# Patient Record
Sex: FEMALE | Race: WHITE | NOT HISPANIC OR LATINO | Employment: FULL TIME | URBAN - METROPOLITAN AREA
[De-identification: names, ages, dates, MRNs, and addresses within clinical notes are randomized per-mention and may not be internally consistent; named-entity substitution may affect disease eponyms.]

---

## 2017-04-24 ENCOUNTER — MYC MEDICAL ADVICE (OUTPATIENT)
Dept: PEDIATRICS | Facility: CLINIC | Age: 47
End: 2017-04-24

## 2017-04-24 NOTE — TELEPHONE ENCOUNTER
Appointment scheduled for 4pm Thursday 4/27/17 per patients request via Shared Performancet.    Elo Brady RN,   [facility]

## 2017-04-26 ENCOUNTER — TELEPHONE (OUTPATIENT)
Dept: OPTOMETRY | Facility: CLINIC | Age: 47
End: 2017-04-26

## 2017-04-26 ENCOUNTER — OFFICE VISIT (OUTPATIENT)
Dept: OPTOMETRY | Facility: CLINIC | Age: 47
End: 2017-04-26
Payer: COMMERCIAL

## 2017-04-26 DIAGNOSIS — H52.4 HYPEROPIA WITH ASTIGMATISM AND PRESBYOPIA, BILATERAL: Primary | ICD-10-CM

## 2017-04-26 DIAGNOSIS — H52.03 HYPEROPIA WITH ASTIGMATISM AND PRESBYOPIA, BILATERAL: Primary | ICD-10-CM

## 2017-04-26 DIAGNOSIS — H52.203 HYPEROPIA WITH ASTIGMATISM AND PRESBYOPIA, BILATERAL: Primary | ICD-10-CM

## 2017-04-26 PROCEDURE — 92310 CONTACT LENS FITTING OU: CPT | Performed by: OPTOMETRIST

## 2017-04-26 PROCEDURE — 92014 COMPRE OPH EXAM EST PT 1/>: CPT | Mod: GA | Performed by: OPTOMETRIST

## 2017-04-26 PROCEDURE — 92015 DETERMINE REFRACTIVE STATE: CPT | Performed by: OPTOMETRIST

## 2017-04-26 ASSESSMENT — VISUAL ACUITY
OD_CC: 20/25
OD_SC: 20/60
CORRECTION_TYPE: GLASSES
METHOD: SNELLEN - LINEAR
OS_SC+: -1
OS_CC+: +2
OS_CC: 20/30-1
OD_SC: 20/30
OS_SC: 20/40
OS_CC: 20/25
OD_SC+: -2
OS_SC: 20/30

## 2017-04-26 ASSESSMENT — REFRACTION_MANIFEST
OS_SPHERE: +0.50
OS_AXIS: 164
OD_AXIS: 010
OD_SPHERE: +0.25
METHOD_AUTOREFRACTION: 1
OD_CYLINDER: +1.25
OS_CYLINDER: +0.75
OD_SPHERE: +0.25
OS_SPHERE: +0.50
OD_AXIS: 020
OS_AXIS: 163
OD_CYLINDER: +1.25
OS_CYLINDER: +0.75

## 2017-04-26 ASSESSMENT — REFRACTION_WEARINGRX
SPECS_TYPE: SVL
OD_AXIS: 12
OD_CYLINDER: +1.50
OS_CYLINDER: +1.00
OS_SPHERE: +0.25
OD_SPHERE: PLANO
OS_AXIS: 160

## 2017-04-26 ASSESSMENT — REFRACTION_CURRENTRX
OS_BASECURVE: 8.6
OS_AXIS: 070
OD_DIAMETER: 14.0
OS_BASECURVE: 8.6
OS_ADDL_SPECS: DIST
OD_CYLINDER: -1.25
OS_BRAND: ACUVUE OASYS
OS_ADDL_SPECS: DIST
OS_DIAMETER: 14.0
OS_SPHERE: +1.25
OD_AXIS: 110
OD_ADDL_SPECS: NEAR
OS_BRAND: ACUVUE OASYS
OD_DIAMETER: 14.0
OS_AXIS: 070
OD_CYLINDER: -1.25
OD_BRAND: ACUVUE OASYS
OS_SPHERE: +1.25
OD_AXIS: 110
OD_BASECURVE: 8.6
OS_CYLINDER: -0.75
OD_BASECURVE: 8.6
OD_BRAND: ACUVUE OASYS
OD_ADDL_SPECS: DIST
OD_SPHERE: +1.50
OS_CYLINDER: -0.75
OD_SPHERE: +2.25
OS_DIAMETER: 14.0

## 2017-04-26 ASSESSMENT — KERATOMETRY
OS_K1POWER_DIOPTERS: 44.25
METHOD_AUTO_MANUAL: AUTOMATED
OD_AXISANGLE2_DEGREES: 099
OS_AXISANGLE_DEGREES: 171
OS_K2POWER_DIOPTERS: 45.00
OD_AXISANGLE_DEGREES: 009
OD_K1POWER_DIOPTERS: 44.50
OD_K2POWER_DIOPTERS: 45.75
OS_AXISANGLE2_DEGREES: 081

## 2017-04-26 ASSESSMENT — CONF VISUAL FIELD
OS_NORMAL: 1
OD_NORMAL: 1
METHOD: COUNTING FINGERS

## 2017-04-26 ASSESSMENT — EXTERNAL EXAM - LEFT EYE: OS_EXAM: NORMAL

## 2017-04-26 ASSESSMENT — EXTERNAL EXAM - RIGHT EYE: OD_EXAM: NORMAL

## 2017-04-26 ASSESSMENT — TONOMETRY
OD_IOP_MMHG: 18
OS_IOP_MMHG: 20
IOP_METHOD: APPLANATION

## 2017-04-26 ASSESSMENT — SLIT LAMP EXAM - LIDS
COMMENTS: NORMAL
COMMENTS: NORMAL

## 2017-04-26 ASSESSMENT — CUP TO DISC RATIO
OD_RATIO: 0.1
OS_RATIO: 0.2

## 2017-04-26 NOTE — MR AVS SNAPSHOT
After Visit Summary   4/26/2017    Lachelle Duque    MRN: [id number]           Patient Information     Date Of Birth          1970        Visit Information        Provider Department      4/26/2017 9:30 AM Savi Gray OD [facility]        Today's Diagnoses     Hyperopia with astigmatism and presbyopia, bilateral    -  1      Care Instructions    We will order the contact lenses and call you when they come in. Try the monovison and let me know if you like it or not.    Your eyes may be blurry at near and sensitive to light for several hours from the dilating drops.    Thank you!      Optometry Providers       Clinic Locations                                 Telephone Number   Dr. Savi Spann   [facility] and [place]  [phone]     [facility] [facility] Hours:                [facility] Hours:       [facility] Hours:  [address]   [address] [facility]     [address] [place] [address]   [place] [address]    [place] [address]  Phone: [phone]                    Phone [phone]                      Phone: [phone]                          Monday 8:00-7:00                          Monday 8:00-7:00                          Monday 8:00-7:00              Tuesday 8:00-6:00                          Tuesday 8:00-7:00                          Tuesday 8:00-7:00              Wednesday 8:00-6:00                  Wednesday 8:00-7:00                   Wednesday 8:00-7:00      Thursday 8:00-6:00                        Thursday 8:00-7:00                         Thursday 8:00-7:00            Friday 8:00-5:00                              Friday 8:00-5:00                              Friday 8:00-5:00    Please log on to [email or web address]) to order your contact lenses.  The link is found on the Eye Care and Vision Services page.  As always, Thank you for  trusting us with your health care needs!          Follow-ups after your visit        Follow-up notes from your care team     Return in about 1 year (around 4/26/2018) for comprehensive eye exam.      Your next 10 appointments already scheduled     Apr 27, 2017  4:00 PM CDT   Return Visit with ROSALIND Nazario CNP   [facility]    [id number] [place] [id number] [phone]              Who to contact     If you have questions or need follow up information about today's clinic visit or your schedule please contact [facility] directly at [phone].  Normal or non-critical lab and imaging results will be communicated to you by MyChart, letter or phone within 4 business days after the clinic has received the results. If you do not hear from us within 7 days, please contact the clinic through iSSimplehart or phone. If you have a critical or abnormal lab result, we will notify you by phone as soon as possible.  Submit refill requests through [employer] or call your pharmacy and they will forward the refill request to us. Please allow 3 business days for your refill to be completed.          Additional Information About Your Visit        MyChart Information     [employer] gives you secure access to your electronic health record. If you see a primary care provider, you can also send messages to your care team and make appointments. If you have questions, please call your primary care clinic.  If you do not have a primary care provider, please call [phone] and they will assist you.        Care EveryWhere ID     This is your Care EveryWhere ID. This could be used by other organizations to access your [place] medical records  [id number]         Blood Pressure from Last 3 Encounters:   09/06/16 110/60   02/26/16 110/60   10/27/15 129/80    Weight from Last 3 Encounters:   09/06/16 106.7 kg (235 lb 3.2 oz)   02/26/16 105.9 kg (233 lb  8 oz)   10/20/15 104.3 kg (230 lb)              We Performed the Following     CONTACT LENS FITTING,BILAT     EYE EXAM (SIMPLE-NONBILLABLE)     REFRACTION        Primary Care Provider Office Phone # Fax #    ROSALIND Nazario [phone]       [facility] [address]  [place] [address]        Thank you!     Thank you for choosing [facility]  for your care. Our goal is always to provide you with excellent care. Hearing back from our patients is one way we can continue to improve our services. Please take a few minutes to complete the written survey that you may receive in the mail after your visit with us. Thank you!             Your Updated Medication List - Protect others around you: Learn how to safely use, store and throw away your medicines at www.disposemymeds.org.          This list is accurate as of: 4/26/17 11:58 AM.  Always use your most recent med list.                   Brand Name Dispense Instructions for use    acyclovir 400 MG tablet    ZOVIRAX    180 tablet    one tid po for 7 days prn for flare ups and one daily for maintenance.       BENADRYL 25 MG capsule   Generic drug:  diphenhydrAMINE      Take 25 mg by mouth every 6 hours as needed.       fluticasone 50 MCG/ACT spray    FLONASE    16 g    Spray 2 sprays into both nostrils daily       norethindrone-ethinyl estradiol 1-35 MG-MCG per tablet    NORTREL 1/35 (28)    84 tablet    Take 1 tablet by mouth daily       omeprazole 20 MG tablet     30 tablet    Take 1 tablet by mouth daily. Take 30-60 minutes before a meal.       SUDAFED 12 HOUR 120 MG 12 hr tablet   Generic drug:  pseudoePHEDrine      Take 120 mg by mouth every 12 hours. PRN.       ZYRTEC ALLERGY 10 MG Caps   Generic drug:  cetirizine HCl      Take one tablet daily.

## 2017-04-26 NOTE — Clinical Note
Luna,  Please order prescription 1 and 2 for Lachelle. She can  both and either call or come in to finalize. Thanks!  Hb

## 2017-04-26 NOTE — TELEPHONE ENCOUNTER
Current Contact Lens Rx     Brand Base Curve Diameter Sphere Cylinder Axis Addl. Specs   Right Acuvue Oasys 8.6 14.0 +1.50 -1.25 110 Dist   Left Acuvue Oasys 8.6 14.0 +1.25 -0.75 070 Dist      Current Contact Lens Rx #2 (Trial Lens)     Brand Base Curve Diameter Sphere Cylinder Axis Addl. Specs   Right Acuvue Oasys 8.6 14.0 +2.25 -1.25 110 Near   Left Acuvue Oasys 8.6 14.0 +1.25 -0.75 070 Dist        Ordered from [facility].  Pt needs to let Dr. Gray know if the monovision works.  If works no need to come in for follow up.

## 2017-04-26 NOTE — PATIENT INSTRUCTIONS
We will order the contact lenses and call you when they come in. Try the monovison and let me know if you like it or not.    Your eyes may be blurry at near and sensitive to light for several hours from the dilating drops.    Thank you!      Optometry Providers       Clinic Locations                                 Telephone Number   Dr. Savi Spann   [facility] and [place]  [phone]     [facility] Hours:                [facility] Hours:       [facility] [place] [facility] Hours:  [address]   [address] [facility] [address]     [address] [place] [address]   [place] [address]    [place] [address]  Phone: [phone]                    Phone [phone]                      Phone: [phone]                          Monday 8:00-7:00                          Monday 8:00-7:00                          Monday 8:00-7:00              Tuesday 8:00-6:00                          Tuesday 8:00-7:00                          Tuesday 8:00-7:00              Wednesday 8:00-6:00                  Wednesday 8:00-7:00                   Wednesday 8:00-7:00      Thursday 8:00-6:00                        Thursday 8:00-7:00                         Thursday 8:00-7:00            Friday 8:00-5:00                              Friday 8:00-5:00                              Friday 8:00-5:00    Please log on to [email or web address] to order your contact lenses.  The link is found on the Eye Care and Vision Services page.  As always, Thank you for trusting us with your health care needs!

## 2017-04-26 NOTE — PROGRESS NOTES
Chief Complaint   Patient presents with     COMPREHENSIVE EYE EXAM     Contact Lens Fitting     waiver signed $60        Previous contact lens wearer? Yes: Acuvue Oasys for Astigmatism   Comfort of contact lenses :Pt likes her contacts. Wears for about 12 hours a day and uses biotru solution  Satisfied with current lenses: Yes        Last Eye Exam: 3/2016  Dilated Previously: Yes    What are you currently using to see?  glasses and contacts    Distance Vision Acuity: Satisfied with vision    Near Vision Acuity: Not satisfied . Pt will wear cheaters sometimes with contacts    Eye Comfort: good  Do you use eye drops? 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       OBJECTIVE: See Ophthalmology exam    ASSESSMENT:    ICD-10-CM    1. Hyperopia with astigmatism and presbyopia, bilateral H52.03 EYE EXAM (SIMPLE-NONBILLABLE)    H52.203 REFRACTION     CONTACT LENS FITTING,BILAT      PLAN:     Patient Instructions     We will order the contact lenses and call you when they come in. Try the monovison and let me know if you like it or not.    Your eyes may be blurry at near and sensitive to light for several hours from the dilating drops.    Thank you!      Optometry Providers       Clinic Locations                                 Telephone Number   Dr. Savi Spann   [facility]  [phone]     [facility] Hours:                [place] [facility] Hours:       [facility] Hours:  [address] [facility] [address]   [address]     [address] [place] [address]   [place] [address]    [place] [address]  Phone: [phone]                    Phone [phone]                      Phone: [phone]                          Monday 8:00-7:00                           Monday 8:00-7:00                          Monday 8:00-7:00              Tuesday 8:00-6:00                          Tuesday 8:00-7:00                          Tuesday 8:00-7:00              Wednesday 8:00-6:00                  Wednesday 8:00-7:00                   Wednesday 8:00-7:00      Thursday 8:00-6:00                        Thursday 8:00-7:00                         Thursday 8:00-7:00            Friday 8:00-5:00                              Friday 8:00-5:00                              Friday 8:00-5:00    Please log on to [email or web address] to order your contact lenses.  The link is found on the Eye Care and Vision Services page.  As always, Thank you for trusting us with your health care needs!

## 2017-05-10 ENCOUNTER — TELEPHONE (OUTPATIENT)
Dept: OPTOMETRY | Facility: CLINIC | Age: 47
End: 2017-05-10

## 2017-05-10 ASSESSMENT — REFRACTION_MANIFEST
OD_ADD: +1.50
OS_ADD: +1.50

## 2017-05-10 NOTE — TELEPHONE ENCOUNTER
Reason for Call:  Other call back    Detailed comments: Lachelle called asking someone to give her a call regarding the trial pair of contact lens she is currently using. Has a couple general questions, did not specify    Phone Number Patient can be reached at: Home number on file [phone] (home)    Best Time: Any    Can we leave a detailed message on this number? YES    Call taken on 5/10/2017 at 5:38 PM by Sue Berrios

## 2017-05-11 NOTE — TELEPHONE ENCOUNTER
Called Lachelle. She likes the mono fit. Will finalize tonight and fax to her when she calls with the number tomorrow.    Savi Gray O.D.  Tel: [phone]

## 2017-08-18 ENCOUNTER — RADIANT APPOINTMENT (OUTPATIENT)
Dept: MAMMOGRAPHY | Facility: CLINIC | Age: 47
End: 2017-08-18
Attending: NURSE PRACTITIONER
Payer: COMMERCIAL

## 2017-08-18 DIAGNOSIS — Z12.31 VISIT FOR SCREENING MAMMOGRAM: ICD-10-CM

## 2017-08-18 PROCEDURE — 77063 BREAST TOMOSYNTHESIS BI: CPT | Performed by: RADIOLOGY

## 2017-08-18 PROCEDURE — G0202 SCR MAMMO BI INCL CAD: HCPCS | Performed by: RADIOLOGY

## 2017-09-08 ENCOUNTER — OFFICE VISIT (OUTPATIENT)
Dept: PEDIATRICS | Facility: CLINIC | Age: 47
End: 2017-09-08
Payer: COMMERCIAL

## 2017-09-08 VITALS
SYSTOLIC BLOOD PRESSURE: 115 MMHG | OXYGEN SATURATION: 97 % | HEART RATE: 76 BPM | HEIGHT: 66 IN | DIASTOLIC BLOOD PRESSURE: 80 MMHG | BODY MASS INDEX: 39.55 KG/M2 | TEMPERATURE: 97.1 F | WEIGHT: 246.1 LBS

## 2017-09-08 DIAGNOSIS — Z00.00 ENCOUNTER FOR ROUTINE ADULT HEALTH EXAMINATION WITHOUT ABNORMAL FINDINGS: Primary | ICD-10-CM

## 2017-09-08 DIAGNOSIS — Z30.09 ENCOUNTER FOR OTHER GENERAL COUNSELING OR ADVICE ON CONTRACEPTION: ICD-10-CM

## 2017-09-08 DIAGNOSIS — Z13.6 CARDIOVASCULAR SCREENING; LDL GOAL LESS THAN 160: ICD-10-CM

## 2017-09-08 DIAGNOSIS — Z00.00 ENCOUNTER FOR ROUTINE ADULT HEALTH EXAMINATION WITHOUT ABNORMAL FINDINGS: ICD-10-CM

## 2017-09-08 DIAGNOSIS — E55.9 VITAMIN D INSUFFICIENCY: ICD-10-CM

## 2017-09-08 LAB
ANION GAP SERPL CALCULATED.3IONS-SCNC: 5 MMOL/L (ref 3–14)
BUN SERPL-MCNC: 14 MG/DL (ref 7–30)
CALCIUM SERPL-MCNC: 8.9 MG/DL (ref 8.5–10.1)
CHLORIDE SERPL-SCNC: 108 MMOL/L (ref 94–109)
CHOLEST SERPL-MCNC: 192 MG/DL
CO2 SERPL-SCNC: 27 MMOL/L (ref 20–32)
CREAT SERPL-MCNC: 0.67 MG/DL (ref 0.52–1.04)
DEPRECATED CALCIDIOL+CALCIFEROL SERPL-MC: 26 UG/L (ref 20–75)
GFR SERPL CREATININE-BSD FRML MDRD: >90 ML/MIN/1.7M2
GLUCOSE SERPL-MCNC: 107 MG/DL (ref 70–99)
HDLC SERPL-MCNC: 38 MG/DL
LDLC SERPL CALC-MCNC: 121 MG/DL
NONHDLC SERPL-MCNC: 154 MG/DL
POTASSIUM SERPL-SCNC: 3.9 MMOL/L (ref 3.4–5.3)
SODIUM SERPL-SCNC: 140 MMOL/L (ref 133–144)
TRIGL SERPL-MCNC: 165 MG/DL

## 2017-09-08 PROCEDURE — 99396 PREV VISIT EST AGE 40-64: CPT | Performed by: NURSE PRACTITIONER

## 2017-09-08 PROCEDURE — 82306 VITAMIN D 25 HYDROXY: CPT | Performed by: NURSE PRACTITIONER

## 2017-09-08 PROCEDURE — 36415 COLL VENOUS BLD VENIPUNCTURE: CPT | Performed by: NURSE PRACTITIONER

## 2017-09-08 PROCEDURE — 80048 BASIC METABOLIC PNL TOTAL CA: CPT | Performed by: NURSE PRACTITIONER

## 2017-09-08 PROCEDURE — 80061 LIPID PANEL: CPT | Performed by: NURSE PRACTITIONER

## 2017-09-08 NOTE — PROGRESS NOTES
SUBJECTIVE:   CC: Lachelle Duque is an 47 year old woman who presents for preventive health visit.     Healthy Habits:    Do you get at least three servings of calcium containing foods daily (dairy, green leafy vegetables, etc.)? yes    Amount of exercise or daily activities, outside of work: 4-5 day(s) per week    Problems taking medications regularly No    Medication side effects: No    Have you had an eye exam in the past two years? yes    Do you see a dentist twice per year? yes  Do you have sleep apnea, excessive snoring or daytime drowsiness?no      Today's PHQ-2 Score:   PHQ-2 ( 1999 Pfizer) 9/8/2017 9/6/2016   Q1: Little interest or pleasure in doing things 0 0   Q2: Feeling down, depressed or hopeless 0 0   PHQ-2 Score 0 0         Abuse: Current or Past(Physical, Sexual or Emotional)- NA  Do you feel safe in your environment - Yes  Social History   Substance Use Topics     Smoking status: Never Smoker     Smokeless tobacco: Never Used     Alcohol use 0.5 oz/week     1 Standard drinks or equivalent per week      Comment: occasionally     The patient does not drink >3 drinks per day nor >7 drinks per week.    Reviewed orders with patient.  Reviewed health maintenance and updated orders accordingly - Yes  Labs reviewed in EPIC  BP Readings from Last 3 Encounters:   09/08/17 115/80   09/06/16 110/60   02/26/16 110/60    Wt Readings from Last 3 Encounters:   09/08/17 246 lb 1.6 oz (111.6 kg)   09/06/16 235 lb 3.2 oz (106.7 kg)   02/26/16 233 lb 8 oz (105.9 kg)                  Patient Active Problem List   Diagnosis     Encounter for other general counseling or advice on contraception     Herpes zoster     Family history of colon cancer     Hyperlipidemia LDL goal <130     Obesity     Vitamin D deficiency     Hiatal hernia     GERD (gastroesophageal reflux disease)     Cholelithiasis     Biliary colic     Environmental allergies     Elevation of optic disc, right     Pupil asymmetry     Past Surgical  History:   Procedure Laterality Date     ADENOIDECTOMY  1977     COLONOSCOPY N/A 10/27/2015    Procedure: COLONOSCOPY;  Surgeon: Duane, William Charles, MD;  Location: [facility] OR     COLONOSCOPY WITH CO2 INSUFFLATION N/A 10/27/2015    Procedure: COLONOSCOPY WITH CO2 INSUFFLATION;  Surgeon: Duane, William Charles, MD;  Location: [facility] OR     HC TOOTH EXTRACTION W/FORCEP      18 yo     LAPAROSCOPIC CHOLECYSTECTOMY  1/30/2013    Procedure: LAPAROSCOPIC CHOLECYSTECTOMY;  Laparoscopic Cholecystectomy;  Surgeon: Cindy Soni MD;  Location: [facility] OR     SINUS SURGERY  1988    Deviated septum     wisdom teeth removed[         Social History   Substance Use Topics     Smoking status: Never Smoker     Smokeless tobacco: Never Used     Alcohol use 0.5 oz/week     1 Standard drinks or equivalent per week      Comment: occasionally     Family History   Problem Relation Age of Onset     C.A.D. Father      bypass     DIABETES Father      Cancer - colorectal Father 42     GASTROINTESTINAL DISEASE Mother      diverticulosis     Arthritis Mother      fibromyalgia, lupus, rheumatoid     Anesthesia Reaction Mother      nausea     Asthma Mother      Lipids Mother      DIABETES Mother      Macular Degeneration Other      CANCER Other 29     CML     Breast Cancer Other      Colon Polyps Brother      C.A.D. Maternal Grandfather      CEREBROVASCULAR DISEASE Maternal Grandfather      Hypertension Maternal Grandmother      Glaucoma Maternal Grandmother      GASTROINTESTINAL DISEASE Maternal Grandmother      diverticulosis     Depression Brother      Arthritis Brother      fibromyalgia     Anesthesia Reaction Brother      nausea     CANCER Maternal Aunt      Breast Cancer Maternal Aunt      Thyroid Disease No family hx of      Prostate Cancer No family hx of          Current Outpatient Prescriptions   Medication Sig Dispense Refill     norethindrone-ethinyl estradiol (NORTREL 1/35, 28,) 1-35 MG-MCG per tablet Take 1 tablet by mouth daily 84  tablet 4     acyclovir (ZOVIRAX) 400 MG tablet one tid po for 7 days prn for flare ups and one daily for maintenance. 180 tablet 3     fluticasone (FLONASE) 50 MCG/ACT nasal spray Spray 2 sprays into both nostrils daily 16 g 3     omeprazole 20 MG tablet Take 1 tablet by mouth daily. Take 30-60 minutes before a meal. 30 tablet 1     diphenhydrAMINE (BENADRYL) 25 MG capsule Take 25 mg by mouth every 6 hours as needed.       cetirizine HCl (ZYRTEC ALLERGY) 10 MG CAPS Take one tablet daily.       pseudoePHEDrine (SUDAFED 12 HOUR) 120 MG 12 hr tablet Take 120 mg by mouth every 12 hours. PRN.        [DISCONTINUED] norethindrone-ethinyl estradiol (NORTREL 1/35, 28,) 1-35 MG-MCG per tablet Take 1 tablet by mouth daily 84 tablet 4     Allergies   Allergen Reactions     Sulfa Drugs Anaphylaxis           Patient under age 50, mutual decision reflected in health maintenance.        Pertinent mammograms are reviewed under the imaging tab.  History of abnormal Pap smear: NO - age 30- 65 PAP every 3 years recommended    Reviewed and updated as needed this visit by clinical staffTobacco  Allergies  Meds  Med Hx  Surg Hx  Fam Hx  Soc Hx        Reviewed and updated as needed this visit by Provider        Past Medical History:   Diagnosis Date     Chronic rhinitis      Environmental allergies 1/18/2013     Gastro-oesophageal reflux disease      GERD (gastroesophageal reflux disease) 1/3/2013     Herpes zoster without mention of complication     L eye      Hiatal hernia      PONV (postoperative nausea and vomiting)       Past Surgical History:   Procedure Laterality Date     ADENOIDECTOMY  1977     COLONOSCOPY N/A 10/27/2015    Procedure: COLONOSCOPY;  Surgeon: Duane, William Charles, MD;  Location: [facility]     COLONOSCOPY WITH CO2 INSUFFLATION N/A 10/27/2015    Procedure: COLONOSCOPY WITH CO2 INSUFFLATION;  Surgeon: Duane, William Charles, MD;  Location: [facility] OR     HC TOOTH EXTRACTION W/FORCEP      18 yo     LAPAROSCOPIC  "CHOLECYSTECTOMY  1/30/2013    Procedure: LAPAROSCOPIC CHOLECYSTECTOMY;  Laparoscopic Cholecystectomy;  Surgeon: Cindy Soni MD;  Location: [facility] OR     SINUS SURGERY  1988    Deviated septum     wisdom teeth removed[         ROS:  CONSTITUTIONAL:NEGATIVE for fever, chills, change in weight  INTEGUMENTARY/SKIN: NEGATIVE for worrisome rashes, moles or lesions  EYES: NEGATIVE for vision changes or irritation  ENT: NEGATIVE for ear, mouth and throat problems  RESP:NEGATIVE for significant cough or SOB  BREAST: NEGATIVE for masses, tenderness or discharge  CV: NEGATIVE for chest pain, palpitations or peripheral edema  GI: NEGATIVE for nausea, abdominal pain, heartburn, or change in bowel habits   female: normal menses, no unusual urinary symptoms and no unusual vaginal symptoms  MUSCULOSKELETAL:NEGATIVE for significant arthralgias or myalgia  NEURO: NEGATIVE for weakness, dizziness or paresthesias  ENDOCRINE: NEGATIVE for temperature intolerance, skin/hair changes  HEME/ALLERGY/IMMUNE: NEGATIVE for bleeding problems  PSYCHIATRIC: NEGATIVE for changes in mood or affect     OBJECTIVE:   /80 (BP Location: Right arm, Patient Position: Sitting, Cuff Size: Adult Large)  Pulse 76  Temp 97.1  F (36.2  C) (Temporal)  Ht 5' 6.25\" (1.683 m)  Wt 246 lb 1.6 oz (111.6 kg)  LMP 08/23/2017  SpO2 97%  Breastfeeding? No  BMI 39.42 kg/m2   Wt Readings from Last 4 Encounters:   09/08/17 246 lb 1.6 oz (111.6 kg)   09/06/16 235 lb 3.2 oz (106.7 kg)   02/26/16 233 lb 8 oz (105.9 kg)   10/20/15 230 lb (104.3 kg)       EXAM:  GENERAL: alert, no distress and obese  EYES: Eyes grossly normal to inspection, PERRL and conjunctivae and sclerae normal  HENT: ear canals and TM's normal, nose and mouth without ulcers or lesions  NECK: no adenopathy, no asymmetry, masses, or scars and thyroid normal to palpation  RESP: lungs clear to auscultation - no rales, rhonchi or wheezes  BREAST: normal without masses, tenderness or nipple " discharge and no palpable axillary masses or adenopathy  CV: regular rate and rhythm, normal S1 S2, no S3 or S4, no murmur, click or rub, no peripheral edema and peripheral pulses strong  ABDOMEN: soft, nontender, no hepatosplenomegaly, no masses and bowel sounds normal   (female): normal female external genitalia, normal urethral meatus, vaginal mucosa pink, moist, well rugated, and normal cervix/adnexa/uterus without masses or discharge  MS: no gross musculoskeletal defects noted, no edema  SKIN: no suspicious lesions or rashes  NEURO: Normal strength and tone, mentation intact and speech normal  PSYCH: mentation appears normal, affect normal/bright  LYMPH: no cervical, supraclavicular, axillary, or inguinal adenopathy    ASSESSMENT/PLAN:   Lachelle was seen today for physical.    Diagnoses and all orders for this visit:    Encounter for routine adult health examination without abnormal findings    Encounter for other general counseling or advice on contraception  -     norethindrone-ethinyl estradiol (NORTREL 1/35, 28,) 1-35 MG-MCG per tablet; Take 1 tablet by mouth daily    PLAN:   1.   Symptomatic therapy suggested: Continue current medications.  2.  Orders Placed This Encounter   Medications     norethindrone-ethinyl estradiol (NORTREL 1/35, 28,) 1-35 MG-MCG per tablet     Sig: Take 1 tablet by mouth daily     Dispense:  84 tablet     Refill:  4     3. Patient needs to follow up in if no improvement,or sooner if worsening of symptoms or other symptoms develop.  Follow up office visit in one year for annual health maintenance exam, sooner PRN.    COUNSELING:   Reviewed preventive health counseling, as reflected in patient instructions  Special attention given to:        Regular exercise       Healthy diet/nutrition       Vision screening       Contraception       Family planning       The 10-year ASCVD risk score (Molly RAFAEL Jr, et al., 2013) is: 1.1%    Values used to calculate the score:      Age: 47 years       "Sex: Female      Is Non- : No      Diabetic: No      Tobacco smoker: No      Systolic Blood Pressure: 115 mmHg      Is BP treated: No      HDL Cholesterol: 38 mg/dL      Total Cholesterol: 192 mg/dL         reports that she has never smoked. She has never used smokeless tobacco.    Estimated body mass index is 39.42 kg/(m^2) as calculated from the following:    Height as of this encounter: 5' 6.25\" (1.683 m).    Weight as of this encounter: 246 lb 1.6 oz (111.6 kg).   Weight management plan: Discussed healthy diet and exercise guidelines and patient will follow up in 12 months in clinic to re-evaluate.    Counseling Resources:  ATP IV Guidelines  Pooled Cohorts Equation Calculator  Breast Cancer Risk Calculator  FRAX Risk Assessment  ICSI Preventive Guidelines  Dietary Guidelines for Americans, 2010  USDA's MyPlate  ASA Prophylaxis  Lung CA Screening    ROSALIND Nazario CNP  [facility]  "

## 2017-09-08 NOTE — NURSING NOTE
"Chief Complaint   Patient presents with     Physical     AFE       Initial /80 (BP Location: Right arm, Patient Position: Sitting, Cuff Size: Adult Large)  Pulse 76  Temp 97.1  F (36.2  C) (Temporal)  Ht 5' 6.25\" (1.683 m)  Wt 246 lb 1.6 oz (111.6 kg)  LMP 08/23/2017  SpO2 97%  Breastfeeding? No  BMI 39.42 kg/m2 Estimated body mass index is 39.42 kg/(m^2) as calculated from the following:    Height as of this encounter: 5' 6.25\" (1.683 m).    Weight as of this encounter: 246 lb 1.6 oz (111.6 kg).  Medication Reconciliation: complete      NINO Obrien      "

## 2017-09-08 NOTE — MR AVS SNAPSHOT
After Visit Summary   9/8/2017    Lachelle Duuqe    MRN: [id number]           Patient Information     Date Of Birth          1970        Visit Information        Provider Department      9/8/2017 7:40 AM Suha Beavers APRN CNP [facility]        Today's Diagnoses     Encounter for routine adult health examination without abnormal findings    -  1    Encounter for other general counseling or advice on contraception          Care Instructions    PLAN:   1.   Symptomatic therapy suggested: Continue current medications.  2.  Orders Placed This Encounter   Medications     norethindrone-ethinyl estradiol (NORTREL 1/35, 28,) 1-35 MG-MCG per tablet     Sig: Take 1 tablet by mouth daily     Dispense:  84 tablet     Refill:  4     3. Patient needs to follow up in if no improvement,or sooner if worsening of symptoms or other symptoms develop.  Follow up office visit in one year for annual health maintenance exam, sooner PRN.    Preventive Health Recommendations  Female Ages 40 to 49    Yearly exam:     See your health care provider every year in order to  1. Review health changes.   2. Discuss preventive care.    3. Review your medicines if your doctor prescribed any.      Get a Pap test every three years (unless you have an abnormal result and your provider advises testing more often).      If you get Pap tests with HPV test, you only need to test every 5 years, unless you have an abnormal result. You do not need a Pap test if your uterus was removed (hysterectomy) and you have not had cancer.      You should be tested each year for STDs (sexually transmitted diseases), if you're at risk.       Ask your doctor if you should have a mammogram.      Have a colonoscopy (test for colon cancer) if someone in your family has had colon cancer or polyps before age 50.       Have a cholesterol test every 5 years.       Have a diabetes test (fasting glucose) after age 45. If you are at risk  for diabetes, you should have this test every 3 years.    Shots: Get a flu shot each year. Get a tetanus shot every 10 years.     Nutrition:     Eat at least 5 servings of fruits and vegetables each day.    Eat whole-grain bread, whole-wheat pasta and brown rice instead of white grains and rice.    Talk to your provider about Calcium and Vitamin D.     Lifestyle    Exercise at least 150 minutes a week (an average of 30 minutes a day, 5 days a week). This will help you control your weight and prevent disease.    Limit alcohol to one drink per day.    No smoking.     Wear sunscreen to prevent skin cancer.    See your dentist every six months for an exam and cleaning.  It was a pleasure seeing you today at the [facility]. Thank you for allowing us to care for you today. We truly hope we provided you with the excellent service you deserve. Please let us know if there is anything else we can do for you so we can be sure you are leaving completley satisfied with your care experience.       General information about your clinic   Clinic Hours Lab Hours (Appointments are required)   Mon-Thurs: 7:30 AM - 7 PM Mon-Thurs: 7:30 AM - 7 PM   Fri: 7:30 AM - 5 PM Fri: 7:30 AM - 5 PM        After Hours Nurse Advise & Appts:  [facility]: [phone]  [facility] On Call: to make appointments anytime: [phone] On Call Physician: call [phone] and answering service will page the on call physician.        For urgent appointments, please call [phone] and ask for the triage nurse or your care team clinic nurse.  How to contact my care team:  MyChart: [email or web address]   Phone: [phone]   Fax: [phone]       [facility]:   Phone: [phone]  Hours: 8:00 AM - 6:00 PM  Medication Refills:  Call your pharmacy and they will forward the refill to us. Please allow 3 business days for your refills to be completed.       Normal or non-critical lab and imaging results  will be communicated to you by MyChart, letter or phone within 7 days.  If you do not hear from us within 10 days, please call the clinic. If you have a critical or abnormal lab result, we will notify you by phone as soon as possible.       We now have PWIC (Pediatric Walk in Care)  Monday-Friday from 7:30-4. Simply walk in and be seen for your urgent needs like cough, fever, rash, diarrhea or vomiting, pink eye, UTI. No appointments needed. Ask one of the team for more information      -Your Care Team:    Dr. Senait Shen - Internal Medicine/Pediatrics   Dr. Yue Wiggins - Family Medicine  Dr. Kirsten Iqbal - Pediatrics  Dr. Polly Tovar - Pediatrics  Suha Beavers CNP - Family Practice Nurse Practitioner            Follow-ups after your visit        Who to contact     If you have questions or need follow up information about today's clinic visit or your schedule please contact [facility] directly at [phone].  Normal or non-critical lab and imaging results will be communicated to you by Fangddhart, letter or phone within 4 business days after the clinic has received the results. If you do not hear from us within 7 days, please contact the clinic through Fangddhart or phone. If you have a critical or abnormal lab result, we will notify you by phone as soon as possible.  Submit refill requests through [employer] or call your pharmacy and they will forward the refill request to us. Please allow 3 business days for your refill to be completed.          Additional Information About Your Visit        [employer] Information     [employer] gives you secure access to your electronic health record. If you see a primary care provider, you can also send messages to your care team and make appointments. If you have questions, please call your primary care clinic.  If you do not have a primary care provider, please call [phone] and they will assist you.      [employer] is an electronic gateway that provides easy,  "online access to your medical records. With dianboom, you can request a clinic appointment, read your test results, renew a prescription or communicate with your care team.     To access your existing account, please contact your [facility] or call [phone] for assistance.        Care EveryWhere ID     This is your Care EveryWhere ID. This could be used by other organizations to access your [place] medical records  [id number]        Your Vitals Were     Pulse Temperature Height Last Period Pulse Oximetry Breastfeeding?    76 97.1  F (36.2  C) (Temporal) 5' 6.25\" (1.683 m) 08/23/2017 97% No    BMI (Body Mass Index)                   39.42 kg/m2            Blood Pressure from Last 3 Encounters:   09/08/17 115/80   09/06/16 110/60   02/26/16 110/60    Weight from Last 3 Encounters:   09/08/17 246 lb 1.6 oz (111.6 kg)   09/06/16 235 lb 3.2 oz (106.7 kg)   02/26/16 233 lb 8 oz (105.9 kg)              Today, you had the following     No orders found for display         Where to get your medicines      These medications were sent to [facility] - [place], [place] - [address]  [address] [facility] [address]    Hours:  Mon-Fri 8:30am-5:00pm Toll Free [phone] Phone:  [phone]     norethindrone-ethinyl estradiol 1-35 MG-MCG per tablet          Primary Care Provider Office Phone # Fax #    Suha nAnel Beavers, APRN [facility] [phone]       [address]  [place] [address]        Equal Access to Services     TUCKER GARCIA AH: [place]lin[place] Kim, waraimundoda lumarkoadaha, qaybta kaalmada pema, lior rosales. So [facility] [phone].    ATENCIÓN: Si habla español, tiene a knight disposición servicios gratuitos de asistencia lingüística. Llame al [phone].    We comply with applicable federal civil rights laws and [place] laws. We do not discriminate on the basis of race, color, " national origin, age, disability sex, sexual orientation or gender identity.            Thank you!     Thank you for choosing [facility]  for your care. Our goal is always to provide you with excellent care. Hearing back from our patients is one way we can continue to improve our services. Please take a few minutes to complete the written survey that you may receive in the mail after your visit with us. Thank you!             Your Updated Medication List - Protect others around you: Learn how to safely use, store and throw away your medicines at www.disposemymeds.org.          This list is accurate as of: 9/8/17  8:02 AM.  Always use your most recent med list.                   Brand Name Dispense Instructions for use Diagnosis    acyclovir 400 MG tablet    ZOVIRAX    180 tablet    one tid po for 7 days prn for flare ups and one daily for maintenance.    Herpes zoster without complication       BENADRYL 25 MG capsule   Generic drug:  diphenhydrAMINE      Take 25 mg by mouth every 6 hours as needed.        fluticasone 50 MCG/ACT spray    FLONASE    16 g    Spray 2 sprays into both nostrils daily    Chronic rhinitis       norethindrone-ethinyl estradiol 1-35 MG-MCG per tablet    NORTREL 1/35 (28)    84 tablet    Take 1 tablet by mouth daily    Encounter for other general counseling or advice on contraception       omeprazole 20 MG tablet     30 tablet    Take 1 tablet by mouth daily. Take 30-60 minutes before a meal.    Recurring abdominal pain, GERD (gastroesophageal reflux disease)       SUDAFED 12 HOUR 120 MG 12 hr tablet   Generic drug:  pseudoePHEDrine      Take 120 mg by mouth every 12 hours. PRN.        ZYRTEC ALLERGY 10 MG Caps   Generic drug:  cetirizine HCl      Take one tablet daily.

## 2017-09-11 NOTE — PROGRESS NOTES
Yanelis Duque,    Attached are your test results.  -Kidney function (GFR) is normal.  -Sodium is normal.  -Potassium is normal.  -Glucose is slight elevated and may be sign of early diabetes (prediabetes). ADVISE:: low carbohydrate diet, exercise, try to lose weight (if necessary) and recheck glucose in 12 months. (GLU,A1C, DX: prediabetes)  -LDL(bad) cholesterol level is elevated, HDL(good) cholesterol level is low and your triglycerides are elevated which can increase your heart disease risk.  A diet high in fat and simple carbohydrates, genetics and being overweight can contribute to this. ADVISE: Exercise, a low fat, low carbohydrate diet, weight control, and omega-3 fatty acids (fish oil) daily are helpful to improve this.  Rechecking your fasting cholesterol panel in 12 months is recommended (Lipid w/ LDL reflex, DX: hyperlipidemia)  -Vitamin D level is mildly below normal, 1000 IU daily in diet or supplements is recommended.    Please contact us if you have any questions.    Suha Beavers, CNP

## 2018-04-27 ENCOUNTER — OFFICE VISIT (OUTPATIENT)
Dept: OPTOMETRY | Facility: CLINIC | Age: 48
End: 2018-04-27
Payer: COMMERCIAL

## 2018-04-27 DIAGNOSIS — H52.203 ASTIGMATISM OF BOTH EYES WITH PRESBYOPIA: Primary | ICD-10-CM

## 2018-04-27 DIAGNOSIS — H47.391 ELEVATION OF OPTIC DISC, RIGHT: ICD-10-CM

## 2018-04-27 DIAGNOSIS — H52.4 ASTIGMATISM OF BOTH EYES WITH PRESBYOPIA: Primary | ICD-10-CM

## 2018-04-27 PROCEDURE — 92014 COMPRE OPH EXAM EST PT 1/>: CPT | Performed by: OPTOMETRIST

## 2018-04-27 PROCEDURE — 92015 DETERMINE REFRACTIVE STATE: CPT | Performed by: OPTOMETRIST

## 2018-04-27 ASSESSMENT — REFRACTION_MANIFEST
OD_CYLINDER: +1.00
OS_SPHERE: +0.25
OS_AXIS: 166
OD_ADD: +1.75
OD_AXIS: 010
OS_ADD: +1.75
OS_CYLINDER: +1.00
METHOD_AUTOREFRACTION: 1
OS_SPHERE: +0.25
OD_CYLINDER: +1.25
OS_CYLINDER: +1.00
OD_SPHERE: PLANO
OS_AXIS: 175
OD_SPHERE: +0.50
OD_AXIS: 010

## 2018-04-27 ASSESSMENT — CUP TO DISC RATIO
OS_RATIO: 0.2
OD_RATIO: 0.1

## 2018-04-27 ASSESSMENT — REFRACTION_WEARINGRX
OS_AXIS: 163
OS_ADD: +1.50
OD_CYLINDER: +1.25
OD_AXIS: 020
OS_SPHERE: +0.50
OD_ADD: +1.50
SPECS_TYPE: PAL
OS_CYLINDER: +0.75
OD_SPHERE: +0.25

## 2018-04-27 ASSESSMENT — VISUAL ACUITY
OS_SC: 20/20-1
OD_SC: 20/30
OD_SC: 20/30
METHOD: SNELLEN - LINEAR
OD_SC+: +1
OD_CC: 20/25
CORRECTION_TYPE: GLASSES
OS_CC+: -1
OS_SC: 20/25
OS_CC: 20/20

## 2018-04-27 ASSESSMENT — SLIT LAMP EXAM - LIDS
COMMENTS: NORMAL
COMMENTS: NORMAL

## 2018-04-27 ASSESSMENT — EXTERNAL EXAM - RIGHT EYE: OD_EXAM: NORMAL

## 2018-04-27 ASSESSMENT — TONOMETRY
OD_IOP_MMHG: 15
IOP_METHOD: APPLANATION
OS_IOP_MMHG: 18

## 2018-04-27 ASSESSMENT — CONF VISUAL FIELD
OS_NORMAL: 1
METHOD: COUNTING FINGERS
OD_NORMAL: 1

## 2018-04-27 ASSESSMENT — EXTERNAL EXAM - LEFT EYE: OS_EXAM: NORMAL

## 2018-04-27 NOTE — PATIENT INSTRUCTIONS
There was very little change in the prescription for your glasses and contact lenses.    Your eyes may be blurry at near and sensitive to light for several hours from the dilating drops.    Yearly eye exams recommended.    Thank you!

## 2018-04-27 NOTE — MR AVS SNAPSHOT
After Visit Summary   4/27/2018    Lachelle Duque    MRN: [id number]           Patient Information     Date Of Birth          1970        Visit Information        Provider Department      4/27/2018 8:40 AM Savi Gray OD [facility]        Today's Diagnoses     Astigmatism of both eyes with presbyopia    -  1    Elevation of optic disc, right          Care Instructions    There was very little change in the prescription for your glasses and contact lenses.    Your eyes may be blurry at near and sensitive to light for several hours from the dilating drops.    Yearly eye exams recommended.    Thank you!          Follow-ups after your visit        Who to contact     If you have questions or need follow up information about today's clinic visit or your schedule please contact [facility] directly at [phone].  Normal or non-critical lab and imaging results will be communicated to you by MyChart, letter or phone within 4 business days after the clinic has received the results. If you do not hear from us within 7 days, please contact the clinic through Flashnoteshart or phone. If you have a critical or abnormal lab result, we will notify you by phone as soon as possible.  Submit refill requests through [employer] or call your pharmacy and they will forward the refill request to us. Please allow 3 business days for your refill to be completed.          Additional Information About Your Visit        MyChart Information     [employer] gives you secure access to your electronic health record. If you see a primary care provider, you can also send messages to your care team and make appointments. If you have questions, please call your primary care clinic.  If you do not have a primary care provider, please call [phone] and they will assist you.        Care EveryWhere ID     This is your Care EveryWhere ID. This could be used by other organizations to access your  [place] medical records  [id number]         Blood Pressure from Last 3 Encounters:   09/08/17 115/80   09/06/16 110/60   02/26/16 110/60    Weight from Last 3 Encounters:   09/08/17 111.6 kg (246 lb 1.6 oz)   09/06/16 106.7 kg (235 lb 3.2 oz)   02/26/16 105.9 kg (233 lb 8 oz)              We Performed the Following     EYE EXAM (SIMPLE-NONBILLABLE)     REFRACTION        Primary Care Provider Office Phone # Fax #    Suha Beavers, APRN [facility] [phone]       [address]  [place] [address]        Equal Access to Services     TUCKER GARCIA : Hadii raul callejaso [place], waaxda luqadaha, qaybta kaalmada adeegyada, lior bernard . So [facility] [phone].    ATENCIÓN: Si habla español, tiene a knight disposición servicios gratuitos de asistencia lingüística. Llame al [phone].    We comply with applicable federal civil rights laws and [place] laws. We do not discriminate on the basis of race, color, national origin, age, disability, sex, sexual orientation, or gender identity.            Thank you!     Thank you for choosing [facility]  for your care. Our goal is always to provide you with excellent care. Hearing back from our patients is one way we can continue to improve our services. Please take a few minutes to complete the written survey that you may receive in the mail after your visit with us. Thank you!             Your Updated Medication List - Protect others around you: Learn how to safely use, store and throw away your medicines at www.disposemymeds.org.          This list is accurate as of 4/27/18 10:03 AM.  Always use your most recent med list.                   Brand Name Dispense Instructions for use Diagnosis    acyclovir 400 MG tablet    ZOVIRAX    180 tablet    one tid po for 7 days prn for flare ups and one daily for maintenance.    Herpes zoster without complication       BENADRYL 25 MG capsule   Generic drug:   diphenhydrAMINE      Take 25 mg by mouth every 6 hours as needed.        fluticasone 50 MCG/ACT spray    FLONASE    16 g    Spray 2 sprays into both nostrils daily    Chronic rhinitis       norethindrone-ethinyl estradiol 1-35 MG-MCG per tablet    NORTREL 1/35 (28)    84 tablet    Take 1 tablet by mouth daily    Encounter for other general counseling or advice on contraception       omeprazole 20 MG tablet     30 tablet    Take 1 tablet by mouth daily. Take 30-60 minutes before a meal.    Recurring abdominal pain, GERD (gastroesophageal reflux disease)       SUDAFED 12 HOUR 120 MG 12 hr tablet   Generic drug:  pseudoePHEDrine      Take 120 mg by mouth every 12 hours. PRN.        ZYRTEC ALLERGY 10 MG Caps   Generic drug:  cetirizine HCl      Take one tablet daily.

## 2018-04-27 NOTE — PROGRESS NOTES
Chief Complaint   Patient presents with     COMPREHENSIVE EYE EXAM         Last Eye Exam: 4/26/2017  Dilated Previously: Yes    What are you currently using to see?  glasses and contacts       Distance Vision Acuity: Satisfied with vision    Near Vision Acuity: Satisfied with vision while reading  with glasses / contacts    Eye Comfort: good  Do you use eye drops? 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       OBJECTIVE: See Ophthalmology exam    ASSESSMENT:    ICD-10-CM    1. Astigmatism of both eyes with presbyopia H52.203 EYE EXAM (SIMPLE-NONBILLABLE)    H52.4 REFRACTION   2. Elevation of optic disc, right H47.391 EYE EXAM (SIMPLE-NONBILLABLE)      PLAN:     Patient Instructions   There was very little change in the prescription for your glasses and contact lenses.    Your eyes may be blurry at near and sensitive to light for several hours from the dilating drops.    Yearly eye exams recommended.    Thank you!

## 2018-04-27 NOTE — LETTER
4/27/2018         RE: Lachelle Duque  [address]  [facility] [address]        Dear Colleague,    Thank you for referring your patient, Lachelle Duque, to the [facility]. Please see a copy of my visit note below.    Chief Complaint   Patient presents with     COMPREHENSIVE EYE EXAM         Last Eye Exam: 4/26/2017  Dilated Previously: Yes    What are you currently using to see?  glasses and contacts       Distance Vision Acuity: Satisfied with vision    Near Vision Acuity: Satisfied with vision while reading  with glasses / contacts    Eye Comfort: good  Do you use eye drops? 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       OBJECTIVE: See Ophthalmology exam    ASSESSMENT:    ICD-10-CM    1. Astigmatism of both eyes with presbyopia H52.203 EYE EXAM (SIMPLE-NONBILLABLE)    H52.4 REFRACTION   2. Elevation of optic disc, right H47.391 EYE EXAM (SIMPLE-NONBILLABLE)      PLAN:     Patient Instructions   There was very little change in the prescription for your glasses and contact lenses.    Your eyes may be blurry at near and sensitive to light for several hours from the dilating drops.    Yearly eye exams recommended.    Thank you!         Again, thank you for allowing me to participate in the care of your patient.        Sincerely,        Savi Gray OD

## 2018-08-20 ENCOUNTER — RADIANT APPOINTMENT (OUTPATIENT)
Dept: MAMMOGRAPHY | Facility: CLINIC | Age: 48
End: 2018-08-20
Attending: NURSE PRACTITIONER
Payer: COMMERCIAL

## 2018-08-20 DIAGNOSIS — Z12.39 SCREENING BREAST EXAMINATION: ICD-10-CM

## 2018-08-20 PROCEDURE — 77067 SCR MAMMO BI INCL CAD: CPT

## 2018-08-20 PROCEDURE — 77063 BREAST TOMOSYNTHESIS BI: CPT

## 2018-09-06 ENCOUNTER — DOCUMENTATION ONLY (OUTPATIENT)
Dept: LAB | Facility: CLINIC | Age: 48
End: 2018-09-06

## 2018-09-06 DIAGNOSIS — Z13.1 SCREENING FOR DIABETES MELLITUS: ICD-10-CM

## 2018-09-06 DIAGNOSIS — Z00.00 ENCOUNTER FOR ROUTINE ADULT HEALTH EXAMINATION WITHOUT ABNORMAL FINDINGS: ICD-10-CM

## 2018-09-06 DIAGNOSIS — Z13.29 SCREENING FOR THYROID DISORDER: ICD-10-CM

## 2018-09-06 DIAGNOSIS — R73.09 ELEVATED GLUCOSE: ICD-10-CM

## 2018-09-06 DIAGNOSIS — E78.5 HYPERLIPIDEMIA LDL GOAL <130: Primary | ICD-10-CM

## 2018-09-06 DIAGNOSIS — E55.9 VITAMIN D DEFICIENCY: ICD-10-CM

## 2018-09-06 NOTE — PROGRESS NOTES
Labs pended. Routed to PCP to review and order.    Elo Brady RN,   [facility]

## 2018-09-07 NOTE — PROGRESS NOTES
Next 5 appointments (look out 90 days)     Sep 10, 2018  9:30 AM CDT   PHYSICAL with ROSALIND Nazario CNP   [facility] ([facility]    [id number] [place] [address]   [phone]                Done

## 2018-09-10 ENCOUNTER — OFFICE VISIT (OUTPATIENT)
Dept: PEDIATRICS | Facility: CLINIC | Age: 48
End: 2018-09-10
Payer: COMMERCIAL

## 2018-09-10 VITALS
WEIGHT: 240.1 LBS | HEIGHT: 66 IN | HEART RATE: 94 BPM | OXYGEN SATURATION: 97 % | TEMPERATURE: 97.6 F | SYSTOLIC BLOOD PRESSURE: 101 MMHG | BODY MASS INDEX: 38.59 KG/M2 | DIASTOLIC BLOOD PRESSURE: 50 MMHG

## 2018-09-10 DIAGNOSIS — Z91.09 ENVIRONMENTAL ALLERGIES: ICD-10-CM

## 2018-09-10 DIAGNOSIS — Z13.1 SCREENING FOR DIABETES MELLITUS: ICD-10-CM

## 2018-09-10 DIAGNOSIS — E55.9 VITAMIN D DEFICIENCY: ICD-10-CM

## 2018-09-10 DIAGNOSIS — Z00.00 ENCOUNTER FOR ROUTINE ADULT HEALTH EXAMINATION WITHOUT ABNORMAL FINDINGS: ICD-10-CM

## 2018-09-10 DIAGNOSIS — B02.9 HERPES ZOSTER WITHOUT COMPLICATION: ICD-10-CM

## 2018-09-10 DIAGNOSIS — Z00.00 ENCOUNTER FOR ROUTINE ADULT HEALTH EXAMINATION WITHOUT ABNORMAL FINDINGS: Primary | ICD-10-CM

## 2018-09-10 DIAGNOSIS — Z30.09 ENCOUNTER FOR OTHER GENERAL COUNSELING OR ADVICE ON CONTRACEPTION: ICD-10-CM

## 2018-09-10 DIAGNOSIS — Z13.29 SCREENING FOR THYROID DISORDER: ICD-10-CM

## 2018-09-10 DIAGNOSIS — E78.5 HYPERLIPIDEMIA LDL GOAL <130: ICD-10-CM

## 2018-09-10 DIAGNOSIS — R73.09 ELEVATED GLUCOSE: ICD-10-CM

## 2018-09-10 DIAGNOSIS — A09 TRAVELER'S DIARRHEA: ICD-10-CM

## 2018-09-10 PROBLEM — E66.01 MORBID OBESITY (H): Status: ACTIVE | Noted: 2018-09-10

## 2018-09-10 LAB
ALBUMIN SERPL-MCNC: 3.4 G/DL (ref 3.4–5)
ALP SERPL-CCNC: 83 U/L (ref 40–150)
ALT SERPL W P-5'-P-CCNC: 32 U/L (ref 0–50)
ANION GAP SERPL CALCULATED.3IONS-SCNC: 8 MMOL/L (ref 3–14)
AST SERPL W P-5'-P-CCNC: 19 U/L (ref 0–45)
BILIRUB SERPL-MCNC: 0.3 MG/DL (ref 0.2–1.3)
BUN SERPL-MCNC: 14 MG/DL (ref 7–30)
CALCIUM SERPL-MCNC: 8.6 MG/DL (ref 8.5–10.1)
CHLORIDE SERPL-SCNC: 109 MMOL/L (ref 94–109)
CHOLEST SERPL-MCNC: 195 MG/DL
CO2 SERPL-SCNC: 24 MMOL/L (ref 20–32)
CREAT SERPL-MCNC: 0.77 MG/DL (ref 0.52–1.04)
DEPRECATED CALCIDIOL+CALCIFEROL SERPL-MC: 51 UG/L (ref 20–75)
GFR SERPL CREATININE-BSD FRML MDRD: 81 ML/MIN/1.7M2
GLUCOSE SERPL-MCNC: 99 MG/DL (ref 70–99)
HBA1C MFR BLD: 5.4 % (ref 0–5.6)
HDLC SERPL-MCNC: 39 MG/DL
HIV 1+2 AB+HIV1 P24 AG SERPL QL IA: NONREACTIVE
LDLC SERPL CALC-MCNC: 125 MG/DL
NONHDLC SERPL-MCNC: 156 MG/DL
POTASSIUM SERPL-SCNC: 4 MMOL/L (ref 3.4–5.3)
PROT SERPL-MCNC: 7.8 G/DL (ref 6.8–8.8)
SODIUM SERPL-SCNC: 141 MMOL/L (ref 133–144)
TRIGL SERPL-MCNC: 157 MG/DL
TSH SERPL DL<=0.005 MIU/L-ACNC: 1.22 MU/L (ref 0.4–4)

## 2018-09-10 PROCEDURE — 80061 LIPID PANEL: CPT | Performed by: NURSE PRACTITIONER

## 2018-09-10 PROCEDURE — 99396 PREV VISIT EST AGE 40-64: CPT | Performed by: NURSE PRACTITIONER

## 2018-09-10 PROCEDURE — 87389 HIV-1 AG W/HIV-1&-2 AB AG IA: CPT | Performed by: NURSE PRACTITIONER

## 2018-09-10 PROCEDURE — 82306 VITAMIN D 25 HYDROXY: CPT | Performed by: NURSE PRACTITIONER

## 2018-09-10 PROCEDURE — 36415 COLL VENOUS BLD VENIPUNCTURE: CPT | Performed by: NURSE PRACTITIONER

## 2018-09-10 PROCEDURE — 83036 HEMOGLOBIN GLYCOSYLATED A1C: CPT | Performed by: NURSE PRACTITIONER

## 2018-09-10 PROCEDURE — 84443 ASSAY THYROID STIM HORMONE: CPT | Performed by: NURSE PRACTITIONER

## 2018-09-10 PROCEDURE — 80053 COMPREHEN METABOLIC PANEL: CPT | Performed by: NURSE PRACTITIONER

## 2018-09-10 RX ORDER — AZITHROMYCIN 500 MG/1
500 TABLET, FILM COATED ORAL DAILY
Qty: 3 TABLET | Refills: 0 | Status: SHIPPED | OUTPATIENT
Start: 2018-09-10 | End: 2019-05-06

## 2018-09-10 RX ORDER — FLUTICASONE PROPIONATE 50 MCG
2 SPRAY, SUSPENSION (ML) NASAL DAILY
Qty: 16 G | Refills: 3 | Status: SHIPPED | OUTPATIENT
Start: 2018-09-10 | End: 2019-05-20

## 2018-09-10 RX ORDER — ACYCLOVIR 400 MG/1
TABLET ORAL
Qty: 180 TABLET | Refills: 3 | Status: SHIPPED | OUTPATIENT
Start: 2018-09-10 | End: 2022-04-18

## 2018-09-10 NOTE — PATIENT INSTRUCTIONS
PLAN:   1.   Symptomatic therapy suggested: Continue current medications.  2.  Orders Placed This Encounter   Medications     norethindrone-ethinyl estradiol (NORTREL 1/35, 28,) 1-35 MG-MCG per tablet     Sig: Take 1 tablet by mouth daily     Dispense:  84 tablet     Refill:  4     acyclovir (ZOVIRAX) 400 MG tablet     Sig: one tid po for 7 days prn for flare ups and one daily for maintenance.     Dispense:  180 tablet     Refill:  3     fluticasone (FLONASE) 50 MCG/ACT spray     Sig: Spray 2 sprays into both nostrils daily     Dispense:  16 g     Refill:  3     azithromycin (ZITHROMAX) 500 MG tablet     Sig: Take 1 tablet (500 mg) by mouth daily     Dispense:  3 tablet     Refill:  0     Orders Placed This Encounter   Procedures     HIV Antigen Antibody Combo       3. Patient needs to follow up in if no improvement,or sooner if worsening of symptoms or other symptoms develop.  Will follow up and/or notify patient of  results via My Chart to determine further need for followup  Follow up office visit in one year for annual health maintenance exam, sooner PRN.    Preventive Health Recommendations  Female Ages 40 to 49    Yearly exam:     See your health care provider every year in order to  1. Review health changes.   2. Discuss preventive care.    3. Review your medicines if your doctor prescribed any.      Get a Pap test every three years (unless you have an abnormal result and your provider advises testing more often).      If you get Pap tests with HPV test, you only need to test every 5 years, unless you have an abnormal result. You do not need a Pap test if your uterus was removed (hysterectomy) and you have not had cancer.      You should be tested each year for STDs (sexually transmitted diseases), if you're at risk.     Ask your doctor if you should have a mammogram.      Have a colonoscopy (test for colon cancer) if someone in your family has had colon cancer or polyps before age 50.       Have a  cholesterol test every 5 years.       Have a diabetes test (fasting glucose) after age 45. If you are at risk for diabetes, you should have this test every 3 years.    Shots: Get a flu shot each year. Get a tetanus shot every 10 years.     Nutrition:     Eat at least 5 servings of fruits and vegetables each day.    Eat whole-grain bread, whole-wheat pasta and brown rice instead of white grains and rice.    Get adequate Calcium and Vitamin D.      Lifestyle    Exercise at least 150 minutes a week (an average of 30 minutes a day, 5 days a week). This will help you control your weight and prevent disease.    Limit alcohol to one drink per day.    No smoking.     Wear sunscreen to prevent skin cancer.    See your dentist every six months for an exam and cleaning.  It was a pleasure seeing you today at the [facility]. Thank you for allowing us to care for you today. We truly hope we provided you with the excellent service you deserve. Please let us know if there is anything else we can do for you so we can be sure you are leaving completley satisfied with your care experience.       General information about your clinic   Clinic Hours Lab Hours (Appointments are required)   Mon-Thurs: 7:30 AM - 7 PM Mon-Thurs: 7:30 AM - 7 PM   Fri: 7:30 AM - 5 PM Fri: 7:30 AM - 5 PM        After Hours Nurse Advise & Appts:  [facility]visors: [phone]  [facility] On Call: to make appointments anytime: [phone] On Call Physician: call [phone] and answering service will page the on call physician.        For urgent appointments, please call [phone] and ask for the triage nurse or your care team clinic nurse.  How to contact my care team:  MyChart: [email or web address]   Phone: [phone]   Fax: [phone]       [facility]:   Phone: [phone]  Hours: 8:00 AM - 6:00 PM  Medication Refills:  Call your pharmacy and they will forward the refill to us. Please allow 3  business days for your refills to be completed.       Normal or non-critical lab and imaging results will be communicated to you by MyChart, letter or phone within 7 days.  If you do not hear from us within 10 days, please call the clinic. If you have a critical or abnormal lab result, we will notify you by phone as soon as possible.       We now have PWIC (Pediatric Walk in Care)  Monday-Friday from 7:30-4. Simply walk in and be seen for your urgent needs like cough, fever, rash, diarrhea or vomiting, pink eye, UTI. No appointments needed. Ask one of the team for more information      -Your Care Team:    Dr. Senait Shen - Internal Medicine/Pediatrics   Dr. Yue Wiggins - Family Medicine  Dr. Kirsten Iqbal - Pediatrics  Dr. Polly Tovar - Pediatrics  Suha Beavers CNP - Family Practice Nurse Practitioner

## 2018-09-10 NOTE — MR AVS SNAPSHOT
After Visit Summary   9/10/2018    Lachelle Duque    MRN: [id number]           Patient Information     Date Of Birth          1970        Visit Information        Provider Department      9/10/2018 9:30 AM Suha Beavers APRN CNP [facility]        Today's Diagnoses     Encounter for routine adult health examination without abnormal findings    -  1    Encounter for other general counseling or advice on contraception        Herpes zoster without complication by the left eye recurrent         Environmental allergies        Traveler's diarrhea          Care Instructions    PLAN:   1.   Symptomatic therapy suggested: Continue current medications.  2.  Orders Placed This Encounter   Medications     norethindrone-ethinyl estradiol (NORTREL 1/35, 28,) 1-35 MG-MCG per tablet     Sig: Take 1 tablet by mouth daily     Dispense:  84 tablet     Refill:  4     acyclovir (ZOVIRAX) 400 MG tablet     Sig: one tid po for 7 days prn for flare ups and one daily for maintenance.     Dispense:  180 tablet     Refill:  3     fluticasone (FLONASE) 50 MCG/ACT spray     Sig: Spray 2 sprays into both nostrils daily     Dispense:  16 g     Refill:  3     azithromycin (ZITHROMAX) 500 MG tablet     Sig: Take 1 tablet (500 mg) by mouth daily     Dispense:  3 tablet     Refill:  0     Orders Placed This Encounter   Procedures     HIV Antigen Antibody Combo       3. Patient needs to follow up in if no improvement,or sooner if worsening of symptoms or other symptoms develop.  Will follow up and/or notify patient of  results via My Chart to determine further need for followup  Follow up office visit in one year for annual health maintenance exam, sooner PRN.    Preventive Health Recommendations  Female Ages 40 to 49    Yearly exam:     See your health care provider every year in order to  1. Review health changes.   2. Discuss preventive care.    3. Review your medicines if your doctor prescribed  any.      Get a Pap test every three years (unless you have an abnormal result and your provider advises testing more often).      If you get Pap tests with HPV test, you only need to test every 5 years, unless you have an abnormal result. You do not need a Pap test if your uterus was removed (hysterectomy) and you have not had cancer.      You should be tested each year for STDs (sexually transmitted diseases), if you're at risk.     Ask your doctor if you should have a mammogram.      Have a colonoscopy (test for colon cancer) if someone in your family has had colon cancer or polyps before age 50.       Have a cholesterol test every 5 years.       Have a diabetes test (fasting glucose) after age 45. If you are at risk for diabetes, you should have this test every 3 years.    Shots: Get a flu shot each year. Get a tetanus shot every 10 years.     Nutrition:     Eat at least 5 servings of fruits and vegetables each day.    Eat whole-grain bread, whole-wheat pasta and brown rice instead of white grains and rice.    Get adequate Calcium and Vitamin D.      Lifestyle    Exercise at least 150 minutes a week (an average of 30 minutes a day, 5 days a week). This will help you control your weight and prevent disease.    Limit alcohol to one drink per day.    No smoking.     Wear sunscreen to prevent skin cancer.    See your dentist every six months for an exam and cleaning.  It was a pleasure seeing you today at the [facility]. Thank you for allowing us to care for you today. We truly hope we provided you with the excellent service you deserve. Please let us know if there is anything else we can do for you so we can be sure you are leaving completley satisfied with your care experience.       General information about your clinic   Clinic Hours Lab Hours (Appointments are required)   Mon-Thurs: 7:30 AM - 7 PM Mon-Thurs: 7:30 AM - 7 PM   Fri: 7:30 AM - 5 PM Fri: 7:30 AM - 5 PM        After  Hours Nurse Advise & Appts:  [facility]: [phone]  [facility] On Call: to make appointments anytime: [phone] On Call Physician: call [phone] and answering service will page the on call physician.        For urgent appointments, please call [phone] and ask for the triage nurse or your care team clinic nurse.  How to contact my care team:  [facility]: [email or web address]   Phone: [phone]   Fax: [phone]       [facility]:   Phone: [phone]  Hours: 8:00 AM - 6:00 PM  Medication Refills:  Call your pharmacy and they will forward the refill to us. Please allow 3 business days for your refills to be completed.       Normal or non-critical lab and imaging results will be communicated to you by MyChart, letter or phone within 7 days.  If you do not hear from us within 10 days, please call the clinic. If you have a critical or abnormal lab result, we will notify you by phone as soon as possible.       We now have PWIC (Pediatric Walk in Care)  Monday-Friday from 7:30-4. Simply walk in and be seen for your urgent needs like cough, fever, rash, diarrhea or vomiting, pink eye, UTI. No appointments needed. Ask one of the team for more information      -Your Care Team:    Dr. Senait Shen - Internal Medicine/Pediatrics   Dr. Yue Wiggins - Family Medicine  Dr. Kirsten Iqbal - Pediatrics  Dr. Polly Tovar - Pediatrics  Suha Beavers CNP - Family Practice Nurse Practitioner                         Follow-ups after your visit        Who to contact     If you have questions or need follow up information about today's clinic visit or your schedule please contact [facility] [[email or web address]] directly at [phone].  Normal or non-critical lab and imaging results will be communicated to you by MyChart, letter or phone within 4 business days after the clinic has received the results. If you do not hear from us within 7 days, please contact the clinic through MyChart or phone. If you  "have a critical or abnormal lab result, we will notify you by phone as soon as possible.  Submit refill requests through [employer] or call your pharmacy and they will forward the refill request to us. Please allow 3 business days for your refill to be completed.          Additional Information About Your Visit        [employer] Information     [employer] gives you secure access to your electronic health record. If you see a primary care provider, you can also send messages to your care team and make appointments. If you have questions, please call your primary care clinic.  If you do not have a primary care provider, please call [phone] and they will assist you.      [employer] is an electronic gateway that provides easy, online access to your medical records. With [employer], you can request a clinic appointment, read your test results, renew a prescription or communicate with your care team.     To access your existing account, please contact your [facility] or call [phone] for assistance.        Care EveryWhere ID     This is your Care EveryWhere ID. This could be used by other organizations to access your [facility] medical records  [id number]        Your Vitals Were     Pulse Temperature Height Last Period Pulse Oximetry Breastfeeding?    94 97.6  F (36.4  C) (Temporal) 5' 5.5\" (1.664 m) 08/21/2018 97% No    BMI (Body Mass Index)                   39.35 kg/m2            Blood Pressure from Last 3 Encounters:   09/10/18 101/50   09/08/17 115/80   09/06/16 110/60    Weight from Last 3 Encounters:   09/10/18 240 lb 1.6 oz (108.9 kg)   09/08/17 246 lb 1.6 oz (111.6 kg)   09/06/16 235 lb 3.2 oz (106.7 kg)              We Performed the Following     HIV Antigen Antibody Combo          Today's Medication Changes          These changes are accurate as of 9/10/18 10:01 AM.  If you have any questions, ask your nurse or doctor.               Start taking these medicines.        Dose/Directions "    azithromycin 500 MG tablet   Commonly known as:  ZITHROMAX   Used for:  Traveler's diarrhea   Started by:  Suha Beavers APRN CNP        Dose:  500 mg   Take 1 tablet (500 mg) by mouth daily   Quantity:  3 tablet   Refills:  0            Where to get your medicines      These medications were sent to [facility] - [place], [place] - [address]  [address] [facility], [facility] [address]    Hours:  Mon-Fri 8:30am-5:00pm Toll Free [phone] Phone:  [phone]     acyclovir 400 MG tablet    azithromycin 500 MG tablet    fluticasone 50 MCG/ACT spray    norethindrone-ethinyl estradiol 1-35 MG-MCG per tablet                Primary Care Provider Office Phone # Fax #    ROSALIND Nazario [phone] [address]  [place] [address]        Equal Access to Services     [facility] AH: Hadii raul ku hadasho Soomaali, waaxda luqadaha, qaybta kaalmada adeegyada, waxay idiin haydebbien reese bernard . So [facility] [phone].    ATENCIÓN: Si friedala español, tiene a knight disposición servicios gratuitos de asistencia lingüística. Shahida al [phone].    We comply with applicable federal civil rights laws and [place] laws. We do not discriminate on the basis of race, color, national origin, age, disability, sex, sexual orientation, or gender identity.            Thank you!     Thank you for choosing [facility]  for your care. Our goal is always to provide you with excellent care. Hearing back from our patients is one way we can continue to improve our services. Please take a few minutes to complete the written survey that you may receive in the mail after your visit with us. Thank you!             Your Updated Medication List - Protect others around you: Learn how to safely use, store and throw away your medicines at www.disposemymeds.org.          This list is accurate as of 9/10/18 10:01 AM.  Always use your most  recent med list.                   Brand Name Dispense Instructions for use Diagnosis    acyclovir 400 MG tablet    ZOVIRAX    180 tablet    one tid po for 7 days prn for flare ups and one daily for maintenance.    Herpes zoster without complication       azithromycin 500 MG tablet    ZITHROMAX    3 tablet    Take 1 tablet (500 mg) by mouth daily    Traveler's diarrhea       BENADRYL 25 MG capsule   Generic drug:  diphenhydrAMINE      Take 25 mg by mouth every 6 hours as needed.        fluticasone 50 MCG/ACT spray    FLONASE    16 g    Spray 2 sprays into both nostrils daily    Environmental allergies       norethindrone-ethinyl estradiol 1-35 MG-MCG per tablet    NORTREL 1/35 28    84 tablet    Take 1 tablet by mouth daily    Encounter for other general counseling or advice on contraception       omeprazole 20 MG tablet     30 tablet    Take 1 tablet by mouth daily. Take 30-60 minutes before a meal.    Recurring abdominal pain, GERD (gastroesophageal reflux disease)       SUDAFED 12 HOUR 120 MG 12 hr tablet   Generic drug:  pseudoePHEDrine      Take 120 mg by mouth every 12 hours. PRN.        ZYRTEC ALLERGY 10 MG Caps   Generic drug:  cetirizine HCl      Take one tablet daily.

## 2018-09-10 NOTE — PROGRESS NOTES
Yanelis Duque,    Attached are your test results.  -Vitamin D level is normal, continue present supplements is recommended.    Please contact us if you have any questions.    Suha Beavers, CNP

## 2018-09-10 NOTE — NURSING NOTE
"Chief Complaint   Patient presents with     Physical     AFE       Initial /50 (BP Location: Right arm, Patient Position: Sitting, Cuff Size: Adult Large)  Pulse 94  Temp 97.6  F (36.4  C) (Temporal)  Ht 5' 5.5\" (1.664 m)  Wt 240 lb 1.6 oz (108.9 kg)  LMP 08/21/2018  SpO2 97%  Breastfeeding? No  BMI 39.35 kg/m2 Estimated body mass index is 39.35 kg/(m^2) as calculated from the following:    Height as of this encounter: 5' 5.5\" (1.664 m).    Weight as of this encounter: 240 lb 1.6 oz (108.9 kg).  Medication Reconciliation: complete      NINO Obrien      "

## 2018-09-10 NOTE — PROGRESS NOTES
SUBJECTIVE:   CC: Lachelle Duque is an 48 year old woman who presents for preventive health visit.     Healthy Habits:     Do you get at least three servings of calcium containing foods daily (dairy, green leafy vegetables, etc.)? yes    Amount of exercise or daily activities, outside of work: very little    Problems taking medications regularly No    Medication side effects: No    Have you had an eye exam in the past two years? yes    Do you see a dentist twice per year? yes    Do you have sleep apnea, excessive snoring or daytime drowsiness?no    Today's PHQ-2 Score:   PHQ-2 ( 1999 [employer]) 9/10/2018 9/8/2017   Q1: Little interest or pleasure in doing things 0 0   Q2: Feeling down, depressed or hopeless 0 0   PHQ-2 Score 0 0       Abuse: Current or Past(Physical, Sexual or Emotional)- No  Do you feel safe in your environment - Yes    Social History   Substance Use Topics     Smoking status: Never Smoker     Smokeless tobacco: Never Used     Alcohol use 0.5 oz/week     1 Standard drinks or equivalent per week      Comment: occasionally     If you drink alcohol do you typically have >3 drinks per day or >7 drinks per week? No                     Reviewed orders with patient.  Reviewed health maintenance and updated orders accordingly - Yes  Labs reviewed in EPIC  BP Readings from Last 3 Encounters:   09/10/18 101/50   09/08/17 115/80   09/06/16 110/60    Wt Readings from Last 3 Encounters:   09/10/18 240 lb 1.6 oz (108.9 kg)   09/08/17 246 lb 1.6 oz (111.6 kg)   09/06/16 235 lb 3.2 oz (106.7 kg)                  Patient Active Problem List   Diagnosis     Encounter for other general counseling or advice on contraception     Herpes zoster     Family history of colon cancer     Hyperlipidemia LDL goal <130     Obesity     Vitamin D deficiency     Hiatal hernia     GERD (gastroesophageal reflux disease)     Cholelithiasis     Biliary colic     Environmental allergies     Elevation of optic disc, right     Pupil  asymmetry     Past Surgical History:   Procedure Laterality Date     ADENOIDECTOMY  1977     COLONOSCOPY N/A 10/27/2015    Procedure: COLONOSCOPY;  Surgeon: Duane, William Charles, MD;  Location: [facility] OR     COLONOSCOPY WITH CO2 INSUFFLATION N/A 10/27/2015    Procedure: COLONOSCOPY WITH CO2 INSUFFLATION;  Surgeon: Duane, William Charles, MD;  Location: [facility] OR     HC TOOTH EXTRACTION W/FORCEP      20 yo     LAPAROSCOPIC CHOLECYSTECTOMY  1/30/2013    Procedure: LAPAROSCOPIC CHOLECYSTECTOMY;  Laparoscopic Cholecystectomy;  Surgeon: Cindy Soni MD;  Location: [facility] OR     SINUS SURGERY  1988    Deviated septum     wisdom teeth removed[         Social History   Substance Use Topics     Smoking status: Never Smoker     Smokeless tobacco: Never Used     Alcohol use 0.5 oz/week     1 Standard drinks or equivalent per week      Comment: occasionally     Family History   Problem Relation Age of Onset     C.A.D. Father      bypass     Diabetes Father      Cancer - colorectal Father 42     GASTROINTESTINAL DISEASE Mother      diverticulosis     Arthritis Mother      fibromyalgia, lupus, rheumatoid     Anesthesia Reaction Mother      nausea     Asthma Mother      Lipids Mother      Diabetes Mother      Macular Degeneration Other      Cancer Other 29     CML     Breast Cancer Other      Colon Polyps Brother      C.A.D. Maternal Grandfather      Cerebrovascular Disease Maternal Grandfather      Hypertension Maternal Grandmother      Glaucoma Maternal Grandmother      GASTROINTESTINAL DISEASE Maternal Grandmother      diverticulosis     Depression Brother      Arthritis Brother      fibromyalgia     Anesthesia Reaction Brother      nausea     Cancer Maternal Aunt      Breast Cancer Maternal Aunt      Thyroid Disease No family hx of      Prostate Cancer No family hx of          Current Outpatient Prescriptions   Medication Sig Dispense Refill     acyclovir (ZOVIRAX) 400 MG tablet one tid po for 7 days prn for flare ups and one  daily for maintenance. 180 tablet 3     cetirizine HCl (ZYRTEC ALLERGY) 10 MG CAPS Take one tablet daily.       diphenhydrAMINE (BENADRYL) 25 MG capsule Take 25 mg by mouth every 6 hours as needed.       fluticasone (FLONASE) 50 MCG/ACT nasal spray Spray 2 sprays into both nostrils daily 16 g 3     norethindrone-ethinyl estradiol (NORTREL 1/35, 28,) 1-35 MG-MCG per tablet Take 1 tablet by mouth daily 84 tablet 4     omeprazole 20 MG tablet Take 1 tablet by mouth daily. Take 30-60 minutes before a meal. 30 tablet 1     pseudoePHEDrine (SUDAFED 12 HOUR) 120 MG 12 hr tablet Take 120 mg by mouth every 12 hours. PRN.        Allergies   Allergen Reactions     Sulfa Drugs Anaphylaxis       Patient under age 50, mutual decision reflected in health maintenance.      Pertinent mammograms are reviewed under the imaging tab.  History of abnormal Pap smear: NO - age 30- 65 PAP every 3 years recommended  PAP / HPV Latest Ref Rng & Units 9/6/2016 8/19/2013 9/17/2012   PAP - NIL NIL NIL   HPV 16 DNA NEG Negative - -   HPV 18 DNA NEG Negative - -   OTHER HR HPV NEG Negative - -     Reviewed and updated as needed this visit by clinical staff  Tobacco  Allergies  Meds  Med Hx  Surg Hx  Fam Hx  Soc Hx        Reviewed and updated as needed this visit by Provider        Past Medical History:   Diagnosis Date     Chronic rhinitis      Environmental allergies 1/18/2013     Gastro-oesophageal reflux disease      GERD (gastroesophageal reflux disease) 1/3/2013     Herpes zoster without mention of complication     L eye      Hiatal hernia      PONV (postoperative nausea and vomiting)       Past Surgical History:   Procedure Laterality Date     ADENOIDECTOMY  1977     COLONOSCOPY N/A 10/27/2015    Procedure: COLONOSCOPY;  Surgeon: Duane, William Charles, MD;  Location:  OR     COLONOSCOPY WITH CO2 INSUFFLATION N/A 10/27/2015    Procedure: COLONOSCOPY WITH CO2 INSUFFLATION;  Surgeon: Duane, William Charles, MD;  Location:  OR       "TOOTH EXTRACTION W/FORCEP      18 yo     LAPAROSCOPIC CHOLECYSTECTOMY  1/30/2013    Procedure: LAPAROSCOPIC CHOLECYSTECTOMY;  Laparoscopic Cholecystectomy;  Surgeon: Cindy Soni MD;  Location: [facility] OR     SINUS SURGERY  1988    Deviated septum     wisdom teeth removed[         ROS:  CONSTITUTIONAL:POSITIVE  for weight loss and NEGATIVE  for anorexia  INTEGUMENTARY/SKIN: NEGATIVE for worrisome rashes, moles or lesions  EYES: NEGATIVE for vision changes or irritation  ENT: NEGATIVE for ear, mouth and throat problems  RESP:NEGATIVE for significant cough or SOB  BREAST: NEGATIVE for masses, tenderness or discharge  CV: NEGATIVE for chest pain, palpitations or peripheral edema  GI: POSITIVE for Hx GERD and also has a history of a hiatal hernia  and NEGATIVE for nausea, poor appetite and vomiting. Has had diarrhea since returning from trip to [place] about a week. Will have 2 or 3 times a day   female: normal menses, no unusual urinary symptoms and no unusual vaginal symptoms  MUSCULOSKELETAL:NEGATIVE for significant arthralgias or myalgia  NEURO: NEGATIVE for weakness, dizziness or paresthesias  ENDOCRINE: NEGATIVE for temperature intolerance, skin/hair changes  HEME/ALLERGY/IMMUNE: NEGATIVE for bleeding problems  PSYCHIATRIC: NEGATIVE for changes in mood or affect       OBJECTIVE:   /50 (BP Location: Right arm, Patient Position: Sitting, Cuff Size: Adult Large)  Pulse 94  Temp 97.6  F (36.4  C) (Temporal)  Ht 5' 5.5\" (1.664 m)  Wt 240 lb 1.6 oz (108.9 kg)  LMP 08/21/2018  SpO2 97%  Breastfeeding? No  BMI 39.35 kg/m2   Wt Readings from Last 4 Encounters:   09/10/18 240 lb 1.6 oz (108.9 kg)   09/08/17 246 lb 1.6 oz (111.6 kg)   09/06/16 235 lb 3.2 oz (106.7 kg)   02/26/16 233 lb 8 oz (105.9 kg)       EXAM:  GENERAL: alert, no distress and obese  EYES: Eyes grossly normal to inspection, PERRL and conjunctivae and sclerae normal  HENT: ear canals and TM's normal, nose and mouth without ulcers or " lesions  NECK: no adenopathy, no asymmetry, masses, or scars and thyroid normal to palpation  RESP: lungs clear to auscultation - no rales, rhonchi or wheezes  BREAST: normal without masses, tenderness or nipple discharge and no palpable axillary masses or adenopathy  CV: regular rates and rhythm, no murmur, click or rub, peripheral pulses strong and no peripheral edema  ABDOMEN: soft, nontender, no hepatosplenomegaly, no masses and bowel sounds normal   (female): normal female external genitalia, normal urethral meatus, vaginal mucosa pink, moist, well rugated, and normal cervix/adnexa/uterus without masses or discharge  MS: no gross musculoskeletal defects noted, no edema  SKIN: no suspicious lesions or rashes  NEURO: Normal strength and tone, mentation intact and speech normal  PSYCH: mentation appears normal, affect normal/bright  LYMPH: no cervical, supraclavicular, axillary, or inguinal adenopathy    Diagnostic Test Results:  Results for orders placed or performed in visit on 09/10/18   Lipid panel reflex to direct LDL Fasting   Result Value Ref Range    Cholesterol 195 <200 mg/dL    Triglycerides 157 (H) <150 mg/dL    HDL Cholesterol 39 (L) >49 mg/dL    LDL Cholesterol Calculated 125 (H) <100 mg/dL    Non HDL Cholesterol 156 (H) <130 mg/dL   Hemoglobin A1c   Result Value Ref Range    Hemoglobin A1C 5.4 0 - 5.6 %   Comprehensive metabolic panel   Result Value Ref Range    Sodium 141 133 - 144 mmol/L    Potassium 4.0 3.4 - 5.3 mmol/L    Chloride 109 94 - 109 mmol/L    Carbon Dioxide 24 20 - 32 mmol/L    Anion Gap 8 3 - 14 mmol/L    Glucose 99 70 - 99 mg/dL    Urea Nitrogen 14 7 - 30 mg/dL    Creatinine 0.77 0.52 - 1.04 mg/dL    GFR Estimate 81 >60 mL/min/1.7m2    GFR Estimate If Black >90 >60 mL/min/1.7m2    Calcium 8.6 8.5 - 10.1 mg/dL    Bilirubin Total 0.3 0.2 - 1.3 mg/dL    Albumin 3.4 3.4 - 5.0 g/dL    Protein Total 7.8 6.8 - 8.8 g/dL    Alkaline Phosphatase 83 40 - 150 U/L    ALT 32 0 - 50 U/L    AST 19  0 - 45 U/L       ASSESSMENT/PLAN:   Lachelle was seen today for physical.    Diagnoses and all orders for this visit:    Encounter for routine adult health examination without abnormal findings  -     HIV Antigen Antibody Combo    Encounter for other general counseling or advice on contraception  -     norethindrone-ethinyl estradiol (NORTREL 1/35, 28,) 1-35 MG-MCG per tablet; Take 1 tablet by mouth daily    Herpes zoster without complication by the left eye recurrent   -     acyclovir (ZOVIRAX) 400 MG tablet; one tid po for 7 days prn for flare ups and one daily for maintenance.    Environmental allergies  -     fluticasone (FLONASE) 50 MCG/ACT spray; Spray 2 sprays into both nostrils daily    Traveler's diarrhea  -     azithromycin (ZITHROMAX) 500 MG tablet; Take 1 tablet (500 mg) by mouth daily  Further workup and treatment could be done if symptoms persist, worsen or new related symptoms occur. The patient will call in that eventuality.    PLAN:   Continue current medications.   Patient needs to follow up in if no improvement,or sooner if worsening of symptoms or other symptoms develop.  Will follow up and/or notify patient of  results via My Chart to determine further need for followup  Follow up office visit in one year for annual health maintenance exam, sooner PRN.      COUNSELING:   Reviewed preventive health counseling, as reflected in patient instructions  Special attention given to:        Regular exercise       Healthy diet/nutrition       Vision screening       Contraception       Family planning       Osteoporosis Prevention/Bone Health       The 10-year ASCVD risk score (Molly HALL Jr, et al., 2013) is: 0.9%    Values used to calculate the score:      Age: 48 years      Sex: Female      Is Non- : No      Diabetic: No      Tobacco smoker: No      Systolic Blood Pressure: 101 mmHg      Is BP treated: No      HDL Cholesterol: 39 mg/dL      Total Cholesterol: 195 mg/dL       Advance  "Care Planning    BP Readings from Last 1 Encounters:   09/08/17 115/80     Estimated body mass index is 39.42 kg/(m^2) as calculated from the following:    Height as of 9/8/17: 5' 6.25\" (1.683 m).    Weight as of 9/8/17: 246 lb 1.6 oz (111.6 kg).      Weight management plan: Discussed healthy diet and exercise guidelines and patient will follow up in 12 months in clinic to re-evaluate.     reports that she has never smoked. She has never used smokeless tobacco.      Counseling Resources:  ATP IV Guidelines  Pooled Cohorts Equation Calculator  Breast Cancer Risk Calculator  FRAX Risk Assessment  ICSI Preventive Guidelines  Dietary Guidelines for Americans, 2010  USDA's MyPlate  ASA Prophylaxis  Lung CA Screening    ROSALIND Nazario CNP  [facility]  "

## 2018-09-10 NOTE — PROGRESS NOTES
Yanelis Duque,    Attached are your test results.  -Liver and gallbladder tests are normal. (ALT,AST, Alk phos, bilirubin), kidney function is normal (Cr, GFR), Sodium is normal, Potassium is normal, Calcium is normal, Glucose is normal (diabetes screening test).   -LDL(bad) cholesterol level is elevated, HDL(good) cholesterol level is low and your triglycerides are elevated which can increase your heart disease risk.  A diet high in fat and simple carbohydrates, genetics and being overweight can contribute to this. ADVISE: Exercise, a low fat, low carbohydrate diet, weight control, and omega-3 fatty acids (fish oil)daily are helpful to improve this.  Rechecking your fasting cholesterol panel in 12 months is recommended (Lipid w/ LDL reflex, DX: hyperlipidemia)  -TSH (thyroid stimulating hormone) level is normal which indicates normal thyroid function.  -A1C (diabetic test) is normal and indicates that your blood sugar has been in a normal range the last 3 months.   Please contact us if you have any questions.    Suha Beavers, CNP

## 2018-09-11 NOTE — PROGRESS NOTES
Yanelis Duque,    Attached are your test results.  HIV is negative    Please contact us if you have any questions.    Suha Beavers, CNP

## 2019-02-08 ENCOUNTER — E-VISIT (OUTPATIENT)
Dept: PEDIATRICS | Facility: CLINIC | Age: 49
End: 2019-02-08
Payer: COMMERCIAL

## 2019-02-08 DIAGNOSIS — A09 TRAVELER'S DIARRHEA: ICD-10-CM

## 2019-02-08 PROCEDURE — 99444 ZZC PHYSICIAN ONLINE EVALUATION & MANAGEMENT SERVICE: CPT | Performed by: NURSE PRACTITIONER

## 2019-02-08 RX ORDER — AZITHROMYCIN 500 MG/1
500 TABLET, FILM COATED ORAL DAILY
Qty: 3 TABLET | Refills: 0 | Status: SHIPPED | OUTPATIENT
Start: 2019-02-08 | End: 2019-05-06

## 2019-02-08 NOTE — PATIENT INSTRUCTIONS
Thank you for choosing us for your care. I have placed an order for a prescription so that you can start treatment. View your full visit summary for details by clicking on the link below. Your pharmacist will able to address any questions you may have about the medication.     If you re not feeling better within 2-3 days, please schedule an appointment.  You can schedule an appointment right here in [facility], or call [phone]  If the visit is for the same symptoms as your e-visit, we ll refund the cost of your e-visit if seen within seven days.      Traveler's Diarrhea (Adult)    Traveler's diarrhea is an infection in the intestinal tract that is usually caused by bacteria called E coli. This bacteria is commonly found in water supplies of developing countries. The local people of those countries are used to E coli in the water and don't get sick. Tourists who drink contaminated water or eat foods that were washed or prepared with this water may become very ill.  The illness begins 1 to 3 days after exposure and lasts up to 5 days. Symptoms include fever, vomiting, stomach cramping, and watery diarrhea. There may also be blood or mucus in the stool. Mild cases will get better without treatment. Antibiotics are used for more severe cases.  Home care    If you were prescribed antibiotics, take them until they are finished.    You may use acetaminophen or ibuprofen to control fever, unless another medicine was prescribed. If you have chronic liver or kidney disease or have ever had a stomach ulcer or gastrointestinal  bleeding, talk with the healthcare provider before using these medicines. Aspirin should never be used in anyone under 18 years of age who is ill with a fever. It may cause severe illness or death.    Do not take over-the-counter antidiarrheal medicines, unless advised by the healthcare provider.  Once vomiting stops, follow these guidelines:  During the first 12 to 24 hours, follow the diet below:     Fruit juices. Apple, grape and cranberry juice; clear fruit drinks, electrolyte replacement and sports drinks.    Beverages. Soft drinks without caffeine; mineral water (plain or flavored); decaffeinated tea and coffee    Soups. Clear broth, consommé and bouillon.    Desserts. Plain gelatin, popsicles and fruit juice bars; As you feel better, you may add 6 to 8 oz of yogurt per day.  During the next 24 hours, you may add the following to the above:    Hot cereal, plain toast, bread, rolls, or crackers    Plain noodles, rice, mashed potatoes, or chicken noodle or rice soup    Unsweetened canned fruit (avoid pineapple), bananas    Limit fat intake to less than 15 grams per day by avoiding margarine, butter, oils, mayonnaise, sauces, gravies, fried foods, peanut butter, meat, poultry, and fish.    Limit fiber; avoid raw or cooked vegetables, fresh fruits (except bananas), and bran cereals.    Limit caffeine and chocolate. No spices or seasonings except salt.  During the next 24 hours, you can gradually resume a normal diet as the symptoms lessen.  Follow-up care  Follow up with your healthcare provider, or as advised. Call if you are not improving within 24 hours or if the diarrhea lasts more than 1 week on antibiotics. If a stool (diarrhea) sample was taken, you may call in 2 days (or as directed) for the results.  When to seek medical advice  Call your healthcare provider if any of these happen:    Severe constant pain in the lower part of the belly, on the right side    Blood in diarrhea or vomit, dark coffee ground appearing vomit, or dark tarry stools    Continued vomiting (unable to keep liquids down)    Frequent diarrhea (more than 5 times a day); blood (red or black) or mucus in diarrhea    Reduced oral intake    Reduced urine output or extreme thirst    Weakness, dizziness, or fainting    Drowsiness, confusion, stiff neck, or seizure    Fever (1 degree above your normal temperature) lasting 24 to 48 hours,  or whatever your healthcare provider told you to report based on your condition  Date Last Reviewed: 11/1/2017 2000-2018 [employer]. [address] [place] [address], [place] [address]. All rights reserved. This information is not intended as a substitute for professional medical care. Always follow your healthcare professional's instructions.

## 2019-05-06 ENCOUNTER — OFFICE VISIT (OUTPATIENT)
Dept: OPTOMETRY | Facility: CLINIC | Age: 49
End: 2019-05-06
Payer: COMMERCIAL

## 2019-05-06 DIAGNOSIS — Z01.00 EXAMINATION OF EYES AND VISION: Primary | ICD-10-CM

## 2019-05-06 DIAGNOSIS — H52.4 PRESBYOPIA: ICD-10-CM

## 2019-05-06 DIAGNOSIS — H52.03 HYPEROPIA, BILATERAL: ICD-10-CM

## 2019-05-06 DIAGNOSIS — H52.223 REGULAR ASTIGMATISM OF BOTH EYES: ICD-10-CM

## 2019-05-06 PROCEDURE — 92014 COMPRE OPH EXAM EST PT 1/>: CPT | Performed by: OPTOMETRIST

## 2019-05-06 PROCEDURE — 92310 CONTACT LENS FITTING OU: CPT | Mod: GA | Performed by: OPTOMETRIST

## 2019-05-06 PROCEDURE — 92015 DETERMINE REFRACTIVE STATE: CPT | Performed by: OPTOMETRIST

## 2019-05-06 ASSESSMENT — TONOMETRY
OD_IOP_MMHG: 20
IOP_METHOD: TONOPEN
OS_IOP_MMHG: 19

## 2019-05-06 ASSESSMENT — REFRACTION_CURRENTRX
OD_CYLINDER: -1.25
OS_ADDL_SPECS: DIST
OD_AXIS: 110
OS_AXIS: 070
OD_ADDL_SPECS: NEAR
OS_CYLINDER: -0.75
OD_SPHERE: +2.50
OS_SPHERE: +1.25

## 2019-05-06 ASSESSMENT — VISUAL ACUITY
OD_CC: 20/30-1
OD_CC: 20/20
OS_SC: 20/25
OD_SC: 20/25
OD_SC+: -2
OD_CC+: -2
OS_CC: 20/20-1
OS_CC: 20/20
METHOD: SNELLEN - LINEAR
CORRECTION_TYPE: GLASSES

## 2019-05-06 ASSESSMENT — REFRACTION_MANIFEST
OS_ADD: +1.75
OD_CYLINDER: +1.25
OD_SPHERE: +0.50
OD_AXIS: 020
OD_ADD: +1.75
OS_AXIS: 164
OS_SPHERE: +0.50
OS_CYLINDER: +0.75

## 2019-05-06 ASSESSMENT — REFRACTION_WEARINGRX
OD_AXIS: 010
OS_ADD: +1.50
OS_SPHERE: +0.25
OD_CYLINDER: +1.00
OS_AXIS: 175
OS_CYLINDER: +0.75
OD_ADD: +1.50
OS_SPHERE: +0.50
OS_CYLINDER: +1.00
OD_CYLINDER: +1.25
OD_SPHERE: PLANO
OS_AXIS: 163
OD_AXIS: 020
OD_ADD: +1.75
SPECS_TYPE: PAL
OS_ADD: +1.75
OD_SPHERE: +0.25

## 2019-05-06 ASSESSMENT — SLIT LAMP EXAM - LIDS
COMMENTS: NORMAL
COMMENTS: NORMAL

## 2019-05-06 ASSESSMENT — CONF VISUAL FIELD
OS_NORMAL: 1
OD_NORMAL: 1

## 2019-05-06 ASSESSMENT — EXTERNAL EXAM - RIGHT EYE: OD_EXAM: NORMAL

## 2019-05-06 ASSESSMENT — EXTERNAL EXAM - LEFT EYE: OS_EXAM: NORMAL

## 2019-05-06 ASSESSMENT — CUP TO DISC RATIO
OD_RATIO: 0.1
OS_RATIO: 0.1

## 2019-05-06 NOTE — PATIENT INSTRUCTIONS
Eyeglass prescription given.    Trial contact lenses will be ordered for .  Ok to order if satisfied.    Contact lens prescription given.    Return in 1 year for a complete eye exam or sooner if needed.    Juan Carlos Spann OD    The affects of the dilating drops last for 4- 6 hours.  You will be more sensitive to light and vision will be blurry up close.  Mydriatic sunglasses were given if needed.      Optometry Providers       Clinic Locations                                 Telephone Number   Dr. Faith Ray   [place] [facility] [place]   [place] [phone]     [facility] Hours:                [place] [facility] Hours:       [facility] Hours:   [address] [facility] [address]   [address]      [address]  [place] [address]   [place] [address]    [place] [address]  Phone: [phone]                    Phone: [phone]     Phone: [phone]                      Monday 8:00-7:00                          Monday 8:00-7:00                          Monday 8:00-7:00              Tuesday 8:00-6:00                          Tuesday 8:00-7:00                          Tuesday 8:00-7:00              Wednesday 8:00-6:00                  Wednesday 8:00-7:00                   Wednesday 8:00-7:00      Thursday 8:00-6:00                        Thursday 8:00-7:00                         Thursday 8:00-7:00            Friday 8:00-5:00                              Friday 8:00-5:00                              Friday 8:00-5:00    [facility] Hours:   [address] [place] [address]  [phone]    Monday 8:00-7:00  Tuesday 8:00-7:00  Wednesday 8:00-7:00  Thursday 8:00-7:00  Friday 8:00-5:00  Please log on to [email or web address] to order your contact lenses.  The link is found on the [facility] page.  As always, Thank you for trusting us with your health care needs!

## 2019-05-06 NOTE — PROGRESS NOTES
Chief Complaint   Patient presents with     Annual Eye Exam     More contacts fit fee ok        Previous contact lens wearer? Yes: lacie crum   Comfort of contact lenses :great  Satisfied with current lenses: Yes,just need stronger rx        Last Eye Exam: 4-  Dilated Previously: Yes    What are you currently using to see?  glasses and contacts    Distance Vision Acuity: Satisfied with vision    Near Vision Acuity: Not satisfied     Eye Comfort: good  Do you use eye drops? 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     Medical, surgical and family histories reviewed and updated 5/6/2019.       OBJECTIVE: See Ophthalmology exam    ASSESSMENT:    ICD-10-CM    1. Examination of eyes and vision Z01.00 EYE EXAM (SIMPLE-NONBILLABLE)     REFRACTION     CONTACT LENS FITTING,BILAT   2. Hyperopia, bilateral H52.03 EYE EXAM (SIMPLE-NONBILLABLE)     REFRACTION     CONTACT LENS FITTING,BILAT   3. Regular astigmatism of both eyes H52.223 EYE EXAM (SIMPLE-NONBILLABLE)     REFRACTION     CONTACT LENS FITTING,BILAT   4. Presbyopia H52.4 EYE EXAM (SIMPLE-NONBILLABLE)     REFRACTION     CONTACT LENS FITTING,BILAT      PLAN:     Patient Instructions   Eyeglass prescription given.    Trial contact lenses will be ordered for .  Ok to order if satisfied.    Contact lens prescription given.    Return in 1 year for a complete eye exam or sooner if needed.    Juan Carlos Spann, OD

## 2019-05-06 NOTE — LETTER
5/6/2019         RE: Lachelle Duque  [address]  [facility] [address]        Dear Colleague,    Thank you for referring your patient, Lachelle Duque, to the [facility]. Please see a copy of my visit note below.    Chief Complaint   Patient presents with     Annual Eye Exam     More contacts fit fee ok        Previous contact lens wearer? Yes: acuvue oasys   Comfort of contact lenses :great  Satisfied with current lenses: Yes,just need stronger rx        Last Eye Exam: 4-  Dilated Previously: Yes    What are you currently using to see?  glasses and contacts    Distance Vision Acuity: Satisfied with vision    Near Vision Acuity: Not satisfied     Eye Comfort: good  Do you use eye drops? 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     Medical, surgical and family histories reviewed and updated 5/6/2019.       OBJECTIVE: See Ophthalmology exam    ASSESSMENT:    ICD-10-CM    1. Examination of eyes and vision Z01.00 EYE EXAM (SIMPLE-NONBILLABLE)     REFRACTION     CONTACT LENS FITTING,BILAT   2. Hyperopia, bilateral H52.03 EYE EXAM (SIMPLE-NONBILLABLE)     REFRACTION     CONTACT LENS FITTING,BILAT   3. Regular astigmatism of both eyes H52.223 EYE EXAM (SIMPLE-NONBILLABLE)     REFRACTION     CONTACT LENS FITTING,BILAT   4. Presbyopia H52.4 EYE EXAM (SIMPLE-NONBILLABLE)     REFRACTION     CONTACT LENS FITTING,BILAT      PLAN:     Patient Instructions   Eyeglass prescription given.    Trial contact lenses will be ordered for .  Ok to order if satisfied.    Contact lens prescription given.    Return in 1 year for a complete eye exam or sooner if needed.    Juan Carlos Spann, OD                         Again, thank you for allowing me to participate in the care of your patient.        Sincerely,        Juan Carlos Spann, OD

## 2019-05-07 ENCOUNTER — TELEPHONE (OUTPATIENT)
Dept: OPTOMETRY | Facility: CLINIC | Age: 49
End: 2019-05-07

## 2019-05-07 NOTE — TELEPHONE ENCOUNTER
Brand Base Curve Diameter Sphere Cylinder Axis Addl. Specs   Right Acuvue Oasys 8.6 14.0 +2.50 -1.25 110 Near   Left Acuvue Oasys 8.6 14.0 +1.25 -0.75 070 Dist     Trial contact lenses will be ordered for .  Ok to order if satisfied.    Beatrice Michaels ORDERED CONTACTS 5/7/2019.

## 2019-05-17 ASSESSMENT — REFRACTION_CURRENTRX
OD_BRAND: J&J ACUVUE OASYS FOR ASTIGMATISM BC 8.6, D 14.5
OS_BRAND: J&J ACUVUE OASYS FOR ASTIGMATISM BC 8.6, D 14.5

## 2019-05-20 DIAGNOSIS — Z91.09 ENVIRONMENTAL ALLERGIES: ICD-10-CM

## 2019-05-21 RX ORDER — FLUTICASONE PROPIONATE 50 MCG
SPRAY, SUSPENSION (ML) NASAL
Qty: 16 G | Refills: 2 | Status: SHIPPED | OUTPATIENT
Start: 2019-05-21 | End: 2022-04-18

## 2019-08-22 ENCOUNTER — ANCILLARY PROCEDURE (OUTPATIENT)
Dept: MAMMOGRAPHY | Facility: CLINIC | Age: 49
End: 2019-08-22
Attending: NURSE PRACTITIONER
Payer: COMMERCIAL

## 2019-08-22 DIAGNOSIS — Z12.31 VISIT FOR SCREENING MAMMOGRAM: ICD-10-CM

## 2019-08-22 PROCEDURE — 77063 BREAST TOMOSYNTHESIS BI: CPT | Performed by: RADIOLOGY

## 2019-08-22 PROCEDURE — 77067 SCR MAMMO BI INCL CAD: CPT | Performed by: RADIOLOGY

## 2019-09-20 ENCOUNTER — ANCILLARY PROCEDURE (OUTPATIENT)
Dept: GENERAL RADIOLOGY | Facility: CLINIC | Age: 49
End: 2019-09-20
Attending: NURSE PRACTITIONER
Payer: COMMERCIAL

## 2019-09-20 ENCOUNTER — OFFICE VISIT (OUTPATIENT)
Dept: PEDIATRICS | Facility: CLINIC | Age: 49
End: 2019-09-20
Payer: COMMERCIAL

## 2019-09-20 ENCOUNTER — ANCILLARY PROCEDURE (OUTPATIENT)
Dept: ULTRASOUND IMAGING | Facility: CLINIC | Age: 49
End: 2019-09-20
Attending: NURSE PRACTITIONER
Payer: COMMERCIAL

## 2019-09-20 VITALS
OXYGEN SATURATION: 96 % | TEMPERATURE: 97.6 F | HEIGHT: 66 IN | SYSTOLIC BLOOD PRESSURE: 115 MMHG | BODY MASS INDEX: 36.8 KG/M2 | WEIGHT: 229 LBS | DIASTOLIC BLOOD PRESSURE: 75 MMHG | HEART RATE: 76 BPM

## 2019-09-20 DIAGNOSIS — Z13.6 CARDIOVASCULAR SCREENING; LDL GOAL LESS THAN 160: ICD-10-CM

## 2019-09-20 DIAGNOSIS — Z00.00 ROUTINE GENERAL MEDICAL EXAMINATION AT A HEALTH CARE FACILITY: Primary | ICD-10-CM

## 2019-09-20 DIAGNOSIS — Z13.29 SCREENING FOR THYROID DISORDER: ICD-10-CM

## 2019-09-20 DIAGNOSIS — M79.609 POPLITEAL PAIN: ICD-10-CM

## 2019-09-20 DIAGNOSIS — M25.562 LEFT KNEE PAIN, UNSPECIFIED CHRONICITY: ICD-10-CM

## 2019-09-20 DIAGNOSIS — Z13.1 SCREENING FOR DIABETES MELLITUS (DM): ICD-10-CM

## 2019-09-20 DIAGNOSIS — Z30.09 ENCOUNTER FOR OTHER GENERAL COUNSELING OR ADVICE ON CONTRACEPTION: ICD-10-CM

## 2019-09-20 DIAGNOSIS — Z12.4 SCREENING FOR MALIGNANT NEOPLASM OF CERVIX: ICD-10-CM

## 2019-09-20 LAB
ALBUMIN SERPL-MCNC: 3.8 G/DL (ref 3.4–5)
ALP SERPL-CCNC: 84 U/L (ref 40–150)
ALT SERPL W P-5'-P-CCNC: 28 U/L (ref 0–50)
ANION GAP SERPL CALCULATED.3IONS-SCNC: 5 MMOL/L (ref 3–14)
AST SERPL W P-5'-P-CCNC: 17 U/L (ref 0–45)
BILIRUB SERPL-MCNC: 0.3 MG/DL (ref 0.2–1.3)
BUN SERPL-MCNC: 14 MG/DL (ref 7–30)
CALCIUM SERPL-MCNC: 9.7 MG/DL (ref 8.5–10.1)
CHLORIDE SERPL-SCNC: 106 MMOL/L (ref 94–109)
CHOLEST SERPL-MCNC: 231 MG/DL
CO2 SERPL-SCNC: 28 MMOL/L (ref 20–32)
CREAT SERPL-MCNC: 0.69 MG/DL (ref 0.52–1.04)
GFR SERPL CREATININE-BSD FRML MDRD: >90 ML/MIN/{1.73_M2}
GLUCOSE SERPL-MCNC: 106 MG/DL (ref 70–99)
HDLC SERPL-MCNC: 52 MG/DL
LDLC SERPL CALC-MCNC: 162 MG/DL
NONHDLC SERPL-MCNC: 179 MG/DL
POTASSIUM SERPL-SCNC: 4.1 MMOL/L (ref 3.4–5.3)
PROT SERPL-MCNC: 8 G/DL (ref 6.8–8.8)
SODIUM SERPL-SCNC: 139 MMOL/L (ref 133–144)
TRIGL SERPL-MCNC: 83 MG/DL
TSH SERPL DL<=0.005 MIU/L-ACNC: 0.67 MU/L (ref 0.4–4)

## 2019-09-20 PROCEDURE — 73560 X-RAY EXAM OF KNEE 1 OR 2: CPT | Mod: LT | Performed by: RADIOLOGY

## 2019-09-20 PROCEDURE — 99214 OFFICE O/P EST MOD 30 MIN: CPT | Mod: 25 | Performed by: NURSE PRACTITIONER

## 2019-09-20 PROCEDURE — 84443 ASSAY THYROID STIM HORMONE: CPT | Performed by: NURSE PRACTITIONER

## 2019-09-20 PROCEDURE — 99396 PREV VISIT EST AGE 40-64: CPT | Performed by: NURSE PRACTITIONER

## 2019-09-20 PROCEDURE — 80053 COMPREHEN METABOLIC PANEL: CPT | Performed by: NURSE PRACTITIONER

## 2019-09-20 PROCEDURE — 80061 LIPID PANEL: CPT | Performed by: NURSE PRACTITIONER

## 2019-09-20 PROCEDURE — 87624 HPV HI-RISK TYP POOLED RSLT: CPT | Performed by: NURSE PRACTITIONER

## 2019-09-20 PROCEDURE — 36415 COLL VENOUS BLD VENIPUNCTURE: CPT | Performed by: NURSE PRACTITIONER

## 2019-09-20 PROCEDURE — 93971 EXTREMITY STUDY: CPT | Mod: LT | Performed by: RADIOLOGY

## 2019-09-20 PROCEDURE — G0123 SCREEN CERV/VAG THIN LAYER: HCPCS | Performed by: NURSE PRACTITIONER

## 2019-09-20 ASSESSMENT — MIFFLIN-ST. JEOR: SCORE: 1690.49

## 2019-09-20 NOTE — NURSING NOTE
"Chief Complaint   Patient presents with     Physical     AFE-fasting today       Initial /75 (BP Location: Right arm, Patient Position: Sitting, Cuff Size: Adult Large)   Pulse 76   Temp 97.6  F (36.4  C) (Temporal)   Ht 1.676 m (5' 6\")   Wt 103.9 kg (229 lb)   LMP 09/17/2019 (Exact Date)   SpO2 96%   Breastfeeding? No   BMI 36.96 kg/m   Estimated body mass index is 36.96 kg/m  as calculated from the following:    Height as of this encounter: 1.676 m (5' 6\").    Weight as of this encounter: 103.9 kg (229 lb).  Medication Reconciliation: complete      NINO Obrien      "

## 2019-09-20 NOTE — RESULT ENCOUNTER NOTE
Yanelis Duque,    Attached are your test results.  Normal except possible subchondral cyst. This is a fluid-filled space inside a joint that extends from one of the bones that forms the joint. This type of bone cyst is usually caused by  Osteoarthritis  Lets have you see orthopedics and see what they think we should do     Please call [phone] to make appointment  if you do not hear from referrals in the next few days.      Please contact us if you have any questions.    Suha Beavers, CNP

## 2019-09-20 NOTE — PROGRESS NOTES
SUBJECTIVE:   CC: Lachelle Duque is an 49 year old woman who presents for preventive health visit.     Healthy Habits:    Do you get at least three servings of calcium containing foods daily (dairy, green leafy vegetables, etc.)? yes    Amount of exercise or daily activities, outside of work: 2-3 day(s) per week    Problems taking medications regularly No    Medication side effects: No    Have you had an eye exam in the past two years? yes    Do you see a dentist twice per year? yes    Do you have sleep apnea, excessive snoring or daytime drowsiness?no      Today's PHQ-2 Score:   PHQ-2 ( 1999 [employer]) 9/20/2019 9/10/2018   Q1: Little interest or pleasure in doing things 0 0   Q2: Feeling down, depressed or hopeless 0 0   PHQ-2 Score 0 0       Abuse: Current or Past(Physical, Sexual or Emotional)- No  Do you feel safe in your environment? Yes    Social History     Tobacco Use     Smoking status: Never Smoker     Smokeless tobacco: Never Used   Substance Use Topics     Alcohol use: Yes     Alcohol/week: 0.5 oz     Types: 1 Standard drinks or equivalent per week     Comment: occasionally     If you drink alcohol do you typically have >3 drinks per day or >7 drinks per week? No                     Reviewed orders with patient.  Reviewed health maintenance and updated orders accordingly - Yes  Labs reviewed in EPIC  BP Readings from Last 3 Encounters:   09/20/19 115/75   09/10/18 101/50   09/08/17 115/80    Wt Readings from Last 3 Encounters:   09/20/19 103.9 kg (229 lb)   09/10/18 108.9 kg (240 lb 1.6 oz)   09/08/17 111.6 kg (246 lb 1.6 oz)                  Patient Active Problem List   Diagnosis     Encounter for other general counseling or advice on contraception     Herpes zoster     Family history of colon cancer     Hyperlipidemia LDL goal <130     Obesity     Vitamin D deficiency     Hiatal hernia     GERD (gastroesophageal reflux disease)     Cholelithiasis     Biliary colic     Environmental allergies      Elevation of optic disc, right     Pupil asymmetry     Obesity (BMI 35.0-39.9) with comorbidity (H)     Past Surgical History:   Procedure Laterality Date     ADENOIDECTOMY  1977     COLONOSCOPY N/A 10/27/2015    Procedure: COLONOSCOPY;  Surgeon: Duane, William Charles, MD;  Location: [facility]     COLONOSCOPY WITH CO2 INSUFFLATION N/A 10/27/2015    Procedure: COLONOSCOPY WITH CO2 INSUFFLATION;  Surgeon: Duane, William Charles, MD;  Location: [facility]     HC TOOTH EXTRACTION W/FORCEP      18 yo     LAPAROSCOPIC CHOLECYSTECTOMY  1/30/2013    Procedure: LAPAROSCOPIC CHOLECYSTECTOMY;  Laparoscopic Cholecystectomy;  Surgeon: Cindy Soni MD;  Location: [facility] OR     SINUS SURGERY  1988    Deviated septum     wisdom teeth removed[         Social History     Tobacco Use     Smoking status: Never Smoker     Smokeless tobacco: Never Used   Substance Use Topics     Alcohol use: Yes     Alcohol/week: 0.5 oz     Types: 1 Standard drinks or equivalent per week     Comment: occasionally     Family History   Problem Relation Age of Onset     C.A.D. Father         bypass     Diabetes Father      Cancer - colorectal Father 42     Coronary Artery Disease Father      Gastrointestinal Disease Mother         diverticulosis     Arthritis Mother         fibromyalgia, lupus, rheumatoid     Anesthesia Reaction Mother         nausea     Asthma Mother      Lipids Mother      Diabetes Mother      Macular Degeneration Other      Cancer Other 29        CML     Breast Cancer Other      Colon Polyps Brother      C.A.D. Maternal Grandfather      Cerebrovascular Disease Maternal Grandfather      Coronary Artery Disease Maternal Grandfather      Hypertension Maternal Grandmother      Glaucoma Maternal Grandmother      Gastrointestinal Disease Maternal Grandmother         diverticulosis     Depression Brother      Arthritis Brother         fibromyalgia     Anesthesia Reaction Brother         nausea     Cancer Maternal Aunt      Breast Cancer Maternal  Aunt      Coronary Artery Disease Maternal Uncle      Thyroid Disease No family hx of      Prostate Cancer No family hx of          Current Outpatient Medications   Medication Sig Dispense Refill     acyclovir (ZOVIRAX) 400 MG tablet one tid po for 7 days prn for flare ups and one daily for maintenance. 180 tablet 3     cetirizine HCl (ZYRTEC ALLERGY) 10 MG CAPS Take one tablet daily.       diphenhydrAMINE (BENADRYL) 25 MG capsule Take 25 mg by mouth every 6 hours as needed.       fluticasone (FLONASE) 50 MCG/ACT nasal spray INSTILL TWO SPRAYS IN EACH NOSTRIL EVERY DAY 16 g 2     norethindrone-ethinyl estradiol (NORTREL 1/35, 28,) 1-35 MG-MCG per tablet Take 1 tablet by mouth daily 84 tablet 4     omeprazole 20 MG tablet Take 1 tablet by mouth daily. Take 30-60 minutes before a meal. 30 tablet 1     pseudoePHEDrine (SUDAFED 12 HOUR) 120 MG 12 hr tablet Take 120 mg by mouth every 12 hours. PRN.        Allergies   Allergen Reactions     Sulfa Drugs Anaphylaxis       Mammogram Screening: Patient under age 50, mutual decision reflected in health maintenance.      Pertinent mammograms are reviewed under the imaging tab.  History of abnormal Pap smear: NO - age 30- 65 PAP every 3 years recommended  PAP / HPV Latest Ref Rng & Units 9/6/2016 8/19/2013 9/17/2012   PAP - NIL NIL NIL   HPV 16 DNA NEG Negative - -   HPV 18 DNA NEG Negative - -   OTHER HR HPV NEG Negative - -     Reviewed and updated as needed this visit by clinical staff  Tobacco  Allergies  Meds  Med Hx  Surg Hx  Fam Hx  Soc Hx        Reviewed and updated as needed this visit by Provider        Past Medical History:   Diagnosis Date     Chronic rhinitis      Environmental allergies 1/18/2013     Gastro-oesophageal reflux disease      GERD (gastroesophageal reflux disease) 1/3/2013     Herpes zoster without mention of complication     L eye      Hiatal hernia      PONV (postoperative nausea and vomiting)       Past Surgical History:   Procedure Laterality  "Date     ADENOIDECTOMY  1977     COLONOSCOPY N/A 10/27/2015    Procedure: COLONOSCOPY;  Surgeon: Duane, William Charles, MD;  Location: [facility]     COLONOSCOPY WITH CO2 INSUFFLATION N/A 10/27/2015    Procedure: COLONOSCOPY WITH CO2 INSUFFLATION;  Surgeon: Duane, William Charles, MD;  Location: [facility]     HC TOOTH EXTRACTION W/FORCEP      20 yo     LAPAROSCOPIC CHOLECYSTECTOMY  1/30/2013    Procedure: LAPAROSCOPIC CHOLECYSTECTOMY;  Laparoscopic Cholecystectomy;  Surgeon: Cindy Soni MD;  Location: [facility]     SINUS SURGERY  1988    Deviated septum     wisdom teeth removed[         ROS:  CONSTITUTIONAL:POSITIVE  for weight loss and NEGATIVE  for anorexia  INTEGUMENTARY/SKIN: NEGATIVE for worrisome rashes, moles or lesions  EYES: NEGATIVE for vision changes or irritation  ENT: NEGATIVE for ear, mouth and throat problems  RESP:NEGATIVE for significant cough or SOB  BREAST: NEGATIVE for masses, tenderness or discharge  CV: NEGATIVE for chest pain, palpitations or peripheral edema  GI: NEGATIVE for nausea, abdominal pain, heartburn, or change in bowel habits   female: normal menses, no unusual urinary symptoms and no unusual vaginal symptoms  MUSCULOSKELETAL:NEGATIVE for Hx DVT , Hx gout, joint swelling , joint warmth  and paresthesias  pain behind her left knee going on for about 2 weeks. No injury or swelling.   NEURO: NEGATIVE for weakness, dizziness or paresthesias  ENDOCRINE: NEGATIVE for temperature intolerance, skin/hair changes  HEME/ALLERGY/IMMUNE: NEGATIVE for bleeding problems  PSYCHIATRIC: NEGATIVE for changes in mood or affect     OBJECTIVE:   /75 (BP Location: Right arm, Patient Position: Sitting, Cuff Size: Adult Large)   Pulse 76   Temp 97.6  F (36.4  C) (Temporal)   Ht 1.676 m (5' 6\")   Wt 103.9 kg (229 lb)   LMP 09/17/2019 (Exact Date)   SpO2 96%   Breastfeeding? No   BMI 36.96 kg/m    EXAM:  GENERAL: alert, no distress and obese  EYES: Eyes grossly normal to inspection, PERRL and " conjunctivae and sclerae normal  HENT: ear canals and TM's normal, nose and mouth without ulcers or lesions  NECK: no adenopathy, no asymmetry, masses, or scars and thyroid normal to palpation  RESP: lungs clear to auscultation - no rales, rhonchi or wheezes  BREAST: normal without masses, tenderness or nipple discharge and no palpable axillary masses or adenopathy  CV: regular rates and rhythm, no murmur, click or rub, peripheral pulses strong and no peripheral edema  ABDOMEN: soft, nontender, no hepatosplenomegaly, no masses and bowel sounds normal   (female): normal female external genitalia, normal urethral meatus, vaginal mucosa pink, moist, well rugated, and normal cervix/adnexa/uterus without masses or discharge  pelvic exam limited by obesity, pap smear done  MS: extremities normal- no gross deformities noted, gait normal, normal muscle tone and peripheral pulses normal  gait is age appropriate without ataxia  antalgic gait, limited range of motion  Due to pain and swelling popliteal space, popliteal fossa tenderness, leg compartments are soft  and no knee discomfort with internal rotation of the hip  SKIN: no suspicious lesions or rashes  NEURO: Normal strength and tone, mentation intact and speech normal  PSYCH: mentation appears normal, affect normal/bright  LYMPH: no cervical, supraclavicular, axillary, or inguinal adenopathy    Diagnostic Test Results:  Results for orders placed or performed in visit on 09/20/19   HPV High Risk Types DNA Cervical   Result Value Ref Range    HPV Source [facility]     HPV 16 DNA Negative NEG^Negative    HPV 18 DNA Negative NEG^Negative    Other HR HPV Negative NEG^Negative    Final Diagnosis This patient's sample is negative for HPV DNA.     Specimen Description Cervical Cells    Lipid panel reflex to direct LDL Fasting   Result Value Ref Range    Cholesterol 231 (H) <200 mg/dL    Triglycerides 83 <150 mg/dL    HDL Cholesterol 52 >49 mg/dL    LDL Cholesterol Calculated  162 (H) <100 mg/dL    Non HDL Cholesterol 179 (H) <130 mg/dL   Comprehensive metabolic panel   Result Value Ref Range    Sodium 139 133 - 144 mmol/L    Potassium 4.1 3.4 - 5.3 mmol/L    Chloride 106 94 - 109 mmol/L    Carbon Dioxide 28 20 - 32 mmol/L    Anion Gap 5 3 - 14 mmol/L    Glucose 106 (H) 70 - 99 mg/dL    Urea Nitrogen 14 7 - 30 mg/dL    Creatinine 0.69 0.52 - 1.04 mg/dL    GFR Estimate >90 >60 mL/min/[1.73_m2]    GFR Estimate If Black >90 >60 mL/min/[1.73_m2]    Calcium 9.7 8.5 - 10.1 mg/dL    Bilirubin Total 0.3 0.2 - 1.3 mg/dL    Albumin 3.8 3.4 - 5.0 g/dL    Protein Total 8.0 6.8 - 8.8 g/dL    Alkaline Phosphatase 84 40 - 150 U/L    ALT 28 0 - 50 U/L    AST 17 0 - 45 U/L   TSH with free T4 reflex   Result Value Ref Range    TSH 0.67 0.40 - 4.00 mU/L   A pap thin layer screen   Result Value Ref Range    PAP OTHER-NIL, See Result     Copath Report         Patient Name: BALDEV ORTIZ  MR#: [id number]  Specimen #: [id number]  Collected: 9/20/2019  Received: 9/23/2019  Reported: 9/25/2019 08:33  Ordering Phy(s): RAGINI WHITE    For improved result formatting, select 'View Enhanced Report Format' under   Linked Documents section.    SPECIMEN/STAIN PROCESS:  Pap thin layer prep screening ([facility])       Pap-Cyto x 1, HPV ordered x 1    SOURCE: Cervical, endocervical  ----------------------------------------------------------------   Pap thin layer prep screening ([facility])  SPECIMEN ADEQUACY:  Satisfactory for evaluation.  Specimen was unable to be imaged on the   [employer] .  -Transformation zone component present.    CYTOLOGIC INTERPRETATION:    Other: Negative for Intraepithelial Lesion or Malignancy, See   interpretation/Result.         Endometrial cells present in a woman 45 years of age or older     -Endometrial cells correlate with the menstrual history provided.    Electronically signed out by:  JOSE CARLOS Leone ([facility])    CLINICAL HISTORY:  LMP: 09/17/2019  Oral Birth  Control Pill, A previous normal pap  Date of Last Pap: 09/06/2016,    Papanicolaou Test Limitations:  Cervical cytology is a screening test with   limited sensitivity; regular  screening is critical for cancer prevention; Pap tests are primarily   effective for the diagnosis/prevention of  squamous cell carcinoma, not adenocarcinomas or other cancers.    The technical component of this testing was completed at the [facility], with the professional component performed   at the [facility], [address] [place] [id number] ([phone])    COLLECTION SITE:  Client:  [facility]  Location: [facility] (B)         Recent Results (from the past 744 hour(s))   US Lower Extremity Venous Duplex Left    Narrative    EXAMINATION: US LOWER EXTREMITY VENOUS DUPLEX LEFT, 9/20/2019 9:39 AM     COMPARISON: None.    HISTORY: Left popliteal pain.    TECHNIQUE:  Gray-scale evaluation with compression, spectral flow, and  color Doppler assessment of the deep venous system of the left leg  from groin to knee, and then at the ankle.    FINDINGS:  In the left lower extremity, the common femoral, superficial femoral,  popliteal and posterior tibial veins demonstrate normal  compressibility and blood flow. There is normal compressibility,  phasicity, and augmentation in the right common femoral vein. No  ultrasound evidence of popliteal fossa cyst.      Impression    IMPRESSION:  1.  No evidence left lower extremity deep venous thrombosis.    AN MD JUSTIN   XR Knee Left 1/2 Views    Narrative    2 views left knee radiographs 9/20/2019 10:29 AM    History: Left knee pain, unspecified chronicity    Comparison: None available.    Findings:    AP and lateral views of the left knee were obtained.     No acute osseous abnormality.  No joint effusion.    Question subchondral cystic change  of the patellar articular surface;  otherwise, no substantial degenerative change.    Soft tissue is unremarkable.      Impression    Impression:  1. No acute osseous abnormality.  2. Question subchondral cystic change of the patellar articular  surface; otherwise, no substantial degenerative change.    SRAA JULIAN   MR Knee Left w/o Contrast    Narrative    EXAMINATION: MRI of the left knee without contrast.    DATE:  9/30/2019.    HISTORY: Knee pain.    TECHNIQUE: Multiplanar, multisequence MR imaging of the left knee was  obtained using standard sequences in 3 orthogonal planes without the  use of intravenous or intra-articular gadolinium contrast.    Comparison:  Comparison radiographs dated 9/20/2019 were reviewed,  which demonstrated no acute bone abnormality.    FINDINGS:    In the medial compartment, there is a complex radial tear involving  the posterior horn of the medial meniscus, coronal series 7 image 23.  There is heterogeneity of the articular cartilage along the medial  femoral condyle with cartilage fissuring and subtle subchondral edema.    In the lateral compartment, the lateral meniscus is intact. There is  no high-grade or full-thickness cartilage loss or subchondral edema.    In the patellofemoral compartment, there are areas of cartilage  fissuring and high-grade to full-thickness cartilage loss centered at  the median ridge of the patella extending both medially and laterally  with subchondral edema. The trochlear cartilage is intact.    The anterior and posterior cruciate ligaments are intact.    The tibial collateral ligament is intact. Mild soft tissue edema  surrounds the intact tibial collateral ligament.    The iliotibial band, fibular collateral ligament, biceps femoris  tendon, and popliteus tendons are intact.    There is moderate-sized joint effusion. Small amount of fluid in the  semimembranosus medial gastrocnemius bursa. No joint bodies.    The extensor mechanism is intact  and normal in appearance.       Impression    IMPRESSION:  1. Moderate-sized left knee joint effusion.  2. Radial tear involving the posterior horn of the left knee medial  meniscus.  3. Osteoarthrosis in the left knee, greatest within the patellofemoral  and medial femorotibial joint compartments, as described above. There  are areas of high-grade to full-thickness cartilage loss centered at  the median ridge of the patella with subchondral edema, extending both  medially and laterally. There are also areas of cartilage fissuring  with heterogeneity of the articular cartilage along the medial femoral  condyle with subtle subchondral edema.  4. The anterior and posterior cruciate ligaments, lateral meniscus,  and medial and lateral supporting structures are intact. Small amount  of fluid along the tibial collateral ligament is likely reactive to  the medial meniscal tear, less likely a low-grade sprain.    POLY BALDERRAMA MD         ASSESSMENT/PLAN:   Lachelle was seen today for physical.    Diagnoses and all orders for this visit:    Routine general medical examination at a health care facility  -     JUST IN CASE  -     Lipid panel reflex to direct LDL Fasting  -     Comprehensive metabolic panel  -     TSH with free T4 reflex    Encounter for other general counseling or advice on contraception  -     norethindrone-ethinyl estradiol (NORTREL 1/35, 28,) 1-35 MG-MCG tablet; Take 1 tablet by mouth daily    Screening for malignant neoplasm of cervix  -     Pap imaged thin layer screen with HPV - recommended age 30 - 65 years (select HPV order below)  -     HPV High Risk Types DNA Cervical  -     A pap thin layer screen    CARDIOVASCULAR SCREENING; LDL GOAL LESS THAN 160  -     Lipid panel reflex to direct LDL Fasting  -     Lipid panel reflex to direct LDL Fasting; Future    Screening for diabetes mellitus (DM)  -     JUST IN CASE  -     Comprehensive metabolic panel    Screening for thyroid disorder  -     TSH with  "free T4 reflex    Left knee pain, unspecified chronicity  -     XR Knee Left 1/2 Views; Future  -     ORTHOPEDICS ADULT REFERRAL  Will follow up and/or notify patient of  results via My Chart to determine further need for followup    Popliteal pain  -     US Lower Extremity Venous Duplex Left; Future  -     ORTHOPEDICS ADULT REFERRAL  Rule out DVT   Will follow up and/or notify patient of  results via My Chart to determine further need for followup      PLAN:    Patient needs to follow up in if no improvement,or sooner if worsening of symptoms or other symptoms develop.  FURTHER TESTING:       - venous  ultrasound  Work on weight loss  Regular exercise  Will follow up and/or notify patient of  results via My Chart to determine further need for followup  Follow up office visit in one year for annual health maintenance exam, sooner PRN.      COUNSELING:   Reviewed preventive health counseling, as reflected in patient instructions  Special attention given to:        Regular exercise       Healthy diet/nutrition       Vision screening       Osteoporosis Prevention/Bone Health       The 10-year ASCVD risk score (Molly HALL Jr., et al., 2013) is: 1.3%    Values used to calculate the score:      Age: 49 years      Sex: Female      Is Non- : No      Diabetic: No      Tobacco smoker: No      Systolic Blood Pressure: 127 mmHg      Is BP treated: No      HDL Cholesterol: 52 mg/dL      Total Cholesterol: 231 mg/dL    Estimated body mass index is 36.96 kg/m  as calculated from the following:    Height as of this encounter: 1.676 m (5' 6\").    Weight as of this encounter: 103.9 kg (229 lb).    Weight management plan: Discussed healthy diet and exercise guidelines     reports that she has never smoked. She has never used smokeless tobacco.      Counseling Resources:  ATP IV Guidelines  Pooled Cohorts Equation Calculator  Breast Cancer Risk Calculator  FRAX Risk Assessment  ICSI Preventive Guidelines  Dietary " Guidelines for Americans, 2010  USDA's MyPlate  ASA Prophylaxis  Lung CA Screening    ROSALIND Nazario CNP  [facility]

## 2019-09-20 NOTE — PATIENT INSTRUCTIONS
PLAN:   1.   Symptomatic therapy suggested: Continue current medication regimen unchanged.  2.  Orders Placed This Encounter   Medications     norethindrone-ethinyl estradiol (NORTREL 1/35, 28,) 1-35 MG-MCG tablet     Sig: Take 1 tablet by mouth daily     Dispense:  84 tablet     Refill:  4     Orders Placed This Encounter   Procedures     US Lower Extremity Venous Duplex Left     XR Knee Left 1/2 Views     Pap imaged thin layer screen with HPV - recommended age 30 - 65 years (select HPV order below)     HPV High Risk Types DNA Cervical     JUST IN CASE     Lipid panel reflex to direct LDL Fasting     Comprehensive metabolic panel     TSH with free T4 reflex       3. Patient needs to follow up in if no improvement,or sooner if worsening of symptoms or other symptoms develop.  FURTHER TESTING:       - venous  ultrasound  Work on weight loss  Regular exercise  Will follow up and/or notify patient of  results via My Chart to determine further need for followup  Follow up office visit in one year for annual health maintenance exam, sooner PRN.    Preventive Health Recommendations  Female Ages 40 to 49    Yearly exam:     See your health care provider every year in order to  1. Review health changes.   2. Discuss preventive care.    3. Review your medicines if your doctor prescribed any.      Get a Pap test every three years (unless you have an abnormal result and your provider advises testing more often).      If you get Pap tests with HPV test, you only need to test every 5 years, unless you have an abnormal result. You do not need a Pap test if your uterus was removed (hysterectomy) and you have not had cancer.      You should be tested each year for STDs (sexually transmitted diseases), if you're at risk.     Ask your doctor if you should have a mammogram.      Have a colonoscopy (test for colon cancer) if someone in your family has had colon cancer or polyps before age 50.       Have a cholesterol test every 5  years.       Have a diabetes test (fasting glucose) after age 45. If you are at risk for diabetes, you should have this test every 3 years.    Shots: Get a flu shot each year. Get a tetanus shot every 10 years.     Nutrition:     Eat at least 5 servings of fruits and vegetables each day.    Eat whole-grain bread, whole-wheat pasta and brown rice instead of white grains and rice.    Get adequate Calcium and Vitamin D.      Lifestyle    Exercise at least 150 minutes a week (an average of 30 minutes a day, 5 days a week). This will help you control your weight and prevent disease.    Limit alcohol to one drink per day.    No smoking.     Wear sunscreen to prevent skin cancer.    See your dentist every six months for an exam and cleaning.

## 2019-09-20 NOTE — RESULT ENCOUNTER NOTE
Yanelis Duque,    Attached are your test results.  Ultrasound negative will see what the xray tells us    Please contact us if you have any questions.    Suha Beavers, CNP

## 2019-09-20 NOTE — RESULT ENCOUNTER NOTE
Yanelis Duque,    Attached are your test results.  -LDL(bad) cholesterol level is elevated which can increase your heart disease risk.  A diet high in fat and simple carbohydrates, genetics and being overweight can contribute to this. ADVISE: exercising 150 minutes of aerobic exercise per week (30 minutes for 5 days per week or 50 minutes for 3 days per week are options) and eating a low saturated fat/low carbohydrate diet are helpful to improve this. In 3 months, you should recheck your fasting cholesterol panel by scheduling a lab-only appointment.  -Liver and gallbladder tests (ALT,AST, Alk phos,bilirubin) are normal.  -Kidney function (GFR) is normal.  -Sodium is normal.  -Potassium is normal.  -Calcium is normal.  -Glucose is slight elevated and may be a sign of early diabetes (prediabetes). ADVISE:: eating a low carbohydrate diet, exercising, trying to lose weight (if necessary) and rechecking your glucose level in 12 months.  -TSH (thyroid stimulating hormone) level is normal which indicates normal thyroid function.   Please contact us if you have any questions.    Suha Beavers, CNP

## 2019-09-25 LAB
COPATH REPORT: NORMAL
PAP: NORMAL

## 2019-09-27 LAB
FINAL DIAGNOSIS: NORMAL
HPV HR 12 DNA CVX QL NAA+PROBE: NEGATIVE
HPV16 DNA SPEC QL NAA+PROBE: NEGATIVE
HPV18 DNA SPEC QL NAA+PROBE: NEGATIVE
SPECIMEN DESCRIPTION: NORMAL
SPECIMEN SOURCE CVX/VAG CYTO: NORMAL

## 2019-09-30 ENCOUNTER — ANCILLARY PROCEDURE (OUTPATIENT)
Dept: MRI IMAGING | Facility: CLINIC | Age: 49
End: 2019-09-30
Attending: NURSE PRACTITIONER
Payer: COMMERCIAL

## 2019-09-30 DIAGNOSIS — M25.562 LEFT KNEE PAIN, UNSPECIFIED CHRONICITY: ICD-10-CM

## 2019-10-01 NOTE — RESULT ENCOUNTER NOTE
Yanelis Duque,    Attached are your test results.  MRI shows significant arthritis   Also shows a tear in the meniscus and fluid in the joint  Keep appointment with orthopedics and will see what they recommend    Please contact us if you have any questions.    Suha Beavers, CNP

## 2019-10-04 ENCOUNTER — OFFICE VISIT (OUTPATIENT)
Dept: ORTHOPEDICS | Facility: CLINIC | Age: 49
End: 2019-10-04
Attending: NURSE PRACTITIONER
Payer: COMMERCIAL

## 2019-10-04 VITALS
OXYGEN SATURATION: 97 % | SYSTOLIC BLOOD PRESSURE: 127 MMHG | HEART RATE: 72 BPM | WEIGHT: 232.9 LBS | DIASTOLIC BLOOD PRESSURE: 80 MMHG | HEIGHT: 67 IN | BODY MASS INDEX: 36.55 KG/M2

## 2019-10-04 DIAGNOSIS — M25.562 ACUTE PAIN OF LEFT KNEE: Primary | ICD-10-CM

## 2019-10-04 PROCEDURE — 99213 OFFICE O/P EST LOW 20 MIN: CPT | Performed by: FAMILY MEDICINE

## 2019-10-04 RX ORDER — DICLOFENAC SODIUM 75 MG/1
75 TABLET, DELAYED RELEASE ORAL 2 TIMES DAILY
Qty: 28 TABLET | Refills: 1 | Status: SHIPPED | OUTPATIENT
Start: 2019-10-04 | End: 2019-11-15

## 2019-10-04 ASSESSMENT — PAIN SCALES - GENERAL: PAINLEVEL: MODERATE PAIN (5)

## 2019-10-04 ASSESSMENT — MIFFLIN-ST. JEOR: SCORE: 1724.06

## 2019-10-04 NOTE — NURSING NOTE
"      [facility]  10/4/2019    Lachelle Duque's chief complaint for this visit includes:  Chief Complaint   Patient presents with     Left Knee - Pain     PCP: Suha Beavers    Referring Provider:  Suha Beavers, APRN CNP  [address]  [place] [address]    /80 (BP Location: Left arm, Patient Position: Sitting, Cuff Size: Adult Large)   Pulse 72   Ht 1.702 m (5' 7\")   Wt 105.6 kg (232 lb 14.4 oz)   LMP 09/17/2019 (Exact Date)   SpO2 97%   BMI 36.48 kg/m    Moderate Pain (5)       Reason for visit:     What part of your body is injured / painful?  left knee    What caused the injury /pain? Felt pain when walking    How long ago did your injury occur or pain begin? 6 days ago    What are your most bothersome symptoms? Pain, Swelling, Tingling and Giving way or instability    How would you characterize your symptom?  aching and dull    What makes your symptoms better? Rest, Ice, Ibuprofen, Tylenol and Other: elevation    What makes your symptoms worse? Walking, Squatting and Other: stairs    Have you been previously seen for this problem? Yes, Suha Beavers    Medical History:    Any recent changes to your medical history? No    Any new medication prescribed since last visit? No    Have you had surgery on this body part before? No    Social History:    Occupation: RN - Med Onc at the     Exercise: Cardiovascular: Other: treadmill and dog walking    Review of Systems:    Do you have fever, chills, weight loss? No    Do you have any vision problems? No    Do you have any chest pain or edema? No    Do you have any shortness of breath or wheezing?  No    Do you have stomach problems? No    Do you have any numbness or focal weakness? No    Do you have diabetes? No    Do you have problems with bleeding or clotting? No    Do you have an rashes or other skin lesions? No    Domitila Harris CMA           "

## 2019-10-04 NOTE — LETTER
"    10/4/2019         RE: Lachelle Duque  [address]  [place] [address]        Dear Colleague,    Thank you for referring your patient, Lachelle Duque, to the [facility]. Please see a copy of my visit note below.    [facility]      Patient is a 49 year old female who presents to the office today for: left knee pain. Present for 4 weeks. No inciting event or trauma. Pain in posterior knee. Has had some pain with WB and twisting, stairs. Better with rest and ice. Ibuprofen and acetaminophen help slightly. No prior knee injury or knee pain.  No clicking locking or catching    Past Medical History, Current Medications, and Allergies are reviewed in the electronic medical record as appropriate.     ROS: Pertinent items are noted in HPI.  Constitutional: negative for fevers, chills and malaise  Cardiovascular: negative for dyspnea, fatigue, lower extremity edema  Integument/breast: negative for rash, skin lesion(s) and skin color change  Neurological: negative for paresthesia and weakness      EXAM:/80 (BP Location: Left arm, Patient Position: Sitting, Cuff Size: Adult Large)   Pulse 72   Ht 1.702 m (5' 7\")   Wt 105.6 kg (232 lb 14.4 oz)   LMP 09/17/2019 (Exact Date)   SpO2 97%   BMI 36.48 kg/m       Patient is alert, No acute distress, pleasant and conversational.    Gait: nonantalgic. Normal heel toe gait.    left knee:   Skin intact. No erythema or ecchymosis.  mild effusion, no soft tissue swelling.    AROM: Zero to approximately 135  without restriction or reported pain.    Palpation:   TTP posterior medial joint line  No medial or lateral facet joint tenderness.  No lateral joint line tenderness     Special Tests:  Negative bounce test, negative forced flexion and + Jesse's for pain. + Thessaly  No ligamentous laxity or pain with valgus or varus stress.  Negative Lachman's, Anterior Drawer and Posterior Drawer     Full Isometric quad " strength, extensor mechanism in place     Neurovascularly intact in the lower extremity    Hip and Ankle with full AROM and nontender      Imaging: Reviewed MRI of the left knee performed on 9/30/2019 and report per radiology:  IMPRESSION:  1. Moderate-sized left knee joint effusion.  2. Radial tear involving the posterior horn of the left knee medial  meniscus.  3. Osteoarthrosis in the left knee, greatest within the patellofemoral  and medial femorotibial joint compartments, as described above. There  are areas of high-grade to full-thickness cartilage loss centered at  the median ridge of the patella with subchondral edema, extending both  medially and laterally. There are also areas of cartilage fissuring  with heterogeneity of the articular cartilage along the medial femoral  condyle with subtle subchondral edema.  4. The anterior and posterior cruciate ligaments, lateral meniscus,  and medial and lateral supporting structures are intact. Small amount  of fluid along the tibial collateral ligament is likely reactive to  the medial meniscal tear, less likely a low-grade sprain.      Assessment: Patient is a 49 year old female with knee pain for the last 4 weeks and demonstration of focal cartilage loss and medial meniscus tearing on MRI.  At the current time it is unclear how much the meniscus injury is contributing to her pain, if at all.    Recommendations:   Reviewed imaging and assessment with the patient in detail  Discussed course of treatment including oral anti-inflammatory, diclofenac prescribed, knee bracing with cut out knee sleeve, home exercises and physical therapy.  May consider trial of steroid injection.  She will follow-up if she would like to pursue this option.  Otherwise follow-up in 6 weeks for reevaluation.    Ezra Currie MD                Again, thank you for allowing me to participate in the care of your patient.        Sincerely,        Ezra Currie MD

## 2019-10-04 NOTE — PATIENT INSTRUCTIONS
Thanks for coming today.  [facility] Medicine Clinic  [address] [place] [address]    To schedule future appointments in Ortho Clinic, you may call [phone].    To schedule ordered imaging by your provider:   Call Central Imaging [phone]    To schedule an injection ordered by your provider:  Call Central Imaging Injection scheduling line: [phone]  [employer] available online at:  [email or web address]    Please call if any further questions or concerns ([phone]).  Clinic hours 8 am to 5 pm.    Return to clinic (call) if symptoms worsen or fail to improve.

## 2019-10-04 NOTE — PROGRESS NOTES
"[facility]      Patient is a 49 year old female who presents to the office today for: left knee pain. Present for 4 weeks. No inciting event or trauma. Pain in posterior knee. Has had some pain with WB and twisting, stairs. Better with rest and ice. Ibuprofen and acetaminophen help slightly. No prior knee injury or knee pain.  No clicking locking or catching    Past Medical History, Current Medications, and Allergies are reviewed in the electronic medical record as appropriate.     ROS: Pertinent items are noted in HPI.  Constitutional: negative for fevers, chills and malaise  Cardiovascular: negative for dyspnea, fatigue, lower extremity edema  Integument/breast: negative for rash, skin lesion(s) and skin color change  Neurological: negative for paresthesia and weakness      EXAM:/80 (BP Location: Left arm, Patient Position: Sitting, Cuff Size: Adult Large)   Pulse 72   Ht 1.702 m (5' 7\")   Wt 105.6 kg (232 lb 14.4 oz)   LMP 09/17/2019 (Exact Date)   SpO2 97%   BMI 36.48 kg/m      Patient is alert, No acute distress, pleasant and conversational.    Gait: nonantalgic. Normal heel toe gait.    left knee:   Skin intact. No erythema or ecchymosis.  mild effusion, no soft tissue swelling.    AROM: Zero to approximately 135  without restriction or reported pain.    Palpation:   TTP posterior medial joint line  No medial or lateral facet joint tenderness.  No lateral joint line tenderness     Special Tests:  Negative bounce test, negative forced flexion and + Jesse's for pain. + Thessaly  No ligamentous laxity or pain with valgus or varus stress.  Negative Lachman's, Anterior Drawer and Posterior Drawer     Full Isometric quad strength, extensor mechanism in place     Neurovascularly intact in the lower extremity    Hip and Ankle with full AROM and nontender      Imaging: Reviewed MRI of the left knee performed on 9/30/2019 and report per radiology:  IMPRESSION:  1. Moderate-sized " left knee joint effusion.  2. Radial tear involving the posterior horn of the left knee medial  meniscus.  3. Osteoarthrosis in the left knee, greatest within the patellofemoral  and medial femorotibial joint compartments, as described above. There  are areas of high-grade to full-thickness cartilage loss centered at  the median ridge of the patella with subchondral edema, extending both  medially and laterally. There are also areas of cartilage fissuring  with heterogeneity of the articular cartilage along the medial femoral  condyle with subtle subchondral edema.  4. The anterior and posterior cruciate ligaments, lateral meniscus,  and medial and lateral supporting structures are intact. Small amount  of fluid along the tibial collateral ligament is likely reactive to  the medial meniscal tear, less likely a low-grade sprain.      Assessment: Patient is a 49 year old female with knee pain for the last 4 weeks and demonstration of focal cartilage loss and medial meniscus tearing on MRI.  At the current time it is unclear how much the meniscus injury is contributing to her pain, if at all.    Recommendations:   Reviewed imaging and assessment with the patient in detail  Discussed course of treatment including oral anti-inflammatory, diclofenac prescribed, knee bracing with cut out knee sleeve, home exercises and physical therapy.  May consider trial of steroid injection.  She will follow-up if she would like to pursue this option.  Otherwise follow-up in 6 weeks for reevaluation.    Ezra Currie MD

## 2019-10-16 ENCOUNTER — THERAPY VISIT (OUTPATIENT)
Dept: PHYSICAL THERAPY | Facility: CLINIC | Age: 49
End: 2019-10-16
Payer: COMMERCIAL

## 2019-10-16 DIAGNOSIS — S83.212A BUCKET-HANDLE TEAR OF MEDIAL MENISCUS OF LEFT KNEE AS CURRENT INJURY, INITIAL ENCOUNTER: ICD-10-CM

## 2019-10-16 DIAGNOSIS — M25.562 ACUTE PAIN OF LEFT KNEE: ICD-10-CM

## 2019-10-16 PROBLEM — S83.242A TEAR OF MEDIAL MENISCUS OF LEFT KNEE, CURRENT: Status: ACTIVE | Noted: 2019-10-16

## 2019-10-16 PROCEDURE — 97110 THERAPEUTIC EXERCISES: CPT | Mod: GP

## 2019-10-16 PROCEDURE — 97161 PT EVAL LOW COMPLEX 20 MIN: CPT | Mod: GP

## 2019-10-16 NOTE — PROGRESS NOTES
[facility] Initial Evaluation  Subjective:    Type of problem:  Left knee       Problem details: Onset: started from walking approximately 3-4 weeks ago; pain in posterior medial knee. 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 Moderate-sized left knee joint effusion.  2. Radial tear involving the posterior horn of the left knee medial  meniscus.  3. Osteoarthrosis in the left knee, greatest within the patellofemoral  and medial femorotibial joint compartments, as described above. There  are areas of high-grade to full-thickness cartilage loss centered at  the median ridge of the patella with subchondral edema, extending both  medially and laterally. There are also areas of cartilage fissuring  with heterogeneity of the articular cartilage along the medial femoral  condyle with subtle subchondral edema.  4. The anterior and posterior cruciate ligaments, lateral meniscus,  and medial and lateral supporting structures are intact. Small amount  of fluid along the tibial collateral ligament is likely reactive to  the medial meniscal tear, less likely a low-grade sprain..   Patient reports pain:  Medial.   Symptoms are exacerbated by ascending stairs and descending stairs and relieved by NSAID's.                      Objective:  System                                                Knee Evaluation:  ROM:  Strength wnl knee: Proximal hip strength grossly 4+/5   AROM    Hyperextension: Left:  2    Right:  Extension: Left: 0    Right:   Flexion: Left: 130   Right:              Palpation:  Palpation of knee: crepitus audible and palpable           Functional Testing:  : step down test: painful and slight dynamic valgus.                  General     ROS    Assessment/Plan:    Patient is a 49 year old  female with left side knee complaints.    Patient has the following significant findings with corresponding treatment plan.                Diagnosis 1:  Meniscal tear, OA changes   Pain -  self management and education  Impaired muscle performance - neuro re-education  Decreased function - therapeutic activities    Therapy Evaluation Codes:       Previous and current functional limitations:  (See Goal Flow Sheet for this information)    Short term and Long term goals: (See Goal Flow Sheet for this information)     Communication ability:  Patient appears to be able to clearly communicate and understand verbal and written communication and follow directions correctly.  Treatment Explanation - The following has been discussed with the patient:   RX ordered/plan of care  Anticipated outcomes  Possible risks and side effects  This patient would benefit from PT intervention to resume normal activities.   Rehab potential is good.    Frequency:  1 X week, once daily  Duration:  for 6 weeks  Discharge Plan:  Achieve all LTG.  Independent in home treatment program.  Reach maximal therapeutic benefit.    Please refer to the daily flowsheet for treatment today, total treatment time and time spent performing 1:1 timed codes.

## 2019-10-17 ASSESSMENT — ACTIVITIES OF DAILY LIVING (ADL)
WEAKNESS: THE SYMPTOM AFFECTS MY ACTIVITY SLIGHTLY
PAIN: THE SYMPTOM AFFECTS MY ACTIVITY SLIGHTLY
HOW_WOULD_YOU_RATE_THE_CURRENT_FUNCTION_OF_YOUR_KNEE_DURING_YOUR_USUAL_DAILY_ACTIVITIES_ON_A_SCALE_FROM_0_TO_100_WITH_100_BEING_YOUR_LEVEL_OF_KNEE_FUNCTION_PRIOR_TO_YOUR_INJURY_AND_0_BEING_THE_INABILITY_TO_PERFORM_ANY_OF_YOUR_USUAL_DAILY_ACTIVITIES?: 80
KNEE_ACTIVITY_OF_DAILY_LIVING_SCORE: 78.57
KNEE_ACTIVITY_OF_DAILY_LIVING_SUM: 55
RISE FROM A CHAIR: ACTIVITY IS NOT DIFFICULT
WALK: ACTIVITY IS MINIMALLY DIFFICULT
KNEEL ON THE FRONT OF YOUR KNEE: ACTIVITY IS MINIMALLY DIFFICULT
SQUAT: ACTIVITY IS NOT DIFFICULT
AS_A_RESULT_OF_YOUR_KNEE_INJURY,_HOW_WOULD_YOU_RATE_YOUR_CURRENT_LEVEL_OF_DAILY_ACTIVITY?: NEARLY NORMAL
GIVING WAY, BUCKLING OR SHIFTING OF KNEE: I DO NOT HAVE THE SYMPTOM
RAW_SCORE: 55
LIMPING: THE SYMPTOM AFFECTS MY ACTIVITY SLIGHTLY
SWELLING: THE SYMPTOM AFFECTS MY ACTIVITY SLIGHTLY
STIFFNESS: I HAVE THE SYMPTOM BUT IT DOES NOT AFFECT MY ACTIVITY
GO DOWN STAIRS: ACTIVITY IS MINIMALLY DIFFICULT
HOW_WOULD_YOU_RATE_THE_OVERALL_FUNCTION_OF_YOUR_KNEE_DURING_YOUR_USUAL_DAILY_ACTIVITIES?: NEARLY NORMAL
GO UP STAIRS: ACTIVITY IS MINIMALLY DIFFICULT
STAND: ACTIVITY IS MINIMALLY DIFFICULT
SIT WITH YOUR KNEE BENT: ACTIVITY IS MINIMALLY DIFFICULT

## 2019-10-23 ENCOUNTER — THERAPY VISIT (OUTPATIENT)
Dept: PHYSICAL THERAPY | Facility: CLINIC | Age: 49
End: 2019-10-23
Payer: COMMERCIAL

## 2019-10-23 DIAGNOSIS — S83.212A BUCKET-HANDLE TEAR OF MEDIAL MENISCUS OF LEFT KNEE AS CURRENT INJURY, INITIAL ENCOUNTER: ICD-10-CM

## 2019-10-23 DIAGNOSIS — M25.562 ACUTE PAIN OF LEFT KNEE: ICD-10-CM

## 2019-10-23 PROCEDURE — 97530 THERAPEUTIC ACTIVITIES: CPT | Mod: GP

## 2019-10-23 PROCEDURE — 97110 THERAPEUTIC EXERCISES: CPT | Mod: GP

## 2019-11-06 ENCOUNTER — THERAPY VISIT (OUTPATIENT)
Dept: PHYSICAL THERAPY | Facility: CLINIC | Age: 49
End: 2019-11-06
Payer: COMMERCIAL

## 2019-11-06 ENCOUNTER — HEALTH MAINTENANCE LETTER (OUTPATIENT)
Age: 49
End: 2019-11-06

## 2019-11-06 DIAGNOSIS — S83.212A BUCKET-HANDLE TEAR OF MEDIAL MENISCUS OF LEFT KNEE AS CURRENT INJURY, INITIAL ENCOUNTER: ICD-10-CM

## 2019-11-06 DIAGNOSIS — M25.562 ACUTE PAIN OF LEFT KNEE: ICD-10-CM

## 2019-11-06 PROCEDURE — 97110 THERAPEUTIC EXERCISES: CPT | Mod: GP

## 2019-11-06 PROCEDURE — 97530 THERAPEUTIC ACTIVITIES: CPT | Mod: GP

## 2019-11-15 ENCOUNTER — OFFICE VISIT (OUTPATIENT)
Dept: ORTHOPEDICS | Facility: CLINIC | Age: 49
End: 2019-11-15
Payer: COMMERCIAL

## 2019-11-15 VITALS
OXYGEN SATURATION: 97 % | HEART RATE: 76 BPM | DIASTOLIC BLOOD PRESSURE: 80 MMHG | BODY MASS INDEX: 35.55 KG/M2 | WEIGHT: 227 LBS | SYSTOLIC BLOOD PRESSURE: 137 MMHG

## 2019-11-15 DIAGNOSIS — M25.562 ACUTE PAIN OF LEFT KNEE: ICD-10-CM

## 2019-11-15 PROCEDURE — 99213 OFFICE O/P EST LOW 20 MIN: CPT | Performed by: FAMILY MEDICINE

## 2019-11-15 RX ORDER — DICLOFENAC SODIUM 75 MG/1
75 TABLET, DELAYED RELEASE ORAL 2 TIMES DAILY
Qty: 28 TABLET | Refills: 1 | Status: SHIPPED | OUTPATIENT
Start: 2019-11-15 | End: 2021-09-28

## 2019-11-15 ASSESSMENT — PAIN SCALES - GENERAL: PAINLEVEL: MILD PAIN (3)

## 2019-11-15 NOTE — PATIENT INSTRUCTIONS
Thanks for coming today.  Ortho/Sports Medicine Clinic  [address] [place] [address]    To schedule future appointments in Ortho Clinic, you may call [phone].    To schedule ordered imaging by your provider:   Call Central Imaging [phone]    To schedule an injection ordered by your provider:  Call Central Imaging Injection scheduling line: [phone]  [employer] available online at:  [email or web address]    Please call if any further questions or concerns ([phone]).  Clinic hours 8 am to 5 pm.    Return to clinic (call) if symptoms worsen or fail to improve.

## 2019-11-15 NOTE — PROGRESS NOTES
[facility]      Patient is a 49 year old female who presents to the office today for: left knee pain and meniscus injury.  Since her last visit she experienced significant clinical improvement with the diclofenac.  She is regularly for 2 weeks and then has been decreasing over the last 2.  Does have some aching pain in the posterior medial knee after long day of work when she does not take the diclofenac.  She has not found the brace helpful.  She is gone to physical therapy for a few visits and this seems to be helping.    Denies weakness, numbness, tingling, clicking, locking, or catching.      Past Medical History, Current Medications, and Allergies are reviewed in the electronic medical record as appropriate.     ROS: Pertinent items are noted in HPI.  Constitutional: negative for fevers, chills and malaise  Cardiovascular: negative for dyspnea, fatigue, lower extremity edema  Integument/breast: negative for rash, skin lesion(s) and skin color change  Neurological: negative for paresthesia and weakness      EXAM:/80 (BP Location: Left arm, Patient Position: Sitting, Cuff Size: Adult Regular)   Pulse 76   Wt 103 kg (227 lb)   SpO2 97%   BMI 35.55 kg/m      Patient is alert, No acute distress, pleasant and conversational.    Gait: nonantalgic. Normal heel toe gait.    left knee:   Skin intact. No erythema or ecchymosis.  No effusion or soft tissue swelling.    AROM: Zero to approximately 135  without restriction or reported pain.    Palpation:   TTP over posterior medial joint line  No medial or lateral facet joint tenderness.  No lateral joint line tenderness     Special Tests:  Negative bounce test, negative forced flexion and + Jesse's.  No ligamentous laxity or pain with valgus or varus stress.  Negative Lachman's, Anterior Drawer and Posterior Drawer      Neurovascularly intact in the lower extremity        Imaging: no new imaging this visit      Assessment: Patient is  a 49 year old female with left knee pain and medial meniscus tear. Overall pain has improved with diclofenac and PT but has persistent pain over the posterior medial joint line, which would seem to be more consistent with a meniscal etiology.     Recommendations:  Reviewed previous imaging and current assessment with patient in detail.   Discussed option for treatment.  This could include steroid injection and continued physical therapy.  However, since her pain continues to localize over the posterior medial joint line in the region of her meniscal injury could also discuss with surgeon regarding meniscectomy.  After discussion of risks and benefits the patient would like to follow-up with surgeon for further discussion of surgical options.  Could consider steroid injection at that time as well.  In the meantime she will continue with physical therapy, diclofenac as needed, and bracing for comfort.    Ezra Currie MD

## 2019-11-15 NOTE — NURSING NOTE
[facility] FOLLOW-UP VISIT 11/15/2019    Lachelle Duque's chief complaint for this visit includes:  Chief Complaint   Patient presents with     Left Knee - Pain     PCP: Suha Beavers    Referring Provider:  No referring provider defined for this encounter.    /80 (BP Location: Left arm, Patient Position: Sitting, Cuff Size: Adult Regular)   Pulse 76   Wt 103 kg (227 lb)   SpO2 97%   BMI 35.55 kg/m    Mild Pain (3)       Interval History:     Follow up reason: left knee pain    Date of injury: 09/29/19    Date last seen: 10/04/19    Following Therapeutic Plan: Yes - PT, diclofenac / sleeve did not help    Pain: Improving    Function: Improving    Medical History:    Any recent changes to your medical history? No    Any new medication prescribed since last visit? No    Review of Systems:    Do you have fever, chills, weight loss? No    Do you have any vision problems? No    Do you have any chest pain or edema? No    Do you have any shortness of breath or wheezing?  No    Do you have stomach problems? No    Do you have any numbness or focal weakness? No    Do you have diabetes? No    Do you have problems with bleeding or clotting? No    Do you have an rashes or other skin lesions? No    Domitila Harris CMA

## 2019-11-18 NOTE — TELEPHONE ENCOUNTER
RECORDS RECEIVED FROM: left knee referred by Dr. Currie    DATE RECEIVED: Nov 25, 2019     NOTES STATUS DETAILS   OFFICE NOTE from referring provider Internal  Ezra Currie MD    OFFICE NOTE from other specialist N/A    DISCHARGE SUMMARY from hospital N/A    DISCHARGE REPORT from the ER N/A    OPERATIVE REPORT N/A    MEDICATION LIST Internal    IMPLANT RECORD/STICKER N/A    LABS     CBC/DIFF N/A    CULTURES N/A    INJECTIONS DONE IN RADIOLOGY N/A    MRI Internal    CT SCAN N/A    XRAYS (IMAGES & REPORTS) Internal    TUMOR     PATHOLOGY  Slides & report N/A      11/18/19   10:55 AM   Pre-visit complete  Ama Glamm, CMA

## 2019-11-25 ENCOUNTER — OFFICE VISIT (OUTPATIENT)
Dept: ORTHOPEDICS | Facility: CLINIC | Age: 49
End: 2019-11-25
Payer: COMMERCIAL

## 2019-11-25 ENCOUNTER — PRE VISIT (OUTPATIENT)
Dept: ORTHOPEDICS | Facility: CLINIC | Age: 49
End: 2019-11-25

## 2019-11-25 VITALS — WEIGHT: 227 LBS | HEIGHT: 67 IN | BODY MASS INDEX: 35.63 KG/M2

## 2019-11-25 DIAGNOSIS — G89.29 CHRONIC PAIN OF LEFT KNEE: Primary | ICD-10-CM

## 2019-11-25 DIAGNOSIS — M25.562 CHRONIC PAIN OF LEFT KNEE: Primary | ICD-10-CM

## 2019-11-25 ASSESSMENT — MIFFLIN-ST. JEOR: SCORE: 1697.3

## 2019-11-25 NOTE — LETTER
11/25/2019       RE: Lachelle Duque  [address]  [place] [address]     Dear Colleague,    Thank you for referring your patient, Lachelle Duque, to the [facility] at [facility]. Please see a copy of my visit note below.    CHIEF CONCERN: Left knee pain    HISTORY:   Very pleasant 49-year-old female who in September 2019 noted the onset of left-sided knee pain.  He was a little bit sore she ultimately discussed this with her primary care physician who obtained an ultrasound to evaluate for a Baker's cyst as well as plain radiographs.  Shortly after this she saw a an acute worsening of her knee pain were became very painful to her injection to call in sick to work for a couple days.  She was unable to do her job.  She was started on oral diclofenac which was helpful to her she has not had an injection.  She ultimately got an MRI of her knee.  It showed a radial tear of the posterior horn of her medial meniscus of the left knee and she was referred to my clinic for definitive management.    PAST MEDICAL HISTORY: (Reviewed with the patient and in the [facility] medical record)  1. None    PAST SURGICAL HISTORY: (Reviewed with the patient and in the [facility] medical record)  1. Cholecystectomy    MEDICATIONS: (Reviewed with the patient and in the [facility] medical record)    Notable medications include: No blood thinners or narcotics    ALLERGIES: (Reviewed with the patient and in the [facility] medical record)  1. 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 Articulate and Interactive    Body mass index is 35.55 kg/m .    Left lower extremity :    Skin is Warm and Well perfused, no suggestion of infection    Range of motion 0 to 115 degrees    Stable to varus and valgus stress testing.  Stable anterior and posterior drawer testing    1 quadrant medial lateral translation of the patella.  Tilt to neutral.    Positive medial and posterior medial joint line tenderness    EHL/FHL/TA/GS 5/5    Sensation intact L3-S1    2+ Dorsalis Pedis Pulse    IMAGING:    Plain Radiographs: Plain radiographs show overall well-preserved joint space between the femur and the tibia    MRI: MRI shows a radial tear of the posterior horn the medial meniscus some early arthritis is noted of the medial compartment and a little bit more advanced arthritis noted the patellofemoral compartment however overall her cartilage surfaces are very well preserved    ASSESSMENT:  1. Radial tear of the posterior horn the medial meniscus.  Meniscal root equivalent injury    PLAN:  1. I long discussion with the patient regarding her left knee.  I reviewed with her diagnosis potential treatment options  2. I offered her surgery today this will be examination of a left knee arthroscopy, meniscus root repair  3. We reviewed the risks, benefits, complications, techniques, alternatives to surgery.  We reviewed the expected course of recovery and alternative treatment options.  Together through a combined decision making approach we elected to proceed.  4. She will look at her schedule to find time of this is available.  She understands that waiting on this may increase the risk of wear and tear of that compartment.  She also understands that despite a good repair and excellent recovery in her part she may still get progressive  disease through her knee.          Again, thank you for allowing me to participate in the care of your patient.      Sincerely,    Armando Sunshine MD

## 2019-11-25 NOTE — NURSING NOTE
Teaching Flowsheet   Relevant Diagnosis: Left meniscus root tear  Teaching Topic: Left meniscus root repair    Patient is single and will have her parents come to help her after surgery. She is a nurse in the system. She will reach out to schedule after she speaks with her family. Health history positive only for birth control use. Mom has a history of blood clots due to comorbid conditions. She will have her preop done at [facility].     Person(s) involved in teaching:   Patient     Motivation Level:  Asks Questions: Yes  Eager to Learn: Yes  Cooperative: Yes  Receptive (willing/able to accept information): Yes  Any cultural factors/Yazidism beliefs that may influence understanding or compliance? No     Patient demonstrates understanding of the following:  Reason for the appointment, diagnosis and treatment plan: Yes  Knowledge of proper use of medications and conditions for which they are ordered (with special attention to potential side effects or drug interactions): Yes  Which situations necessitate calling provider and whom to contact: Yes     Teaching Concerns Addressed: Patient understands she will need a preop exam 14 to 30 days before surgery. She knows she will begin physical therapy 3-5 days postop; she wishes to go to [facility].     Proper use and care of brace/crutches (medical equip, care aids, etc.): Yes  Nutritional needs and diet plan: Yes  Pain management techniques: Yes  Wound Care: Yes  How and/when to access community resources: Yes     Instructional Materials Used/Given: Preoperative teaching packet, surgical soap.

## 2019-11-25 NOTE — LETTER
11/25/2019      RE: Lachelle Duque  [address]  [place] [address]       CHIEF CONCERN: Left knee pain    HISTORY:   Very pleasant 49-year-old female who in September 2019 noted the onset of left-sided knee pain.  He was a little bit sore she ultimately discussed this with her primary care physician who obtained an ultrasound to evaluate for a Baker's cyst as well as plain radiographs.  Shortly after this she saw a an acute worsening of her knee pain were became very painful to her injection to call in sick to work for a couple days.  She was unable to do her job.  She was started on oral diclofenac which was helpful to her she has not had an injection.  She ultimately got an MRI of her knee.  It showed a radial tear of the posterior horn of her medial meniscus of the left knee and she was referred to my clinic for definitive management.    PAST MEDICAL HISTORY: (Reviewed with the patient and in the [facility] medical record)  1. None    PAST SURGICAL HISTORY: (Reviewed with the patient and in the [facility] medical record)  1. Cholecystectomy    MEDICATIONS: (Reviewed with the patient and in the [facility] medical record)    Notable medications include: No blood thinners or narcotics    ALLERGIES: (Reviewed with the patient and in the [facility] medical record)  1. 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 Articulate and  Interactive    Body mass index is 35.55 kg/m .    Left lower extremity :    Skin is Warm and Well perfused, no suggestion of infection    Range of motion 0 to 115 degrees    Stable to varus and valgus stress testing.  Stable anterior and posterior drawer testing    1 quadrant medial lateral translation of the patella.  Tilt to neutral.    Positive medial and posterior medial joint line tenderness    EHL/FHL/TA/GS 5/5    Sensation intact L3-S1    2+ Dorsalis Pedis Pulse    IMAGING:    Plain Radiographs: Plain radiographs show overall well-preserved joint space between the femur and the tibia    MRI: MRI shows a radial tear of the posterior horn the medial meniscus some early arthritis is noted of the medial compartment and a little bit more advanced arthritis noted the patellofemoral compartment however overall her cartilage surfaces are very well preserved    ASSESSMENT:  1. Radial tear of the posterior horn the medial meniscus.  Meniscal root equivalent injury    PLAN:  1. I long discussion with the patient regarding her left knee.  I reviewed with her diagnosis potential treatment options  2. I offered her surgery today this will be examination of a left knee arthroscopy, meniscus root repair  3. We reviewed the risks, benefits, complications, techniques, alternatives to surgery.  We reviewed the expected course of recovery and alternative treatment options.  Together through a combined decision making approach we elected to proceed.  4. She will look at her schedule to find time of this is available.  She understands that waiting on this may increase the risk of wear and tear of that compartment.  She also understands that despite a good repair and excellent recovery in her part she may still get progressive disease through her knee.          Armando Sunshine MD

## 2019-11-25 NOTE — PROGRESS NOTES
CHIEF CONCERN: Left knee pain    HISTORY:   Very pleasant 49-year-old female who in September 2019 noted the onset of left-sided knee pain.  He was a little bit sore she ultimately discussed this with her primary care physician who obtained an ultrasound to evaluate for a Baker's cyst as well as plain radiographs.  Shortly after this she saw a an acute worsening of her knee pain were became very painful to her injection to call in sick to work for a couple days.  She was unable to do her job.  She was started on oral diclofenac which was helpful to her she has not had an injection.  She ultimately got an MRI of her knee.  It showed a radial tear of the posterior horn of her medial meniscus of the left knee and she was referred to my clinic for definitive management.    PAST MEDICAL HISTORY: (Reviewed with the patient and in the [facility] medical record)  1. None    PAST SURGICAL HISTORY: (Reviewed with the patient and in the [facility] medical record)  1. Cholecystectomy    MEDICATIONS: (Reviewed with the patient and in the [facility] medical record)    Notable medications include: No blood thinners or narcotics    ALLERGIES: (Reviewed with the patient and in the [facility] medical record)  1. 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 Articulate and Interactive    Body mass index is 35.55 kg/m .    Left lower extremity :    Skin is Warm  and Well perfused, no suggestion of infection    Range of motion 0 to 115 degrees    Stable to varus and valgus stress testing.  Stable anterior and posterior drawer testing    1 quadrant medial lateral translation of the patella.  Tilt to neutral.    Positive medial and posterior medial joint line tenderness    EHL/FHL/TA/GS 5/5    Sensation intact L3-S1    2+ Dorsalis Pedis Pulse    IMAGING:    Plain Radiographs: Plain radiographs show overall well-preserved joint space between the femur and the tibia    MRI: MRI shows a radial tear of the posterior horn the medial meniscus some early arthritis is noted of the medial compartment and a little bit more advanced arthritis noted the patellofemoral compartment however overall her cartilage surfaces are very well preserved    ASSESSMENT:  1. Radial tear of the posterior horn the medial meniscus.  Meniscal root equivalent injury    PLAN:  1. I long discussion with the patient regarding her left knee.  I reviewed with her diagnosis potential treatment options  2. I offered her surgery today this will be examination of a left knee arthroscopy, meniscus root repair  3. We reviewed the risks, benefits, complications, techniques, alternatives to surgery.  We reviewed the expected course of recovery and alternative treatment options.  Together through a combined decision making approach we elected to proceed.  4. She will look at her schedule to find time of this is available.  She understands that waiting on this may increase the risk of wear and tear of that compartment.  She also understands that despite a good repair and excellent recovery in her part she may still get progressive disease through her knee.

## 2019-12-06 ENCOUNTER — THERAPY VISIT (OUTPATIENT)
Dept: PHYSICAL THERAPY | Facility: CLINIC | Age: 49
End: 2019-12-06
Payer: COMMERCIAL

## 2019-12-06 DIAGNOSIS — M25.562 CHRONIC PAIN OF LEFT KNEE: ICD-10-CM

## 2019-12-06 DIAGNOSIS — M25.562 ACUTE PAIN OF LEFT KNEE: ICD-10-CM

## 2019-12-06 DIAGNOSIS — S83.212A BUCKET-HANDLE TEAR OF MEDIAL MENISCUS OF LEFT KNEE AS CURRENT INJURY, INITIAL ENCOUNTER: ICD-10-CM

## 2019-12-06 DIAGNOSIS — G89.29 CHRONIC PAIN OF LEFT KNEE: ICD-10-CM

## 2019-12-06 PROCEDURE — 97530 THERAPEUTIC ACTIVITIES: CPT | Mod: GP | Performed by: PHYSICAL THERAPIST

## 2019-12-06 PROCEDURE — 97110 THERAPEUTIC EXERCISES: CPT | Mod: GP | Performed by: PHYSICAL THERAPIST

## 2019-12-06 PROCEDURE — 97112 NEUROMUSCULAR REEDUCATION: CPT | Mod: GP | Performed by: PHYSICAL THERAPIST

## 2019-12-06 NOTE — PROGRESS NOTES
Subjective:  HPI                    Objective:  System    Physical Exam    General     ROS    Assessment/Plan:    SUBJECTIVE  Subjective changes as noted by pt:  Shanta reports that she was seen by Dr. Sunshine and that she has a radial root tear in the meniscus. Shooting for surgical repair next spring. Wants to wait until then to avoid snow and ice, as well was having her parents to help with cares. Most days the knee pain is managed OK.      Current pain level: 1/10     Changes in function:  Yes (See Goal flowsheet attached for changes in current functional level)     Adverse reaction to treatment or activity:  None    OBJECTIVE  Changes in objective findings:  Yes, L knee AROM 7-0-124. Excessive anterior knee exursion on unsupported squats.         ASSESSMENT  Lachelle continues to require intervention to meet STG and LTG's: PT  Patient is progressing as expected.  Response to therapy has shown an improvement in  pain level  Progress made towards STG/LTG?  Yes (See Goal flowsheet attached for updates on achievement of STG and LTG)    PLAN  Continue current treatment plan until patient demonstrates readiness to progress to higher level exercises.    PTA/ATC plan:  N/A    Please refer to the daily flowsheet for treatment today, total treatment time and time spent performing 1:1 timed codes.

## 2019-12-19 ENCOUNTER — THERAPY VISIT (OUTPATIENT)
Dept: PHYSICAL THERAPY | Facility: CLINIC | Age: 49
End: 2019-12-19
Payer: COMMERCIAL

## 2019-12-19 DIAGNOSIS — S83.212A BUCKET-HANDLE TEAR OF MEDIAL MENISCUS OF LEFT KNEE AS CURRENT INJURY, INITIAL ENCOUNTER: ICD-10-CM

## 2019-12-19 DIAGNOSIS — M25.562 ACUTE PAIN OF LEFT KNEE: ICD-10-CM

## 2019-12-19 PROCEDURE — 97530 THERAPEUTIC ACTIVITIES: CPT | Mod: GP | Performed by: PHYSICAL THERAPIST

## 2019-12-19 PROCEDURE — 97110 THERAPEUTIC EXERCISES: CPT | Mod: GP | Performed by: PHYSICAL THERAPIST

## 2019-12-19 PROCEDURE — 97112 NEUROMUSCULAR REEDUCATION: CPT | Mod: GP | Performed by: PHYSICAL THERAPIST

## 2019-12-19 NOTE — PROGRESS NOTES
Subjective:  HPI                    Objective:  System                                                Knee Evaluation:  ROM:    AROM    Hyperextension: Left:  7    Right:    Flexion: Left: 118   Right:        Strength:     Extension:  Left: 5/5   Pain:        Flexion:  Left: 5-/5   Pain:                            General     ROS    Assessment/Plan:    PROGRESS  REPORT    Progress reporting period is from 10/16/19 to 12/19/19.       SUBJECTIVE  Subjective changes noted by patient:  Shanta reports that since starting PT her legs feel stronger. She notices when going from sit to stand it helps. Continues to note pain when performing stairs (up and down). Possible meniscus reconstruction/replacement with cadaver in spring 2020.         Current pain level is 2/10  .     Previous pain level was  NA  .   Changes in function:  Yes (See Goal flowsheet attached for changes in current functional level)  Adverse reaction to treatment or activity: None    OBJECTIVE  Changes noted in objective findings:  Yes, see physical exam        ASSESSMENT/PLAN  Updated problem list and treatment plan: Diagnosis 1:  L medial meniscus tear  Pain -  hot/cold therapy, self management, education and home program  Decreased ROM/flexibility - manual therapy, therapeutic exercise, therapeutic activity and home program  Decreased joint mobility - manual therapy, therapeutic exercise, therapeutic activity and home program  Decreased strength - therapeutic exercise, therapeutic activities and home program  Inflammation - self management/home program  Impaired muscle performance - neuro re-education and home program  Decreased function - therapeutic activities and home program  STG/LTGs have been met or progress has been made towards goals:  Yes (See Goal flow sheet completed today.)  Assessment of Progress: Patient is meeting short term goals and is progressing towards long term goals.  Self Management Plans:  Patient has been instructed in a home  treatment program.  Patient  has been instructed in self management of symptoms.  I have re-evaluated this patient and find that the nature, scope, duration and intensity of the therapy is appropriate for the medical condition of the patient.  Lachelle continues to require the following intervention to meet STG and LTG's:  PT    Recommendations:  This patient would benefit from continued therapy.     Frequency:  1 X a month, once daily  Duration:  for 2 months        Please refer to the daily flowsheet for treatment today, total treatment time and time spent performing 1:1 timed codes.

## 2019-12-30 ENCOUNTER — PREP FOR PROCEDURE (OUTPATIENT)
Dept: ORTHOPEDICS | Facility: CLINIC | Age: 49
End: 2019-12-30

## 2019-12-30 ENCOUNTER — TELEPHONE (OUTPATIENT)
Dept: ORTHOPEDICS | Facility: CLINIC | Age: 49
End: 2019-12-30

## 2019-12-30 DIAGNOSIS — G89.29 CHRONIC PAIN OF LEFT KNEE: Primary | ICD-10-CM

## 2019-12-30 DIAGNOSIS — M25.562 CHRONIC PAIN OF LEFT KNEE: Primary | ICD-10-CM

## 2019-12-30 NOTE — TELEPHONE ENCOUNTER
Returned call to patient to schedule surgery with Dr Sunshine. I left her my direct number to call back at her convenience. [phone].

## 2020-01-06 ENCOUNTER — HOSPITAL ENCOUNTER (OUTPATIENT)
Facility: AMBULATORY SURGERY CENTER | Age: 50
End: 2020-01-06
Attending: ORTHOPAEDIC SURGERY
Payer: COMMERCIAL

## 2020-01-06 DIAGNOSIS — G89.29 CHRONIC PAIN OF LEFT KNEE: ICD-10-CM

## 2020-01-06 DIAGNOSIS — M25.562 CHRONIC PAIN OF LEFT KNEE: ICD-10-CM

## 2020-01-06 NOTE — TELEPHONE ENCOUNTER
Patient is scheduled for surgery with Dr. Sunshine    Spoke or left message with: Patient    Date of Surgery: 4/21/20    Location: [facility]    Post op: 1 week, scheduled    Pre-op with surgeon (if applicable): Complete    H&P: Patient will schedule with PCP    Additional imaging/appointments: N/A    Surgery packet: Received in clinic     Additional comments: N/A

## 2020-01-07 RX ORDER — CEFAZOLIN SODIUM 2 G/50ML
2 SOLUTION INTRAVENOUS
Status: CANCELLED | OUTPATIENT
Start: 2020-04-21

## 2020-01-07 RX ORDER — CEFAZOLIN SODIUM 1 G/50ML
1 SOLUTION INTRAVENOUS SEE ADMIN INSTRUCTIONS
Status: CANCELLED | OUTPATIENT
Start: 2020-04-21

## 2020-01-17 ENCOUNTER — THERAPY VISIT (OUTPATIENT)
Dept: PHYSICAL THERAPY | Facility: CLINIC | Age: 50
End: 2020-01-17
Payer: COMMERCIAL

## 2020-01-17 DIAGNOSIS — M25.562 ACUTE PAIN OF LEFT KNEE: ICD-10-CM

## 2020-01-17 DIAGNOSIS — S83.212A BUCKET-HANDLE TEAR OF MEDIAL MENISCUS OF LEFT KNEE AS CURRENT INJURY, INITIAL ENCOUNTER: ICD-10-CM

## 2020-01-17 PROCEDURE — 97112 NEUROMUSCULAR REEDUCATION: CPT | Mod: GP | Performed by: PHYSICAL THERAPIST

## 2020-01-17 PROCEDURE — 97530 THERAPEUTIC ACTIVITIES: CPT | Mod: GP | Performed by: PHYSICAL THERAPIST

## 2020-01-17 PROCEDURE — 97110 THERAPEUTIC EXERCISES: CPT | Mod: GP | Performed by: PHYSICAL THERAPIST

## 2020-01-17 NOTE — PROGRESS NOTES
Subjective:  HPI                    Objective:  System    Physical Exam    General     ROS    Assessment/Plan:    SUBJECTIVE  Subjective changes as noted by pt:  Benjamin reports that she is a little sore this morning. Thinks it's a combination of work and the weather. Scheduled her meniscal repair (cadaver replacement) for 4/21/19. Will be TTWB for 6-8 wks.      Current pain level: 4/10     Changes in function:  Yes (See Goal flowsheet attached for changes in current functional level)     Adverse reaction to treatment or activity:  None    OBJECTIVE  Changes in objective findings:  Yes, L knee extension 5-/5 with slight pain hesitation. Excessive anterior knee excursion on unsupported squatting.          ASSESSMENT  Lachelle continues to require intervention to meet STG and LTG's: PT  Patient is progressing as expected.  Response to therapy has shown lack of progress in  pain level  Progress made towards STG/LTG?  Yes (See Goal flowsheet attached for updates on achievement of STG and LTG)    PLAN  Continue current treatment plan until patient demonstrates readiness to progress to higher level exercises.    PTA/ATC plan:  N/A    Please refer to the daily flowsheet for treatment today, total treatment time and time spent performing 1:1 timed codes.

## 2020-02-19 ENCOUNTER — DOCUMENTATION ONLY (OUTPATIENT)
Dept: ORTHOPEDICS | Facility: CLINIC | Age: 50
End: 2020-02-19

## 2020-03-10 NOTE — PROGRESS NOTES
Subjective:  HPI  Physical Exam                    Objective:  System    Physical Exam    General     ROS    Assessment/Plan:    DISCHARGE REPORT    Progress reporting period is from 12/19/19 to 3/12/20.       SUBJECTIVE  Subjective changes noted by patient:  Shanta reports that knee hasn't been too bad. Sore after her 12-hr shifts. Been working on HEP without issue. Surgery planned for root repair of meniscus on 4/21  .       Current pain level is 1/10  .     Previous pain level was  4/10  .   Changes in function:  Yes (See Goal flowsheet attached for changes in current functional level)  Adverse reaction to treatment or activity: None    OBJECTIVE  Changes noted in objective findings:  Yes, L knee AROM 7-0-128.  MMT WNL.          ASSESSMENT/PLAN  Updated problem list and treatment plan: Diagnosis 1:  L medial meniscus tear    Pain -  self management, education and home program  Decreased ROM/flexibility - manual therapy, therapeutic exercise, therapeutic activity and home program  Decreased joint mobility - manual therapy, therapeutic exercise, therapeutic activity and home program  Decreased strength - therapeutic exercise, therapeutic activities and home program  Impaired muscle performance - neuro re-education and home program  Decreased function - therapeutic activities and home program  STG/LTGs have been met or progress has been made towards goals:  Yes (See Goal flow sheet completed today.)  Assessment of Progress: Pt has completed program for pre-op L meniscus repair.   Self Management Plans:  Patient is independent in a home treatment program.  Patient is independent in self management of symptoms.  I have re-evaluated this patient and find that the nature, scope, duration and intensity of the therapy is appropriate for the medical condition of the patient.  Lachelle continues to require the following intervention to meet STG and LTG's:  PT intervention is no longer required to meet  STG/LTG.    Recommendations:  This patient is ready to be discharged from therapy and continue their home treatment program.    Please refer to the daily flowsheet for treatment today, total treatment time and time spent performing 1:1 timed codes.

## 2020-03-12 ENCOUNTER — THERAPY VISIT (OUTPATIENT)
Dept: PHYSICAL THERAPY | Facility: CLINIC | Age: 50
End: 2020-03-12
Payer: COMMERCIAL

## 2020-03-12 DIAGNOSIS — S83.212A BUCKET-HANDLE TEAR OF MEDIAL MENISCUS OF LEFT KNEE AS CURRENT INJURY, INITIAL ENCOUNTER: ICD-10-CM

## 2020-03-12 DIAGNOSIS — M25.562 ACUTE PAIN OF LEFT KNEE: ICD-10-CM

## 2020-03-12 PROBLEM — S83.242A TEAR OF MEDIAL MENISCUS OF LEFT KNEE, CURRENT: Status: RESOLVED | Noted: 2019-10-16 | Resolved: 2020-03-12

## 2020-03-12 PROCEDURE — 97110 THERAPEUTIC EXERCISES: CPT | Mod: GP | Performed by: PHYSICAL THERAPIST

## 2020-03-12 PROCEDURE — 97530 THERAPEUTIC ACTIVITIES: CPT | Mod: GP | Performed by: PHYSICAL THERAPIST

## 2020-03-12 PROCEDURE — 97112 NEUROMUSCULAR REEDUCATION: CPT | Mod: GP | Performed by: PHYSICAL THERAPIST

## 2020-03-27 ENCOUNTER — TELEPHONE (OUTPATIENT)
Dept: PEDIATRICS | Facility: CLINIC | Age: 50
End: 2020-03-27

## 2020-03-27 NOTE — TELEPHONE ENCOUNTER
Spoke with pt, reviewed no visitors for appt on 3/30/2020. Instructed to call morning of if develops any sx's.     Do you have any of the following symptoms:  a)      Fever (or reported chills) No  b)      Shortness of Breath No  c)      Rash No    If a patient reports yes to any of these symptoms, obtain direction from the provider and call the patient back to let them know if they can come in or not.  1.    Provider needs to determine if this patient should still be seen in clinic.  2.    If decision to not see in clinic, call patient back and refer them to COVID- 19 [email or web address] or schedule COVID-19 phone visit.  3.    Turn in-person visit into telephone visit (FOR RETURN PATIENTS ONLY)    Remind patients that visitors are not allowed on site. Only one legal guardian who screens negative to the above questions will be allowed to accompany patients. If a patient indicates that they will be bringing a legal guardian with them to the appointment please make sure to screen both the patient and the legal guardian for symptoms using the tool above.

## 2020-03-30 ENCOUNTER — OFFICE VISIT (OUTPATIENT)
Dept: PEDIATRICS | Facility: CLINIC | Age: 50
End: 2020-03-30
Payer: COMMERCIAL

## 2020-03-30 VITALS
HEART RATE: 82 BPM | BODY MASS INDEX: 35.57 KG/M2 | TEMPERATURE: 97.8 F | SYSTOLIC BLOOD PRESSURE: 110 MMHG | OXYGEN SATURATION: 98 % | WEIGHT: 221.3 LBS | DIASTOLIC BLOOD PRESSURE: 70 MMHG | HEIGHT: 66 IN

## 2020-03-30 DIAGNOSIS — Z01.818 PREOP GENERAL PHYSICAL EXAM: Primary | ICD-10-CM

## 2020-03-30 DIAGNOSIS — M25.562 LEFT KNEE PAIN, UNSPECIFIED CHRONICITY: ICD-10-CM

## 2020-03-30 LAB
ANION GAP SERPL CALCULATED.3IONS-SCNC: 4 MMOL/L (ref 3–14)
BUN SERPL-MCNC: 14 MG/DL (ref 7–30)
CALCIUM SERPL-MCNC: 9.4 MG/DL (ref 8.5–10.1)
CHLORIDE SERPL-SCNC: 108 MMOL/L (ref 94–109)
CO2 SERPL-SCNC: 26 MMOL/L (ref 20–32)
CREAT SERPL-MCNC: 0.68 MG/DL (ref 0.52–1.04)
ERYTHROCYTE [DISTWIDTH] IN BLOOD BY AUTOMATED COUNT: 13 % (ref 10–15)
GFR SERPL CREATININE-BSD FRML MDRD: >90 ML/MIN/{1.73_M2}
GLUCOSE SERPL-MCNC: 97 MG/DL (ref 70–99)
HCT VFR BLD AUTO: 39.5 % (ref 35–47)
HGB BLD-MCNC: 12.9 G/DL (ref 11.7–15.7)
MCH RBC QN AUTO: 30.4 PG (ref 26.5–33)
MCHC RBC AUTO-ENTMCNC: 32.7 G/DL (ref 31.5–36.5)
MCV RBC AUTO: 93 FL (ref 78–100)
PLATELET # BLD AUTO: 283 10E9/L (ref 150–450)
POTASSIUM SERPL-SCNC: 4.2 MMOL/L (ref 3.4–5.3)
RBC # BLD AUTO: 4.25 10E12/L (ref 3.8–5.2)
SODIUM SERPL-SCNC: 138 MMOL/L (ref 133–144)
WBC # BLD AUTO: 7.5 10E9/L (ref 4–11)

## 2020-03-30 PROCEDURE — 99214 OFFICE O/P EST MOD 30 MIN: CPT | Performed by: NURSE PRACTITIONER

## 2020-03-30 PROCEDURE — 36415 COLL VENOUS BLD VENIPUNCTURE: CPT | Performed by: NURSE PRACTITIONER

## 2020-03-30 PROCEDURE — 80048 BASIC METABOLIC PNL TOTAL CA: CPT | Performed by: NURSE PRACTITIONER

## 2020-03-30 PROCEDURE — 85027 COMPLETE CBC AUTOMATED: CPT | Performed by: NURSE PRACTITIONER

## 2020-03-30 ASSESSMENT — MIFFLIN-ST. JEOR: SCORE: 1647.62

## 2020-03-30 NOTE — PROGRESS NOTES
[address] [address]  [phone]  Dept: [phone]    PRE-OP EVALUATION:  Today's date: 3/30/2020    Lachelle Duque (: 1970) presents for pre-operative evaluation assessment as requested by Dr. Sunshine.  She requires evaluation and anesthesia risk assessment prior to undergoing surgery/procedure for treatment of left .    Proposed Surgery/ Procedure: Examination under anesthesia Left knee, Left knee arthroscopy, Left meniscus root repair   Date of Surgery/ Procedure: 2020  Time of Surgery/ Procedure: 9:00AM  [facility]   Fax number for surgical facility:   Primary Physician: Suha Beavers  Type of Anesthesia Anticipated: Choice    Patient has a Health Care Directive or Living Will:  NO    1. NO - Do you have a history of heart attack, stroke, stent, bypass or surgery on an artery in the head, neck, heart or legs?  2. NO - Do you ever have any pain or discomfort in your chest?  3. NO - Do you have a history of  Heart Failure?  4. NO - Are you troubled by shortness of breath when: walking on the level, up a slight hill or at night?  5. NO - Do you currently have a cold, bronchitis or other respiratory infection?  6. NO - Do you have a cough, shortness of breath or wheezing?  7. NO - Do you sometimes get pains in the calves of your legs when you walk?  8. YES - Do you or anyone in your family have previous history of blood clots? mother  9. NO - Do you or does anyone in your family have a serious bleeding problem such as prolonged bleeding following surgeries or cuts?  10. NO - Have you ever had problems with anemia or been told to take iron pills?  11. NO - Have you had any abnormal blood loss such as black, tarry or bloody stools, or abnormal vaginal bleeding?  12. NO - Have you ever had a blood transfusion?  13. NO - Have you or any of your relatives ever had problems with anesthesia?  14. NO  - Do you have sleep apnea, excessive snoring or daytime drowsiness?  15. NO - Do you have any prosthetic heart valves?  16. NO - Do you have prosthetic joints?  17. NO - Is there any chance that you may be pregnant?      HPI:     HPI related to upcoming procedure: Left knee, Left knee arthroscopy, Left meniscus root repair   Date of Surgery/ Procedure: 4/21/2020    See problem list for active medical problems.  Problems all longstanding and stable, except as noted/documented.  See ROS for pertinent symptoms related to these conditions.      MEDICAL HISTORY:     Patient Active Problem List    Diagnosis Date Noted     Chronic pain of left knee 01/06/2020     Priority: Medium     Added automatically from request for surgery [id number]       Obesity (BMI 35.0-39.9) with comorbidity (H) 09/10/2018     Priority: Medium     Elevation of optic disc, 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 Please review for accuracy       Biliary colic 01/09/2013     Priority: Medium     Hiatal hernia 01/03/2013     Priority: Medium     GERD (gastroesophageal reflux disease) 01/03/2013     Priority: Medium     Vitamin D deficiency 08/26/2011     Priority: Medium     Obesity 05/09/2011     Priority: Medium     Hyperlipidemia LDL goal <130 09/09/2010     Priority: Medium     Strong family history of premature CAD  [place] Risk Score: 4% (16 Total Points)         Family history of colon cancer 08/17/2009     Priority: Medium     9% chance of having Egan Syndrome. Will have patient do pelvic US to evaluate ovaries and uterus.        Encounter for other general counseling or advice on contraception 07/19/2007     Priority: Medium     Diagnosis updated by automated process. Provider to review and confirm.       Herpes zoster 07/19/2007     Priority:  Medium     L eye.  Followed by Dr. Solis at [facility]   Has periodic flare ups  Problem list name updated by automated process. Provider to review        Past Medical History:   Diagnosis Date     Chronic rhinitis      Environmental allergies 1/18/2013     Gastro-oesophageal reflux disease      GERD (gastroesophageal reflux disease) 1/3/2013     Herpes zoster without mention of complication     L eye      Hiatal hernia      PONV (postoperative nausea and vomiting)      Past Surgical History:   Procedure Laterality Date     ADENOIDECTOMY  1977     COLONOSCOPY N/A 10/27/2015    Procedure: COLONOSCOPY;  Surgeon: Duane, William Charles, MD;  Location: [facility] OR     COLONOSCOPY WITH CO2 INSUFFLATION N/A 10/27/2015    Procedure: COLONOSCOPY WITH CO2 INSUFFLATION;  Surgeon: Duane, William Charles, MD;  Location: [facility] OR     HC TOOTH EXTRACTION W/FORCEP      20 yo     LAPAROSCOPIC CHOLECYSTECTOMY  1/30/2013    Procedure: LAPAROSCOPIC CHOLECYSTECTOMY;  Laparoscopic Cholecystectomy;  Surgeon: Cindy Soni MD;  Location: [facility] OR     SINUS SURGERY  1988    Deviated septum     wisdom teeth removed[       Current Outpatient Medications   Medication Sig Dispense Refill     acyclovir (ZOVIRAX) 400 MG tablet one tid po for 7 days prn for flare ups and one daily for maintenance. 180 tablet 3     cetirizine HCl (ZYRTEC ALLERGY) 10 MG CAPS Take one tablet daily.       diclofenac (VOLTAREN) 75 MG EC tablet Take 1 tablet (75 mg) by mouth 2 times daily 28 tablet 1     diphenhydrAMINE (BENADRYL) 25 MG capsule Take 25 mg by mouth every 6 hours as needed.       fluticasone (FLONASE) 50 MCG/ACT nasal spray INSTILL TWO SPRAYS IN EACH NOSTRIL EVERY DAY 16 g 2     norethindrone-ethinyl estradiol (NORTREL 1/35, 28,) 1-35 MG-MCG tablet Take 1 tablet by mouth daily 84 tablet 4     omeprazole 20 MG tablet Take 1 tablet by mouth daily. Take 30-60 minutes before a meal. 30 tablet 1     pseudoePHEDrine (SUDAFED 12 HOUR) 120 MG 12 hr tablet  "Take 120 mg by mouth every 12 hours. PRN.        order for DME Equipment being ordered: DME  [id number] $30  Knee Sleeve Open Patella, XL (Patient not taking: Reported on 3/30/2020) 1 Device 0     OTC products: no recent use of OTC ASA, NSAIDS or Steroids and no use of herbal medications or other supplements    Allergies   Allergen Reactions     Sulfa Drugs Anaphylaxis      Latex Allergy: NO    Social History     Tobacco Use     Smoking status: Never Smoker     Smokeless tobacco: Never Used   Substance Use Topics     Alcohol use: Yes     Alcohol/week: 0.8 standard drinks     Types: 1 Standard drinks or equivalent per week     Comment: occasionally     History   Drug Use No       REVIEW OF SYSTEMS:   CONSTITUTIONAL: NEGATIVE for fever, chills, change in weight  INTEGUMENTARY/SKIN: NEGATIVE for rash   ENT/MOUTH: NEGATIVE for ear, mouth and throat problems  RESP: NEGATIVE for significant cough or SOB  CV: NEGATIVE for chest pain, palpitations or peripheral edema  GI: NEGATIVE for nausea, abdominal pain, heartburn, or change in bowel habits  : NEGATIVE for frequency, dysuria, or hematuria  MUSCULOSKELETAL:POSITIVE  for joint pain knee  and NEGATIVE for joint swelling  and joint warmth   NEURO: NEGATIVE for weakness, dizziness or paresthesias  ENDOCRINE: NEGATIVE for temperature intolerance, skin/hair changes  HEME: NEGATIVE for bleeding problems  PSYCHIATRIC: NEGATIVE for changes in mood or affect    EXAM:   /70 (BP Location: Right arm, Patient Position: Sitting, Cuff Size: Adult Large)   Pulse 82   Temp 97.8  F (36.6  C) (Temporal)   Ht 1.664 m (5' 5.5\")   Wt 100.4 kg (221 lb 4.8 oz)   LMP 03/10/2020 (Approximate)   SpO2 98%   Breastfeeding No   BMI 36.27 kg/m      GENERAL APPEARANCE: healthy, alert, active and no distress     EYES: Eyes grossly normal to inspection and conjunctivae and sclerae normal     HENT: ear canals and TM's normal and nose and mouth without ulcers or lesions     NECK: no " adenopathy, no asymmetry, masses, or scars and thyroid normal to palpation     RESP: lungs clear to auscultation - no rales, rhonchi or wheezes     CV: regular rates and rhythm, no murmur, click or rub and no irregular beats     ABDOMEN: soft, nontender     MS: extremities normal- no gross deformities noted and gait normal     SKIN: no suspicious lesions or rashes     NEURO: Normal strength and tone, sensory exam grossly normal, mentation intact and speech normal     PSYCH: mentation appears normal. and affect normal/bright     LYMPHATICS: No cervical adenopathy    DIAGNOSTICS:   EKG: Not indicated due to non-vascular surgery and low risk of event (age <65 and without cardiac risk factors)  Results for orders placed or performed in visit on 03/30/20   Basic metabolic panel     Status: None   Result Value Ref Range    Sodium 138 133 - 144 mmol/L    Potassium 4.2 3.4 - 5.3 mmol/L    Chloride 108 94 - 109 mmol/L    Carbon Dioxide 26 20 - 32 mmol/L    Anion Gap 4 3 - 14 mmol/L    Glucose 97 70 - 99 mg/dL    Urea Nitrogen 14 7 - 30 mg/dL    Creatinine 0.68 0.52 - 1.04 mg/dL    GFR Estimate >90 >60 mL/min/[1.73_m2]    GFR Estimate If Black >90 >60 mL/min/[1.73_m2]    Calcium 9.4 8.5 - 10.1 mg/dL   CBC with platelets     Status: None   Result Value Ref Range    WBC 7.5 4.0 - 11.0 10e9/L    RBC Count 4.25 3.8 - 5.2 10e12/L    Hemoglobin 12.9 11.7 - 15.7 g/dL    Hematocrit 39.5 35.0 - 47.0 %    MCV 93 78 - 100 fl    MCH 30.4 26.5 - 33.0 pg    MCHC 32.7 31.5 - 36.5 g/dL    RDW 13.0 10.0 - 15.0 %    Platelet Count 283 150 - 450 10e9/L         IMPRESSION:   Reason for surgery/procedure: Left knee, Left knee arthroscopy, Left meniscus root repair   Date of Surgery/ Procedure: 4/21/2020    Diagnosis/reason for consult: preop evaluation     The proposed surgical procedure is considered INTERMEDIATE risk.    REVISED CARDIAC RISK INDEX  The patient has the following serious cardiovascular risks for perioperative complications such  as (MI, PE, VFib and 3  AV Block):  No serious cardiac risks  INTERPRETATION: 0 risks: Class I (very low risk - 0.4% complication rate)    The patient has the following additional risks for perioperative complications:  The 10-year ASCVD risk score (Molly HALL Jr., et al., 2013) is: 1%    Values used to calculate the score:      Age: 49 years      Sex: Female      Is Non- : No      Diabetic: No      Tobacco smoker: No      Systolic Blood Pressure: 110 mmHg      Is BP treated: No      HDL Cholesterol: 52 mg/dL      Total Cholesterol: 231 mg/dL      ICD-10-CM    1. Preop general physical exam  Z01.818 Basic metabolic panel     CBC with platelets   2. Left knee pain, unspecified chronicity  M25.562 Basic metabolic panel     CBC with platelets       RECOMMENDATIONS:     Before your surgery:  10 days before: stop all over the counter supplements  1 week before: stop aspirin if you are taking aspirin.   stop ibuprofen, naproxen or similar antiinflammatory medications. You may use Tylenol for pain or headache.     On the day of your surgery:  Do not take any medication the morning of your surgery.     After surgery:    You may resume all your medications after the surgery unless your surgeon instructs you otherwise      APPROVAL GIVEN to proceed with proposed procedure, without further diagnostic evaluation       Signed Electronically by: ROSALIND Nazario CNP    Copy of this evaluation report is provided to requesting physician.    [facility] Preop Guidelines    Revised Cardiac Risk Index

## 2020-03-30 NOTE — NURSING NOTE
"Chief Complaint   Patient presents with     Pre-Op Exam     DOS:4/21/2020       Initial /70 (BP Location: Right arm, Patient Position: Sitting, Cuff Size: Adult Large)   Pulse 82   Temp 97.8  F (36.6  C) (Temporal)   Ht 1.664 m (5' 5.5\")   Wt 100.4 kg (221 lb 4.8 oz)   LMP 03/10/2020 (Approximate)   SpO2 98%   Breastfeeding No   BMI 36.27 kg/m   Estimated body mass index is 36.27 kg/m  as calculated from the following:    Height as of this encounter: 1.664 m (5' 5.5\").    Weight as of this encounter: 100.4 kg (221 lb 4.8 oz).  Medication Reconciliation: complete      NINO Obrien      "

## 2020-03-30 NOTE — PATIENT INSTRUCTIONS
Before your surgery:  10 days before: stop all over the counter supplements  1 week before: stop aspirin if you are taking aspirin.   stop ibuprofen, naproxen or similar antiinflammatory medications. You may use Tylenol for pain or headache.     On the day of your surgery:  Do not take any medication the morning of your surgery.     After surgery:    You may resume all your medications after the surgery unless your surgeon instructs you otherwise      Before Your Surgery      Call your surgeon if there is any change in your health. This includes signs of a cold or flu (such as a sore throat, runny nose, cough, rash or fever).    Do not smoke, drink alcohol or take over the counter medicine (unless your surgeon or primary care doctor tells you to) for the 24 hours before and after surgery.    If you take prescribed drugs: Follow your doctor s orders about which medicines to take and which to stop until after surgery.    Eating and drinking prior to surgery: follow the instructions from your surgeon    Take a shower or bath the night before surgery. Use the soap your surgeon gave you to gently clean your skin. If you do not have soap from your surgeon, use your regular soap. Do not shave or scrub the surgery site.  Wear clean pajamas and have clean sheets on your bed.

## 2020-03-30 NOTE — RESULT ENCOUNTER NOTE
Yanelis Duque,    Attached are your test results.  -Normal red blood cell (hgb) levels, normal white blood cell count and normal platelet levels.  -Kidney function is normal (Cr, GFR), Sodium is normal, Potassium is normal, Calcium is normal, Glucose is normal.    Please contact us if you have any questions.    Suha Beavers, CNP

## 2020-03-31 ENCOUNTER — TELEPHONE (OUTPATIENT)
Dept: ORTHOPEDICS | Facility: CLINIC | Age: 50
End: 2020-03-31

## 2020-03-31 NOTE — TELEPHONE ENCOUNTER
Spoke with patient to inform her that her surgery with Dr Sunshine scheduled for 4/21/20 has been canceled/postponed due to COVID19. Patient was told she will receive a call to reschedule once the restrictions have been lifted. She was agreeable to the plan.

## 2020-04-07 ENCOUNTER — TELEPHONE (OUTPATIENT)
Dept: PHYSICAL THERAPY | Facility: CLINIC | Age: 50
End: 2020-04-07

## 2020-04-07 NOTE — TELEPHONE ENCOUNTER
"Spoke with Shanta regarding upcoming post-op appts at [facility]. Informed her of need to cancel appts due to [facility] being closed until at least week of 5/4. Pt reported her surgery has been postponed indefinitely. Provided number to call if/when surgery rescheduled and needed to schedule in-clinic pt (pt would be \"essential\").       "

## 2020-04-16 NOTE — PROGRESS NOTES
Completed patient's FMLA forms and Workability form. A copy was faxed to [facility] at [phone]. Sent copy to Medical Records for scanning. Patient was informed via phone.    - - -

## 2020-04-29 ENCOUNTER — TELEPHONE (OUTPATIENT)
Dept: PEDIATRICS | Facility: CLINIC | Age: 50
End: 2020-04-29

## 2020-04-29 NOTE — TELEPHONE ENCOUNTER
Overdue results review encounter    Orders: lipid  Date ordered: 9/2019- was suppose to f/u 3 months for repeat    Defer?    Unclear if ordered test still needed. Routing to ordering provider for review.     Provider: Please cancel orders if no longer needed or route to pool for patient contact to schedule.

## 2020-05-13 ENCOUNTER — TELEPHONE (OUTPATIENT)
Dept: ORTHOPEDICS | Facility: CLINIC | Age: 50
End: 2020-05-13

## 2020-05-13 NOTE — TELEPHONE ENCOUNTER
Phoned patient to offer to reschedule her surgery with Dr Sunshine. Patient stated she is interested in rescheduling, but needs to consult and coordinate with her work and family first. She will call me back as soon as she is ready to schedule a date.

## 2020-05-15 ENCOUNTER — TELEPHONE (OUTPATIENT)
Dept: ORTHOPEDICS | Facility: CLINIC | Age: 50
End: 2020-05-15

## 2020-05-15 DIAGNOSIS — Z11.59 ENCOUNTER FOR SCREENING FOR OTHER VIRAL DISEASES: Primary | ICD-10-CM

## 2020-05-15 NOTE — TELEPHONE ENCOUNTER
Patient is scheduled for surgery with Dr. Sunshine    Spoke or left message with: Patient    Date of Surgery: 6/16/20    [facility]    Post op: 1 week, scheduled    Pre-op with surgeon (if applicable): Complete    H&P: Scheduled with PCP 6/1/20    Additional imaging/appointments: N/A    Surgery packet: Already received    Additional comments: Patient aware she will be contacted to schedule COVID19 test

## 2020-06-01 ENCOUNTER — OFFICE VISIT (OUTPATIENT)
Dept: PEDIATRICS | Facility: CLINIC | Age: 50
End: 2020-06-01
Payer: COMMERCIAL

## 2020-06-01 VITALS
OXYGEN SATURATION: 98 % | WEIGHT: 221.4 LBS | DIASTOLIC BLOOD PRESSURE: 60 MMHG | TEMPERATURE: 97.4 F | BODY MASS INDEX: 35.58 KG/M2 | HEIGHT: 66 IN | HEART RATE: 81 BPM | SYSTOLIC BLOOD PRESSURE: 120 MMHG

## 2020-06-01 DIAGNOSIS — M25.562 CHRONIC PAIN OF LEFT KNEE: ICD-10-CM

## 2020-06-01 DIAGNOSIS — G89.29 CHRONIC PAIN OF LEFT KNEE: ICD-10-CM

## 2020-06-01 DIAGNOSIS — Z01.818 PREOP GENERAL PHYSICAL EXAM: Primary | ICD-10-CM

## 2020-06-01 PROCEDURE — 99214 OFFICE O/P EST MOD 30 MIN: CPT | Performed by: NURSE PRACTITIONER

## 2020-06-01 ASSESSMENT — MIFFLIN-ST. JEOR: SCORE: 1643.07

## 2020-06-01 NOTE — NURSING NOTE
"Chief Complaint   Patient presents with     Pre-Op Exam     DOS:6/16/2020       Initial /60 (BP Location: Right arm, Patient Position: Sitting, Cuff Size: Adult Large)   Pulse 81   Temp 97.4  F (36.3  C) (Temporal)   Ht 1.664 m (5' 5.5\")   Wt 100.4 kg (221 lb 6.4 oz)   LMP 05/26/2020 (Exact Date)   SpO2 98%   Breastfeeding No   BMI 36.28 kg/m   Estimated body mass index is 36.28 kg/m  as calculated from the following:    Height as of this encounter: 1.664 m (5' 5.5\").    Weight as of this encounter: 100.4 kg (221 lb 6.4 oz).  Medication Reconciliation: complete      NINO Obrien      "

## 2020-06-01 NOTE — PROGRESS NOTES
[address] [address]  [phone]  Dept: [phone]    PRE-OP EVALUATION:  Today's date: 2020    Lahcelle Duque (: 1970) presents for pre-operative evaluation assessment as requested by Dr. Sunshine.  She requires evaluation and anesthesia risk assessment prior to undergoing surgery/procedure for treatment of left knee .    Fax number for surgical facility:   Primary Physician: Suha Beavers  Type of Anesthesia Anticipated: Choice    Patient has a Health Care Directive or Living Will:  NO    Preop Questions 2020   Who is doing your surgery? Dr Sunshine   What are you having done? [facility]   Date of Surgery/Procedure: 2020   Facility or Hospital where procedure/surgery will be performed: [facility]   1.  Do you have a history of Heart attack, stroke, stent, coronary bypass surgery, or other heart surgery? No   2.  Do you ever have any pain or discomfort in your chest? No   3.  Do you have a history of  Heart Failure? No   4.   Are you troubled by shortness of breath when:  walking on a level surface, or up a slight hill, or at night? No   5.  Do you currently have a cold, bronchitis or other respiratory infection? No   6.  Do you have a cough, shortness of breath, or wheezing? No   7.  Do you sometimes get pains in the calves of your legs when you walk? No   8. Do you or anyone in your family have previous history of blood clots? YES - mother   9.  Do you or does anyone in your family have a serious bleeding problem such as prolonged bleeding following surgeries or cuts? No   10. Have you ever had problems with anemia or been told to take iron pills? No   11. Have you had any abnormal blood loss such as black, tarry or bloody stools, or abnormal vaginal bleeding? No   12. Have you ever had a blood transfusion? No   13. Have you or any of your relatives ever had problems with anesthesia? No   14. Do you have sleep apnea,  excessive snoring or daytime drowsiness? No   15. Do you have any prosthetic heart valves? No   16. Do you have prosthetic joints? No   17. Is there any chance that you may be pregnant? No         HPI:     HPI related to upcoming procedure:  undergoing surgery/procedure for treatment of left knee .      See problem list for active medical problems.  Problems all longstanding and stable, except as noted/documented.  See ROS for pertinent symptoms related to these conditions.      MEDICAL HISTORY:     Patient Active Problem List    Diagnosis Date Noted     Chronic pain of left knee 01/06/2020     Priority: Medium     Added automatically from request for surgery [id number]       Obesity (BMI 35.0-39.9) with comorbidity (H) 09/10/2018     Priority: Medium     Elevation of optic disc, 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 Please review for accuracy       Biliary colic 01/09/2013     Priority: Medium     Hiatal hernia 01/03/2013     Priority: Medium     GERD (gastroesophageal reflux disease) 01/03/2013     Priority: Medium     Vitamin D deficiency 08/26/2011     Priority: Medium     Obesity 05/09/2011     Priority: Medium     Hyperlipidemia LDL goal <130 09/09/2010     Priority: Medium     Strong family history of premature CAD  [place] Risk Score: 4% (16 Total Points)         Family history of colon cancer 08/17/2009     Priority: Medium     9% chance of having Egan Syndrome. Will have patient do pelvic US to evaluate ovaries and uterus.        Encounter for other general counseling or advice on contraception 07/19/2007     Priority: Medium     Diagnosis updated by automated process. Provider to review and confirm.       Herpes zoster 07/19/2007     Priority: Medium     L eye.  Followed by Dr. Solis at [facility]    Has periodic flare ups  Problem list name updated by automated process. Provider to review        Past Medical History:   Diagnosis Date     Chronic rhinitis      Environmental allergies 1/18/2013     Gastro-oesophageal reflux disease      GERD (gastroesophageal reflux disease) 1/3/2013     Herpes zoster without mention of complication     L eye      Hiatal hernia      PONV (postoperative nausea and vomiting)      Past Surgical History:   Procedure Laterality Date     ADENOIDECTOMY  1977     COLONOSCOPY N/A 10/27/2015    Procedure: COLONOSCOPY;  Surgeon: Duane, William Charles, MD;  Location: [facility] OR     COLONOSCOPY WITH CO2 INSUFFLATION N/A 10/27/2015    Procedure: COLONOSCOPY WITH CO2 INSUFFLATION;  Surgeon: Duane, William Charles, MD;  Location: [facility] OR     HC TOOTH EXTRACTION W/FORCEP      18 yo     LAPAROSCOPIC CHOLECYSTECTOMY  1/30/2013    Procedure: LAPAROSCOPIC CHOLECYSTECTOMY;  Laparoscopic Cholecystectomy;  Surgeon: Cindy Soni MD;  Location: [facility] OR     SINUS SURGERY  1988    Deviated septum     wisdom teeth removed[       Current Outpatient Medications   Medication Sig Dispense Refill     acyclovir (ZOVIRAX) 400 MG tablet one tid po for 7 days prn for flare ups and one daily for maintenance. 180 tablet 3     cetirizine HCl (ZYRTEC ALLERGY) 10 MG CAPS Take one tablet daily.       diclofenac (VOLTAREN) 75 MG EC tablet Take 1 tablet (75 mg) by mouth 2 times daily 28 tablet 1     diphenhydrAMINE (BENADRYL) 25 MG capsule Take 25 mg by mouth every 6 hours as needed.       fluticasone (FLONASE) 50 MCG/ACT nasal spray INSTILL TWO SPRAYS IN EACH NOSTRIL EVERY DAY 16 g 2     norethindrone-ethinyl estradiol (NORTREL 1/35, 28,) 1-35 MG-MCG tablet Take 1 tablet by mouth daily 84 tablet 4     omeprazole 20 MG tablet Take 1 tablet by mouth daily. Take 30-60 minutes before a meal. 30 tablet 1     pseudoePHEDrine (SUDAFED 12 HOUR) 120 MG 12 hr tablet Take 120 mg by mouth every 12 hours. PRN.        OTC products: no  "recent use of OTC ASA, NSAIDS or Steroids and no use of herbal medications or other supplements    Allergies   Allergen Reactions     Sulfa Drugs Anaphylaxis      Latex Allergy: NO    Social History     Tobacco Use     Smoking status: Never Smoker     Smokeless tobacco: Never Used   Substance Use Topics     Alcohol use: Yes     Alcohol/week: 0.8 standard drinks     Types: 1 Standard drinks or equivalent per week     Comment: occasionally     History   Drug Use No       REVIEW OF SYSTEMS:   CONSTITUTIONAL: NEGATIVE for fever, chills, change in weight  INTEGUMENTARY/SKIN: NEGATIVE for rash   EYES: NEGATIVE for vision changes or irritation  ENT/MOUTH: NEGATIVE for ear, mouth and throat problems  RESP: NEGATIVE for significant cough or SOB  CV: NEGATIVE for chest pain, palpitations or peripheral edema  GI: NEGATIVE for nausea, abdominal pain, heartburn, or change in bowel habits  : NEGATIVE for frequency, dysuria, or hematuria  MUSCULOSKELETAL:POSITIVE  for joint pain   NEURO: NEGATIVE for weakness, dizziness or paresthesias  ENDOCRINE: NEGATIVE for temperature intolerance, skin/hair changes  HEME: NEGATIVE for bleeding problems  PSYCHIATRIC: NEGATIVE for changes in mood or affect    EXAM:   /60 (BP Location: Right arm, Patient Position: Sitting, Cuff Size: Adult Large)   Pulse 81   Temp 97.4  F (36.3  C) (Temporal)   Ht 1.664 m (5' 5.5\")   Wt 100.4 kg (221 lb 6.4 oz)   LMP 05/26/2020 (Exact Date)   SpO2 98%   Breastfeeding No   BMI 36.28 kg/m      GENERAL APPEARANCE: healthy, alert and no distress     EYES: Eyes grossly normal to inspection and conjunctivae and sclerae normal     HENT: ear canals and TM's normal and nose and mouth without ulcers or lesions     NECK: no adenopathy     RESP: lungs clear to auscultation - no rales, rhonchi or wheezes     CV: regular rates and rhythm, no murmur, click or rub and no irregular beats     ABDOMEN: soft, nontender     MS: extremities normal- no gross deformities " noted and gait normal     SKIN: no suspicious lesions or rashes     NEURO: Normal strength and tone, sensory exam grossly normal, mentation intact and speech normal     PSYCH: mentation appears normal. and affect normal/bright     LYMPHATICS: No cervical adenopathy    DIAGNOSTICS:   EKG: Not indicated due to non-vascular surgery and low risk of event (age <65 and without cardiac risk factors)    Recent Labs   Lab Test 03/30/20  0930 09/20/19  0906 09/10/18  0922  02/26/16  1002   HGB 12.9  --   --   --  13.2     --   --   --  235    139 141   < > 139   POTASSIUM 4.2 4.1 4.0   < > 4.2   CR 0.68 0.69 0.77   < > 0.66   A1C  --   --  5.4  --   --     < > = values in this interval not displayed.      IMPRESSION:   Reason for surgery/procedure:  undergoing surgery/procedure for treatment of left knee .    Diagnosis/reason for consult: preop evaluation     The proposed surgical procedure is considered INTERMEDIATE risk.    REVISED CARDIAC RISK INDEX  The patient has the following serious cardiovascular risks for perioperative complications such as (MI, PE, VFib and 3  AV Block):  No serious cardiac risks  INTERPRETATION: 0 risks: Class I (very low risk - 0.4% complication rate)    The patient has the following additional risks for perioperative complications:  The 10-year ASCVD risk score (Mollymignon HALL Jr., et al., 2013) is: 1.2%    Values used to calculate the score:      Age: 50 years      Sex: Female      Is Non- : No      Diabetic: No      Tobacco smoker: No      Systolic Blood Pressure: 120 mmHg      Is BP treated: No      HDL Cholesterol: 52 mg/dL      Total Cholesterol: 231 mg/dL      Lachelle was seen today for pre-op exam.    Diagnoses and all orders for this visit:    Preop general physical exam  -       Chronic pain of left knee  -         RECOMMENDATIONS:     Before your surgery:  10 days before: stop all over the counter supplements  1 week before: stop aspirin if you are taking  aspirin.   stop ibuprofen, naproxen or similar antiinflammatory medications. You may use Tylenol for pain or headache.     On the day of your surgery:  Do not take any medication the morning of your surgery.     After surgery:    You may resume all your medications after the surgery unless your surgeon instructs you otherwise    APPROVAL GIVEN to proceed with proposed procedure, without further diagnostic evaluation       Signed Electronically by: ROSALIND Nazario CNP    Copy of this evaluation report is provided to requesting physician.    [facility] Preop Guidelines    Revised Cardiac Risk Index

## 2020-06-01 NOTE — PATIENT INSTRUCTIONS
PLAN:   1.   Symptomatic therapy suggested:   Before your surgery:  10 days before: stop all over the counter supplements  1 week before: stop aspirin if you are taking aspirin.   stop ibuprofen, naproxen or similar antiinflammatory medications. You may use Tylenol for pain or headache.     On the day of your surgery:  Do not take any medication the morning of your surgery.     After surgery:    You may resume all your medications after the surgery unless your surgeon instructs you otherwise    2. Patient needs to follow up in if no improvement,or sooner if worsening of symptoms or other symptoms develop.  Before Your Surgery      Call your surgeon if there is any change in your health. This includes signs of a cold or flu (such as a sore throat, runny nose, cough, rash or fever).    Do not smoke, drink alcohol or take over the counter medicine (unless your surgeon or primary care doctor tells you to) for the 24 hours before and after surgery.    If you take prescribed drugs: Follow your doctor s orders about which medicines to take and which to stop until after surgery.    Eating and drinking prior to surgery: follow the instructions from your surgeon    Take a shower or bath the night before surgery. Use the soap your surgeon gave you to gently clean your skin. If you do not have soap from your surgeon, use your regular soap. Do not shave or scrub the surgery site.  Wear clean pajamas and have clean sheets on your bed.

## 2020-06-14 DIAGNOSIS — Z11.59 ENCOUNTER FOR SCREENING FOR OTHER VIRAL DISEASES: ICD-10-CM

## 2020-06-14 PROCEDURE — 99000 SPECIMEN HANDLING OFFICE-LAB: CPT | Performed by: ORTHOPAEDIC SURGERY

## 2020-06-14 PROCEDURE — U0003 INFECTIOUS AGENT DETECTION BY NUCLEIC ACID (DNA OR RNA); SEVERE ACUTE RESPIRATORY SYNDROME CORONAVIRUS 2 (SARS-COV-2) (CORONAVIRUS DISEASE [COVID-19]), AMPLIFIED PROBE TECHNIQUE, MAKING USE OF HIGH THROUGHPUT TECHNOLOGIES AS DESCRIBED BY CMS-2020-01-R: HCPCS | Mod: 90 | Performed by: ORTHOPAEDIC SURGERY

## 2020-06-15 ENCOUNTER — ANESTHESIA EVENT (OUTPATIENT)
Dept: SURGERY | Facility: AMBULATORY SURGERY CENTER | Age: 50
End: 2020-06-15

## 2020-06-15 LAB
SARS-COV-2 RNA SPEC QL NAA+PROBE: NOT DETECTED
SPECIMEN SOURCE: NORMAL

## 2020-06-16 ENCOUNTER — ANESTHESIA (OUTPATIENT)
Dept: SURGERY | Facility: AMBULATORY SURGERY CENTER | Age: 50
End: 2020-06-16

## 2020-06-16 ENCOUNTER — HOSPITAL ENCOUNTER (OUTPATIENT)
Facility: AMBULATORY SURGERY CENTER | Age: 50
End: 2020-06-16
Attending: ORTHOPAEDIC SURGERY
Payer: COMMERCIAL

## 2020-06-16 VITALS
OXYGEN SATURATION: 99 % | RESPIRATION RATE: 14 BRPM | TEMPERATURE: 98 F | HEIGHT: 65 IN | DIASTOLIC BLOOD PRESSURE: 86 MMHG | BODY MASS INDEX: 36.82 KG/M2 | WEIGHT: 221 LBS | HEART RATE: 66 BPM | SYSTOLIC BLOOD PRESSURE: 139 MMHG

## 2020-06-16 DIAGNOSIS — M25.562 CHRONIC PAIN OF LEFT KNEE: Primary | ICD-10-CM

## 2020-06-16 DIAGNOSIS — G89.29 CHRONIC PAIN OF LEFT KNEE: Primary | ICD-10-CM

## 2020-06-16 DEVICE — IMP ANCHOR ARTHREX BIO-SWIVELOCK 5.5MM AR-2323BCC: Type: IMPLANTABLE DEVICE | Site: KNEE | Status: FUNCTIONAL

## 2020-06-16 RX ORDER — LIDOCAINE HYDROCHLORIDE 20 MG/ML
INJECTION, SOLUTION INFILTRATION; PERINEURAL PRN
Status: DISCONTINUED | OUTPATIENT
Start: 2020-06-16 | End: 2020-06-16

## 2020-06-16 RX ORDER — KETOROLAC TROMETHAMINE 30 MG/ML
INJECTION, SOLUTION INTRAMUSCULAR; INTRAVENOUS PRN
Status: DISCONTINUED | OUTPATIENT
Start: 2020-06-16 | End: 2020-06-16

## 2020-06-16 RX ORDER — MEPERIDINE HYDROCHLORIDE 25 MG/ML
12.5 INJECTION INTRAMUSCULAR; INTRAVENOUS; SUBCUTANEOUS
Status: DISCONTINUED | OUTPATIENT
Start: 2020-06-16 | End: 2020-06-17 | Stop reason: HOSPADM

## 2020-06-16 RX ORDER — LIDOCAINE 40 MG/G
CREAM TOPICAL
Status: DISCONTINUED | OUTPATIENT
Start: 2020-06-16 | End: 2020-06-16 | Stop reason: HOSPADM

## 2020-06-16 RX ORDER — DEXAMETHASONE SODIUM PHOSPHATE 4 MG/ML
INJECTION, SOLUTION INTRA-ARTICULAR; INTRALESIONAL; INTRAMUSCULAR; INTRAVENOUS; SOFT TISSUE PRN
Status: DISCONTINUED | OUTPATIENT
Start: 2020-06-16 | End: 2020-06-16

## 2020-06-16 RX ORDER — ACETAMINOPHEN 325 MG/1
650 TABLET ORAL EVERY 4 HOURS
Qty: 120 TABLET | Refills: 0 | Status: SHIPPED | OUTPATIENT
Start: 2020-06-16 | End: 2020-09-10

## 2020-06-16 RX ORDER — CEFAZOLIN SODIUM 2 G/50ML
2 SOLUTION INTRAVENOUS
Status: COMPLETED | OUTPATIENT
Start: 2020-06-16 | End: 2020-06-16

## 2020-06-16 RX ORDER — PROPOFOL 10 MG/ML
INJECTION, EMULSION INTRAVENOUS PRN
Status: DISCONTINUED | OUTPATIENT
Start: 2020-06-16 | End: 2020-06-16

## 2020-06-16 RX ORDER — ONDANSETRON 4 MG/1
4-8 TABLET, ORALLY DISINTEGRATING ORAL EVERY 8 HOURS PRN
Qty: 10 TABLET | Refills: 0 | Status: SHIPPED | OUTPATIENT
Start: 2020-06-16 | End: 2020-09-10

## 2020-06-16 RX ORDER — SODIUM CHLORIDE, SODIUM LACTATE, POTASSIUM CHLORIDE, CALCIUM CHLORIDE 600; 310; 30; 20 MG/100ML; MG/100ML; MG/100ML; MG/100ML
INJECTION, SOLUTION INTRAVENOUS CONTINUOUS
Status: DISCONTINUED | OUTPATIENT
Start: 2020-06-16 | End: 2020-06-16 | Stop reason: HOSPADM

## 2020-06-16 RX ORDER — KETAMINE HYDROCHLORIDE 10 MG/ML
INJECTION, SOLUTION INTRAMUSCULAR; INTRAVENOUS PRN
Status: DISCONTINUED | OUTPATIENT
Start: 2020-06-16 | End: 2020-06-16

## 2020-06-16 RX ORDER — OXYCODONE HYDROCHLORIDE 5 MG/1
5-10 TABLET ORAL EVERY 4 HOURS PRN
Qty: 30 TABLET | Refills: 0 | Status: SHIPPED | OUTPATIENT
Start: 2020-06-16 | End: 2020-09-10

## 2020-06-16 RX ORDER — PROPOFOL 10 MG/ML
INJECTION, EMULSION INTRAVENOUS CONTINUOUS PRN
Status: DISCONTINUED | OUTPATIENT
Start: 2020-06-16 | End: 2020-06-16

## 2020-06-16 RX ORDER — SODIUM CHLORIDE, SODIUM LACTATE, POTASSIUM CHLORIDE, CALCIUM CHLORIDE 600; 310; 30; 20 MG/100ML; MG/100ML; MG/100ML; MG/100ML
INJECTION, SOLUTION INTRAVENOUS CONTINUOUS
Status: DISCONTINUED | OUTPATIENT
Start: 2020-06-16 | End: 2020-06-17 | Stop reason: HOSPADM

## 2020-06-16 RX ORDER — GABAPENTIN 300 MG/1
300 CAPSULE ORAL ONCE
Status: COMPLETED | OUTPATIENT
Start: 2020-06-16 | End: 2020-06-16

## 2020-06-16 RX ORDER — CEFAZOLIN SODIUM 1 G/50ML
1 SOLUTION INTRAVENOUS SEE ADMIN INSTRUCTIONS
Status: DISCONTINUED | OUTPATIENT
Start: 2020-06-16 | End: 2020-06-16 | Stop reason: HOSPADM

## 2020-06-16 RX ORDER — ONDANSETRON 2 MG/ML
4 INJECTION INTRAMUSCULAR; INTRAVENOUS EVERY 30 MIN PRN
Status: DISCONTINUED | OUTPATIENT
Start: 2020-06-16 | End: 2020-06-17 | Stop reason: HOSPADM

## 2020-06-16 RX ORDER — NALOXONE HYDROCHLORIDE 0.4 MG/ML
.1-.4 INJECTION, SOLUTION INTRAMUSCULAR; INTRAVENOUS; SUBCUTANEOUS
Status: DISCONTINUED | OUTPATIENT
Start: 2020-06-16 | End: 2020-06-17 | Stop reason: HOSPADM

## 2020-06-16 RX ORDER — OXYCODONE HYDROCHLORIDE 5 MG/1
5 TABLET ORAL EVERY 4 HOURS PRN
Status: DISCONTINUED | OUTPATIENT
Start: 2020-06-16 | End: 2020-06-17 | Stop reason: HOSPADM

## 2020-06-16 RX ORDER — ACETAMINOPHEN 325 MG/1
975 TABLET ORAL ONCE
Status: COMPLETED | OUTPATIENT
Start: 2020-06-16 | End: 2020-06-16

## 2020-06-16 RX ORDER — IBUPROFEN 600 MG/1
600 TABLET, FILM COATED ORAL EVERY 6 HOURS PRN
Qty: 30 TABLET | Refills: 0 | Status: SHIPPED | OUTPATIENT
Start: 2020-06-16 | End: 2022-04-18

## 2020-06-16 RX ORDER — AMOXICILLIN 250 MG
1-2 CAPSULE ORAL 2 TIMES DAILY
Qty: 12 TABLET | Refills: 0 | Status: SHIPPED | OUTPATIENT
Start: 2020-06-16 | End: 2020-09-10

## 2020-06-16 RX ORDER — FENTANYL CITRATE 50 UG/ML
25-50 INJECTION, SOLUTION INTRAMUSCULAR; INTRAVENOUS EVERY 5 MIN PRN
Status: DISCONTINUED | OUTPATIENT
Start: 2020-06-16 | End: 2020-06-16 | Stop reason: HOSPADM

## 2020-06-16 RX ORDER — ONDANSETRON 4 MG/1
4 TABLET, ORALLY DISINTEGRATING ORAL EVERY 30 MIN PRN
Status: DISCONTINUED | OUTPATIENT
Start: 2020-06-16 | End: 2020-06-17 | Stop reason: HOSPADM

## 2020-06-16 RX ORDER — ONDANSETRON 4 MG/1
4 TABLET, ORALLY DISINTEGRATING ORAL
Status: DISCONTINUED | OUTPATIENT
Start: 2020-06-16 | End: 2020-06-17 | Stop reason: HOSPADM

## 2020-06-16 RX ORDER — OXYCODONE HYDROCHLORIDE 5 MG/1
5 TABLET ORAL
Status: COMPLETED | OUTPATIENT
Start: 2020-06-16 | End: 2020-06-16

## 2020-06-16 RX ORDER — BUPIVACAINE HYDROCHLORIDE AND EPINEPHRINE 2.5; 5 MG/ML; UG/ML
INJECTION, SOLUTION INFILTRATION; PERINEURAL PRN
Status: DISCONTINUED | OUTPATIENT
Start: 2020-06-16 | End: 2020-06-16 | Stop reason: HOSPADM

## 2020-06-16 RX ORDER — GLYCOPYRROLATE 0.2 MG/ML
INJECTION, SOLUTION INTRAMUSCULAR; INTRAVENOUS PRN
Status: DISCONTINUED | OUTPATIENT
Start: 2020-06-16 | End: 2020-06-16

## 2020-06-16 RX ORDER — ONDANSETRON 2 MG/ML
INJECTION INTRAMUSCULAR; INTRAVENOUS PRN
Status: DISCONTINUED | OUTPATIENT
Start: 2020-06-16 | End: 2020-06-16

## 2020-06-16 RX ORDER — HYDROXYZINE HYDROCHLORIDE 25 MG/1
25 TABLET, FILM COATED ORAL
Status: DISCONTINUED | OUTPATIENT
Start: 2020-06-16 | End: 2020-06-17 | Stop reason: HOSPADM

## 2020-06-16 RX ADMIN — LIDOCAINE HYDROCHLORIDE 100 MG: 20 INJECTION, SOLUTION INFILTRATION; PERINEURAL at 11:52

## 2020-06-16 RX ADMIN — GABAPENTIN 300 MG: 300 CAPSULE ORAL at 11:12

## 2020-06-16 RX ADMIN — PROPOFOL: 10 INJECTION, EMULSION INTRAVENOUS at 12:40

## 2020-06-16 RX ADMIN — OXYCODONE HYDROCHLORIDE 5 MG: 5 TABLET ORAL at 13:32

## 2020-06-16 RX ADMIN — SODIUM CHLORIDE, SODIUM LACTATE, POTASSIUM CHLORIDE, CALCIUM CHLORIDE: 600; 310; 30; 20 INJECTION, SOLUTION INTRAVENOUS at 11:13

## 2020-06-16 RX ADMIN — GLYCOPYRROLATE 0.2 MG: 0.2 INJECTION, SOLUTION INTRAMUSCULAR; INTRAVENOUS at 11:46

## 2020-06-16 RX ADMIN — PROPOFOL 200 MG: 10 INJECTION, EMULSION INTRAVENOUS at 11:52

## 2020-06-16 RX ADMIN — DEXAMETHASONE SODIUM PHOSPHATE 4 MG: 4 INJECTION, SOLUTION INTRA-ARTICULAR; INTRALESIONAL; INTRAMUSCULAR; INTRAVENOUS; SOFT TISSUE at 11:46

## 2020-06-16 RX ADMIN — ONDANSETRON 4 MG: 2 INJECTION INTRAMUSCULAR; INTRAVENOUS at 11:46

## 2020-06-16 RX ADMIN — ONDANSETRON 4 MG: 2 INJECTION INTRAMUSCULAR; INTRAVENOUS at 13:34

## 2020-06-16 RX ADMIN — KETAMINE HYDROCHLORIDE 25 MG: 10 INJECTION, SOLUTION INTRAMUSCULAR; INTRAVENOUS at 11:53

## 2020-06-16 RX ADMIN — PROPOFOL 100 MG: 10 INJECTION, EMULSION INTRAVENOUS at 11:53

## 2020-06-16 RX ADMIN — CEFAZOLIN SODIUM 2 G: 2 SOLUTION INTRAVENOUS at 11:55

## 2020-06-16 RX ADMIN — PROPOFOL 200 MCG/KG/MIN: 10 INJECTION, EMULSION INTRAVENOUS at 11:53

## 2020-06-16 RX ADMIN — KETAMINE HYDROCHLORIDE 15 MG: 10 INJECTION, SOLUTION INTRAMUSCULAR; INTRAVENOUS at 11:56

## 2020-06-16 RX ADMIN — KETOROLAC TROMETHAMINE 30 MG: 30 INJECTION, SOLUTION INTRAMUSCULAR; INTRAVENOUS at 11:46

## 2020-06-16 RX ADMIN — ACETAMINOPHEN 975 MG: 325 TABLET ORAL at 11:12

## 2020-06-16 RX ADMIN — KETAMINE HYDROCHLORIDE 10 MG: 10 INJECTION, SOLUTION INTRAMUSCULAR; INTRAVENOUS at 12:08

## 2020-06-16 ASSESSMENT — MIFFLIN-ST. JEOR: SCORE: 1633.33

## 2020-06-16 NOTE — ANESTHESIA POSTPROCEDURE EVALUATION
Anesthesia POST Procedure Evaluation    Patient: Lachelle Duque   MRN:     [id number] Gender:   female   Age:    50 year old :      1970        Preoperative Diagnosis: Chronic pain of left knee [M25.562, G89.29]   Procedure(s):  Examination under anesthesia Left knee, Left knee arthroscopy, Left meniscus root repair   Postop Comments: No value filed.     Anesthesia Type: General       Disposition: Outpatient   Postop Pain Control: Uneventful            Sign Out: Well controlled pain   PONV: No   Neuro/Psych: Uneventful            Sign Out: Acceptable/Baseline neuro status   Airway/Respiratory: Uneventful            Sign Out: Acceptable/Baseline resp. status   CV/Hemodynamics: Uneventful            Sign Out: Acceptable CV status   Other NRE: NONE   DID A NON-ROUTINE EVENT OCCUR? No         Last Anesthesia Record Vitals:  CRNA VITALS  2020 1238 - 2020 1338      2020             Pulse:  89    SpO2:  99 %    Resp Rate (observed):  16          Last PACU Vitals:  Vitals Value Taken Time   /84 2020  1:45 PM   Temp 36.6  C (97.8  F) 2020  1:45 PM   Pulse 69 2020  1:45 PM   Resp 17 2020  1:48 PM   SpO2 97 % 2020  1:48 PM   Temp src     NIBP     Pulse     SpO2     Resp     Temp     Ht Rate     Temp 2     Vitals shown include unvalidated device data.      Electronically Signed By: Pepito Justice MD, 2020, 2:20 PM

## 2020-06-16 NOTE — OP NOTE
PREOPERATIVE DIAGNOSIS:   1. Left Medial meniscus root tear    POSTOPERATIVE DIAGNOSIS:  1. Left Medial meniscus root tear    PROCEDURE:  1. Examination under anesthesia Left Knee  2. Left Knee arthroscopy  3. Transosseous repair of Left medial meniscus root    DATE OF SURGERY: 2/7/2020    SURGEON: Armando Sunshine MD    ASSISTANT: None    RESIDENT OR FELLOW: MD Walter    OPERATIVE INDICATIONS: Lachelle Duque is a pleasant 50 year old female who I saw through my orthopedic clinic with a history, physical, imaging consistent with a medial meniscus root tears .  I reviewed with the patient the risks, benefits, complications, techniques and alternatives to surgery.  We reviewed the expected course of recovery and the potential expected outcomes.  I specifically discussed with her that there is not yet clear evidence that a successful surgery ultimately decreases the risk of knee replacement surgery.  I was very clear that it would not reverse the degenerative changes that had already occurred.  The patient understood both the risks and benefits and desired to proceed despite the risks.    OPERATIVE DETAILS: In the preoperative area the patient's informed consent was reviewed and they desired to proceed.  The Left leg was marked and the patient was in agreement.  The patient was taken to the operating room where a timeout was performed and all parties were in agreement.  Preoperative antibiotics were given within 1 hour of the time of incision.  The patient was placed in the supine position and surrendered to LMA anesthesia.  No tourniquet was applied.  Egg crate was placed beneath the well leg and a side post was utilized.  The operative leg was prepped and draped in the usual sterile fashion.     Examination Under Anesthesia: Range of motion was 0 to 125 degrees.  Stable to varus and valgus stress testing.  Stable anterior and posterior drawer testing.  No pivot shift.  Lachman 0, 1 quadrant medial lateral  translation of the patella.    Anterior medial and anterolateral arthroscopic portals were created and the diagnostic arthroscopy was performed with the following findings: There were no loose bodies within the suprapatellar pouch, medial gutter, lateral gutter.  Lateral femoral condyle and lateral tibial plateau showed overall well-preserved cartilage which was normal.  ACL and PCL were intact.  Central trochlea showed normal cartilage.  Patella showed grade 2 fissures.  Medial tibial plateau cartilage was grade 1.  Medial femoral condyle was grade 2 with possible grade 3 fissures in the flexion weight bearing zone.  Clear tear of the medial meniscus root.  Lateral meniscus was intact.    At this time a cannula was placed on the medial portal and a meniscus scorpion was used to place 2, 0-fiber link sutures in a grasping fashion in the posterior horn of the medial meniscus.  At this time a multiuse guide was introduced and a 2.4 mm drill to pin was placed into the anatomic insertion of the medial meniscus posterior root.  We prepared the footprint with a curette prior to placing the pin.  The pin was advanced under arthroscopic localization and we overreamed with a 5 mm reamer.  A passing suture was placed through the tunnel and her sutures and the meniscus were routed through the tibia.  Maximum traction was applied to the sutures and excellent reduction of the meniscus to the tibia was noted.  No further instability probing.  Excellent fixation.    At this time on the anterior medial tibia 2.4 mm drill to pin was placed just distal to our aperture on the anteromedial tibial cortex we reamed with a 5 mm reamer tapping was performed and a 5.5 swivel lock was placed.  Final arthroscopic images showed good position and excellent reduction of the meniscus to the tibial plateau    Copious irrigation was performed an a layered closure was initiated, sterile dressings were applied and the patient was transferred to the  recovery room in stable condition with stable vital signs.    ESTIMATED BLOOD LOSS: 5 ml    TOURNIQUET TIME: No tourniquet was placed.    COMPLICATIONS: None apparent.    DRAINS: None.    SPECIMENS: None.     POSTOPERATIVE PLAN:  1. Toe-touch weightbearing x 6weeks   2. Range of motion 0 to 90 degrees  3. Shower on day 3.  No submerging the wounds.   4. Physical therapy to start within 1 week  5. Follow-up with me in 1 week.  6. At 6 weeks begin weightbearing as tolerated  7. No running or impact sports for 4 to 6 months.  No kneeling or squatting for as long as possible

## 2020-06-16 NOTE — DISCHARGE INSTRUCTIONS
[facility]  Home Care Following Anesthesia  For 24 hours after surgery:  1. Get plenty of rest.  A responsible adult must stay with you for at least 24 hours after you leave the surgery center.  2. Do not drive or use heavy equipment.  If you have weakness or tingling, don't drive or use heavy equipment until this feeling goes away.   3. Do not drink alcohol.   4. Avoid strenuous or risky activities.  Ask for help when climbing stairs.  5. You may feel lightheaded.  IF so, sit for a few minutes before standing.  Have someone help you get up.   6. If you have nausea (feel sick to your stomach): Drink only clear liquids such as apple juice, ginger ale, broth or 7-Up.  Rest may also help.  Be sure to drink enough fluids.  Move to a regular diet as you feel able.   7. You may have a slight fever.  Call the doctor if your fever is over 100 F (37.7 C) (taken under the tongue) or lasts longer than 24 hours.  8. You may have a dry mouth, a sore throat, muscle aches or trouble sleeping. These should go away after 24 hours.  9. Do not make important or legal decisions.               Tips for taking pain medications  To get the best pain relief possible, remember these points:    Take pain medications as directed, before pain becomes severe.    Pain medication can upset your stomach: taking it with food may help.    Constipation is a common side effect of pain medication. Drink plenty of  fluids.    Eat foods high in fiber. Take a stool softener if recommended by your doctor or pharmacist.    Do not drink alcohol, drive or operate machinery while taking pain medications.    Ask about other ways to control pain, such as with heat, ice or relaxation.    Tylenol/Acetaminophen Consumption  To help encourage the safe use of acetaminophen, the makers of TYLENOL  have lowered the maximum daily dose for single-ingredient Extra Strength TYLENOL  (acetaminophen) products sold in the [place] from 8  pills per day (4,000 mg) to 6 pills per day (3,000 mg). The dosing interval has also changed from 2 pills every 4-6 hours to 2 pills every 6 hours.    If you feel your pain relief is insufficient, you may take Tylenol/Acetaminophen in addition to your narcotic pain medication.     Be careful not to exceed 3,000 mg of Tylenol/Acetaminophen in a 24 hour period from all sources.    If you are taking extra strength Tylenol/acetaminophen (500 mg), the maximum dose is 6 tablets in 24 hours.    If you are taking regular strength acetaminophen (325 mg), the maximum dose is 9 tablets in 24 hours.    Call a doctor for any of the followin. Signs of infection (fever, growing tenderness at the surgery site, a large amount of drainage or bleeding, severe pain, foul-smelling drainage, redness, swelling).  2. It has been over 8 to 10 hours since surgery and you are still not able to urinate (pass water).  3. Headache for over 24 hours.  4. Numbness, tingling or weakness the day after surgery (if you had spinal anesthesia).  5. Signs of Covid-19 infection (temperature over 100 degrees, shortness of breath, cough, loss of taste/smell, generalized body aches, persistent headache, chills, sore throat, nausea/vomiting/diarrhea)  Your doctor is:       Dr. Armando Sunshine, Orthopaedics: [phone]               Or dial [phone] and ask for the resident on call for:  Orthopaedics  For emergency care, call the:  [facility]:  [phone] (TTY for hearing impaired: [phone])

## 2020-06-16 NOTE — ANESTHESIA PREPROCEDURE EVALUATION
"Anesthesia Pre-Procedure Evaluation    Patient: Lachelle Duque   MRN:     [id number] Gender:   female   Age:    50 year old :      1970        Preoperative Diagnosis: Chronic pain of left knee [M25.562, G89.29]   Procedure(s):  Examination under anesthesia Left knee, Left knee arthroscopy, Left meniscus root repair     LABS:  CBC:   Lab Results   Component Value Date    WBC 7.5 2020    WBC 7.0 2016    HGB 12.9 2020    HGB 13.2 2016    HCT 39.5 2020    HCT 39.3 2016     2020     2016     BMP:   Lab Results   Component Value Date     2020     2019    POTASSIUM 4.2 2020    POTASSIUM 4.1 2019    CHLORIDE 108 2020    CHLORIDE 106 2019    CO2 26 2020    CO2 28 2019    BUN 14 2020    BUN 14 2019    CR 0.68 2020    CR 0.69 2019    GLC 97 2020     (H) 2019     COAGS: No results found for: PTT, INR, FIBR  POC: No results found for: BGM, HCG, HCGS  OTHER:   Lab Results   Component Value Date    A1C 5.4 09/10/2018    DULCE 9.4 2020    ALBUMIN 3.8 2019    PROTTOTAL 8.0 2019    ALT 28 2019    AST 17 2019    ALKPHOS 84 2019    BILITOTAL 0.3 2019    LIPASE 52 2013    TSH 0.67 2019    T4 1.15 2015        Preop Vitals    BP Readings from Last 3 Encounters:   20 (!) 145/85   20 120/60   20 110/70    Pulse Readings from Last 3 Encounters:   20 81   20 82   11/15/19 76      Resp Readings from Last 3 Encounters:   20 14   10/27/15 16   10/20/15 16    SpO2 Readings from Last 3 Encounters:   20 95%   20 98%   20 98%      Temp Readings from Last 1 Encounters:   20 37.1  C (98.7  F) (Oral)    Ht Readings from Last 1 Encounters:   20 1.651 m (5' 5\")      Wt Readings from Last 1 Encounters:   20 100.2 kg (221 lb)    Estimated body mass index is " "36.78 kg/m  as calculated from the following:    Height as of this encounter: 1.651 m (5' 5\").    Weight as of this encounter: 100.2 kg (221 lb).     LDA:  Peripheral IV 06/16/20 Right Hand (Active)   Site Assessment WDL 06/16/20 1107   Line Status Infusing 06/16/20 1107   Phlebitis Scale 0-->no symptoms 06/16/20 1107   Extravasation? No 06/16/20 1107   Dressing Intervention New dressing  06/16/20 1107   Number of days: 0       Airway - Adult/Peds laryngeal mask airway (Active)   Number of days: 0        Past Medical History:   Diagnosis Date     Chronic rhinitis      Environmental allergies 1/18/2013     Gastro-oesophageal reflux disease      GERD (gastroesophageal reflux disease) 1/3/2013     Herpes zoster without mention of complication     L eye      Hiatal hernia      PONV (postoperative nausea and vomiting)     PONV      Past Surgical History:   Procedure Laterality Date     ADENOIDECTOMY  1977     COLONOSCOPY N/A 10/27/2015    Procedure: COLONOSCOPY;  Surgeon: Duane, William Charles, MD;  Location: [facility] OR     COLONOSCOPY WITH CO2 INSUFFLATION N/A 10/27/2015    Procedure: COLONOSCOPY WITH CO2 INSUFFLATION;  Surgeon: Duane, William Charles, MD;  Location: [facility] OR     HC TOOTH EXTRACTION W/FORCEP      18 yo     LAPAROSCOPIC CHOLECYSTECTOMY  1/30/2013    Procedure: LAPAROSCOPIC CHOLECYSTECTOMY;  Laparoscopic Cholecystectomy;  Surgeon: Cindy Soni MD;  Location: [facility] OR     SINUS SURGERY  1988    Deviated septum     wisdom teeth removed[        Allergies   Allergen Reactions     Sulfa Drugs Anaphylaxis        Anesthesia Evaluation     . Pt has had prior anesthetic. Type: General    History of anesthetic complications   - PONV        ROS/MED HX    ENT/Pulmonary:  - neg pulmonary ROS     Neurologic:  - neg neurologic ROS     Cardiovascular:  - neg cardiovascular ROS       METS/Exercise Tolerance:  >4 METS   Hematologic:  - neg hematologic  ROS       Musculoskeletal:  - neg musculoskeletal ROS       GI/Hepatic:  "    (+) GERD       Renal/Genitourinary:  - ROS Renal section negative       Endo:     (+) Obesity, .      Psychiatric:  - neg psychiatric ROS       Infectious Disease:  - neg infectious disease ROS       Malignancy:         Other:                         PHYSICAL EXAM:   Mental Status/Neuro: A/A/O   Airway: Facies: Feasible  Mallampati: I  Mouth/Opening: Full  TM distance: > 6 cm  Neck ROM: Full   Respiratory: Auscultation: CTAB     Resp. Rate: Normal     Resp. Effort: Normal      CV: Rhythm: Regular  Rate: Age appropriate  Heart: Normal Sounds  Edema: None   Comments:      Dental: Normal Dentition                Assessment:   ASA SCORE: 2    H&P: History and physical reviewed and following examination; no interval change.   Smoking Status:  Non-Smoker/Unknown   NPO Status: NPO Appropriate     Plan:   Anes. Type:  General   Pre-Medication: None   Induction:  IV (Standard)   Airway: LMA   Access/Monitoring: PIV   Maintenance: TIVA     Postop Plan:   Postop Pain: Ketamine  Postop Sedation/Airway: Not planned  Disposition: Outpatient     PONV Management:   Adult Risk Factors: Female, H/o PONV or Motion Sickness, Non-Smoker   Prevention: Ondansetron, Dexamethasone, No Volatiles     CONSENT: Direct conversation   Plan and risks discussed with: Patient          Comments for Plan/Consent:  No opioids                 Saad Rodriguez MD, MD

## 2020-06-16 NOTE — ANESTHESIA CARE TRANSFER NOTE
Patient: Lachelle Duque    Procedure(s):  Examination under anesthesia Left knee, Left knee arthroscopy, Left meniscus root repair    Diagnosis: Chronic pain of left knee [M25.562, G89.29]  Diagnosis Additional Information: No value filed.    Anesthesia Type:   General     Note:  Airway :Face Mask  Patient transferred to:PACU  Comments: VSS and WNL, comfortable, no PONV, report to Bonita RNHandoff Report: Identifed the Patient, Identified the Reponsible Provider, Reviewed the pertinent medical history, Discussed the surgical course, Reviewed Intra-OP anesthesia mangement and issues during anesthesia, Set expectations for post-procedure period and Allowed opportunity for questions and acknowledgement of understanding      Vitals: (Last set prior to Anesthesia Care Transfer)    CRNA VITALS  6/16/2020 1238 - 6/16/2020 1312      6/16/2020             Pulse:  89    SpO2:  99 %    Resp Rate (observed):  16                Electronically Signed By: ROSALIND Lerma CRNA  June 16, 2020  1:12 PM

## 2020-06-24 ENCOUNTER — OFFICE VISIT (OUTPATIENT)
Dept: ORTHOPEDICS | Facility: CLINIC | Age: 50
End: 2020-06-24
Payer: COMMERCIAL

## 2020-06-24 DIAGNOSIS — M25.562 ACUTE PAIN OF LEFT KNEE: Primary | ICD-10-CM

## 2020-06-24 NOTE — LETTER
6/24/2020      RE: Lachelle HEDRICK Neto  [address]  [place] [address]       DIAGNOSIS:   1. Left meniscus root tear    PROCEDURES:  1. Left knee transosseous repair of medial meniscus root date of surgery 6/16/2020    HISTORY:  Doing well 1 week out from surgery.  Pain controlled.  Never took any narcotics.  Willing to start therapy now.    EXAM:     General: Awake, Alert, and oriented. Articulates and communicates with a normal affect     Left lower Extremity:    Incisions well healed without evidence of infection    Normal post-operative effusion and ecchymosis    Range of motion and stability exam not performed    Neurovascularly intact    IMAGING:  None    ASSESSMENT:  1. 1 week status post transosseous repair of medial meniscus root left knee doing well.    PLAN:     Toe-touch weightbearing x6 weeks    Range of motion 0 to 90 degrees x 6 weeks    Sutures removed in clinic    Leave steri-strips in place until they fall off    OK to shower allowing water to run over incision    No soaking, scrubbing, baths, or lake for 1 additional week    Continue PT as scheduled     Pain medications reviewed and no refills required.     Operative report provided and arthroscopic images reviewed    Follow up at 6 weeks from the date of surgery with no new X-Rays needed         Armando Sunshine MD

## 2020-06-24 NOTE — PROGRESS NOTES
DIAGNOSIS:   1. Left meniscus root tear    PROCEDURES:  1. Left knee transosseous repair of medial meniscus root date of surgery 6/16/2020    HISTORY:  Doing well 1 week out from surgery.  Pain controlled.  Never took any narcotics.  Willing to start therapy now.    EXAM:     General: Awake, Alert, and oriented. Articulates and communicates with a normal affect     Left lower Extremity:    Incisions well healed without evidence of infection    Normal post-operative effusion and ecchymosis    Range of motion and stability exam not performed    Neurovascularly intact    IMAGING:  None    ASSESSMENT:  1. 1 week status post transosseous repair of medial meniscus root left knee doing well.    PLAN:     Toe-touch weightbearing x6 weeks    Range of motion 0 to 90 degrees x 6 weeks    Sutures removed in clinic    Leave steri-strips in place until they fall off    OK to shower allowing water to run over incision    No soaking, scrubbing, baths, or lake for 1 additional week    Continue PT as scheduled     Pain medications reviewed and no refills required.     Operative report provided and arthroscopic images reviewed    Follow up at 6 weeks from the date of surgery with no new X-Rays needed

## 2020-06-29 ENCOUNTER — THERAPY VISIT (OUTPATIENT)
Dept: PHYSICAL THERAPY | Facility: CLINIC | Age: 50
End: 2020-06-29
Payer: COMMERCIAL

## 2020-06-29 DIAGNOSIS — Z47.89 AFTERCARE FOLLOWING SURGERY OF THE MUSCULOSKELETAL SYSTEM: ICD-10-CM

## 2020-06-29 DIAGNOSIS — M25.562 CHRONIC PAIN OF LEFT KNEE: Primary | ICD-10-CM

## 2020-06-29 DIAGNOSIS — G89.29 CHRONIC PAIN OF LEFT KNEE: Primary | ICD-10-CM

## 2020-06-29 DIAGNOSIS — M25.562 ACUTE PAIN OF LEFT KNEE: ICD-10-CM

## 2020-06-29 PROCEDURE — 97161 PT EVAL LOW COMPLEX 20 MIN: CPT | Mod: GP | Performed by: PHYSICAL THERAPIST

## 2020-06-29 PROCEDURE — 97110 THERAPEUTIC EXERCISES: CPT | Mod: GP | Performed by: PHYSICAL THERAPIST

## 2020-06-29 ASSESSMENT — ACTIVITIES OF DAILY LIVING (ADL)
AS_A_RESULT_OF_YOUR_KNEE_INJURY,_HOW_WOULD_YOU_RATE_YOUR_CURRENT_LEVEL_OF_DAILY_ACTIVITY?: SEVERELY ABNORMAL
GIVING WAY, BUCKLING OR SHIFTING OF KNEE: NOT ANSWERED
LIMPING: NOT ANSWERED
HOW_WOULD_YOU_RATE_THE_CURRENT_FUNCTION_OF_YOUR_KNEE_DURING_YOUR_USUAL_DAILY_ACTIVITIES_ON_A_SCALE_FROM_0_TO_100_WITH_100_BEING_YOUR_LEVEL_OF_KNEE_FUNCTION_PRIOR_TO_YOUR_INJURY_AND_0_BEING_THE_INABILITY_TO_PERFORM_ANY_OF_YOUR_USUAL_DAILY_ACTIVITIES?: 25
HOW_WOULD_YOU_RATE_THE_OVERALL_FUNCTION_OF_YOUR_KNEE_DURING_YOUR_USUAL_DAILY_ACTIVITIES?: SEVERELY ABNORMAL
GO UP STAIRS: NOT ANSWERED
KNEEL ON THE FRONT OF YOUR KNEE: NOT ANSWERED
SQUAT: NOT ANSWERED
WEAKNESS: THE SYMPTOM AFFECTS MY ACTIVITY SEVERELY
GO DOWN STAIRS: NOT ANSWERED
STAND: ACTIVITY IS MINIMALLY DIFFICULT
PAIN: THE SYMPTOM AFFECTS MY ACTIVITY SLIGHTLY
STIFFNESS: THE SYMPTOM AFFECTS MY ACTIVITY SLIGHTLY
SWELLING: THE SYMPTOM AFFECTS MY ACTIVITY SLIGHTLY
WALK: NOT ANSWERED
KNEE_ACTIVITY_OF_DAILY_LIVING_SUM: 23
SIT WITH YOUR KNEE BENT: ACTIVITY IS MINIMALLY DIFFICULT
RISE FROM A CHAIR: ACTIVITY IS NOT DIFFICULT

## 2020-06-29 NOTE — PROGRESS NOTES
[facility] Initial Evaluation  Subjective:  The history is provided by the patient. No  was used.   Patient Health History  Lachelle Duque being seen for s/p L knee Meniscectomy.     Problem began: 6/24/2020 (referral;  6/16/20 DOS).   Problem occurred: stepped wrong while walking; felt something give way on L knee   Pain is reported as 1/10 on pain scale.  General health as reported by patient is good.  Pertinent medical history includes: overweight.   Red flags:  None as reported by patient.  Medical allergies: other. Other medical allergies details: sulfa.   Surgeries include:  None.    Current medications:  Pain medication and other. Other medications details: Zyrtec, Omeprazole, Birth Control.    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   Primary job tasks include:  Prolonged standing, repetitive tasks and computer work.                  Therapist Generated HPI Evaluation  Problem details: Pt presents to PT today s/p L Meniscus Repair; DOS: 6/16/20.  Pt stated the original DOI was 9/2019; pt stated she was walking and felt something give way in the L knee; unable to walk.    Pt did PT fall 2019; surgery was delayed due to COVID 19..         Type of problem:  Left knee.    This is a new condition.  Condition occurred with:  Other reason (stepped wrong).    Patient reports pain:  In the joint and medial.        Associated symptoms:  Loss of motion/stiffness, edema and loss of strength. Symptoms are exacerbated by ascending stairs, certain positions, activity, bending/squatting, descending stairs, kneeling, sitting, standing, transfers, walking and weight bearing  and relieved by ice, rest and other (elevation).                              Objective:    Gait:  TTWB instruction but pt prefers to be NWB    Gait Type:  Antalgic   Weight Bearing Status:  NWB   Assistive Devices:  Crutches  Deviations:  Lumbar:  Trunk flexion                                                       Knee Evaluation:  ROM:  Strength wnl knee: able to perform SLR flexion with noted 10 degree extension lag.  AROM      Extension: Left: 6    Right:   Flexion: Left: 72   Right:  PROM      Extension: Left: 4   Right:   Flexion: Left: 84   Right:       Strength:         Quad Set Left: Fair    Pain:                     General     ROS    Assessment/Plan:    Patient is a 50 year old female with left side knee complaints.    Patient has the following significant findings with corresponding treatment plan.                Diagnosis 1:  S/p L Meniscus Repair  Pain -  hot/cold therapy, manual therapy and splint/taping/bracing/orthotics  Decreased ROM/flexibility - manual therapy, therapeutic exercise and therapeutic activity  Decreased strength - therapeutic exercise, therapeutic activities and home program  Impaired gait - gait training and assistive devices  Impaired muscle performance - neuro re-education  Decreased function - therapeutic activities    Therapy Evaluation Codes:   1) History comprised of:   Personal factors that impact the plan of care:      None.    Comorbidity factors that impact the plan of care are:      None.     Medications impacting care: Pain.  2) Examination of Body Systems comprised of:   Body structures and functions that impact the plan of care:      Knee.   Activity limitations that impact the plan of care are:      Bathing, Bending, Driving, Lifting, Sitting, Squatting/kneeling, Stairs, Standing, Walking, Working, Sleeping and transfers.  3) Clinical presentation characteristics are:   Stable/Uncomplicated.  4) Decision-Making    Low complexity using standardized patient assessment instrument and/or measureable assessment of functional outcome.  Cumulative Therapy Evaluation is: Low complexity.    Previous and current functional limitations:  (See Goal Flow Sheet for this information)    Short term and Long term goals: (See Goal Flow Sheet for this information)      Communication ability:  Patient appears to be able to clearly communicate and understand verbal and written communication and follow directions correctly.  Treatment Explanation - The following has been discussed with the patient:   RX ordered/plan of care  Anticipated outcomes  Possible risks and side effects  This patient would benefit from PT intervention to resume normal activities.   Rehab potential is good.    Frequency:  1-2 X week, once daily  Duration:  for 8-10 weeks  Discharge Plan:  Achieve all LTG.  Independent in home treatment program.  Reach maximal therapeutic benefit.    Please refer to the daily flowsheet for treatment today, total treatment time and time spent performing 1:1 timed codes.

## 2020-07-13 ENCOUNTER — THERAPY VISIT (OUTPATIENT)
Dept: PHYSICAL THERAPY | Facility: CLINIC | Age: 50
End: 2020-07-13
Payer: COMMERCIAL

## 2020-07-13 DIAGNOSIS — M25.562 ACUTE PAIN OF LEFT KNEE: ICD-10-CM

## 2020-07-13 DIAGNOSIS — Z47.89 AFTERCARE FOLLOWING SURGERY OF THE MUSCULOSKELETAL SYSTEM: ICD-10-CM

## 2020-07-13 PROCEDURE — 97140 MANUAL THERAPY 1/> REGIONS: CPT | Mod: GP | Performed by: PHYSICAL THERAPIST

## 2020-07-13 PROCEDURE — 97110 THERAPEUTIC EXERCISES: CPT | Mod: GP | Performed by: PHYSICAL THERAPIST

## 2020-07-27 ENCOUNTER — THERAPY VISIT (OUTPATIENT)
Dept: PHYSICAL THERAPY | Facility: CLINIC | Age: 50
End: 2020-07-27
Payer: COMMERCIAL

## 2020-07-27 DIAGNOSIS — Z47.89 AFTERCARE FOLLOWING SURGERY OF THE MUSCULOSKELETAL SYSTEM: ICD-10-CM

## 2020-07-27 DIAGNOSIS — M25.562 ACUTE PAIN OF LEFT KNEE: ICD-10-CM

## 2020-07-27 PROCEDURE — 97530 THERAPEUTIC ACTIVITIES: CPT | Mod: GP | Performed by: PHYSICAL THERAPIST

## 2020-07-27 PROCEDURE — 97110 THERAPEUTIC EXERCISES: CPT | Mod: GP | Performed by: PHYSICAL THERAPIST

## 2020-07-27 PROCEDURE — 97116 GAIT TRAINING THERAPY: CPT | Mod: GP | Performed by: PHYSICAL THERAPIST

## 2020-07-27 NOTE — PROGRESS NOTES
Subjective:  HPI  Physical Exam                    Objective:  System    Physical Exam    General      ROS    Assessment/Plan:    PROGRESS  REPORT    Progress reporting period as of 7/27/2020.       SUBJECTIVE  Subjective changes noted by patient:  Patient seen 3 times s/p L knee arthroscopy. Patient reports overall she is doing really well.  Is currently 6 weeks s/p so ready to move on to next phase of rehab.    Current pain level is 0/10  .     Previous pain level was : 10/10.   Changes in function:  Yes (See Goal flowsheet attached for changes in current functional level)  Adverse reaction to treatment or activity: activity - fatigued and pain    OBJECTIVE  Changes noted in objective findings: (6 weeks s/p)   L knee ROM:  2-0-104 (no issues getting to 90 degrees of flexion prior to today)  L knee quad set:  Very good; able to perform SLR in all 4 directions without issues  Gait: antalgic; WBAT now with crutches; able to progress to 1 crutch for short distance.    ASSESSMENT/PLAN  Updated problem list and treatment plan: Diagnosis 1:  S/p L Knee meniscus root radial tear repair.  Pain -  hot/cold therapy and manual therapy  Decreased ROM/flexibility - manual therapy, therapeutic exercise and therapeutic activity  Decreased strength - therapeutic exercise, therapeutic activities and home program  Decreased proprioception - neuro re-education, gait training and therapeutic activities  Impaired gait - gait training and assistive devices  Impaired muscle performance - neuro re-education  Decreased function - therapeutic activities  STG/LTGs have been met or progress has been made towards goals:  Yes (See Goal flow sheet completed today.)  Assessment of Progress: The patient's condition is improving.  The patient's condition has potential to improve.  Self Management Plans:  Patient has been instructed in a home treatment program.  I have re-evaluated this patient and find that the nature, scope, duration and intensity  of the therapy is appropriate for the medical condition of the patient.  Lachelle continues to require the following intervention to meet STG and LTG's:  PT    Recommendations:  This patient would benefit from continued therapy.     Frequency:  1-2 X week, once daily  Duration:  for 6-8 visits        Please refer to the daily flowsheet for treatment today, total treatment time and time spent performing 1:1 timed codes.

## 2020-07-27 NOTE — PATIENT INSTRUCTIONS
Thank you for referring  Shanta Duque to the [facility] for Physical Therapy.  I have attached a Current PT progress note for you to review prior to you visit with  Shanta  on  7/30/2020.  Please message me or call me at [phone] if you have any questions.    Thank You again for referring to the [facility].  Fani Clifton, MPT  [facility].

## 2020-07-30 ENCOUNTER — OFFICE VISIT (OUTPATIENT)
Dept: ORTHOPEDICS | Facility: CLINIC | Age: 50
End: 2020-07-30
Payer: COMMERCIAL

## 2020-07-30 DIAGNOSIS — M25.562 CHRONIC PAIN OF LEFT KNEE: Primary | ICD-10-CM

## 2020-07-30 DIAGNOSIS — G89.29 CHRONIC PAIN OF LEFT KNEE: Primary | ICD-10-CM

## 2020-07-30 PROCEDURE — 99024 POSTOP FOLLOW-UP VISIT: CPT | Performed by: ORTHOPAEDIC SURGERY

## 2020-07-30 ASSESSMENT — PAIN SCALES - GENERAL: PAINLEVEL: MILD PAIN (2)

## 2020-07-30 NOTE — PATIENT INSTRUCTIONS
Thanks for coming today.  [facility]  [address] [place] [address]    To schedule future appointments in Ortho Clinic, you may call [phone].    To schedule ordered imaging by your provider:   Call Central Imaging [phone]    To schedule an injection ordered by your provider:  Call Central [facility] Injection scheduling line: [phone]  Activity [facility] available online at:  [email or web address]    Please call if any further questions or concerns ([phone]).  Clinic hours 8 am to 5 pm.    Return to clinic (call) if symptoms worsen or fail to improve.

## 2020-07-30 NOTE — NURSING NOTE
Lachelle Duque's chief complaint for this visit includes:  Chief Complaint   Patient presents with     RECHECK     6 week postop     PCP: Suha Beavers    Referring Provider:  Referred Self, MD  No address on file    There were no vitals taken for this visit.  Data Unavailable     Do you need any medication refills at today's visit? no

## 2020-07-30 NOTE — PROGRESS NOTES
DIAGNOSIS:   1. Left meniscus root tear    PROCEDURES:  1. Left knee transosseous repair of medial meniscus root date of surgery 6/16/2020    HISTORY:  Doing well 6 weeks out from surgery.  No pain.  Starting to put weight on it.  Working with therapy.    EXAM:     General: Awake, Alert, and oriented. Articulates and communicates with a normal affect     Left lower Extremity:    Incisions well healed without evidence of infection    No post-operative effusion or ecchymosis    Range of motion 0 to 100 degrees     Ligaments stable     Neurovascularly intact    IMAGING:  None    ASSESSMENT:  1. 6 weeks status post transosseous repair of medial meniscus root left knee doing well.    PLAN:     Weightbearing as tolerated, wean from crutches when able    Range of motion as tolerated    No running, jumping, or impact activities    Try to avoid kneeling or squatting    Continue therapy, follow-up with me in 6 weeks

## 2020-07-30 NOTE — LETTER
7/30/2020         RE: Lachelle Duque  [address]  [place] [address]        Dear Colleague,    Thank you for referring your patient, Lachelle Duque, to the [facility]. Please see a copy of my visit note below.    DIAGNOSIS:   1. Left meniscus root tear    PROCEDURES:  1. Left knee transosseous repair of medial meniscus root date of surgery 6/16/2020    HISTORY:  Doing well 6 weeks out from surgery.  No pain.  Starting to put weight on it.  Working with therapy.    EXAM:     General: Awake, Alert, and oriented. Articulates and communicates with a normal affect     Left lower Extremity:    Incisions well healed without evidence of infection    No post-operative effusion or ecchymosis    Range of motion 0 to 100 degrees     Ligaments stable     Neurovascularly intact    IMAGING:  None    ASSESSMENT:  1. 6 weeks status post transosseous repair of medial meniscus root left knee doing well.    PLAN:     Weightbearing as tolerated, wean from crutches when able    Range of motion as tolerated    No running, jumping, or impact activities    Try to avoid kneeling or squatting    Continue therapy, follow-up with me in 6 weeks         Again, thank you for allowing me to participate in the care of your patient.        Sincerely,        Armando Sunshine MD

## 2020-08-06 ENCOUNTER — THERAPY VISIT (OUTPATIENT)
Dept: PHYSICAL THERAPY | Facility: CLINIC | Age: 50
End: 2020-08-06
Payer: COMMERCIAL

## 2020-08-06 DIAGNOSIS — Z47.89 AFTERCARE FOLLOWING SURGERY OF THE MUSCULOSKELETAL SYSTEM: ICD-10-CM

## 2020-08-06 DIAGNOSIS — M25.562 ACUTE PAIN OF LEFT KNEE: ICD-10-CM

## 2020-08-06 PROCEDURE — 97112 NEUROMUSCULAR REEDUCATION: CPT | Mod: GP | Performed by: PHYSICAL THERAPIST

## 2020-08-06 PROCEDURE — 97110 THERAPEUTIC EXERCISES: CPT | Mod: GP | Performed by: PHYSICAL THERAPIST

## 2020-08-12 ENCOUNTER — THERAPY VISIT (OUTPATIENT)
Dept: PHYSICAL THERAPY | Facility: CLINIC | Age: 50
End: 2020-08-12
Payer: COMMERCIAL

## 2020-08-12 DIAGNOSIS — M25.562 ACUTE PAIN OF LEFT KNEE: ICD-10-CM

## 2020-08-12 DIAGNOSIS — Z47.89 AFTERCARE FOLLOWING SURGERY OF THE MUSCULOSKELETAL SYSTEM: ICD-10-CM

## 2020-08-12 PROCEDURE — 97110 THERAPEUTIC EXERCISES: CPT | Mod: GP | Performed by: PHYSICAL THERAPIST

## 2020-08-18 ENCOUNTER — THERAPY VISIT (OUTPATIENT)
Dept: PHYSICAL THERAPY | Facility: CLINIC | Age: 50
End: 2020-08-18
Payer: COMMERCIAL

## 2020-08-18 DIAGNOSIS — M25.562 ACUTE PAIN OF LEFT KNEE: ICD-10-CM

## 2020-08-18 DIAGNOSIS — Z47.89 AFTERCARE FOLLOWING SURGERY OF THE MUSCULOSKELETAL SYSTEM: ICD-10-CM

## 2020-08-18 PROCEDURE — 97110 THERAPEUTIC EXERCISES: CPT | Mod: GP | Performed by: PHYSICAL THERAPIST

## 2020-08-18 PROCEDURE — 97530 THERAPEUTIC ACTIVITIES: CPT | Mod: GP | Performed by: PHYSICAL THERAPIST

## 2020-08-25 ENCOUNTER — THERAPY VISIT (OUTPATIENT)
Dept: PHYSICAL THERAPY | Facility: CLINIC | Age: 50
End: 2020-08-25
Payer: COMMERCIAL

## 2020-08-25 DIAGNOSIS — M25.562 ACUTE PAIN OF LEFT KNEE: ICD-10-CM

## 2020-08-25 DIAGNOSIS — Z47.89 AFTERCARE FOLLOWING SURGERY OF THE MUSCULOSKELETAL SYSTEM: ICD-10-CM

## 2020-08-25 PROCEDURE — 97530 THERAPEUTIC ACTIVITIES: CPT | Mod: GP | Performed by: PHYSICAL THERAPIST

## 2020-08-25 PROCEDURE — 97112 NEUROMUSCULAR REEDUCATION: CPT | Mod: GP | Performed by: PHYSICAL THERAPIST

## 2020-08-25 PROCEDURE — 97110 THERAPEUTIC EXERCISES: CPT | Mod: GP | Performed by: PHYSICAL THERAPIST

## 2020-08-31 ENCOUNTER — THERAPY VISIT (OUTPATIENT)
Dept: PHYSICAL THERAPY | Facility: CLINIC | Age: 50
End: 2020-08-31
Payer: COMMERCIAL

## 2020-08-31 DIAGNOSIS — M25.562 ACUTE PAIN OF LEFT KNEE: ICD-10-CM

## 2020-08-31 DIAGNOSIS — Z47.89 AFTERCARE FOLLOWING SURGERY OF THE MUSCULOSKELETAL SYSTEM: ICD-10-CM

## 2020-08-31 PROCEDURE — 97530 THERAPEUTIC ACTIVITIES: CPT | Mod: GP | Performed by: PHYSICAL THERAPIST

## 2020-08-31 PROCEDURE — 97112 NEUROMUSCULAR REEDUCATION: CPT | Mod: GP | Performed by: PHYSICAL THERAPIST

## 2020-08-31 PROCEDURE — 97110 THERAPEUTIC EXERCISES: CPT | Mod: GP | Performed by: PHYSICAL THERAPIST

## 2020-08-31 NOTE — PATIENT INSTRUCTIONS
Thank you for referring  Shanta to the [facility] for Physical Therapy.  I have attached a Current PT progress note for you to review prior to you visit with  Shanta on  Thursday September 10th.  Please message me or call me at [phone] if you have any questions.    Thank You again for referring to the [facility].  Fani Clifton, MPT  [facility].

## 2020-08-31 NOTE — PROGRESS NOTES
Subjective:  HPI  Physical Exam                    Objective:  System    Physical Exam    General     ROS    Assessment/Plan:    PROGRESS  REPORT    Progress reporting period as of 8/31/2020.       SUBJECTIVE  Subjective changes noted by patient:   Pt planning to return to work on Monday - is nervous about fatigue but feels ready to return.  Pt feels the L knee will not hinder her ability to do her work.  Current pain level is  0/10.     Previous pain level was : 10/10.   Changes in function:  Yes (See Goal flowsheet attached for changes in current functional level)  Adverse reaction to treatment or activity: activity - some fatigue with prolong activity    OBJECTIVE  Changes noted in objective findings:  Yes, pt has been given illustrated HEP to follow.    Gait:  Antalgic; lacks full L knee ext at times (able to correct with cuing)    Proprioception: Good in static positions; Fair in dynamic positions. Hesitation noted on change of direction.    L knee AROM supine: 6-0-120    L Hip Strength:  abd 4+/5, ext 5/5, add 4/5, flex 4+/5   L Knee Strength: ext 5/5, flex 5/5     ASSESSMENT/PLAN  Updated problem list and treatment plan: Diagnosis 1: S/p L Knee meniscus root radial tear repair.    Pain -  hot/cold therapy  Decreased strength - therapeutic exercise, therapeutic activities and home program  Decreased proprioception - neuro re-education and therapeutic activities  Impaired gait - gait training  Impaired muscle performance - neuro re-education  Decreased function - therapeutic activities  STG/LTGs have been met or progress has been made towards goals:  Yes (See Goal flow sheet completed today.)  Assessment of Progress: The patient's condition is improving.  The patient's condition has potential to improve.  Self Management Plans:  Patient has been instructed in a home treatment program.  I have re-evaluated this patient and find that the nature, scope, duration and intensity of the therapy is appropriate for the  medical condition of the patient.  Lachelle continues to require the following intervention to meet STG and LTG's:  PT    Recommendations:  This patient would benefit from continued therapy.     Frequency:  1 X week, once daily  Duration:  for 2-4 visits        Please refer to the daily flowsheet for treatment today, total treatment time and time spent performing 1:1 timed codes.

## 2020-09-03 DIAGNOSIS — Z12.31 VISIT FOR SCREENING MAMMOGRAM: ICD-10-CM

## 2020-09-10 ENCOUNTER — OFFICE VISIT (OUTPATIENT)
Dept: ORTHOPEDICS | Facility: CLINIC | Age: 50
End: 2020-09-10
Payer: COMMERCIAL

## 2020-09-10 VITALS — HEART RATE: 75 BPM | DIASTOLIC BLOOD PRESSURE: 77 MMHG | SYSTOLIC BLOOD PRESSURE: 125 MMHG

## 2020-09-10 DIAGNOSIS — M25.562 CHRONIC PAIN OF LEFT KNEE: Primary | ICD-10-CM

## 2020-09-10 DIAGNOSIS — G89.29 CHRONIC PAIN OF LEFT KNEE: Primary | ICD-10-CM

## 2020-09-10 PROCEDURE — 99024 POSTOP FOLLOW-UP VISIT: CPT | Performed by: ORTHOPAEDIC SURGERY

## 2020-09-10 ASSESSMENT — PAIN SCALES - GENERAL: PAINLEVEL: NO PAIN (0)

## 2020-09-10 NOTE — PROGRESS NOTES
DIAGNOSIS:   1. Left meniscus root tear    PROCEDURES:  1. Left knee transosseous repair of medial meniscus root date of surgery 6/16/2020    HISTORY:  Doing well 12 weeks out from surgery.  No pain.  She is weightbearing.  She is returned to work.  She feels good about her knee.  Much improved from preoperative state.  SANE score of 85.    EXAM:     General: Awake, Alert, and oriented. Articulates and communicates with a normal affect     Left lower Extremity:    Incisions well healed without evidence of infection    No post-operative effusion or ecchymosis    Range of motion appears full    Ligaments stable     Neurovascularly intact    IMAGING:  None    ASSESSMENT:  1. 12 weeks status post transosseous repair of medial meniscus root left knee doing well.    PLAN:     Weightbearing as tolerated, wean from crutches when able    Range of motion as tolerated    No specific restrictions    Avoid kneeling or squatting as much as possible    Follow-up as needed

## 2020-09-10 NOTE — NURSING NOTE
Lachelle Duque's chief complaint for this visit includes:  Chief Complaint   Patient presents with     Left Knee - Surgical Followup     6/16/20 Left knee arthroscopy, Left meniscus root repair    Sane: Left knee- 85  Right knee- 100     PCP: Suha Beavers    Referring Provider:  No referring provider defined for this encounter.    /77   Pulse 75   No Pain (0)     Do you need any medication refills at today's visit? No

## 2020-09-10 NOTE — LETTER
9/10/2020         RE: Lachelle Duque  [address] [facility] [id number]        Dear Colleague,    Thank you for referring your patient, Lachelle Duque, to the [facility]. Please see a copy of my visit note below.    DIAGNOSIS:   1. Left meniscus root tear    PROCEDURES:  1. Left knee transosseous repair of medial meniscus root date of surgery 6/16/2020    HISTORY:  Doing well 12 weeks out from surgery.  No pain.  She is weightbearing.  She is returned to work.  She feels good about her knee.  Much improved from preoperative state.  SANE score of 85.    EXAM:     General: Awake, Alert, and oriented. Articulates and communicates with a normal affect     Left lower Extremity:    Incisions well healed without evidence of infection    No post-operative effusion or ecchymosis    Range of motion appears full    Ligaments stable     Neurovascularly intact    IMAGING:  None    ASSESSMENT:  1. 12 weeks status post transosseous repair of medial meniscus root left knee doing well.    PLAN:     Weightbearing as tolerated, wean from crutches when able    Range of motion as tolerated    No specific restrictions    Avoid kneeling or squatting as much as possible    Follow-up as needed         Again, thank you for allowing me to participate in the care of your patient.        Sincerely,        Armando Sunshine MD

## 2020-09-14 ENCOUNTER — THERAPY VISIT (OUTPATIENT)
Dept: PHYSICAL THERAPY | Facility: CLINIC | Age: 50
End: 2020-09-14
Payer: COMMERCIAL

## 2020-09-14 DIAGNOSIS — Z47.89 AFTERCARE FOLLOWING SURGERY OF THE MUSCULOSKELETAL SYSTEM: ICD-10-CM

## 2020-09-14 DIAGNOSIS — M25.562 ACUTE PAIN OF LEFT KNEE: ICD-10-CM

## 2020-09-14 PROCEDURE — 97112 NEUROMUSCULAR REEDUCATION: CPT | Mod: GP | Performed by: PHYSICAL THERAPIST

## 2020-09-14 PROCEDURE — 97110 THERAPEUTIC EXERCISES: CPT | Mod: GP | Performed by: PHYSICAL THERAPIST

## 2020-09-23 ENCOUNTER — THERAPY VISIT (OUTPATIENT)
Dept: PHYSICAL THERAPY | Facility: CLINIC | Age: 50
End: 2020-09-23
Payer: COMMERCIAL

## 2020-09-23 DIAGNOSIS — M25.562 ACUTE PAIN OF LEFT KNEE: ICD-10-CM

## 2020-09-23 DIAGNOSIS — Z47.89 AFTERCARE FOLLOWING SURGERY OF THE MUSCULOSKELETAL SYSTEM: ICD-10-CM

## 2020-09-23 PROCEDURE — 97110 THERAPEUTIC EXERCISES: CPT | Mod: GP | Performed by: PHYSICAL THERAPIST

## 2020-09-23 PROCEDURE — 97112 NEUROMUSCULAR REEDUCATION: CPT | Mod: GP | Performed by: PHYSICAL THERAPIST

## 2020-10-07 ENCOUNTER — THERAPY VISIT (OUTPATIENT)
Dept: PHYSICAL THERAPY | Facility: CLINIC | Age: 50
End: 2020-10-07
Payer: COMMERCIAL

## 2020-10-07 ENCOUNTER — MYC REFILL (OUTPATIENT)
Dept: PEDIATRICS | Facility: CLINIC | Age: 50
End: 2020-10-07

## 2020-10-07 DIAGNOSIS — Z30.09 ENCOUNTER FOR OTHER GENERAL COUNSELING OR ADVICE ON CONTRACEPTION: ICD-10-CM

## 2020-10-07 DIAGNOSIS — Z47.89 AFTERCARE FOLLOWING SURGERY OF THE MUSCULOSKELETAL SYSTEM: ICD-10-CM

## 2020-10-07 DIAGNOSIS — M25.562 ACUTE PAIN OF LEFT KNEE: ICD-10-CM

## 2020-10-07 PROCEDURE — 97110 THERAPEUTIC EXERCISES: CPT | Mod: GP | Performed by: PHYSICAL THERAPIST

## 2020-10-07 PROCEDURE — 97112 NEUROMUSCULAR REEDUCATION: CPT | Mod: GP | Performed by: PHYSICAL THERAPIST

## 2020-10-07 ASSESSMENT — ACTIVITIES OF DAILY LIVING (ADL)
SQUAT: ACTIVITY IS MINIMALLY DIFFICULT
AS_A_RESULT_OF_YOUR_KNEE_INJURY,_HOW_WOULD_YOU_RATE_YOUR_CURRENT_LEVEL_OF_DAILY_ACTIVITY?: NEARLY NORMAL
HOW_WOULD_YOU_RATE_THE_OVERALL_FUNCTION_OF_YOUR_KNEE_DURING_YOUR_USUAL_DAILY_ACTIVITIES?: NEARLY NORMAL
GIVING WAY, BUCKLING OR SHIFTING OF KNEE: I DO NOT HAVE THE SYMPTOM
WEAKNESS: I DO NOT HAVE THE SYMPTOM
SIT WITH YOUR KNEE BENT: ACTIVITY IS MINIMALLY DIFFICULT
STAND: ACTIVITY IS NOT DIFFICULT
RAW_SCORE: 59
LIMPING: I DO NOT HAVE THE SYMPTOM
KNEE_ACTIVITY_OF_DAILY_LIVING_SUM: 59
KNEE_ACTIVITY_OF_DAILY_LIVING_SCORE: 84.29
GO DOWN STAIRS: ACTIVITY IS MINIMALLY DIFFICULT
KNEEL ON THE FRONT OF YOUR KNEE: I AM UNABLE TO DO THE ACTIVITY
SWELLING: I DO NOT HAVE THE SYMPTOM
HOW_WOULD_YOU_RATE_THE_CURRENT_FUNCTION_OF_YOUR_KNEE_DURING_YOUR_USUAL_DAILY_ACTIVITIES_ON_A_SCALE_FROM_0_TO_100_WITH_100_BEING_YOUR_LEVEL_OF_KNEE_FUNCTION_PRIOR_TO_YOUR_INJURY_AND_0_BEING_THE_INABILITY_TO_PERFORM_ANY_OF_YOUR_USUAL_DAILY_ACTIVITIES?: 90
GO UP STAIRS: ACTIVITY IS MINIMALLY DIFFICULT
PAIN: I DO NOT HAVE THE SYMPTOM
WALK: ACTIVITY IS NOT DIFFICULT
STIFFNESS: I HAVE THE SYMPTOM BUT IT DOES NOT AFFECT MY ACTIVITY
RISE FROM A CHAIR: ACTIVITY IS MINIMALLY DIFFICULT

## 2020-10-08 ENCOUNTER — MYC MEDICAL ADVICE (OUTPATIENT)
Dept: PEDIATRICS | Facility: CLINIC | Age: 50
End: 2020-10-08

## 2020-10-08 RX ORDER — NORETHINDRONE AND ETHINYL ESTRADIOL 1 MG-35MCG
1 KIT ORAL DAILY
Qty: 84 TABLET | Refills: 0 | Status: SHIPPED | OUTPATIENT
Start: 2020-10-08 | End: 2020-10-26

## 2020-10-08 NOTE — TELEPHONE ENCOUNTER
Refilled in another encounter, informed patient.    Sol Lin RN, [facility]

## 2020-10-26 ENCOUNTER — OFFICE VISIT (OUTPATIENT)
Dept: PEDIATRICS | Facility: CLINIC | Age: 50
End: 2020-10-26
Payer: COMMERCIAL

## 2020-10-26 VITALS
WEIGHT: 230.1 LBS | TEMPERATURE: 98 F | BODY MASS INDEX: 36.98 KG/M2 | DIASTOLIC BLOOD PRESSURE: 70 MMHG | HEIGHT: 66 IN | HEART RATE: 78 BPM | SYSTOLIC BLOOD PRESSURE: 120 MMHG | OXYGEN SATURATION: 97 %

## 2020-10-26 DIAGNOSIS — Z13.6 CARDIOVASCULAR SCREENING; LDL GOAL LESS THAN 160: ICD-10-CM

## 2020-10-26 DIAGNOSIS — Z13.1 SCREENING FOR DIABETES MELLITUS (DM): ICD-10-CM

## 2020-10-26 DIAGNOSIS — Z13.29 SCREENING FOR THYROID DISORDER: ICD-10-CM

## 2020-10-26 DIAGNOSIS — Z00.00 ROUTINE GENERAL MEDICAL EXAMINATION AT A HEALTH CARE FACILITY: Primary | ICD-10-CM

## 2020-10-26 DIAGNOSIS — Z30.09 ENCOUNTER FOR OTHER GENERAL COUNSELING OR ADVICE ON CONTRACEPTION: ICD-10-CM

## 2020-10-26 DIAGNOSIS — Z12.11 SCREEN FOR COLON CANCER: ICD-10-CM

## 2020-10-26 LAB
ALBUMIN SERPL-MCNC: 3.9 G/DL (ref 3.4–5)
ALP SERPL-CCNC: 83 U/L (ref 40–150)
ALT SERPL W P-5'-P-CCNC: 22 U/L (ref 0–50)
ANION GAP SERPL CALCULATED.3IONS-SCNC: 6 MMOL/L (ref 3–14)
AST SERPL W P-5'-P-CCNC: 11 U/L (ref 0–45)
BILIRUB SERPL-MCNC: 0.3 MG/DL (ref 0.2–1.3)
BUN SERPL-MCNC: 15 MG/DL (ref 7–30)
CALCIUM SERPL-MCNC: 9.1 MG/DL (ref 8.5–10.1)
CHLORIDE SERPL-SCNC: 107 MMOL/L (ref 94–109)
CHOLEST SERPL-MCNC: 217 MG/DL
CO2 SERPL-SCNC: 25 MMOL/L (ref 20–32)
CREAT SERPL-MCNC: 0.62 MG/DL (ref 0.52–1.04)
GFR SERPL CREATININE-BSD FRML MDRD: >90 ML/MIN/{1.73_M2}
GLUCOSE SERPL-MCNC: 108 MG/DL (ref 70–99)
HDLC SERPL-MCNC: 50 MG/DL
LDLC SERPL CALC-MCNC: 141 MG/DL
NONHDLC SERPL-MCNC: 167 MG/DL
POTASSIUM SERPL-SCNC: 4 MMOL/L (ref 3.4–5.3)
PROT SERPL-MCNC: 8.2 G/DL (ref 6.8–8.8)
SODIUM SERPL-SCNC: 138 MMOL/L (ref 133–144)
TRIGL SERPL-MCNC: 131 MG/DL
TSH SERPL DL<=0.005 MIU/L-ACNC: 1.36 MU/L (ref 0.4–4)

## 2020-10-26 PROCEDURE — 80053 COMPREHEN METABOLIC PANEL: CPT | Performed by: NURSE PRACTITIONER

## 2020-10-26 PROCEDURE — 36415 COLL VENOUS BLD VENIPUNCTURE: CPT | Performed by: NURSE PRACTITIONER

## 2020-10-26 PROCEDURE — 84443 ASSAY THYROID STIM HORMONE: CPT | Performed by: NURSE PRACTITIONER

## 2020-10-26 PROCEDURE — 99396 PREV VISIT EST AGE 40-64: CPT | Performed by: NURSE PRACTITIONER

## 2020-10-26 PROCEDURE — 80061 LIPID PANEL: CPT | Performed by: NURSE PRACTITIONER

## 2020-10-26 RX ORDER — NORETHINDRONE AND ETHINYL ESTRADIOL 1 MG-35MCG
1 KIT ORAL DAILY
Qty: 84 TABLET | Refills: 4 | Status: SHIPPED | OUTPATIENT
Start: 2020-10-26 | End: 2021-11-08

## 2020-10-26 ASSESSMENT — MIFFLIN-ST. JEOR: SCORE: 1682.54

## 2020-10-26 NOTE — NURSING NOTE
"Chief Complaint   Patient presents with     Physical     AFE       Initial /70 (BP Location: Right arm, Patient Position: Sitting, Cuff Size: Adult Large)   Pulse 78   Temp 98  F (36.7  C) (Temporal)   Ht 1.664 m (5' 5.5\")   Wt 104.4 kg (230 lb 1.6 oz)   LMP 10/14/2020   SpO2 97%   Breastfeeding No   BMI 37.71 kg/m   Estimated body mass index is 37.71 kg/m  as calculated from the following:    Height as of this encounter: 1.664 m (5' 5.5\").    Weight as of this encounter: 104.4 kg (230 lb 1.6 oz).  Medication Reconciliation: complete      NINO Obrien      "

## 2020-10-26 NOTE — PATIENT INSTRUCTIONS
PLAN:   1.   Symptomatic therapy suggested:   Increase calcium to 1000mg and 800iu Vit D  2.  Orders Placed This Encounter   Medications     norethindrone-ethinyl estradiol (NORTREL 1/35, 28,) 1-35 MG-MCG tablet     Sig: Take 1 tablet by mouth daily     Dispense:  84 tablet     Refill:  4     Orders Placed This Encounter   Procedures     Lipid panel reflex to direct LDL Fasting     JUST IN CASE     Comprehensive metabolic panel     TSH with free T4 reflex     GASTROENTEROLOGY ADULT REF PROCEDURE ONLY       3. Patient needs to follow up in if no improvement,or sooner if worsening of symptoms or other symptoms develop.  CONSULTATION/REFERRAL to colonoscopy   Work on weight loss  Regular exercise  Will follow up and/or notify patient of  results via My Chart to determine further need for followup  Follow up office visit in one year for annual health maintenance exam, sooner PRN.  Results for orders placed or performed in visit on 10/26/20   TSH with free T4 reflex     Status: None   Result Value Ref Range    TSH 1.36 0.40 - 4.00 mU/L   Comprehensive metabolic panel     Status: Abnormal   Result Value Ref Range    Sodium 138 133 - 144 mmol/L    Potassium 4.0 3.4 - 5.3 mmol/L    Chloride 107 94 - 109 mmol/L    Carbon Dioxide 25 20 - 32 mmol/L    Anion Gap 6 3 - 14 mmol/L    Glucose 108 (H) 70 - 99 mg/dL    Urea Nitrogen 15 7 - 30 mg/dL    Creatinine 0.62 0.52 - 1.04 mg/dL    GFR Estimate >90 >60 mL/min/[1.73_m2]    GFR Estimate If Black >90 >60 mL/min/[1.73_m2]    Calcium 9.1 8.5 - 10.1 mg/dL    Bilirubin Total 0.3 0.2 - 1.3 mg/dL    Albumin 3.9 3.4 - 5.0 g/dL    Protein Total 8.2 6.8 - 8.8 g/dL    Alkaline Phosphatase 83 40 - 150 U/L    ALT 22 0 - 50 U/L    AST 11 0 - 45 U/L   Lipid panel reflex to direct LDL Fasting     Status: Abnormal   Result Value Ref Range    Cholesterol 217 (H) <200 mg/dL    Triglycerides 131 <150 mg/dL    HDL Cholesterol 50 >49 mg/dL    LDL Cholesterol Calculated 141 (H) <100 mg/dL    Non HDL  Cholesterol 167 (H) <130 mg/dL       Preventive Health Recommendations  Female Ages 40 to 49    Yearly exam:     See your health care provider every year in order to  1. Review health changes.   2. Discuss preventive care.    3. Review your medicines if your doctor prescribed any.      Get a Pap test every three years (unless you have an abnormal result and your provider advises testing more often).      If you get Pap tests with HPV test, you only need to test every 5 years, unless you have an abnormal result. You do not need a Pap test if your uterus was removed (hysterectomy) and you have not had cancer.      You should be tested each year for STDs (sexually transmitted diseases), if you're at risk.     Ask your doctor if you should have a mammogram.      Have a colonoscopy (test for colon cancer) if someone in your family has had colon cancer or polyps before age 50.       Have a cholesterol test every 5 years.       Have a diabetes test (fasting glucose) after age 45. If you are at risk for diabetes, you should have this test every 3 years.    Shots: Get a flu shot each year. Get a tetanus shot every 10 years.     Nutrition:     Eat at least 5 servings of fruits and vegetables each day.    Eat whole-grain bread, whole-wheat pasta and brown rice instead of white grains and rice.    Get adequate Calcium and Vitamin D.      Lifestyle    Exercise at least 150 minutes a week (an average of 30 minutes a day, 5 days a week). This will help you control your weight and prevent disease.    Limit alcohol to one drink per day.    No smoking.     Wear sunscreen to prevent skin cancer.    See your dentist every six months for an exam and cleaning.

## 2020-10-26 NOTE — PROGRESS NOTES
SUBJECTIVE:   CC: Lachelle Duque is an 50 year old woman who presents for preventive health visit.       Patient has been advised of split billing requirements and indicates understanding: Yes    Healthy Habits:    Do you get at least three servings of calcium containing foods daily (dairy, green leafy vegetables, etc.)? yes    Amount of exercise or daily activities, outside of work: 3 day(s) per week    Problems taking medications regularly No    Medication side effects: No    Have you had an eye exam in the past two years? yes    Do you see a dentist twice per year? yes    Do you have sleep apnea, excessive snoring or daytime drowsiness?no      Today's PHQ-2 Score:   PHQ-2 ( 1999 [employer]) 10/26/2020 6/1/2020   Q1: Little interest or pleasure in doing things 0 0   Q2: Feeling down, depressed or hopeless 0 0   PHQ-2 Score 0 0       Abuse: Current or Past(Physical, Sexual or Emotional)- No  Do you feel safe in your environment? Yes        Social History     Tobacco Use     Smoking status: Never Smoker     Smokeless tobacco: Never Used   Substance Use Topics     Alcohol use: Yes     Alcohol/week: 0.8 standard drinks     Types: 1 Standard drinks or equivalent per week     Comment: occasionally     If you drink alcohol do you typically have >3 drinks per day or >7 drinks per week? No                     Reviewed orders with patient.  Reviewed health maintenance and updated orders accordingly - Yes  Lab work is in process  Labs reviewed in EPIC  BP Readings from Last 3 Encounters:   10/26/20 120/70   09/10/20 125/77   06/16/20 139/86    Wt Readings from Last 3 Encounters:   10/26/20 104.4 kg (230 lb 1.6 oz)   06/16/20 100.2 kg (221 lb)   06/01/20 100.4 kg (221 lb 6.4 oz)               Patient Active Problem List   Diagnosis     Encounter for other general counseling or advice on contraception     Herpes zoster     Family history of colon cancer     Hyperlipidemia LDL goal <130     Obesity     Vitamin D deficiency      Hiatal hernia     GERD (gastroesophageal reflux disease)     Cholelithiasis     Biliary colic     Environmental allergies     Elevation of optic disc, right     Pupil asymmetry     Obesity (BMI 35.0-39.9) with comorbidity (H)     Chronic pain of left knee     Acute pain of left knee     Aftercare following surgery of the musculoskeletal system     Past Surgical History:   Procedure Laterality Date     ADENOIDECTOMY  1977     ARTHROSCOPY KNEE WITH MENISCAL REPAIR Left 6/16/2020    Procedure: Examination under anesthesia Left knee, Left knee arthroscopy, Left meniscus root repair;  Surgeon: Armando Sunshine MD;  [facility]: [facility]     COLONOSCOPY N/A 10/27/2015    Procedure: COLONOSCOPY;  Surgeon: Duane, William Charles, MD;  Location: [facility] OR     COLONOSCOPY WITH CO2 INSUFFLATION N/A 10/27/2015    Procedure: COLONOSCOPY WITH CO2 INSUFFLATION;  Surgeon: Duane, William Charles, MD;  Location: [facility] OR     HC TOOTH EXTRACTION W/FORCEP      20 yo     LAPAROSCOPIC CHOLECYSTECTOMY  1/30/2013    Procedure: LAPAROSCOPIC CHOLECYSTECTOMY;  Laparoscopic Cholecystectomy;  Surgeon: Cindy Soni MD;  Location: [facility] OR     SINUS SURGERY  1988    Deviated septum     wisdom teeth removed[         Social History     Tobacco Use     Smoking status: Never Smoker     Smokeless tobacco: Never Used   Substance Use Topics     Alcohol use: Yes     Alcohol/week: 0.8 standard drinks     Types: 1 Standard drinks or equivalent per week     Comment: occasionally     Family History   Problem Relation Age of Onset     C.A.D. Father         bypass     Diabetes Father      Cancer - colorectal Father 42     Coronary Artery Disease Father      Gastrointestinal Disease Mother         diverticulosis     Arthritis Mother         fibromyalgia, lupus, rheumatoid     Anesthesia Reaction Mother         nausea     Asthma Mother      Lipids Mother      Diabetes Mother      Macular Degeneration Other      Cancer Other 29        CML     Breast Cancer  Other      Colon Polyps Brother      C.A.D. Maternal Grandfather      Cerebrovascular Disease Maternal Grandfather      Coronary Artery Disease Maternal Grandfather      Hypertension Maternal Grandmother      Glaucoma Maternal Grandmother      Gastrointestinal Disease Maternal Grandmother         diverticulosis     Depression Brother      Arthritis Brother         fibromyalgia     Anesthesia Reaction Brother         nausea     Cancer Maternal Aunt      Breast Cancer Maternal Aunt      Coronary Artery Disease Maternal Uncle      Thyroid Disease No family hx of      Prostate Cancer No family hx of          Current Outpatient Medications   Medication Sig Dispense Refill     acyclovir (ZOVIRAX) 400 MG tablet one tid po for 7 days prn for flare ups and one daily for maintenance. 180 tablet 3     cetirizine HCl (ZYRTEC ALLERGY) 10 MG CAPS Take one tablet daily.       diclofenac (VOLTAREN) 75 MG EC tablet Take 1 tablet (75 mg) by mouth 2 times daily 28 tablet 1     diphenhydrAMINE (BENADRYL) 25 MG capsule Take 25 mg by mouth every 6 hours as needed.       fluticasone (FLONASE) 50 MCG/ACT nasal spray INSTILL TWO SPRAYS IN EACH NOSTRIL EVERY DAY 16 g 2     ibuprofen (ADVIL/MOTRIN) 600 MG tablet Take 1 tablet (600 mg) by mouth every 6 hours as needed for other (mild and/or inflammatory pain) 30 tablet 0     norethindrone-ethinyl estradiol (NORTREL 1/35, 28,) 1-35 MG-MCG tablet Take 1 tablet by mouth daily 84 tablet 4     omeprazole 20 MG tablet Take 1 tablet by mouth daily. Take 30-60 minutes before a meal. 30 tablet 1     pseudoePHEDrine (SUDAFED 12 HOUR) 120 MG 12 hr tablet Take 120 mg by mouth every 12 hours. PRN.        Allergies   Allergen Reactions     Sulfa Drugs Anaphylaxis       Mammogram Screening: Patient under age 50, mutual decision reflected in health maintenance.      Pertinent mammograms are reviewed under the imaging tab.  History of abnormal Pap smear: NO - age 30-65 PAP every 5 years with negative HPV  co-testing recommended  PAP / HPV Latest Ref Rng & Units 9/20/2019 9/6/2016 8/19/2013   PAP - OTHER-NIL, See Result NIL NIL   HPV 16 DNA NEG:Negative Negative Negative -   HPV 18 DNA NEG:Negative Negative Negative -   OTHER HR HPV NEG:Negative Negative Negative -     Reviewed and updated as needed this visit by clinical staff                 Reviewed and updated as needed this visit by Provider                Past Medical History:   Diagnosis Date     Chronic rhinitis      Environmental allergies 1/18/2013     Gastro-oesophageal reflux disease      GERD (gastroesophageal reflux disease) 1/3/2013     Herpes zoster without mention of complication     L eye      Hiatal hernia      PONV (postoperative nausea and vomiting)     PONV      Past Surgical History:   Procedure Laterality Date     ADENOIDECTOMY  1977     ARTHROSCOPY KNEE WITH MENISCAL REPAIR Left 6/16/2020    Procedure: Examination under anesthesia Left knee, Left knee arthroscopy, Left meniscus root repair;  Surgeon: Armando Sunshine MD;  [facility]     COLONOSCOPY N/A 10/27/2015    Procedure: COLONOSCOPY;  Surgeon: Duane, William Charles, MD;  Location: [facility] OR     COLONOSCOPY WITH CO2 INSUFFLATION N/A 10/27/2015    Procedure: COLONOSCOPY WITH CO2 INSUFFLATION;  Surgeon: Duane, William Charles, MD;  Location: [facility] OR     HC TOOTH EXTRACTION W/FORCEP      20 yo     LAPAROSCOPIC CHOLECYSTECTOMY  1/30/2013    Procedure: LAPAROSCOPIC CHOLECYSTECTOMY;  Laparoscopic Cholecystectomy;  Surgeon: Cindy Soni MD;  Location: [facility] OR     SINUS SURGERY  1988    Deviated septum     wisdom teeth removed[         ROS:  CONSTITUTIONAL:POSITIVE  for weight gain and NEGATIVE  for sweats  INTEGUMENTARY/SKIN: NEGATIVE for worrisome rashes, moles or lesions  EYES: NEGATIVE for vision changes or irritation  ENT: NEGATIVE for ear, mouth and throat problems  RESP:NEGATIVE for significant cough or SOB  BREAST: NEGATIVE for masses, tenderness or discharge  CV:  "NEGATIVE for chest pain, palpitations or peripheral edema  GI: NEGATIVE for nausea, abdominal pain, heartburn, or change in bowel habits   female: normal menses, no unusual urinary symptoms and no unusual vaginal symptoms  MUSCULOSKELETAL:NEGATIVE for significant arthralgias or myalgia  NEURO: NEGATIVE for weakness, dizziness or paresthesias  ENDOCRINE: NEGATIVE for temperature intolerance, skin/hair changes  HEME/ALLERGY/IMMUNE: NEGATIVE for bleeding problems  PSYCHIATRIC: NEGATIVE for changes in mood or affect     OBJECTIVE:   /70 (BP Location: Right arm, Patient Position: Sitting, Cuff Size: Adult Large)   Pulse 78   Temp 98  F (36.7  C) (Temporal)   Ht 1.664 m (5' 5.5\")   Wt 104.4 kg (230 lb 1.6 oz)   LMP 10/14/2020   SpO2 97%   Breastfeeding No   BMI 37.71 kg/m     Wt Readings from Last 4 Encounters:   10/26/20 104.4 kg (230 lb 1.6 oz)   06/16/20 100.2 kg (221 lb)   06/01/20 100.4 kg (221 lb 6.4 oz)   03/30/20 100.4 kg (221 lb 4.8 oz)       EXAM:  GENERAL: alert, no distress and obese  EYES: Eyes grossly normal to inspection and conjunctivae and sclerae normal  HENT: ear canals and TM's normal, nose and mouth without ulcers or lesions  NECK: no adenopathy, no asymmetry, masses, or scars and thyroid normal to palpation  RESP: lungs clear to auscultation - no rales, rhonchi or wheezes  BREAST: normal without masses, tenderness or nipple discharge and no palpable axillary masses or adenopathy  CV: regular rates and rhythm, no murmur, click or rub, peripheral pulses strong and no peripheral edema  ABDOMEN: soft, nontender, no hepatosplenomegaly, no masses and bowel sounds normal   (female): normal female external genitalia, normal urethral meatus, vaginal mucosa pink, moist, well rugated, and normal cervix/adnexa/uterus without masses or discharge  MS: no gross musculoskeletal defects noted, no edema  SKIN: no suspicious lesions or rashes  NEURO: Normal strength and tone, mentation intact and " speech normal  PSYCH: mentation appears normal, affect normal/bright    Diagnostic Test Results:  Labs reviewed in Epic  Results for orders placed or performed in visit on 10/26/20   TSH with free T4 reflex     Status: None   Result Value Ref Range    TSH 1.36 0.40 - 4.00 mU/L   Comprehensive metabolic panel     Status: Abnormal   Result Value Ref Range    Sodium 138 133 - 144 mmol/L    Potassium 4.0 3.4 - 5.3 mmol/L    Chloride 107 94 - 109 mmol/L    Carbon Dioxide 25 20 - 32 mmol/L    Anion Gap 6 3 - 14 mmol/L    Glucose 108 (H) 70 - 99 mg/dL    Urea Nitrogen 15 7 - 30 mg/dL    Creatinine 0.62 0.52 - 1.04 mg/dL    GFR Estimate >90 >60 mL/min/[1.73_m2]    GFR Estimate If Black >90 >60 mL/min/[1.73_m2]    Calcium 9.1 8.5 - 10.1 mg/dL    Bilirubin Total 0.3 0.2 - 1.3 mg/dL    Albumin 3.9 3.4 - 5.0 g/dL    Protein Total 8.2 6.8 - 8.8 g/dL    Alkaline Phosphatase 83 40 - 150 U/L    ALT 22 0 - 50 U/L    AST 11 0 - 45 U/L   Lipid panel reflex to direct LDL Fasting     Status: Abnormal   Result Value Ref Range    Cholesterol 217 (H) <200 mg/dL    Triglycerides 131 <150 mg/dL    HDL Cholesterol 50 >49 mg/dL    LDL Cholesterol Calculated 141 (H) <100 mg/dL    Non HDL Cholesterol 167 (H) <130 mg/dL       ASSESSMENT/PLAN:   Lachelle was seen today for physical.    Diagnoses and all orders for this visit:    Routine general medical examination at a health care facility    Encounter for other general counseling or advice on contraception  -     norethindrone-ethinyl estradiol (NORTREL 1/35, 28,) 1-35 MG-MCG tablet; Take 1 tablet by mouth daily    CARDIOVASCULAR SCREENING; LDL GOAL LESS THAN 160  -     Lipid panel reflex to direct LDL Fasting; Future    Screening for thyroid disorder  -     TSH with free T4 reflex; Future    Screening for diabetes mellitus (DM)  -     JUST IN CASE; Future  -     Comprehensive metabolic panel; Future    Screen for colon cancer  -     GASTROENTEROLOGY ADULT REF PROCEDURE ONLY; Future    PLAN:   .  "Patient needs to follow up in if no improvement,or sooner if worsening of symptoms or other symptoms develop.  CONSULTATION/REFERRAL to colonoscopy   Work on weight loss  Regular exercise  Will follow up and/or notify patient of  results via My Chart to determine further need for followup  Follow up office visit in one year for an    Patient has been advised of split billing requirements and indicates understanding: Yes  COUNSELING:   Reviewed preventive health counseling, as reflected in patient instructions  Special attention given to:        Regular exercise       Healthy diet/nutrition       Vision screening       Osteoporosis prevention/bone health       Consider Hep C screening for all patients one time for ages 18-79 years       HIV screeninx in teen years, 1x in adult years, and at intervals if high risk       The 10-year ASCVD risk score (Molly HALL JrRobert, et al., 2013) is: 1.2%    Values used to calculate the score:      Age: 50 years      Sex: Female      Is Non- : No      Diabetic: No      Tobacco smoker: No      Systolic Blood Pressure: 120 mmHg      Is BP treated: No      HDL Cholesterol: 50 mg/dL      Total Cholesterol: 217 mg/dL    Estimated body mass index is 36.78 kg/m  as calculated from the following:    Height as of 20: 1.651 m (5' 5\").    Weight as of 20: 100.2 kg (221 lb).    Weight management plan: Discussed healthy diet and exercise guidelines    She reports that she has never smoked. She has never used smokeless tobacco.      Counseling Resources:  ATP IV Guidelines  Pooled Cohorts Equation Calculator  Breast Cancer Risk Calculator  BRCA-Related Cancer Risk Assessment: FHS-7 Tool  FRAX Risk Assessment  ICSI Preventive Guidelines  Dietary Guidelines for Americans, 2010  USDA's MyPlate  ASA Prophylaxis  Lung CA Screening    ROSALIND Nazario [facility]  "

## 2020-10-27 NOTE — RESULT ENCOUNTER NOTE
Yanelis Duque,    Attached are your test results.  -LDL(bad) cholesterol level is elevated which can increase your heart disease risk.  A diet high in fat and simple carbohydrates, genetics and being overweight can contribute to this. ADVISE: exercising 150 minutes of aerobic exercise per week (30 minutes for 5 days per week or 50 minutes for 3 days per week are options) and eating a low saturated fat/low carbohydrate diet are helpful to improve this. In 12 months, you should recheck your fasting cholesterol panel by scheduling a lab-only appointment.  -Liver and gallbladder tests (ALT,AST, Alk phos,bilirubin) are normal.  -Kidney function (GFR) is normal.  -Sodium is normal.  -Potassium is normal.  -Calcium is normal.  -Glucose is slight elevated and may be a sign of early diabetes (prediabetes). ADVISE:: eating a low carbohydrate diet, exercising, trying to lose weight (if necessary) and rechecking your glucose level in 12 months.  -TSH (thyroid stimulating hormone) level is normal which indicates normal thyroid function.   Please contact us if you have any questions.    Suha Beaevrs, CNP

## 2020-11-17 ENCOUNTER — OFFICE VISIT (OUTPATIENT)
Dept: OPTOMETRY | Facility: CLINIC | Age: 50
End: 2020-11-17
Payer: COMMERCIAL

## 2020-11-17 DIAGNOSIS — H52.03 HYPEROPIA, BILATERAL: ICD-10-CM

## 2020-11-17 DIAGNOSIS — H52.4 PRESBYOPIA: ICD-10-CM

## 2020-11-17 DIAGNOSIS — H02.889 MEIBOMIAN GLAND DYSFUNCTION: ICD-10-CM

## 2020-11-17 DIAGNOSIS — H52.223 REGULAR ASTIGMATISM OF BOTH EYES: ICD-10-CM

## 2020-11-17 DIAGNOSIS — Z01.00 EXAMINATION OF EYES AND VISION: Primary | ICD-10-CM

## 2020-11-17 PROCEDURE — 92014 COMPRE OPH EXAM EST PT 1/>: CPT | Performed by: OPTOMETRIST

## 2020-11-17 PROCEDURE — 92310 CONTACT LENS FITTING OU: CPT | Mod: GA | Performed by: OPTOMETRIST

## 2020-11-17 PROCEDURE — 92015 DETERMINE REFRACTIVE STATE: CPT | Performed by: OPTOMETRIST

## 2020-11-17 ASSESSMENT — REFRACTION_CURRENTRX
OS_BRAND: J&J 1-DAY ACUVUE MOIST FOR ASTIGMATISM BC 8.5, D 14.5
OD_SPHERE: +3.00
OS_ADDL_SPECS: DIST
OD_BRAND: J&J ACUVUE OASYS FOR ASTIGMATISM BC 8.6, D 14.5
OS_CYLINDER: -0.75
OS_AXIS: 070
OS_ADDL_SPECS: DIST
OS_CYLINDER: -0.75
OD_AXIS: 110
OD_BRAND: J&J 1-DAY ACUVUE MOIST FOR ASTIGMATISM BC 8.5, D 14.5
OS_SPHERE: +1.25
OD_CYLINDER: -1.25
OS_AXIS: 070
OD_SPHERE: +2.50
OD_AXIS: 110
OS_SPHERE: +1.25
OD_ADDL_SPECS: NEAR
OD_ADDL_SPECS: NEAR
OD_CYLINDER: -1.25
OS_BRAND: J&J ACUVUE OASYS FOR ASTIGMATISM BC 8.6, D 14.5

## 2020-11-17 ASSESSMENT — VISUAL ACUITY
OD_CC: 20/20
OD_SC: 20/30
OS_SC+: -1
OD_CC+: -1
OS_SC: 20/25
OS_CC: 20/20
OD_CC: 20/20-2
OS_CC: 20/20-2
CORRECTION_TYPE: GLASSES
METHOD: SNELLEN - LINEAR

## 2020-11-17 ASSESSMENT — REFRACTION_MANIFEST
OD_ADD: +2.00
OD_AXIS: 020
OS_ADD: +2.00
OS_CYLINDER: +0.75
OS_SPHERE: +0.50
OS_AXIS: 164
OD_SPHERE: +0.75
OD_CYLINDER: +1.25

## 2020-11-17 ASSESSMENT — SLIT LAMP EXAM - LIDS
COMMENTS: MEIBOMIAN GLAND DYSFUNCTION
COMMENTS: MEIBOMIAN GLAND DYSFUNCTION

## 2020-11-17 ASSESSMENT — REFRACTION_WEARINGRX
OD_ADD: +1.75
OD_SPHERE: +0.50
OS_SPHERE: +0.50
OS_CYLINDER: +0.75
OD_AXIS: 020
OS_ADD: +1.75
SPECS_TYPE: PAL
OD_CYLINDER: +1.25
OS_AXIS: 164

## 2020-11-17 ASSESSMENT — CONF VISUAL FIELD
OS_NORMAL: 1
OD_NORMAL: 1

## 2020-11-17 ASSESSMENT — TONOMETRY
OS_IOP_MMHG: 21
OD_IOP_MMHG: 18
IOP_METHOD: TONOPEN

## 2020-11-17 ASSESSMENT — EXTERNAL EXAM - LEFT EYE: OS_EXAM: NORMAL

## 2020-11-17 ASSESSMENT — CUP TO DISC RATIO
OS_RATIO: 0.1
OD_RATIO: 0.1

## 2020-11-17 ASSESSMENT — EXTERNAL EXAM - RIGHT EYE: OD_EXAM: NORMAL

## 2020-11-17 NOTE — PROGRESS NOTES
Chief Complaint   Patient presents with     Annual Eye Exam     More contacts fit fee $75     Accompanied by self  Previous contact lens wearer? Yes: acuvue oasys astig  monovision  Comfort of contact lenses :good  Satisfied with current lenses: Yes        Last Eye Exam: 5-6-2019  Dilated Previously: Yes    What are you currently using to see?  glasses    Distance Vision Acuity: Satisfied with vision    Near Vision Acuity: Satisfied with vision while reading  with glasses,small print harder    Eye Comfort: good  Do you use eye drops? 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     Medical, surgical and family histories reviewed and updated 11/17/2020.       OBJECTIVE: See Ophthalmology exam    ASSESSMENT:    ICD-10-CM    1. Examination of eyes and vision  Z01.00 EYE EXAM (SIMPLE-NONBILLABLE)   2. Regular astigmatism of both eyes  H52.223 REFRACTION     CONTACT LENS FITTING,BILAT w/ signed waiver   3. Hyperopia, bilateral  H52.03 REFRACTION     CONTACT LENS FITTING,BILAT w/ signed waiver   4. Presbyopia  H52.4 REFRACTION     CONTACT LENS FITTING,BILAT w/ signed waiver   5. Meibomian gland dysfunction  H02.889 EYE EXAM (SIMPLE-NONBILLABLE)      PLAN:     Patient Instructions   Eyeglass prescription given.    Trial contact lenses will be ordered for .  Schedule a recheck about 1 week after picking up lenses.  Be sure to wear lenses to that appointment.    Heat to the eyes daily for 10-15 minutes nightly with warm washcloth or reusable gel masks from the pharmacy or  [employer] heat masks can be purchased at [employer].    Refresh tears 1 drop 2-4x daily.    Return in 1 year for a complete eye exam or sooner if needed.    Juan Carlos Spann, OD

## 2020-11-17 NOTE — LETTER
11/17/2020         RE: Lachelle Duque  [address]  [facility] [address]        Dear Colleague,    Thank you for referring your patient, Lachelle Duque, to the [facility]. Please see a copy of my visit note below.    Chief Complaint   Patient presents with     Annual Eye Exam     More contacts fit fee $75     Accompanied by self  Previous contact lens wearer? Yes: acuvue oasys astig  monovision  Comfort of contact lenses :good  Satisfied with current lenses: Yes        Last Eye Exam: 5-6-2019  Dilated Previously: Yes    What are you currently using to see?  glasses    Distance Vision Acuity: Satisfied with vision    Near Vision Acuity: Satisfied with vision while reading  with glasses,small print harder    Eye Comfort: good  Do you use eye drops? 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     Medical, surgical and family histories reviewed and updated 11/17/2020.       OBJECTIVE: See Ophthalmology exam    ASSESSMENT:    ICD-10-CM    1. Examination of eyes and vision  Z01.00 EYE EXAM (SIMPLE-NONBILLABLE)   2. Regular astigmatism of both eyes  H52.223 REFRACTION     CONTACT LENS FITTING,BILAT w/ signed waiver   3. Hyperopia, bilateral  H52.03 REFRACTION     CONTACT LENS FITTING,BILAT w/ signed waiver   4. Presbyopia  H52.4 REFRACTION     CONTACT LENS FITTING,BILAT w/ signed waiver   5. Meibomian gland dysfunction  H02.889 EYE EXAM (SIMPLE-NONBILLABLE)      PLAN:     Patient Instructions   Eyeglass prescription given.    Trial contact lenses will be ordered for .  Schedule a recheck about 1 week after picking up lenses.  Be sure to wear lenses to that appointment.    Heat to the eyes daily for 10-15 minutes nightly with warm washcloth or reusable gel masks from the pharmacy or  Everywun heat masks can be purchased at [employer].    Refresh tears 1 drop 2-4x daily.    Return in 1 year for a complete eye exam or sooner if  needed.    Juan Carlos Spann OD                           Again, thank you for allowing me to participate in the care of your patient.        Sincerely,        Juan Carlos Spann OD

## 2020-11-17 NOTE — PATIENT INSTRUCTIONS
Eyeglass prescription given.    Trial contact lenses will be ordered for .  Schedule a recheck about 1 week after picking up lenses.  Be sure to wear lenses to that appointment.    Heat to the eyes daily for 10-15 minutes nightly with warm washcloth or reusable gel masks from the pharmacy or  Cellmemore heat masks can be purchased at [employer].    Refresh tears 1 drop 2-4x daily.    Return in 1 year for a complete eye exam or sooner if needed.    Juan Carlos Spann, OD    The affects of the dilating drops last for 4- 6 hours.  You will be more sensitive to light and vision will be blurry up close.  Do not drive if you do not feel comfortable.  Mydriatic sunglasses were given if needed.    There is a combination of three treatments which can greatly improve symptoms of dry eyes.    1.  Artificial tears  2. Heat (eyes closed)  3. Eyelid and eyelash cleansing (eyes closed)     Use one drop of artificial tears both eyes 4 x daily.  Once in the morning, lunch, dinner and bedtime. Continue to use the drops regardless if your eyes are comfortable or not.  Artificial tears work best as a preventative and not as well after your eyes are starting to bother you.  It may take 4- 6 weeks of using the drops before you notice improvement.  If after that time you are still having problems schedule an appointment for an evaluation and discussion of different treatments such as Restasis or Xiidra.  Dry eyes are a chronic condition and you may have more symptoms at certain times of the year.    Excess tearing can be due to the right tears not working properly or a blockage in the tear drainage system.  You can try using artificial tears 1 drop right eye 4 x day.  If the excess tearing is bothersome after 4-6 weeks of treatment then we can send you for further testing.  This would entail a referral to our oculoplastic specialist Dr. Adriane Lawson at the [facility]-[phone].    Recommended brands are:    Systane Complete  Systane  Ultra  Systane Balance  Refresh Advanced Optive  Refresh Relieva  Blink    Recommended brands for contact lens wearers are:    Systane contacts  Refresh contacts  Blink contacts    If you are using drops more than 4 x day or have sensitivities to preservatives I recommend non preserved artificial tears.  These come in 1 use vials.  They can be used every 1-2 hours.  Do not reuse the vials.    Recommended brands are:    Refresh Optive Ramón-3  Systane- preservative free  Refresh-  preservative free  Blink- preservative free    Gels or ointment can be used at night.    Recommended brands are:    Systane Gel  Refresh Gel  Blink Gel  Genteal Gel    Systane night time (ointment)  Refresh Celluvisc  Refresh PM (ointment)      Visine, Clear Eyes or Murine (drops that get the red out) can irritate the eyes and cause a rebound effect where the eyes become more red and you end up using more drops.  Avoid drops containing tetrahydrozoline, naphazoline, phenylephrine, oxymetazoline.      OTC Lumify is a newer product that gives immediate redness relief without the rebound effect.  Use as needed to take the redness out.    Artificial tears may be used with other drops (such as allergy, glaucoma, antibiotics) around the same time.  Be sure to wait 5 minutes in between drops.    Heat to the eyelids can also improve your symptoms of dry eyes.  Agustina heat masks can be purchased at [employer] to be used nightly for 10-15 minutes.  Other options are gel masks that can be put in the microwave and purchased at most pharmacies.      Tea Tree Oil eyelid cleansers recommended are Ocusoft Oust foam cleanser to cleanse eyelids/lashes at night and in the am. Other options are Blephadex or Cliradex eyelid wipes.  KEEP EYES CLOSED when using these products.  These can be purchased on amazon.com   A good product for make up remover with tea tree oil is OwnerIQ.  This can be found at www.[email or web address] or [employer].    Other good eyelid  cleansers have hypochlorous acid which removes excess bacteria and is safe around the eyes. Products are Avenova, Ocusoft Hypochlor or Heyedrate. Spray solution onto cotton pad, close eyes and gently apply to eyelids and eyelashes using side to side motion.  You can also KEEP EYES CLOSED spray and rub into eyelashes.  You do not need to rinse it off. Use morning and evening. These products can be found on [employer].  You can check with your local pharmacy and see if they can order if for you if they don't have it.    Other brands of eyelid cleansing wipes are:    Ocusoft wipes  Systane wipes      Optometry Providers       Clinic Locations                                 Telephone Number   Dr. Faith Resendiz [phone]     [facility] Hours:                [facility] Hours:       [facility] Hours:   [address]   [address] [address]     [address]  [place] [address]   [place] [address]    [place] [address]  Phone: [phone]                    Phone: [phone]     Phone: [phone]                      Monday 8:00-7:00                          Monday 8:00-7:00                          Monday 8:00-7:00              Tuesday 8:00-6:00                          Tuesday 8:00-7:00                          Tuesday 8:00-7:00              Wednesday 8:00-6:00                  Wednesday 8:00-7:00                   Wednesday 8:00-7:00      Thursday 8:00-6:00                        Thursday 8:00-7:00                         Thursday 8:00-7:00            Friday 8:00-5:00                              Friday 8:00-5:00                              Friday 8:00-5:00    [facility] Hours:   [address] [place] [id number] [phone]    Monday 8:00-7:00  Tuesday 8:00-7:00  Wednesday 8:00-7:00  Thursday 8:00-7:00  Friday 8:00-5:00  Please log on to [email or web address] to order your  contact lenses.  The link is found on the [facility] page.  As always, Thank you for trusting us with your health care needs!

## 2020-11-24 ENCOUNTER — TELEPHONE (OUTPATIENT)
Dept: OPTOMETRY | Facility: CLINIC | Age: 50
End: 2020-11-24

## 2020-11-24 NOTE — TELEPHONE ENCOUNTER
J&J 1-Day Acuvue Moist for Astigmatism BC 8.5, D 14.5     +3.00 -1.25 110 Near   Left J&J 1-Day Acuvue Moist for Astigmatism BC 8.5, D 14.5     +1.25 -0.75 070 Dist     Order trials of prescription 2 (1 Day moist for astigmatism)for  and schedule 1 week check.    Beatrice Michaels ORDERED CONTACTS 11/24/2020.

## 2020-12-04 ENCOUNTER — OFFICE VISIT (OUTPATIENT)
Dept: OPTOMETRY | Facility: CLINIC | Age: 50
End: 2020-12-04
Payer: COMMERCIAL

## 2020-12-04 DIAGNOSIS — H52.03 HYPEROPIA, BILATERAL: ICD-10-CM

## 2020-12-04 DIAGNOSIS — H52.223 REGULAR ASTIGMATISM OF BOTH EYES: Primary | ICD-10-CM

## 2020-12-04 PROCEDURE — 92499 UNLISTED OPH SVC/PROCEDURE: CPT | Performed by: OPTOMETRIST

## 2020-12-04 ASSESSMENT — REFRACTION_CURRENTRX
OS_AXIS: 070
OS_CYLINDER: -0.75
OS_SPHERE: +1.25
OD_AXIS: 110
OS_ADDL_SPECS: DIST
OD_CYLINDER: -1.25
OS_BRAND: J&J 1-DAY ACUVUE MOIST FOR ASTIGMATISM BC 8.5, D 14.5
OD_ADDL_SPECS: NEAR
OD_SPHERE: +3.00
OD_BRAND: J&J 1-DAY ACUVUE MOIST FOR ASTIGMATISM BC 8.5, D 14.5

## 2020-12-04 NOTE — PATIENT INSTRUCTIONS
Contact lens prescription given and form signed.    Return in 1 year for a complete eye exam or sooner if needed.    Juan Carlos Spann, OD

## 2020-12-04 NOTE — PROGRESS NOTES
Chief Complaint   Patient presents with     Contact Lens Check     Satisfied with contacts:  No too dry itchy    Good comfort:  No  Too dry and itchy, patient does not want these contacts  Clear vision:     Yes    Wants to go back to 2 week lens.    Beatrice Michaels Optometric Assistant, A.B.O.C.          Medical, surgical and family histories reviewed and updated 12/4/2020.       OBJECTIVE: See Ophthalmology exam    ASSESSMENT:    ICD-10-CM    1. Regular astigmatism of both eyes  H52.223 CONTACT LENS CHECK   2. Hyperopia, bilateral  H52.03 CONTACT LENS CHECK      PLAN:    Patient Instructions   Contact lens prescription given and form signed.    Return in 1 year for a complete eye exam or sooner if needed.    Juan Carlos Spann, OD

## 2020-12-04 NOTE — LETTER
12/4/2020         RE: Lachelle Duque  [address]  [facility] [address]        Dear Colleague,    Thank you for referring your patient, Lachelle Duque, to the [facility]. Please see a copy of my visit note below.    Chief Complaint   Patient presents with     Contact Lens Check     Satisfied with contacts:  No too dry itchy    Good comfort:  No  Too dry and itchy, patient does not want these contacts  Clear vision:     Yes    Wants to go back to 2 week lens.    Beatrice Michaels Optometric Assistant, A.B.O.C.          Medical, surgical and family histories reviewed and updated 12/4/2020.       OBJECTIVE: See Ophthalmology exam    ASSESSMENT:    ICD-10-CM    1. Regular astigmatism of both eyes  H52.223 CONTACT LENS CHECK   2. Hyperopia, bilateral  H52.03 CONTACT LENS CHECK      PLAN:    Patient Instructions   Contact lens prescription given and form signed.    Return in 1 year for a complete eye exam or sooner if needed.    Juan Carlos Spann, OD                         Again, thank you for allowing me to participate in the care of your patient.        Sincerely,        Juan Carlos Spann, OD

## 2021-01-31 PROBLEM — Z47.89 AFTERCARE FOLLOWING SURGERY OF THE MUSCULOSKELETAL SYSTEM: Status: RESOLVED | Noted: 2020-06-29 | Resolved: 2021-01-31

## 2021-01-31 PROBLEM — M25.562 ACUTE PAIN OF LEFT KNEE: Status: RESOLVED | Noted: 2020-06-29 | Resolved: 2021-01-31

## 2021-01-31 NOTE — PROGRESS NOTES
[facility] Initial Evaluation  Subjective:  HPI       Knee Activity of Daily Living Score: 84.29            Objective:  System    Physical Exam    General     ROS    Assessment/Plan:    DISCHARGE REPORT    Progress reporting period as of 10/7/2020     SUBJECTIVE  Subjective changes noted by patient:   doing well    Current pain level is 0/10  .     Previous pain level was   10/10.   Changes in function:  Yes (See Goal flowsheet attached for changes in current functional level)  Adverse reaction to treatment or activity:     OBJECTIVE  Changes noted in objective findings:    Objective: L knee ROM; 2-0-132     ASSESSMENT/PLAN  Updated problem list and treatment plan: Diagnosis 1:  L knee  Impaired muscle performance - home program  Decreased function - home program  STG/LTGs have been met or progress has been made towards goals:  Yes (See Goal flow sheet completed today.)  Assessment of Progress: The patient's condition is improving.  The patient's condition has potential to improve.  Self Management Plans:  Patient is independent in a home treatment program.    Lachelle continues to require the following intervention to meet STG and LTG's:  PT intervention is no longer required to meet STG/LTG.    Recommendations:  This patient is ready to be discharged from therapy and continue their home treatment program.    Please refer to the daily flowsheet for treatment today, total treatment time and time spent performing 1:1 timed codes.

## 2021-08-30 ENCOUNTER — E-VISIT (OUTPATIENT)
Dept: URGENT CARE | Facility: CLINIC | Age: 51
End: 2021-08-30
Payer: COMMERCIAL

## 2021-08-30 DIAGNOSIS — B37.31 YEAST VAGINITIS: Primary | ICD-10-CM

## 2021-08-30 DIAGNOSIS — J01.90 ACUTE SINUSITIS WITH SYMPTOMS > 10 DAYS: ICD-10-CM

## 2021-08-30 PROCEDURE — 99421 OL DIG E/M SVC 5-10 MIN: CPT

## 2021-08-30 RX ORDER — FLUCONAZOLE 150 MG/1
150 TABLET ORAL ONCE
Qty: 1 TABLET | Refills: 0 | Status: SHIPPED | OUTPATIENT
Start: 2021-08-30 | End: 2021-08-30

## 2021-08-30 RX ORDER — AMOXICILLIN 875 MG
875 TABLET ORAL 2 TIMES DAILY
Qty: 20 TABLET | Refills: 0 | Status: SHIPPED | OUTPATIENT
Start: 2021-08-30 | End: 2021-08-30

## 2021-08-30 NOTE — PATIENT INSTRUCTIONS
Dear Lachelle Duque    After reviewing your responses, I've been able to diagnose you with?a sinus infection caused by bacteria.?     Based on your responses and diagnosis, I have prescribed Augmentin  to treat your symptoms. I have sent this to your pharmacy.?     It is also important to stay well hydrated, get lots of rest and take over-the-counter decongestants,?tylenol?or ibuprofen if you?are able to?take those medications per your primary care provider to help relieve discomfort.?     It is important that you take?all of?your prescribed medication even if your symptoms are improving after a few doses.? Taking?all of?your medicine helps prevent the symptoms from returning.?     If your symptoms worsen, you develop severe headache, vomiting, high fever (>102), or are not improving in 7 days, please contact your primary care provider for an appointment or visit any of our convenient Walk-in Care or Urgent Care Centers to be seen which can be found on our website?here.?     Thanks again for choosing?us?as your health care partner,?   ?  Suha Beavers, ROSALIND CNP?     Sinusitis (Antibiotic Treatment)    The sinuses are air-filled spaces within the bones of the face. They connect to the inside of the nose. Sinusitis is an inflammation of the tissue that lines the sinuses. Sinusitis can occur during a cold. It can also happen due to allergies to pollens and other particles in the air. Sinusitis can cause symptoms of sinus congestion and a feeling of fullness. A sinus infection causes fever, headache, and facial pain. There is often green or yellow fluid draining from the nose or into the back of the throat (post-nasal drip). You have been given antibiotics to treat this condition.   Home care    Take the full course of antibiotics as instructed. Don't stop taking them, even when you feel better.    Drink plenty of water, hot tea, and other liquids as directed by the healthcare provider. This may help thin  nasal mucus. It also may help your sinuses drain fluids.    Heat may help soothe painful areas of your face. Use a towel soaked in hot water. Or,  the shower and direct the warm spray onto your face. Using a vaporizer along with a menthol rub at night may also help soothe symptoms.     An expectorant with guaifenesin may help thin nasal mucus and help your sinuses drain fluids. Talk with your provider or pharmacists before taking an over-the-counter (OTC) medicine if you have any questions about it or its side effects..    You can use an OTC decongestant, unless a similar medicine was prescribed to you. Nasal sprays work the fastest. Use one that contains phenylephrine or oxymetazoline. First blow your nose gently. Then use the spray. Don't use these medicines more often than directed on the label. If you do, your symptoms may get worse. You may also take pills that contain pseudoephedrine. Don t use products that combine multiple medicines. This is because side effects may be increased. Read labels. You can also ask the pharmacist for help. (People with high blood pressure should not use decongestants. They can raise blood pressure.) Talk with your provider or pharmacist if you have any questions about the medicine..    OTC antihistamines may help if allergies contributed to your sinusitis. Talk with your provider or pharmacist if you have any questions about the medicine..    Don't use nasal rinses or irrigation during an acute sinus infection, unless your healthcare provider tells you to. Rinsing may spread the infection to other areas in your sinuses.    Use acetaminophen or ibuprofen to control pain, unless another pain medicine was prescribed to you. If you have chronic liver or kidney disease or ever had a stomach ulcer, talk with your healthcare provider before using these medicines. Never give aspirin to anyone under age 18 who is ill with a fever. It may cause severe liver damage.    Don't smoke.  This can make symptoms worse.    Follow-up care  Follow up with your healthcare provider, or as advised.   When to seek medical advice  Call your healthcare provider if any of these occur:     Facial pain or headache that gets worse    Stiff neck    Unusual drowsiness or confusion    Swelling of your forehead or eyelids    Symptoms don't go away in 10 days    Vision problems, such as blurred or double vision    Fever of 100.4 F (38 C) or higher, or as directed by your healthcare provider  Call 911  Call 911 if any of these occur:     Seizure    Trouble breathing    Feeling dizzy or faint    Fingernails, skin or lips look blue, purple , or gray  Prevention  Here are steps you can take to help prevent an infection:     Keep good hand washing habits.    Don t have close contact with people who have sore throats, colds, or other upper respiratory infections.    Don t smoke, and stay away from secondhand smoke.    Stay up to date with of your vaccines.  [employer] last reviewed this educational content on 12/1/2019 2000-2021 [employer]. All rights reserved. This information is not intended as a substitute for professional medical care. Always follow your healthcare professional's instructions.

## 2021-09-19 ENCOUNTER — HEALTH MAINTENANCE LETTER (OUTPATIENT)
Age: 51
End: 2021-09-19

## 2021-09-27 NOTE — PROGRESS NOTES
Keagan Womack is a 51 year old who presents for the following health issues     History of Present Illness       She eats 4 or more servings of fruits and vegetables daily.She consumes 0 sweetened beverage(s) daily.She exercises with enough effort to increase her heart rate 20 to 29 minutes per day.  She exercises with enough effort to increase her heart rate 3 or less days per week.   She is taking medications regularly.       Musculoskeletal problem/pain  Onset/Duration: 1 month  Is doing a beach body work out in May and has got about half way through   Has used modified version   On labor day walked around [facility] and was sore   Took some Advil and did not do much   Also works at the [employer] and is on feet all day   When to [place] and was helping sibling move as well as parents  Then on Sunday the 19 th felt something give out on the right knee and had intense pain   Has had a torn meniscus in her left knee and felt similar to this   Continues now to have pain and instability   It taking 975 tylenol twice a day and ibuprofen     Description  Location: knee - right  Joint Swelling: YES  Redness: no  Pain: YES  Warmth: no  Intensity:  mild  Progression of Symptoms:  worsening  Accompanying signs and symptoms:   Fevers: no  Numbness/tingling/weakness: no  History  Trauma to the area: walking  Recent illness:  no  Previous similar problem: YES- but on left knee   Previous evaluation:  no  Precipitating or alleviating factors:  Aggravating factors include: walking  Therapies tried and outcome: rest/inactivity and Tylenol PRN    Labs reviewed in EPIC  BP Readings from Last 3 Encounters:   09/28/21 130/85   10/26/20 120/70   09/10/20 125/77    Wt Readings from Last 3 Encounters:   09/28/21 111.4 kg (245 lb 8 oz)   10/26/20 104.4 kg (230 lb 1.6 oz)   06/16/20 100.2 kg (221 lb)                  Patient Active Problem List   Diagnosis     Encounter for other general counseling or advice on contraception      Herpes zoster     Family history of colon cancer     Hyperlipidemia LDL goal <130     Obesity     Vitamin D deficiency     Hiatal hernia     GERD (gastroesophageal reflux disease)     Cholelithiasis     Biliary colic     Environmental allergies     Elevation of optic disc, right     Pupil asymmetry     Obesity (BMI 35.0-39.9) with comorbidity (H)     Chronic pain of left knee     Past Surgical History:   Procedure Laterality Date     ADENOIDECTOMY  1977     ARTHROSCOPY KNEE WITH MENISCAL REPAIR Left 6/16/2020    Procedure: Examination under anesthesia Left knee, Left knee arthroscopy, Left meniscus root repair;  Surgeon: Armando Sunshine MD;  [facility]     COLONOSCOPY N/A 10/27/2015    Procedure: COLONOSCOPY;  Surgeon: Duane, William Charles, MD;  Location: [facility]     COLONOSCOPY WITH CO2 INSUFFLATION N/A 10/27/2015    Procedure: COLONOSCOPY WITH CO2 INSUFFLATION;  Surgeon: Duane, William Charles, MD;  Location: [facility] OR     HC TOOTH EXTRACTION W/FORCEP      18 yo     LAPAROSCOPIC CHOLECYSTECTOMY  1/30/2013    Procedure: LAPAROSCOPIC CHOLECYSTECTOMY;  Laparoscopic Cholecystectomy;  Surgeon: Cindy Soni MD;  Location: [facility] OR     SINUS SURGERY  1988    Deviated septum     wisdom teeth removed[         Social History     Tobacco Use     Smoking status: Never Smoker     Smokeless tobacco: Never Used   Substance Use Topics     Alcohol use: Yes     Alcohol/week: 0.8 standard drinks     Types: 1 Standard drinks or equivalent per week     Comment: occasionally     Family History   Problem Relation Age of Onset     C.A.D. Father         bypass     Diabetes Father      Cancer - colorectal Father 42     Coronary Artery Disease Father      Gastrointestinal Disease Mother         diverticulosis     Arthritis Mother         fibromyalgia, lupus, rheumatoid     Anesthesia Reaction Mother         nausea     Asthma Mother      Lipids Mother      Diabetes Mother      Macular Degeneration Other      Cancer Other  29        CML     Breast Cancer Other      Colon Polyps Brother      C.A.D. Maternal Grandfather      Cerebrovascular Disease Maternal Grandfather      Coronary Artery Disease Maternal Grandfather      Hypertension Maternal Grandmother      Glaucoma Maternal Grandmother      Gastrointestinal Disease Maternal Grandmother         diverticulosis     Depression Brother      Arthritis Brother         fibromyalgia     Anesthesia Reaction Brother         nausea     Cancer Maternal Aunt      Breast Cancer Maternal Aunt      Coronary Artery Disease Maternal Uncle      Thyroid Disease No family hx of      Prostate Cancer No family hx of          Current Outpatient Medications   Medication Sig Dispense Refill     acyclovir (ZOVIRAX) 400 MG tablet one tid po for 7 days prn for flare ups and one daily for maintenance. 180 tablet 3     amoxicillin-clavulanate (AUGMENTIN) 875-125 MG tablet Take 1 tablet by mouth 2 times daily 20 tablet 0     cetirizine HCl (ZYRTEC ALLERGY) 10 MG CAPS Take one tablet daily.       diclofenac (VOLTAREN) 75 MG EC tablet Take 1 tablet (75 mg) by mouth 2 times daily 28 tablet 1     diphenhydrAMINE (BENADRYL) 25 MG capsule Take 25 mg by mouth every 6 hours as needed.       fluticasone (FLONASE) 50 MCG/ACT nasal spray INSTILL TWO SPRAYS IN EACH NOSTRIL EVERY DAY 16 g 2     ibuprofen (ADVIL/MOTRIN) 600 MG tablet Take 1 tablet (600 mg) by mouth every 6 hours as needed for other (mild and/or inflammatory pain) 30 tablet 0     norethindrone-ethinyl estradiol (NORTREL 1/35, 28,) 1-35 MG-MCG tablet Take 1 tablet by mouth daily 84 tablet 4     omeprazole 20 MG tablet Take 1 tablet by mouth daily. Take 30-60 minutes before a meal. 30 tablet 1     pseudoePHEDrine (SUDAFED 12 HOUR) 120 MG 12 hr tablet Take 120 mg by mouth every 12 hours. PRN.        Allergies   Allergen Reactions     Sulfa Drugs Anaphylaxis     Review of Systems   Constitutional, HEENT, cardiovascular, pulmonary, gi and gu systems are negative,  "except as otherwise noted.      Objective    /85   Pulse 89   Ht 1.664 m (5' 5.5\")   Wt 111.4 kg (245 lb 8 oz)   SpO2 97%   BMI 40.23 kg/m    Body mass index is 40.23 kg/m .  Physical Exam   GENERAL: healthy, alert and no distress  EYES: Eyes grossly normal to inspection and conjunctivae and sclerae normal  HENT: ear canals and TM's normal, nose and mouth without ulcers or lesions  RESP: lungs clear to auscultation - no rales, rhonchi or wheezes  CV: regular rates and rhythm, no murmur, click or rub, peripheral pulses strong and no peripheral edema  MS: gait normal, normal muscle tone and peripheral pulses normal  Knee Exam: Inspection: AP/lateral alignment normal, small effusion  Active Range of Motion: full flexion, pain with flexion, full extension, pain with extension  Strength: quad  5/5  SKIN: no suspicious lesions or rashes  NEURO: Normal strength and tone, mentation intact and speech normal  PSYCH: mentation appears normal, affect normal/bright    Recent Results (from the past 744 hour(s))   MA Screen Bilateral w/Ja    Narrative    BILATERAL FULL FIELD DIGITAL SCREENING MAMMOGRAM WITH TOMOSYNTHESIS    Performed on: 9/29/21    Compared to: 09/03/2020, 08/22/2019, 08/20/2018, and 08/18/2017    Technique:  This study was evaluated with the assistance of Computer-Aided   Detection.  Breast Tomosynthesis was used in interpretation.    Findings: The breasts have scattered areas of fibroglandular density.    There is a possible asymmetry in the left breast at the 4 o'clock   position, posterior depth.    The remainder of the breast tissue is unremarkable.  There is no suspicious finding of the right breast.    IMPRESSION: ACR BI-RADS Category 0: Need Additional Imaging Evaluation    RECOMMENDED FOLLOW-UP: Additional mammographic images and possible   ultrasound of the left breast.    The results and recommendations of this examination will be communicated   to the patient and the imaging center will " attempt to schedule follow up   with the patient.     MR Knee Right w/o Contrast    Narrative    MR right knee without contrast 9/30/2021 9:07 AM    Techniques: Multiplanar multisequence imaging of the right knee was  obtained without administration of intra-articular or intravenous  contrast using routing protocol.    History: Knee trauma, meniscal/ligament injury suspected, xray done  (Age >= 1y); Right knee pain, unspecified chronicity; Knee  instability, right    Comparison: None available    Findings:    MENISCI:  Medial meniscus: High-grade posterior horn-posterior root radial tear.  Lateral meniscus: Intact.    LIGAMENTS  Cruciate ligaments: Intact.  Medial supporting structures: Intact.  Lateral supporting structures: Intact.    EXTENSOR MECHANISM  Intact.    FLUID  Small joint effusion. No substantial Pierson's cyst. Pes anserine  bursitis.    OSSEOUS and ARTICULAR STRUCTURES  No fracture, contusion, or osseous lesion is seen.    Patellofemoral compartment: Focal full-thickness cartilage defect at  the patellar eminence with associated subchondral edema-like and  cystlike changes, grade IV chondromalacia. Full-thickness fissuring  along the medial facet. Heterogeneous signal of the lateral facet  cartilage, grade 1/2 chondromalacia.    Medial compartment: Focal cartilage signal inhomogeneity and  underlying subchondral edema-like signal of the distal weightbearing  femoral condyle, grade IV chondromalacia.    Lateral compartment: Full-thickness fissuring and subchondral  edema-like and fluidlike signal changes of the central medial femoral  condyle, great for confirmation.    ANCILLARY FINDINGS  None.      Impression    Impression:    1. 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    2. Full-thickness or near full-thickness radial tear of the junction  of the posterior horn and posterior root ligament of the medial  meniscus.    3. Small joint effusion  "and pes anserine bursitis.    I have personally reviewed the examination and initial interpretation  and I agree with the findings.    LANDON DIEGO MD (Joe)         SYSTEM ID:  [id number]       Assessment & Plan     Right knee pain, unspecified chronicity  - MR Knee Right w/o Contrast  Will Start:  - diclofenac (VOLTAREN) 75 MG EC tablet  Dispense: 30 tablet; Refill: 1  - Orthopedic  Referral    Knee instability, right  - MR Knee Right w/o Contrast  Will Start:  - diclofenac (VOLTAREN) 75 MG EC tablet  Dispense: 30 tablet; Refill: 1  - Orthopedic  Referral    Abnormal MRI, knee  FOLLOW UP WITH SPECIALIST :Orthopedics  - Orthopedic  Referral    Morbid Obesity  Healthy diet and exercise reviewed.  Limit pop and juice intake.  Risks of obesity discussed.  Encourage exercise.  Limit TV/Computer/game time to one hour a day.      BMI:   Estimated body mass index is 40.23 kg/m  as calculated from the following:    Height as of this encounter: 1.664 m (5' 5.5\").    Weight as of this encounter: 111.4 kg (245 lb 8 oz).       See Patient Instructions  Patient Instructions     PLAN:   1.   Symptomatic therapy suggested:   elevate the injured limb, prescription for NSAID given    2.  Orders Placed This Encounter   Medications     diclofenac (VOLTAREN) 75 MG EC tablet     Sig: Take 1 tablet (75 mg) by mouth 2 times daily as needed for moderate pain     Dispense:  30 tablet     Refill:  1     Orders Placed This Encounter   Procedures     MR Knee Right w/o Contrast       3. Patient needs to follow up in if no improvement,or sooner if worsening of symptoms or other symptoms develop.  FURTHER TESTING:       - MRI knee   Will follow up and/or notify patient of  results via My Chart to determine further need for followup    Return in about 1 month (around 10/28/2021), or if symptoms worsen or fail to improve.    ROSALIND Nazario [facility]          "

## 2021-09-28 ENCOUNTER — OFFICE VISIT (OUTPATIENT)
Dept: FAMILY MEDICINE | Facility: CLINIC | Age: 51
End: 2021-09-28
Payer: COMMERCIAL

## 2021-09-28 VITALS
HEIGHT: 66 IN | OXYGEN SATURATION: 97 % | BODY MASS INDEX: 39.46 KG/M2 | SYSTOLIC BLOOD PRESSURE: 130 MMHG | HEART RATE: 89 BPM | WEIGHT: 245.5 LBS | DIASTOLIC BLOOD PRESSURE: 85 MMHG

## 2021-09-28 DIAGNOSIS — M25.361 KNEE INSTABILITY, RIGHT: ICD-10-CM

## 2021-09-28 DIAGNOSIS — M25.561 RIGHT KNEE PAIN, UNSPECIFIED CHRONICITY: Primary | ICD-10-CM

## 2021-09-28 DIAGNOSIS — R93.6 ABNORMAL MRI, KNEE: ICD-10-CM

## 2021-09-28 DIAGNOSIS — E66.01 MORBID OBESITY (H): ICD-10-CM

## 2021-09-28 PROCEDURE — 99213 OFFICE O/P EST LOW 20 MIN: CPT | Performed by: NURSE PRACTITIONER

## 2021-09-28 RX ORDER — DICLOFENAC SODIUM 75 MG/1
75 TABLET, DELAYED RELEASE ORAL 2 TIMES DAILY PRN
Qty: 30 TABLET | Refills: 1 | Status: SHIPPED | OUTPATIENT
Start: 2021-09-28 | End: 2022-04-18

## 2021-09-28 ASSESSMENT — MIFFLIN-ST. JEOR: SCORE: 1747.39

## 2021-09-28 NOTE — PATIENT INSTRUCTIONS
PLAN:   1.   Symptomatic therapy suggested:   elevate the injured limb, prescription for NSAID given    2.  Orders Placed This Encounter   Medications     diclofenac (VOLTAREN) 75 MG EC tablet     Sig: Take 1 tablet (75 mg) by mouth 2 times daily as needed for moderate pain     Dispense:  30 tablet     Refill:  1     Orders Placed This Encounter   Procedures     MR Knee Right w/o Contrast       3. Patient needs to follow up in if no improvement,or sooner if worsening of symptoms or other symptoms develop.  FURTHER TESTING:       - MRI knee   Will follow up and/or notify patient of  results via My Chart to determine further need for followup

## 2021-09-29 ENCOUNTER — ANCILLARY PROCEDURE (OUTPATIENT)
Dept: MAMMOGRAPHY | Facility: CLINIC | Age: 51
End: 2021-09-29
Attending: NURSE PRACTITIONER
Payer: COMMERCIAL

## 2021-09-29 DIAGNOSIS — Z12.31 VISIT FOR SCREENING MAMMOGRAM: ICD-10-CM

## 2021-09-29 PROCEDURE — 77063 BREAST TOMOSYNTHESIS BI: CPT | Mod: GC | Performed by: RADIOLOGY

## 2021-09-29 PROCEDURE — 77067 SCR MAMMO BI INCL CAD: CPT | Mod: GC | Performed by: RADIOLOGY

## 2021-09-30 ENCOUNTER — HOSPITAL ENCOUNTER (OUTPATIENT)
Dept: MRI IMAGING | Facility: CLINIC | Age: 51
Discharge: HOME | End: 2021-09-30
Attending: NURSE PRACTITIONER | Admitting: NURSE PRACTITIONER
Payer: COMMERCIAL

## 2021-09-30 DIAGNOSIS — M25.561 RIGHT KNEE PAIN, UNSPECIFIED CHRONICITY: ICD-10-CM

## 2021-09-30 DIAGNOSIS — M25.361 KNEE INSTABILITY, RIGHT: ICD-10-CM

## 2021-09-30 PROCEDURE — 73721 MRI JNT OF LWR EXTRE W/O DYE: CPT | Mod: RT

## 2021-09-30 PROCEDURE — 73721 MRI JNT OF LWR EXTRE W/O DYE: CPT | Mod: 26 | Performed by: RADIOLOGY

## 2021-09-30 NOTE — RESULT ENCOUNTER NOTE
Yanelis Duque,    Attached are your test results.  Your MRI shows significant arthritis but also signs consistent with meniscus tear   Will make referral to orthopedics    Please call [phone] to make appointment  if you do not hear from referrals in the next few days.      Please contact us if you have any questions.    Suha Beavers, CNP

## 2021-10-08 ENCOUNTER — ANCILLARY PROCEDURE (OUTPATIENT)
Dept: MAMMOGRAPHY | Facility: CLINIC | Age: 51
End: 2021-10-08
Attending: NURSE PRACTITIONER
Payer: COMMERCIAL

## 2021-10-08 ENCOUNTER — IMMUNIZATION (OUTPATIENT)
Dept: NURSING | Facility: CLINIC | Age: 51
End: 2021-10-08
Payer: COMMERCIAL

## 2021-10-08 ENCOUNTER — ANCILLARY PROCEDURE (OUTPATIENT)
Dept: ULTRASOUND IMAGING | Facility: CLINIC | Age: 51
End: 2021-10-08
Attending: NURSE PRACTITIONER
Payer: COMMERCIAL

## 2021-10-08 DIAGNOSIS — R92.8 ABNORMAL MAMMOGRAM: ICD-10-CM

## 2021-10-08 PROCEDURE — 91300 PR COVID VAC PFIZER DIL RECON 30 MCG/0.3 ML IM: CPT

## 2021-10-08 PROCEDURE — 76642 ULTRASOUND BREAST LIMITED: CPT | Mod: LT | Performed by: RADIOLOGY

## 2021-10-08 PROCEDURE — 77061 BREAST TOMOSYNTHESIS UNI: CPT | Mod: LT | Performed by: RADIOLOGY

## 2021-10-08 PROCEDURE — 77065 DX MAMMO INCL CAD UNI: CPT | Mod: LT | Performed by: RADIOLOGY

## 2021-10-08 PROCEDURE — 0004A PR COVID VAC PFIZER DIL RECON 30 MCG/0.3 ML IM: CPT

## 2021-10-14 ENCOUNTER — PRE VISIT (OUTPATIENT)
Dept: ORTHOPEDICS | Facility: CLINIC | Age: 51
End: 2021-10-14

## 2021-10-14 DIAGNOSIS — M25.561 RIGHT KNEE PAIN, UNSPECIFIED CHRONICITY: Primary | ICD-10-CM

## 2021-10-14 NOTE — TELEPHONE ENCOUNTER
PREVISIT INFORMATION                                                    Lachelle RYLEY Duque scheduled for future visit at [facility] specialty clinics.    Patient is scheduled to see Dr. Sunshine on 10/28/21  Reason for visit: Right knee pain  Referring provider Dr. BRITTANY Beavers  Has patient seen previous specialist? No  Medical Records:  Available in chart.  Patient was previously seen at a [facility] or [facility] facility.  Previous patient with Dr. Sunshine  REVIEW                                                      New patient packet mailed to patient: No  Medication reconciliation complete: Yes      Current Outpatient Medications   Medication Sig Dispense Refill    acyclovir (ZOVIRAX) 400 MG tablet one tid po for 7 days prn for flare ups and one daily for maintenance. 180 tablet 3    cetirizine HCl (ZYRTEC ALLERGY) 10 MG CAPS Take one tablet daily.      diclofenac (VOLTAREN) 75 MG EC tablet Take 1 tablet (75 mg) by mouth 2 times daily as needed for moderate pain 30 tablet 1    diphenhydrAMINE (BENADRYL) 25 MG capsule Take 25 mg by mouth every 6 hours as needed.      fluticasone (FLONASE) 50 MCG/ACT nasal spray INSTILL TWO SPRAYS IN EACH NOSTRIL EVERY DAY 16 g 2    ibuprofen (ADVIL/MOTRIN) 600 MG tablet Take 1 tablet (600 mg) by mouth every 6 hours as needed for other (mild and/or inflammatory pain) 30 tablet 0    norethindrone-ethinyl estradiol (NORTREL 1/35, 28,) 1-35 MG-MCG tablet Take 1 tablet by mouth daily 84 tablet 4    omeprazole 20 MG tablet Take 1 tablet by mouth daily. Take 30-60 minutes before a meal. 30 tablet 1    pseudoePHEDrine (SUDAFED 12 HOUR) 120 MG 12 hr tablet Take 120 mg by mouth every 12 hours. PRN.          Allergies: Sulfa drugs        PLAN/FOLLOW-UP NEEDED                                                      Previsit review complete.  Patient will see provider at future scheduled appointment.     Patient Reminders Given:  Please, make sure you bring an updated list of  your medications.   If you are having a procedure, please, present 15 minutes early.  If you need to cancel or reschedule,please call [phone].    Magui Saldaña RN

## 2021-10-15 ENCOUNTER — ANCILLARY PROCEDURE (OUTPATIENT)
Dept: MAMMOGRAPHY | Facility: CLINIC | Age: 51
End: 2021-10-15
Attending: NURSE PRACTITIONER
Payer: COMMERCIAL

## 2021-10-15 ENCOUNTER — ANCILLARY PROCEDURE (OUTPATIENT)
Dept: ULTRASOUND IMAGING | Facility: CLINIC | Age: 51
End: 2021-10-15
Attending: NURSE PRACTITIONER
Payer: COMMERCIAL

## 2021-10-15 DIAGNOSIS — R92.8 ABNORMAL MAMMOGRAM: ICD-10-CM

## 2021-10-15 DIAGNOSIS — R92.8 ABNORMAL FINDING ON BREAST IMAGING: ICD-10-CM

## 2021-10-15 DIAGNOSIS — R92.8 ABNORMAL FINDING ON BREAST IMAGING: Primary | ICD-10-CM

## 2021-10-15 PROCEDURE — 88377 M/PHMTRC ALYS ISHQUANT/SEMIQ: CPT | Performed by: NURSE PRACTITIONER

## 2021-10-15 PROCEDURE — 88342 IMHCHEM/IMCYTCHM 1ST ANTB: CPT | Mod: 59

## 2021-10-15 PROCEDURE — 88360 TUMOR IMMUNOHISTOCHEM/MANUAL: CPT

## 2021-10-15 PROCEDURE — 19083 BX BREAST 1ST LESION US IMAG: CPT | Mod: LT

## 2021-10-15 PROCEDURE — 88305 TISSUE EXAM BY PATHOLOGIST: CPT

## 2021-10-15 PROCEDURE — 77066 DX MAMMO INCL CAD BI: CPT

## 2021-10-15 PROCEDURE — 96374 THER/PROPH/DIAG INJ IV PUSH: CPT

## 2021-10-15 RX ORDER — IOPAMIDOL 755 MG/ML
100 INJECTION, SOLUTION INTRAVASCULAR ONCE
Status: COMPLETED | OUTPATIENT
Start: 2021-10-15 | End: 2021-10-15

## 2021-10-15 RX ADMIN — IOPAMIDOL 100 ML: 755 INJECTION, SOLUTION INTRAVASCULAR at 10:49

## 2021-10-15 RX ADMIN — Medication 50 ML: at 10:49

## 2021-10-18 LAB
PATH REPORT.COMMENTS IMP SPEC: ABNORMAL
PATH REPORT.COMMENTS IMP SPEC: YES
PATH REPORT.FINAL DX SPEC: ABNORMAL
PATH REPORT.GROSS SPEC: ABNORMAL
PATH REPORT.MICROSCOPIC SPEC OTHER STN: ABNORMAL
PATH REPORT.RELEVANT HX SPEC: ABNORMAL
PATHOLOGY SYNOPTIC REPORT: ABNORMAL
PHOTO IMAGE: ABNORMAL

## 2021-10-19 ENCOUNTER — PATIENT OUTREACH (OUTPATIENT)
Dept: ONCOLOGY | Facility: CLINIC | Age: 51
End: 2021-10-19

## 2021-10-19 ENCOUNTER — TELEPHONE (OUTPATIENT)
Dept: ONCOLOGY | Facility: CLINIC | Age: 51
End: 2021-10-19

## 2021-10-19 ENCOUNTER — TELEPHONE (OUTPATIENT)
Dept: GENERAL RADIOLOGY | Facility: CLINIC | Age: 51
End: 2021-10-19

## 2021-10-19 DIAGNOSIS — C50.912 INVASIVE DUCTAL CARCINOMA OF BREAST, FEMALE, LEFT (H): Primary | ICD-10-CM

## 2021-10-19 NOTE — PROGRESS NOTES
New Patient Oncology Nurse Navigator Note     Referring provider: ROSALIND Funes CNP     Referring Clinic/Organization: [facility]     Referred to (specialty): Cancer surgery and medical oncology    Requested provider (if applicable): Dr. Giovanna Alegria     Date Referral Received: 10/19/21     Evaluation for : Breast cancer     Clinical History (per Nurse review of records provided):    Shanta had a screening mammogram on 9/29/21 that indicated a possible asymmetry in the left breast at the 4 o'clock position, posterior depth. The remainder of the breast tissue was unremarkable and there was no suspicious finding of the right breast.  Work up proceeded with left breast diagnostic mammogram and US on 10/8/21. Spot mammographic views of the left breast were performed with tomosynthesis and 2D reconstruction. There was an irregular spiculated mass in the left breast at the 4:00 position middle to posterior depth, which measures 1.3 x 0.8 x 0.9 cm. Real-time targeted sonographic imaging of the left breast showed an irregular spiculated hypoechoic solid mass measuring 0.9 x 1.0 x 0.7 cm at the 4:00 position 6 cm from the nipple. Targeted sonographic imaging of the left axilla demonstrated no suspicious adenopathy    On 10/15/21 bilateral dual-energy contrast-enhanced digital diagnostic mammography with computer aided detection was performed.  At the site of the target lesion, 4:00 position left breast, middle depth there is an area of mass enhancement and central nonenhancement measuring 1.1 cm. No additional suspicious area of enhancement is identified.    10/15/21 LEFT breast, 4:00, 6 cm from nipple, mass, ultrasound guided core biopsy:  -INVASIVE BREAST CARCINOMA OF NO SPECIAL TYPE (INVASIVE DUCTAL CARCINOMA), Madisyn grade 1  -Invasive carcinoma is estrogen receptor positive and progesterone receptor positive by immunohistochemistry   -HER2 FISH results will be reported separately by cytogenetics  -Invasive carcinoma  Ki-67 proliferative index is 10%      Telephoned and spoke with Shanta and she'll see Dr. De La Vega .  Pending HER2 results, Dr. Alegria would like to see her post-op with Oncotype score.

## 2021-10-19 NOTE — TELEPHONE ENCOUNTER
Spoke to patient regarding Left breast biopsy done on 10/15/21 with finding of Invasive breast carcinoma. Notified patient that the Radiologist recommendation is Surgical/Oncology consultation. Referral placed and  will call patient to schedule. Patient verbalized understanding and all questions and concerns answered to patients satisfaction.

## 2021-10-19 NOTE — TELEPHONE ENCOUNTER
I received a call from Shanta.  She is an oncology nurse.  She has a new 1.1 cm breast cancer, grade 1, Er + MT + her2 pending.      We discussed surgical consultation.  Typically, lumpectomy and SNLB is performed.  I would recommend oncotype dx testing.  Overall, given the low grade nature, I do not anticipate she will need chemotherapy, though we will wait for all of the final pathology.    She is meeting with Dr De La Vega on Friday and then will connect with me after this; we discussed it may be useful to touch base after surgery with an oncotype.      Giovanna Alegria

## 2021-10-21 LAB — INTERPRETATION: NORMAL

## 2021-10-22 ENCOUNTER — OFFICE VISIT (OUTPATIENT)
Dept: ONCOLOGY | Facility: CLINIC | Age: 51
End: 2021-10-22
Attending: NURSE PRACTITIONER
Payer: COMMERCIAL

## 2021-10-22 ENCOUNTER — LAB (OUTPATIENT)
Dept: LAB | Facility: CLINIC | Age: 51
End: 2021-10-22
Attending: SURGERY
Payer: COMMERCIAL

## 2021-10-22 VITALS
SYSTOLIC BLOOD PRESSURE: 142 MMHG | OXYGEN SATURATION: 97 % | TEMPERATURE: 98 F | WEIGHT: 247.9 LBS | BODY MASS INDEX: 40.63 KG/M2 | HEART RATE: 86 BPM | DIASTOLIC BLOOD PRESSURE: 89 MMHG

## 2021-10-22 DIAGNOSIS — C50.912 INVASIVE DUCTAL CARCINOMA OF BREAST, FEMALE, LEFT (H): ICD-10-CM

## 2021-10-22 LAB
INTERPRETATION: NORMAL
LAB DIRECTOR INTERPRETATION: NORMAL
LAB PDF RESULT: NORMAL
SIGNIFICANT RESULTS: NORMAL
SIGNIFICANT RESULTS: NORMAL
SPECIMEN DESCRIPTION: NORMAL
SPECIMEN DESCRIPTION: NORMAL
TEST DETAILS, MDL: NORMAL
TEST DETAILS, MDL: NORMAL

## 2021-10-22 PROCEDURE — 36415 COLL VENOUS BLD VENIPUNCTURE: CPT

## 2021-10-22 PROCEDURE — 99204 OFFICE O/P NEW MOD 45 MIN: CPT | Performed by: SURGERY

## 2021-10-22 PROCEDURE — G0463 HOSPITAL OUTPT CLINIC VISIT: HCPCS

## 2021-10-22 ASSESSMENT — PAIN SCALES - GENERAL: PAINLEVEL: NO PAIN (0)

## 2021-10-22 NOTE — NURSING NOTE
"Oncology Rooming Note    October 22, 2021 9:58 AM   Lachelle Duque is a 51 year old female who presents for:    Chief Complaint   Patient presents with     Oncology Clinic Visit     BREAST CANCER      Initial Vitals: BP (!) 142/89   Pulse 86   Temp 98  F (36.7  C) (Oral)   Wt 112.4 kg (247 lb 14.4 oz)   SpO2 97%   BMI 40.63 kg/m   Estimated body mass index is 40.63 kg/m  as calculated from the following:    Height as of 9/28/21: 1.664 m (5' 5.5\").    Weight as of this encounter: 112.4 kg (247 lb 14.4 oz). Body surface area is 2.28 meters squared.  No Pain (0) Comment: Data Unavailable   No LMP recorded.  Allergies reviewed: Yes  Medications reviewed: Yes    Medications: Medication refills not needed today.  Pharmacy name entered into GOOD:    [facility] - [place], [place] - [address]  [facility] [place] [address]  [facility]  [facility] - [place], [place] - [address] [facility] - [place], [place] - [phone]    Clinical concerns: NONE       Shelby Hawk CMA              "

## 2021-10-22 NOTE — NURSING NOTE
Chief Complaint   Patient presents with     Labs Only     Labs drawn by  by RN in lab.      Labs collected from venipuncture by RN.   Agustín Gonzalez RN

## 2021-10-22 NOTE — PROGRESS NOTES
HISTORY OF PRESENT ILLNESS:  Lachelle Duque is a 51-year-old woman who presents with a new diagnosis of a left breast cancer.  She underwent a screening mammogram on 09/29/2021, which demonstrated possible asymmetry in her left breast at 4-o'clock position.  On 10/08/2021, she underwent a mammogram and ultrasound.  On mammography, there was a spiculated mass, at the 4-o'clock position, measuring 1.3 cm in size.  On ultrasound, the mass measured 1.0 cm in size.  Her lymph nodes were negative by ultrasonography. On 10/15/2021, she underwent a contrast enhanced mammography which demonstrated the lesion to be 1.1 cm.  There are no other suspicious lesions in the rest of her breast.  Also, on 10/15, she underwent a needle biopsy, which demonstrated invasive ductal cancer, grade 1, ER positive, ID positive, HER2 negative.  She is now here to talk about treatment options.  She has not palpated a mass in her breast.  She has not had a prior history of any breast problems.    PAST MEDICAL HISTORY:  No major medical problems.    FAMILY HISTORY:  Significant for a maternal aunt with breast cancer.  No history of ovarian cancer.  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    PHYSICAL EXAMINATION:  She is a well-appearing woman in no apparent distress.  Bilateral breast examination reveals no dominant masses, skin changes, nipple changes or axillary adenopathy.    IMPRESSION:  Stage I, ER positive breast cancer.    PLAN:  I talked to her about the various treatment options including mastectomy with or without reconstruction versus lumpectomy plus radiation therapy.  She desires to have breast-conserving surgery.  I did recommend a sentinel lymph node biopsy.  I told her that endocrine therapy would be recommended.  I told she probably would not need chemotherapy but there will be a decision made after surgery.  Today we will obtain genetic testing on her.  We will tentatively schedule her for a left  wire localized lumpectomy and sentinel lymph node biopsy.    Total time 45 minutes, which included reviewing her imaging, in-person visit and coordinating her care.

## 2021-10-22 NOTE — LETTER
10/22/2021         RE: Lachelle Duque  [address] [facility] [address]        Dear Colleague,    Thank you for referring your patient, Lachelle Duque, to the [facility]. Please see a copy of my visit note below.    HISTORY OF PRESENT ILLNESS:  Lachelle Duque is a 51-year-old woman who presents with a new diagnosis of a left breast cancer.  She underwent a screening mammogram on 09/29/2021, which demonstrated possible asymmetry in her left breast at 4-o'clock position.  On 10/08/2021, she underwent a mammogram and ultrasound.  On mammography, there was a spiculated mass, at the 4-o'clock position, measuring 1.3 cm in size.  On ultrasound, the mass measured 1.0 cm in size.  Her lymph nodes were negative by ultrasonography. On 10/15/2021, she underwent a contrast enhanced mammography which demonstrated the lesion to be 1.1 cm.  There are no other suspicious lesions in the rest of her breast.  Also, on 10/15, she underwent a needle biopsy, which demonstrated invasive ductal cancer, grade 1, ER positive, NM positive, HER2 negative.  She is now here to talk about treatment options.  She has not palpated a mass in her breast.  She has not had a prior history of any breast problems.    PAST MEDICAL HISTORY:  No major medical problems.    FAMILY HISTORY:  Significant for a maternal aunt with breast cancer.  No history of ovarian cancer.  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    PHYSICAL EXAMINATION:  She is a well-appearing woman in no apparent distress.  Bilateral breast examination reveals no dominant masses, skin changes, nipple changes or axillary adenopathy.    IMPRESSION:  Stage I, ER positive breast cancer.    PLAN:  I talked to her about the various treatment options including mastectomy with or without reconstruction versus lumpectomy plus radiation therapy.  She desires to have breast-conserving surgery.  I did recommend a  sentinel lymph node biopsy.  I told her that endocrine therapy would be recommended.  I told she probably would not need chemotherapy but there will be a decision made after surgery.  Today we will obtain genetic testing on her.  We will tentatively schedule her for a left wire localized lumpectomy and sentinel lymph node biopsy.    Total time 45 minutes, which included reviewing her imaging, in-person visit and coordinating her care.          Again, thank you for allowing me to participate in the care of your patient.        Sincerely,        Ge De La Vega MD

## 2021-10-25 ENCOUNTER — TELEPHONE (OUTPATIENT)
Dept: ONCOLOGY | Facility: CLINIC | Age: 51
End: 2021-10-25

## 2021-10-25 DIAGNOSIS — Z11.59 ENCOUNTER FOR SCREENING FOR OTHER VIRAL DISEASES: ICD-10-CM

## 2021-10-25 PROBLEM — C50.912 INVASIVE DUCTAL CARCINOMA OF BREAST, FEMALE, LEFT (H): Status: ACTIVE | Noted: 2021-10-25

## 2021-10-25 LAB — INTERPRETATION: NORMAL

## 2021-10-25 NOTE — TELEPHONE ENCOUNTER
I contacted the patient via phone and spoke with her to confirm the scheduled dates and provide the following information:     Surgeon/surgery date/location:  Dr. De La Vega on 11/10 at [facility].  Pre-op physical with:   PCP.   COVID-19 test:   11/8  Post-op:   11/22.    The surgery packet was provided via [employer].    Patient will arrive at 7:30 AM and check in on the 5th floor. NUC med injection will be delivered to the OR for surgeon to inject. Patient does not need to go to that appointment.

## 2021-10-26 ENCOUNTER — TELEPHONE (OUTPATIENT)
Dept: ONCOLOGY | Facility: CLINIC | Age: 51
End: 2021-10-26

## 2021-10-26 NOTE — TELEPHONE ENCOUNTER
FMLA and workability forms received via fax from pt.  Forms filled out and put in providers folder.  Phone call placed to pt to confirm restrictions needed post-op.      Fax [phone]    Soumya Kate CMA ([facility])

## 2021-10-28 ENCOUNTER — OFFICE VISIT (OUTPATIENT)
Dept: ORTHOPEDICS | Facility: CLINIC | Age: 51
End: 2021-10-28
Payer: COMMERCIAL

## 2021-10-28 ENCOUNTER — TELEPHONE (OUTPATIENT)
Dept: ORTHOPEDICS | Facility: CLINIC | Age: 51
End: 2021-10-28

## 2021-10-28 ENCOUNTER — ANCILLARY PROCEDURE (OUTPATIENT)
Dept: GENERAL RADIOLOGY | Facility: CLINIC | Age: 51
End: 2021-10-28
Attending: ORTHOPAEDIC SURGERY
Payer: COMMERCIAL

## 2021-10-28 VITALS
HEIGHT: 66 IN | OXYGEN SATURATION: 98 % | SYSTOLIC BLOOD PRESSURE: 117 MMHG | HEART RATE: 86 BPM | DIASTOLIC BLOOD PRESSURE: 78 MMHG | WEIGHT: 247.5 LBS | BODY MASS INDEX: 39.77 KG/M2

## 2021-10-28 DIAGNOSIS — M25.361 KNEE INSTABILITY, RIGHT: ICD-10-CM

## 2021-10-28 DIAGNOSIS — M25.561 RIGHT KNEE PAIN, UNSPECIFIED CHRONICITY: ICD-10-CM

## 2021-10-28 DIAGNOSIS — R93.6 ABNORMAL MRI, KNEE: ICD-10-CM

## 2021-10-28 PROCEDURE — 73562 X-RAY EXAM OF KNEE 3: CPT | Mod: RT | Performed by: RADIOLOGY

## 2021-10-28 PROCEDURE — 99213 OFFICE O/P EST LOW 20 MIN: CPT | Performed by: ORTHOPAEDIC SURGERY

## 2021-10-28 ASSESSMENT — PAIN SCALES - GENERAL: PAINLEVEL: MODERATE PAIN (4)

## 2021-10-28 ASSESSMENT — MIFFLIN-ST. JEOR: SCORE: 1764.4

## 2021-10-28 NOTE — LETTER
10/28/2021         RE: Lachelle Duque  [address]  [place] [address]        Dear Colleague,    Thank you for referring your patient, Lachelle Duque, to the [facility]. Please see a copy of my visit note below.    Lachelle Duque is a very pleasant 51-year-old woman.  Well-known to me.  I performed a left meniscal root repair this was done June 16 of 2020.  She is done very well on this side.  She describes a SANE score of 90.  Overall very happy with it.  She states that she would not make an appointment for her left knee.  She had an episode while walking down the stairs.  [place] a pop in her right knee.  Imaging obtained.  She read the report.  A meniscus root tear was present and she returns to my clinic to discuss intervention to the right side.    At about the same time that this was happening she was diagnosed with breast cancer on a screening mammogram.  She has surgery pending and will likely need local radiation.    Examination today of her right knee shows full range of motion.  Lachman 0.  No pivot shift.  Stable to varus valgus stress testing.  Stable anterior and posterior drawer testing.  Exam of the left knee shows full range of motion.  Ligaments stable.  Neurovascularly intact.    Imaging: Well-preserved joint space to the medial compartment of the right knee.  Well-preserved patellofemoral compartment.  Trace joint space narrowing is noted in the left the overall preservation of the joint space is present.    Clinical assessment: A year and a half status post left medial meniscus root repair.  Doing well.  SANE score of 90.     Medial meniscus root tear     Recent diagnosis of breast cancer with treatment upcoming    Plan:  Long discussion today.  Reviewed the diagnosis potential treatment options.  I discussed with her the surgery for her right meniscus root.  We discussed the pros cons risks and benefits of surgery.  We discussed the expected course  of recovery.  At this time she is in a proceed with treatment for her breast cancer.  She is actually hoping to delay surgical intervention until the middle of April and I think this is reasonable what I have suggested to her she should come back to my clinic in approximately March may be 3 to 4 weeks before potential surgery date we will get new radiographs to ensure that there has not been progression of her disease process.    If she should fail to do well with nonsurgical means she certainly can come back sooner.    I did place an order today to allow for scheduling at her convenience.      Again, thank you for allowing me to participate in the care of your patient.        Sincerely,        Armando Sunshine MD

## 2021-10-28 NOTE — TELEPHONE ENCOUNTER
Procedure: Meniscus root repair right knee  Facility: [facility]  Length: 90 minutes  Anesthesia: Choice  Post-op appointments needed: 2 weeks nurse only, 6 weeks with provider only.  Surgery packet/instructions given to patient?  Yes      Patient recently diagnosed with Breast lump, needs to get this worked up before scheduling surgery. Has plans for breast surgery on 11/10.   She will need to see Dr. Sunshine 1 month before surgery to confirm surgery.     Magui Saldaña RN

## 2021-10-28 NOTE — NURSING NOTE
"Lachelle Duque's chief complaint for this visit includes:  Chief Complaint   Patient presents with     Right Knee - Pain     Injured right knee descending stairs on 9/19/2021     PCP: Suha Beavers    Referring Provider:  Suha Beavers, ROSALIND CNP  [address] [place] [address]    /78   Pulse 86   Ht 1.676 m (5' 6\")   Wt 112.3 kg (247 lb 8 oz)   SpO2 98%   BMI 39.95 kg/m    Moderate Pain (4)     Do you need any medication refills at today's visit? No    SANE score  Right knee 65 Affected knee  Left knee 90          "

## 2021-10-28 NOTE — PROGRESS NOTES
Lachelle Duque is a very pleasant 51-year-old woman.  Well-known to me.  I performed a left meniscal root repair this was done June 16 of 2020.  She is done very well on this side.  She describes a SANE score of 90.  Overall very happy with it.  She states that she would not make an appointment for her left knee.  She had an episode while walking down the stairs.  [place] a pop in her right knee.  Imaging obtained.  She read the report.  A meniscus root tear was present and she returns to my clinic to discuss intervention to the right side.    At about the same time that this was happening she was diagnosed with breast cancer on a screening mammogram.  She has surgery pending and will likely need local radiation.    Examination today of her right knee shows full range of motion.  Lachman 0.  No pivot shift.  Stable to varus valgus stress testing.  Stable anterior and posterior drawer testing.  Exam of the left knee shows full range of motion.  Ligaments stable.  Neurovascularly intact.    Imaging: Well-preserved joint space to the medial compartment of the right knee.  Well-preserved patellofemoral compartment.  Trace joint space narrowing is noted in the left the overall preservation of the joint space is present.    Clinical assessment: A year and a half status post left medial meniscus root repair.  Doing well.  SANE score of 90.     Medial meniscus root tear     Recent diagnosis of breast cancer with treatment upcoming    Plan:  Long discussion today.  Reviewed the diagnosis potential treatment options.  I discussed with her the surgery for her right meniscus root.  We discussed the pros cons risks and benefits of surgery.  We discussed the expected course of recovery.  At this time she is in a proceed with treatment for her breast cancer.  She is actually hoping to delay surgical intervention until the middle of April and I think this is reasonable what I have suggested to her she should come back to my clinic in  approximately March may be 3 to 4 weeks before potential surgery date we will get new radiographs to ensure that there has not been progression of her disease process.    If she should fail to do well with nonsurgical means she certainly can come back sooner.    I did place an order today to allow for scheduling at her convenience.

## 2021-11-04 NOTE — CONFIDENTIAL NOTE
FMLA and Workability paperwork completed, checked for accuracy, signed and faxed to  [employer] @ [phone]. A copy was made, sent to scanning and emailed to pt.    Successful transmission verified in Right Fax.    Soumya Kate CMA

## 2021-11-05 PROBLEM — M25.561 RIGHT KNEE PAIN, UNSPECIFIED CHRONICITY: Status: ACTIVE | Noted: 2021-11-05

## 2021-11-05 NOTE — TELEPHONE ENCOUNTER
Date Scheduled: 4-19-22  Facility: [facility]  Surgeon: Dr. Sunshine   Post-op appointment scheduled: not scheduled yet   scheduled?: No  Surgery packet/instructions confirmed received?  Yes  Special Considerations:     Pre surgery appt scheduled for 3-17-22 in [facility].    Bonita Villanueva, Surgery Scheduling Coordinator

## 2021-11-07 NOTE — PATIENT INSTRUCTIONS

## 2021-11-07 NOTE — PROGRESS NOTES
[address] [address]  Phone: [phone]  Primary Provider: Suha Beavers  Pre-op Performing Provider: GALILEO VERGARA      PREOPERATIVE EVALUATION:  Today's date: 11/8/2021    Lachelle Duque is a 51 year old female who presents for a preoperative evaluation.    Surgical Information:  Surgery/Procedure: Left wire localized breast lumpectomy with sentinel lymph node biopsy  Surgery Location: [facility]  Surgeon: Dr. Ge De La Vega  Surgery Date: 11/10/2021  Time of Surgery: 10:00 AM  Where patient plans to recover: At home with family  Fax number for surgical facility: Note does not need to be faxed, will be available electronically in Epic.     Type of Anesthesia Anticipated: General    Assessment & Plan     The proposed surgical procedure is considered INTERMEDIATE risk.    Preop general physical exam  Normal exam, labs stable. She has her COVID-19 swab scheduled.   - CBC with platelets; Future  - Basic metabolic panel  (Ca, Cl, CO2, Creat, Gluc, K, Na, BUN); Future  - Basic metabolic panel  (Ca, Cl, CO2, Creat, Gluc, K, Na, BUN)  - CBC with platelets    Invasive ductal carcinoma of breast, female, left (H)  Reason for surgery, needs lumpectomy and sentinal node biopsy     Recurring abdominal pain  stable    Gastroesophageal reflux disease, unspecified whether esophagitis present  stable  - omeprazole 20 MG tablet; Take 1 tablet (20 mg) by mouth daily Take 30-60 minutes before a meal.         Risks and Recommendations:  The patient has the following additional risks and recommendations for perioperative complications:   - No identified additional risk factors other than previously addressed    Medication Instructions:  Patient is to take all scheduled medications on the day of surgery    RECOMMENDATION:  APPROVAL GIVEN to proceed with proposed procedure, without further diagnostic evaluation.    Subjective     HPI related to upcoming procedure:   Hx  of left breast cancer, needs lumpectomy and biopsy the lymph nodes    Preop Questions 11/8/2021   1. Have you ever had a heart attack or stroke? No   2. Have you ever had surgery on your heart or blood vessels, such as a stent placement, a coronary artery bypass, or surgery on an artery in your head, neck, heart, or legs? No   3. Do you have chest pain with activity? No   4. Do you have a history of  heart failure? No   5. Do you currently have a cold, bronchitis or symptoms of other infection? No   6. Do you have a cough, shortness of breath, or wheezing? No   7. Do you or anyone in your family have previous history of blood clots? YES - her mother, more prone, hx of lots of surgeries   8. Do you or does anyone in your family have a serious bleeding problem such as prolonged bleeding following surgeries or cuts? No   9. Have you ever had problems with anemia or been told to take iron pills? No   10. Have you had any abnormal blood loss such as black, tarry or bloody stools, or abnormal vaginal bleeding? No   11. Have you ever had a blood transfusion? No   12. Are you willing to have a blood transfusion if it is medically needed before, during, or after your surgery? Yes   13. Have you or any of your relatives ever had problems with anesthesia? No- doesn't do well with narcotics    14. Do you have sleep apnea, excessive snoring or daytime drowsiness? No   15. Do you have any artifical heart valves or other implanted medical devices like a pacemaker, defibrillator, or continuous glucose monitor? No   16. Do you have artificial joints? No   17. Are you allergic to latex? No   18. Is there any chance that you may be pregnant? No     Health Care Directive:  Patient does not have a Health Care Directive or Living Will: Discussed advance care planning with patient; however, patient declined at this time.    Preoperative Review of :   reviewed - no record of controlled substances prescribed.      Status of Chronic  Conditions:  See problem list for active medical problems.  Problems all longstanding and stable, except as noted/documented.  See ROS for pertinent symptoms related to these conditions.       Vit d3 5000 units daily  Glucosamine condotritin  Calcium  b-12    Review of Systems  CONSTITUTIONAL: NEGATIVE for fever, chills, change in weight  INTEGUMENTARY/SKIN: NEGATIVE for worrisome rashes, moles or lesions  EYES: NEGATIVE for vision changes or irritation  ENT/MOUTH: NEGATIVE for ear, mouth and throat problems  RESP: NEGATIVE for significant cough or SOB  CV: NEGATIVE for chest pain, palpitations or peripheral edema  GI: NEGATIVE for nausea, abdominal pain, heartburn, or change in bowel habits  : NEGATIVE for frequency, dysuria, or hematuria  MUSCULOSKELETAL: NEGATIVE for significant arthralgias or myalgia  NEURO: NEGATIVE for weakness, dizziness or paresthesias  ENDOCRINE: NEGATIVE for temperature intolerance, skin/hair changes  HEME: NEGATIVE for bleeding problems  PSYCHIATRIC: NEGATIVE for changes in mood or affect    Patient Active Problem List    Diagnosis Date Noted     Right knee pain, unspecified chronicity 11/05/2021     Priority: Medium     Added automatically from request for surgery [id number]       Invasive ductal carcinoma of breast, female, left (H) 10/25/2021     Priority: Medium     Added automatically from request for surgery [id number]       Chronic pain of left knee 01/06/2020     Priority: Medium     Added automatically from request for surgery [id number]       Obesity (BMI 35.0-39.9) with comorbidity (H) 09/10/2018     Priority: Medium     Elevation of optic disc, 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 Please review for accuracy       Biliary colic 01/09/2013     Priority: Medium     Hiatal hernia 01/03/2013      Priority: Medium     GERD (gastroesophageal reflux disease) 01/03/2013     Priority: Medium     Vitamin D deficiency 08/26/2011     Priority: Medium     Obesity 05/09/2011     Priority: Medium     Hyperlipidemia LDL goal <130 09/09/2010     Priority: Medium     Strong family history of premature CAD  [place] Risk Score: 4% (16 Total Points)         Family history of colon cancer 08/17/2009     Priority: Medium     9% chance of having Egan Syndrome. Will have patient do pelvic US to evaluate ovaries and uterus.        Encounter for other general counseling or advice on contraception 07/19/2007     Priority: Medium     Diagnosis updated by automated process. Provider to review and confirm.       Herpes zoster 07/19/2007     Priority: Medium     L eye.  Followed by Dr. Solis at [facility]   Has periodic flare ups  Problem list name updated by automated process. Provider to review        Past Medical History:   Diagnosis Date     Chronic rhinitis      Environmental allergies 1/18/2013     Gastro-oesophageal reflux disease      GERD (gastroesophageal reflux disease) 1/3/2013     Herpes zoster without mention of complication     L eye      Hiatal hernia      PONV (postoperative nausea and vomiting)     PONV     Past Surgical History:   Procedure Laterality Date     ADENOIDECTOMY  1977     ARTHROSCOPY KNEE WITH MENISCAL REPAIR Left 6/16/2020    Procedure: Examination under anesthesia Left knee, Left knee arthroscopy, Left meniscus root repair;  Surgeon: Armando Sunshine MD;  [facility]     COLONOSCOPY N/A 10/27/2015    Procedure: COLONOSCOPY;  Surgeon: Duane, William Charles, MD;  Location: [facility] OR     COLONOSCOPY WITH CO2 INSUFFLATION N/A 10/27/2015    Procedure: COLONOSCOPY WITH CO2 INSUFFLATION;  Surgeon: Duane, William Charles, MD;  Location: [facility] OR     HC TOOTH EXTRACTION W/FORCEP      20 yo     LAPAROSCOPIC CHOLECYSTECTOMY  1/30/2013    Procedure: LAPAROSCOPIC CHOLECYSTECTOMY;   Laparoscopic Cholecystectomy;  Surgeon: Cindy Soni MD;  Location: [facility] OR     SINUS SURGERY  1988    Deviated septum     wisdom teeth removed[       Current Outpatient Medications   Medication Sig Dispense Refill     acyclovir (ZOVIRAX) 400 MG tablet one tid po for 7 days prn for flare ups and one daily for maintenance. 180 tablet 3     cetirizine HCl (ZYRTEC ALLERGY) 10 MG CAPS Take one tablet daily.       diclofenac (VOLTAREN) 75 MG EC tablet Take 1 tablet (75 mg) by mouth 2 times daily as needed for moderate pain 30 tablet 1     diphenhydrAMINE (BENADRYL) 25 MG capsule Take 25 mg by mouth every 6 hours as needed.       fluticasone (FLONASE) 50 MCG/ACT nasal spray INSTILL TWO SPRAYS IN EACH NOSTRIL EVERY DAY 16 g 2     ibuprofen (ADVIL/MOTRIN) 600 MG tablet Take 1 tablet (600 mg) by mouth every 6 hours as needed for other (mild and/or inflammatory pain) 30 tablet 0     omeprazole 20 MG tablet Take 1 tablet (20 mg) by mouth daily Take 30-60 minutes before a meal. 30 tablet 1     pseudoePHEDrine (SUDAFED 12 HOUR) 120 MG 12 hr tablet Take 120 mg by mouth every 12 hours. PRN.          Allergies   Allergen Reactions     Sulfa Drugs Anaphylaxis        Social History     Tobacco Use     Smoking status: Never Smoker     Smokeless tobacco: Never Used   Substance Use Topics     Alcohol use: Yes     Alcohol/week: 0.8 standard drinks     Types: 1 Standard drinks or equivalent per week     Comment: occasionally     Family History   Problem Relation Age of Onset     C.A.D. Father         bypass     Diabetes Father      Cancer - colorectal Father 42     Coronary Artery Disease Father      Gastrointestinal Disease Mother         diverticulosis     Arthritis Mother         fibromyalgia, lupus, rheumatoid     Anesthesia Reaction Mother         nausea     Asthma Mother      Lipids Mother      Diabetes Mother      Macular Degeneration Other      Cancer Other 29        CML     Breast Cancer Other      Colon Polyps Brother   "    C.ASHAGGY. Maternal Grandfather      Cerebrovascular Disease Maternal Grandfather      Coronary Artery Disease Maternal Grandfather      Hypertension Maternal Grandmother      Glaucoma Maternal Grandmother      Gastrointestinal Disease Maternal Grandmother         diverticulosis     Depression Brother      Arthritis Brother         fibromyalgia     Anesthesia Reaction Brother         nausea     Cancer Maternal Aunt      Breast Cancer Maternal Aunt      Coronary Artery Disease Maternal Uncle      Thyroid Disease No family hx of      Prostate Cancer No family hx of      History   Drug Use No         Objective     /85 (BP Location: Right arm, Patient Position: Sitting, Cuff Size: Adult Large)   Pulse 78   Temp 99.1  F (37.3  C) (Oral)   Ht 1.664 m (5' 5.5\")   Wt 110.6 kg (243 lb 12.8 oz)   LMP 10/31/2021   SpO2 98%   BMI 39.95 kg/m      Physical Exam    GENERAL APPEARANCE: healthy, alert and no distress     EYES: EOMI, PERRL     HENT: ear canals and TM's normal and nose and mouth without ulcers or lesions     NECK: no adenopathy, no asymmetry, masses, or scars and thyroid normal to palpation     RESP: lungs clear to auscultation - no rales, rhonchi or wheezes     CV: regular rates and rhythm, normal S1 S2, no S3 or S4 and no murmur, click or rub     ABDOMEN:  soft, nontender, no HSM or masses and bowel sounds normal     MS: extremities normal- no gross deformities noted, no evidence of inflammation in joints, FROM in all extremities.     SKIN: no suspicious lesions or rashes     NEURO: Normal strength and tone, sensory exam grossly normal, mentation intact and speech normal     PSYCH: mentation appears normal. and affect normal/bright     LYMPHATICS: No cervical adenopathy    Recent Labs   Lab Test 10/26/20  1014 03/30/20  0930   HGB  --  12.9   PLT  --  283    138   POTASSIUM 4.0 4.2   CR 0.62 0.68        Diagnostics:  Labs pending at this time.  Results will be reviewed when available.   No EKG " required, no history of coronary heart disease, significant arrhythmia, peripheral arterial disease or other structural heart disease.    Revised Cardiac Risk Index (RCRI):  The patient has the following serious cardiovascular risks for perioperative complications:   - No serious cardiac risks = 0 points     RCRI Interpretation: 0 points: Class I (very low risk - 0.4% complication rate)           Signed Electronically by:   ROSALIND Chandler NP-C  [facility]  Copy of this evaluation report is provided to requesting physician.

## 2021-11-07 NOTE — H&P (VIEW-ONLY)
[address] [address]  Phone: [phone]  Primary Provider: Suha Beavers  Pre-op Performing Provider: GALILEO VERGARA      PREOPERATIVE EVALUATION:  Today's date: 11/8/2021    Lachelle Duque is a 51 year old female who presents for a preoperative evaluation.    Surgical Information:  Surgery/Procedure: Left wire localized breast lumpectomy with sentinel lymph node biopsy  Surgery Location: [facility]  Surgeon: Dr. Ge De La Vega  Surgery Date: 11/10/2021  Time of Surgery: 10:00 AM  Where patient plans to recover: At home with family  Fax number for surgical facility: Note does not need to be faxed, will be available electronically in Epic.     Type of Anesthesia Anticipated: General    Assessment & Plan     The proposed surgical procedure is considered INTERMEDIATE risk.    Preop general physical exam  Normal exam, labs stable. She has her COVID-19 swab scheduled.   - CBC with platelets; Future  - Basic metabolic panel  (Ca, Cl, CO2, Creat, Gluc, K, Na, BUN); Future  - Basic metabolic panel  (Ca, Cl, CO2, Creat, Gluc, K, Na, BUN)  - CBC with platelets    Invasive ductal carcinoma of breast, female, left (H)  Reason for surgery, needs lumpectomy and sentinal node biopsy     Recurring abdominal pain  stable    Gastroesophageal reflux disease, unspecified whether esophagitis present  stable  - omeprazole 20 MG tablet; Take 1 tablet (20 mg) by mouth daily Take 30-60 minutes before a meal.         Risks and Recommendations:  The patient has the following additional risks and recommendations for perioperative complications:   - No identified additional risk factors other than previously addressed    Medication Instructions:  Patient is to take all scheduled medications on the day of surgery    RECOMMENDATION:  APPROVAL GIVEN to proceed with proposed procedure, without further diagnostic evaluation.    Subjective     HPI related to upcoming procedure:   Hx  of left breast cancer, needs lumpectomy and biopsy the lymph nodes    Preop Questions 11/8/2021   1. Have you ever had a heart attack or stroke? No   2. Have you ever had surgery on your heart or blood vessels, such as a stent placement, a coronary artery bypass, or surgery on an artery in your head, neck, heart, or legs? No   3. Do you have chest pain with activity? No   4. Do you have a history of  heart failure? No   5. Do you currently have a cold, bronchitis or symptoms of other infection? No   6. Do you have a cough, shortness of breath, or wheezing? No   7. Do you or anyone in your family have previous history of blood clots? YES - her mother, more prone, hx of lots of surgeries   8. Do you or does anyone in your family have a serious bleeding problem such as prolonged bleeding following surgeries or cuts? No   9. Have you ever had problems with anemia or been told to take iron pills? No   10. Have you had any abnormal blood loss such as black, tarry or bloody stools, or abnormal vaginal bleeding? No   11. Have you ever had a blood transfusion? No   12. Are you willing to have a blood transfusion if it is medically needed before, during, or after your surgery? Yes   13. Have you or any of your relatives ever had problems with anesthesia? No- doesn't do well with narcotics    14. Do you have sleep apnea, excessive snoring or daytime drowsiness? No   15. Do you have any artifical heart valves or other implanted medical devices like a pacemaker, defibrillator, or continuous glucose monitor? No   16. Do you have artificial joints? No   17. Are you allergic to latex? No   18. Is there any chance that you may be pregnant? No     Health Care Directive:  Patient does not have a Health Care Directive or Living Will: Discussed advance care planning with patient; however, patient declined at this time.    Preoperative Review of :   reviewed - no record of controlled substances prescribed.      Status of Chronic  Conditions:  See problem list for active medical problems.  Problems all longstanding and stable, except as noted/documented.  See ROS for pertinent symptoms related to these conditions.       Vit d3 5000 units daily  Glucosamine condotritin  Calcium  b-12    Review of Systems  CONSTITUTIONAL: NEGATIVE for fever, chills, change in weight  INTEGUMENTARY/SKIN: NEGATIVE for worrisome rashes, moles or lesions  EYES: NEGATIVE for vision changes or irritation  ENT/MOUTH: NEGATIVE for ear, mouth and throat problems  RESP: NEGATIVE for significant cough or SOB  CV: NEGATIVE for chest pain, palpitations or peripheral edema  GI: NEGATIVE for nausea, abdominal pain, heartburn, or change in bowel habits  : NEGATIVE for frequency, dysuria, or hematuria  MUSCULOSKELETAL: NEGATIVE for significant arthralgias or myalgia  NEURO: NEGATIVE for weakness, dizziness or paresthesias  ENDOCRINE: NEGATIVE for temperature intolerance, skin/hair changes  HEME: NEGATIVE for bleeding problems  PSYCHIATRIC: NEGATIVE for changes in mood or affect    Patient Active Problem List    Diagnosis Date Noted     Right knee pain, unspecified chronicity 11/05/2021     Priority: Medium     Added automatically from request for surgery [id number]       Invasive ductal carcinoma of breast, female, left (H) 10/25/2021     Priority: Medium     Added automatically from request for surgery [id number]       Chronic pain of left knee 01/06/2020     Priority: Medium     Added automatically from request for surgery [id number]       Obesity (BMI 35.0-39.9) with comorbidity (H) 09/10/2018     Priority: Medium     Elevation of optic disc, 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 Please review for accuracy       Biliary colic 01/09/2013     Priority: Medium     Hiatal hernia 01/03/2013      Priority: Medium     GERD (gastroesophageal reflux disease) 01/03/2013     Priority: Medium     Vitamin D deficiency 08/26/2011     Priority: Medium     Obesity 05/09/2011     Priority: Medium     Hyperlipidemia LDL goal <130 09/09/2010     Priority: Medium     Strong family history of premature CAD  [place] Risk Score: 4% (16 Total Points)         Family history of colon cancer 08/17/2009     Priority: Medium     9% chance of having Egan Syndrome. Will have patient do pelvic US to evaluate ovaries and uterus.        Encounter for other general counseling or advice on contraception 07/19/2007     Priority: Medium     Diagnosis updated by automated process. Provider to review and confirm.       Herpes zoster 07/19/2007     Priority: Medium     L eye.  Followed by Dr. Solis at [facility]   Has periodic flare ups  Problem list name updated by automated process. Provider to review        Past Medical History:   Diagnosis Date     Chronic rhinitis      Environmental allergies 1/18/2013     Gastro-oesophageal reflux disease      GERD (gastroesophageal reflux disease) 1/3/2013     Herpes zoster without mention of complication     L eye      Hiatal hernia      PONV (postoperative nausea and vomiting)     PONV     Past Surgical History:   Procedure Laterality Date     ADENOIDECTOMY  1977     ARTHROSCOPY KNEE WITH MENISCAL REPAIR Left 6/16/2020    Procedure: Examination under anesthesia Left knee, Left knee arthroscopy, Left meniscus root repair;  Surgeon: Armando Sunshine MD;  [facility]     COLONOSCOPY N/A 10/27/2015    Procedure: COLONOSCOPY;  Surgeon: Duane, William Charles, MD;  Location: [facility] OR     COLONOSCOPY WITH CO2 INSUFFLATION N/A 10/27/2015    Procedure: COLONOSCOPY WITH CO2 INSUFFLATION;  Surgeon: Duane, William Charles, MD;  Location: [facility] OR     HC TOOTH EXTRACTION W/FORCEP      18 yo     LAPAROSCOPIC CHOLECYSTECTOMY  1/30/2013    Procedure: LAPAROSCOPIC CHOLECYSTECTOMY;   Laparoscopic Cholecystectomy;  Surgeon: Cindy Soni MD;  Location: [facility] OR     SINUS SURGERY  1988    Deviated septum     wisdom teeth removed[       Current Outpatient Medications   Medication Sig Dispense Refill     acyclovir (ZOVIRAX) 400 MG tablet one tid po for 7 days prn for flare ups and one daily for maintenance. 180 tablet 3     cetirizine HCl (ZYRTEC ALLERGY) 10 MG CAPS Take one tablet daily.       diclofenac (VOLTAREN) 75 MG EC tablet Take 1 tablet (75 mg) by mouth 2 times daily as needed for moderate pain 30 tablet 1     diphenhydrAMINE (BENADRYL) 25 MG capsule Take 25 mg by mouth every 6 hours as needed.       fluticasone (FLONASE) 50 MCG/ACT nasal spray INSTILL TWO SPRAYS IN EACH NOSTRIL EVERY DAY 16 g 2     ibuprofen (ADVIL/MOTRIN) 600 MG tablet Take 1 tablet (600 mg) by mouth every 6 hours as needed for other (mild and/or inflammatory pain) 30 tablet 0     omeprazole 20 MG tablet Take 1 tablet (20 mg) by mouth daily Take 30-60 minutes before a meal. 30 tablet 1     pseudoePHEDrine (SUDAFED 12 HOUR) 120 MG 12 hr tablet Take 120 mg by mouth every 12 hours. PRN.          Allergies   Allergen Reactions     Sulfa Drugs Anaphylaxis        Social History     Tobacco Use     Smoking status: Never Smoker     Smokeless tobacco: Never Used   Substance Use Topics     Alcohol use: Yes     Alcohol/week: 0.8 standard drinks     Types: 1 Standard drinks or equivalent per week     Comment: occasionally     Family History   Problem Relation Age of Onset     C.A.D. Father         bypass     Diabetes Father      Cancer - colorectal Father 42     Coronary Artery Disease Father      Gastrointestinal Disease Mother         diverticulosis     Arthritis Mother         fibromyalgia, lupus, rheumatoid     Anesthesia Reaction Mother         nausea     Asthma Mother      Lipids Mother      Diabetes Mother      Macular Degeneration Other      Cancer Other 29        CML     Breast Cancer Other      Colon Polyps Brother   "    C.ASHAGGY. Maternal Grandfather      Cerebrovascular Disease Maternal Grandfather      Coronary Artery Disease Maternal Grandfather      Hypertension Maternal Grandmother      Glaucoma Maternal Grandmother      Gastrointestinal Disease Maternal Grandmother         diverticulosis     Depression Brother      Arthritis Brother         fibromyalgia     Anesthesia Reaction Brother         nausea     Cancer Maternal Aunt      Breast Cancer Maternal Aunt      Coronary Artery Disease Maternal Uncle      Thyroid Disease No family hx of      Prostate Cancer No family hx of      History   Drug Use No         Objective     /85 (BP Location: Right arm, Patient Position: Sitting, Cuff Size: Adult Large)   Pulse 78   Temp 99.1  F (37.3  C) (Oral)   Ht 1.664 m (5' 5.5\")   Wt 110.6 kg (243 lb 12.8 oz)   LMP 10/31/2021   SpO2 98%   BMI 39.95 kg/m      Physical Exam    GENERAL APPEARANCE: healthy, alert and no distress     EYES: EOMI, PERRL     HENT: ear canals and TM's normal and nose and mouth without ulcers or lesions     NECK: no adenopathy, no asymmetry, masses, or scars and thyroid normal to palpation     RESP: lungs clear to auscultation - no rales, rhonchi or wheezes     CV: regular rates and rhythm, normal S1 S2, no S3 or S4 and no murmur, click or rub     ABDOMEN:  soft, nontender, no HSM or masses and bowel sounds normal     MS: extremities normal- no gross deformities noted, no evidence of inflammation in joints, FROM in all extremities.     SKIN: no suspicious lesions or rashes     NEURO: Normal strength and tone, sensory exam grossly normal, mentation intact and speech normal     PSYCH: mentation appears normal. and affect normal/bright     LYMPHATICS: No cervical adenopathy    Recent Labs   Lab Test 10/26/20  1014 03/30/20  0930   HGB  --  12.9   PLT  --  283    138   POTASSIUM 4.0 4.2   CR 0.62 0.68        Diagnostics:  Labs pending at this time.  Results will be reviewed when available.   No EKG " required, no history of coronary heart disease, significant arrhythmia, peripheral arterial disease or other structural heart disease.    Revised Cardiac Risk Index (RCRI):  The patient has the following serious cardiovascular risks for perioperative complications:   - No serious cardiac risks = 0 points     RCRI Interpretation: 0 points: Class I (very low risk - 0.4% complication rate)           Signed Electronically by:   ROSALIND Chandler NP-C  [facility]  Copy of this evaluation report is provided to requesting physician.

## 2021-11-08 ENCOUNTER — LAB (OUTPATIENT)
Dept: URGENT CARE | Facility: URGENT CARE | Age: 51
End: 2021-11-08
Payer: COMMERCIAL

## 2021-11-08 ENCOUNTER — LAB (OUTPATIENT)
Dept: LAB | Facility: CLINIC | Age: 51
End: 2021-11-08
Payer: COMMERCIAL

## 2021-11-08 ENCOUNTER — OFFICE VISIT (OUTPATIENT)
Dept: FAMILY MEDICINE | Facility: CLINIC | Age: 51
End: 2021-11-08
Payer: COMMERCIAL

## 2021-11-08 VITALS
DIASTOLIC BLOOD PRESSURE: 85 MMHG | SYSTOLIC BLOOD PRESSURE: 134 MMHG | HEART RATE: 78 BPM | TEMPERATURE: 99.1 F | HEIGHT: 66 IN | OXYGEN SATURATION: 98 % | WEIGHT: 243.8 LBS | BODY MASS INDEX: 39.18 KG/M2

## 2021-11-08 DIAGNOSIS — Z11.59 ENCOUNTER FOR SCREENING FOR OTHER VIRAL DISEASES: ICD-10-CM

## 2021-11-08 DIAGNOSIS — K21.9 GASTROESOPHAGEAL REFLUX DISEASE, UNSPECIFIED WHETHER ESOPHAGITIS PRESENT: ICD-10-CM

## 2021-11-08 DIAGNOSIS — C50.912 INVASIVE DUCTAL CARCINOMA OF BREAST, FEMALE, LEFT (H): ICD-10-CM

## 2021-11-08 DIAGNOSIS — R10.9 RECURRING ABDOMINAL PAIN: ICD-10-CM

## 2021-11-08 DIAGNOSIS — C50.912 INVASIVE DUCTAL CARCINOMA OF BREAST, FEMALE, LEFT (H): Primary | ICD-10-CM

## 2021-11-08 DIAGNOSIS — Z01.818 PREOP GENERAL PHYSICAL EXAM: Primary | ICD-10-CM

## 2021-11-08 LAB
ANION GAP SERPL CALCULATED.3IONS-SCNC: 4 MMOL/L (ref 3–14)
BUN SERPL-MCNC: 17 MG/DL (ref 7–30)
CALCIUM SERPL-MCNC: 10 MG/DL (ref 8.5–10.1)
CHLORIDE BLD-SCNC: 105 MMOL/L (ref 94–109)
CO2 SERPL-SCNC: 28 MMOL/L (ref 20–32)
CREAT SERPL-MCNC: 0.69 MG/DL (ref 0.52–1.04)
ERYTHROCYTE [DISTWIDTH] IN BLOOD BY AUTOMATED COUNT: 13.9 % (ref 10–15)
GFR SERPL CREATININE-BSD FRML MDRD: >90 ML/MIN/1.73M2
GLUCOSE BLD-MCNC: 101 MG/DL (ref 70–99)
HCT VFR BLD AUTO: 43.2 % (ref 35–47)
HGB BLD-MCNC: 13.9 G/DL (ref 11.7–15.7)
LAB DIRECTOR INTERPRETATION: NORMAL
MCH RBC QN AUTO: 29.1 PG (ref 26.5–33)
MCHC RBC AUTO-ENTMCNC: 32.2 G/DL (ref 31.5–36.5)
MCV RBC AUTO: 90 FL (ref 78–100)
PLATELET # BLD AUTO: 277 10E3/UL (ref 150–450)
POTASSIUM BLD-SCNC: 4 MMOL/L (ref 3.4–5.3)
RBC # BLD AUTO: 4.78 10E6/UL (ref 3.8–5.2)
SIGNIFICANT RESULTS: NORMAL
SODIUM SERPL-SCNC: 137 MMOL/L (ref 133–144)
SPECIMEN DESCRIPTION: NORMAL
TEST DETAILS, MDL: NORMAL
WBC # BLD AUTO: 7.8 10E3/UL (ref 4–11)

## 2021-11-08 PROCEDURE — 80048 BASIC METABOLIC PNL TOTAL CA: CPT | Performed by: NURSE PRACTITIONER

## 2021-11-08 PROCEDURE — 36415 COLL VENOUS BLD VENIPUNCTURE: CPT | Performed by: NURSE PRACTITIONER

## 2021-11-08 PROCEDURE — 99214 OFFICE O/P EST MOD 30 MIN: CPT | Performed by: NURSE PRACTITIONER

## 2021-11-08 PROCEDURE — 81162 BRCA1&2 GEN FULL SEQ DUP/DEL: CPT | Performed by: SURGERY

## 2021-11-08 PROCEDURE — 85027 COMPLETE CBC AUTOMATED: CPT | Performed by: NURSE PRACTITIONER

## 2021-11-08 PROCEDURE — G0452 MOLECULAR PATHOLOGY INTERPR: HCPCS | Performed by: PATHOLOGY

## 2021-11-08 PROCEDURE — U0005 INFEC AGEN DETEC AMPLI PROBE: HCPCS

## 2021-11-08 PROCEDURE — U0003 INFECTIOUS AGENT DETECTION BY NUCLEIC ACID (DNA OR RNA); SEVERE ACUTE RESPIRATORY SYNDROME CORONAVIRUS 2 (SARS-COV-2) (CORONAVIRUS DISEASE [COVID-19]), AMPLIFIED PROBE TECHNIQUE, MAKING USE OF HIGH THROUGHPUT TECHNOLOGIES AS DESCRIBED BY CMS-2020-01-R: HCPCS

## 2021-11-08 RX ORDER — NICOTINE POLACRILEX 4 MG/1
20 GUM, CHEWING ORAL DAILY
Qty: 30 TABLET | Refills: 1 | COMMUNITY
Start: 2021-11-08 | End: 2023-10-02

## 2021-11-08 ASSESSMENT — MIFFLIN-ST. JEOR: SCORE: 1739.68

## 2021-11-08 ASSESSMENT — PAIN SCALES - GENERAL: PAINLEVEL: NO PAIN (0)

## 2021-11-08 NOTE — RESULT ENCOUNTER NOTE
Nate Womack,   Normal CBC, other labs still pending.  Thank you,  ROSALIND Chandler, NP-C  [facility]

## 2021-11-09 ENCOUNTER — ANESTHESIA EVENT (OUTPATIENT)
Dept: SURGERY | Facility: AMBULATORY SURGERY CENTER | Age: 51
End: 2021-11-09
Payer: COMMERCIAL

## 2021-11-09 LAB — SARS-COV-2 RNA RESP QL NAA+PROBE: NEGATIVE

## 2021-11-09 NOTE — RESULT ENCOUNTER NOTE
Nate Womack,   Your kidney function was normal. I cannot remember if you were fasting as your glucose was slightly high. I also see you have history of elevated glucose, and if you were fasting for previous labs then you are at risk of pre-diabetes. Please let me know if you have questions. We may want to consider checking a A1C, which screens for diabetes specifically.  Thank you,  ROSALIND Chandler, NP-C  [facility]

## 2021-11-10 ENCOUNTER — HOSPITAL ENCOUNTER (OUTPATIENT)
Dept: NUCLEAR MEDICINE | Facility: CLINIC | Age: 51
Setting detail: NUCLEAR MEDICINE
Discharge: HOME | End: 2021-11-10
Attending: SURGERY | Admitting: SURGERY
Payer: COMMERCIAL

## 2021-11-10 ENCOUNTER — ANESTHESIA (OUTPATIENT)
Dept: SURGERY | Facility: AMBULATORY SURGERY CENTER | Age: 51
End: 2021-11-10
Payer: COMMERCIAL

## 2021-11-10 ENCOUNTER — HOSPITAL ENCOUNTER (OUTPATIENT)
Facility: AMBULATORY SURGERY CENTER | Age: 51
End: 2021-11-10
Attending: SURGERY
Payer: COMMERCIAL

## 2021-11-10 ENCOUNTER — ANCILLARY PROCEDURE (OUTPATIENT)
Dept: MAMMOGRAPHY | Facility: CLINIC | Age: 51
End: 2021-11-10
Attending: SURGERY
Payer: COMMERCIAL

## 2021-11-10 VITALS
DIASTOLIC BLOOD PRESSURE: 73 MMHG | BODY MASS INDEX: 39.7 KG/M2 | OXYGEN SATURATION: 96 % | RESPIRATION RATE: 16 BRPM | HEIGHT: 66 IN | HEART RATE: 70 BPM | WEIGHT: 247 LBS | TEMPERATURE: 97.5 F | SYSTOLIC BLOOD PRESSURE: 106 MMHG

## 2021-11-10 DIAGNOSIS — C50.912 INVASIVE DUCTAL CARCINOMA OF BREAST, FEMALE, LEFT (H): ICD-10-CM

## 2021-11-10 LAB
HCG UR QL: NEGATIVE
INTERNAL QC OK POCT: NORMAL

## 2021-11-10 PROCEDURE — 38900 IO MAP OF SENT LYMPH NODE: CPT | Mod: LT | Performed by: SURGERY

## 2021-11-10 PROCEDURE — 38525 BIOPSY/REMOVAL LYMPH NODES: CPT | Mod: LT | Performed by: SURGERY

## 2021-11-10 PROCEDURE — 88307 TISSUE EXAM BY PATHOLOGIST: CPT | Mod: TC | Performed by: SURGERY

## 2021-11-10 PROCEDURE — 88377 M/PHMTRC ALYS ISHQUANT/SEMIQ: CPT | Performed by: SURGERY

## 2021-11-10 PROCEDURE — 88377 M/PHMTRC ALYS ISHQUANT/SEMIQ: CPT | Mod: 26 | Performed by: MEDICAL GENETICS

## 2021-11-10 PROCEDURE — 19301 PARTIAL MASTECTOMY: CPT | Mod: LT

## 2021-11-10 PROCEDURE — 81025 URINE PREGNANCY TEST: CPT | Performed by: PATHOLOGY

## 2021-11-10 PROCEDURE — 38792 RA TRACER ID OF SENTINL NODE: CPT

## 2021-11-10 PROCEDURE — 99207 NM LYMPHOSCINTIGRAPHY INJECTION ONLY: CPT | Performed by: RADIOLOGY

## 2021-11-10 PROCEDURE — 88307 TISSUE EXAM BY PATHOLOGIST: CPT | Mod: 26 | Performed by: PATHOLOGY

## 2021-11-10 PROCEDURE — 19301 PARTIAL MASTECTOMY: CPT | Mod: LT | Performed by: SURGERY

## 2021-11-10 PROCEDURE — 38525 BIOPSY/REMOVAL LYMPH NODES: CPT | Mod: LT

## 2021-11-10 PROCEDURE — 77065 DX MAMMO INCL CAD UNI: CPT | Mod: LT | Performed by: RADIOLOGY

## 2021-11-10 PROCEDURE — 19083 BX BREAST 1ST LESION US IMAG: CPT | Mod: LT | Performed by: RADIOLOGY

## 2021-11-10 RX ORDER — ACETAMINOPHEN 325 MG/1
975 TABLET ORAL ONCE
Status: COMPLETED | OUTPATIENT
Start: 2021-11-10 | End: 2021-11-10

## 2021-11-10 RX ORDER — ALBUTEROL SULFATE 0.83 MG/ML
2.5 SOLUTION RESPIRATORY (INHALATION) EVERY 4 HOURS PRN
Status: DISCONTINUED | OUTPATIENT
Start: 2021-11-10 | End: 2021-11-10 | Stop reason: HOSPADM

## 2021-11-10 RX ORDER — LIDOCAINE 40 MG/G
CREAM TOPICAL
Status: DISCONTINUED | OUTPATIENT
Start: 2021-11-10 | End: 2021-11-10 | Stop reason: HOSPADM

## 2021-11-10 RX ORDER — CEFAZOLIN SODIUM 2 G/50ML
2 SOLUTION INTRAVENOUS SEE ADMIN INSTRUCTIONS
Status: DISCONTINUED | OUTPATIENT
Start: 2021-11-10 | End: 2021-11-10 | Stop reason: HOSPADM

## 2021-11-10 RX ORDER — SODIUM CHLORIDE, SODIUM LACTATE, POTASSIUM CHLORIDE, CALCIUM CHLORIDE 600; 310; 30; 20 MG/100ML; MG/100ML; MG/100ML; MG/100ML
INJECTION, SOLUTION INTRAVENOUS CONTINUOUS
Status: DISCONTINUED | OUTPATIENT
Start: 2021-11-10 | End: 2021-11-10 | Stop reason: HOSPADM

## 2021-11-10 RX ORDER — ONDANSETRON 2 MG/ML
4 INJECTION INTRAMUSCULAR; INTRAVENOUS EVERY 30 MIN PRN
Status: DISCONTINUED | OUTPATIENT
Start: 2021-11-10 | End: 2021-11-11 | Stop reason: HOSPADM

## 2021-11-10 RX ORDER — CEFAZOLIN SODIUM 2 G/50ML
2 SOLUTION INTRAVENOUS
Status: COMPLETED | OUTPATIENT
Start: 2021-11-10 | End: 2021-11-10

## 2021-11-10 RX ORDER — FENTANYL CITRATE 50 UG/ML
50 INJECTION, SOLUTION INTRAMUSCULAR; INTRAVENOUS EVERY 5 MIN PRN
Status: DISCONTINUED | OUTPATIENT
Start: 2021-11-10 | End: 2021-11-10 | Stop reason: HOSPADM

## 2021-11-10 RX ORDER — PROPOFOL 10 MG/ML
INJECTION, EMULSION INTRAVENOUS CONTINUOUS PRN
Status: DISCONTINUED | OUTPATIENT
Start: 2021-11-10 | End: 2021-11-10

## 2021-11-10 RX ORDER — ISOSULFAN BLUE 50 MG/5ML
INJECTION, SOLUTION SUBCUTANEOUS PRN
Status: DISCONTINUED | OUTPATIENT
Start: 2021-11-10 | End: 2021-11-10 | Stop reason: HOSPADM

## 2021-11-10 RX ORDER — ONDANSETRON 2 MG/ML
INJECTION INTRAMUSCULAR; INTRAVENOUS PRN
Status: DISCONTINUED | OUTPATIENT
Start: 2021-11-10 | End: 2021-11-10

## 2021-11-10 RX ORDER — DEXAMETHASONE SODIUM PHOSPHATE 4 MG/ML
INJECTION, SOLUTION INTRA-ARTICULAR; INTRALESIONAL; INTRAMUSCULAR; INTRAVENOUS; SOFT TISSUE PRN
Status: DISCONTINUED | OUTPATIENT
Start: 2021-11-10 | End: 2021-11-10

## 2021-11-10 RX ORDER — LIDOCAINE HYDROCHLORIDE 20 MG/ML
INJECTION, SOLUTION INFILTRATION; PERINEURAL PRN
Status: DISCONTINUED | OUTPATIENT
Start: 2021-11-10 | End: 2021-11-10

## 2021-11-10 RX ORDER — PROPOFOL 10 MG/ML
INJECTION, EMULSION INTRAVENOUS PRN
Status: DISCONTINUED | OUTPATIENT
Start: 2021-11-10 | End: 2021-11-10

## 2021-11-10 RX ORDER — SODIUM CHLORIDE, SODIUM LACTATE, POTASSIUM CHLORIDE, CALCIUM CHLORIDE 600; 310; 30; 20 MG/100ML; MG/100ML; MG/100ML; MG/100ML
INJECTION, SOLUTION INTRAVENOUS CONTINUOUS
Status: DISCONTINUED | OUTPATIENT
Start: 2021-11-10 | End: 2021-11-11 | Stop reason: HOSPADM

## 2021-11-10 RX ORDER — GABAPENTIN 300 MG/1
300 CAPSULE ORAL
Status: COMPLETED | OUTPATIENT
Start: 2021-11-10 | End: 2021-11-10

## 2021-11-10 RX ORDER — HYDROMORPHONE HYDROCHLORIDE 1 MG/ML
0.2 INJECTION, SOLUTION INTRAMUSCULAR; INTRAVENOUS; SUBCUTANEOUS EVERY 5 MIN PRN
Status: DISCONTINUED | OUTPATIENT
Start: 2021-11-10 | End: 2021-11-10 | Stop reason: HOSPADM

## 2021-11-10 RX ORDER — OXYCODONE HYDROCHLORIDE 5 MG/1
5 TABLET ORAL EVERY 4 HOURS PRN
Status: DISCONTINUED | OUTPATIENT
Start: 2021-11-10 | End: 2021-11-11 | Stop reason: HOSPADM

## 2021-11-10 RX ORDER — MEPERIDINE HYDROCHLORIDE 25 MG/ML
12.5 INJECTION INTRAMUSCULAR; INTRAVENOUS; SUBCUTANEOUS
Status: DISCONTINUED | OUTPATIENT
Start: 2021-11-10 | End: 2021-11-11 | Stop reason: HOSPADM

## 2021-11-10 RX ORDER — ONDANSETRON 4 MG/1
4 TABLET, ORALLY DISINTEGRATING ORAL EVERY 30 MIN PRN
Status: DISCONTINUED | OUTPATIENT
Start: 2021-11-10 | End: 2021-11-11 | Stop reason: HOSPADM

## 2021-11-10 RX ADMIN — SODIUM CHLORIDE, SODIUM LACTATE, POTASSIUM CHLORIDE, CALCIUM CHLORIDE: 600; 310; 30; 20 INJECTION, SOLUTION INTRAVENOUS at 08:13

## 2021-11-10 RX ADMIN — Medication 100 MCG: at 10:51

## 2021-11-10 RX ADMIN — GABAPENTIN 300 MG: 300 CAPSULE ORAL at 08:13

## 2021-11-10 RX ADMIN — PROPOFOL 200 MG: 10 INJECTION, EMULSION INTRAVENOUS at 10:07

## 2021-11-10 RX ADMIN — PROPOFOL 200 MCG/KG/MIN: 10 INJECTION, EMULSION INTRAVENOUS at 10:07

## 2021-11-10 RX ADMIN — DEXAMETHASONE SODIUM PHOSPHATE 4 MG: 4 INJECTION, SOLUTION INTRA-ARTICULAR; INTRALESIONAL; INTRAMUSCULAR; INTRAVENOUS; SOFT TISSUE at 10:10

## 2021-11-10 RX ADMIN — ACETAMINOPHEN 975 MG: 325 TABLET ORAL at 08:13

## 2021-11-10 RX ADMIN — LIDOCAINE HYDROCHLORIDE 60 MG: 20 INJECTION, SOLUTION INFILTRATION; PERINEURAL at 10:07

## 2021-11-10 RX ADMIN — ONDANSETRON 8 MG: 2 INJECTION INTRAMUSCULAR; INTRAVENOUS at 10:10

## 2021-11-10 RX ADMIN — CEFAZOLIN SODIUM 2 G: 2 SOLUTION INTRAVENOUS at 10:10

## 2021-11-10 ASSESSMENT — MIFFLIN-ST. JEOR: SCORE: 1762.13

## 2021-11-10 NOTE — OP NOTE
Procedure Date: 11/10/2021    PREOPERATIVE DIAGNOSIS:  Left breast cancer.    POSTOPERATIVE DIAGNOSIS:  Left breast cancer.    PROCEDURES:  Left wire localized lumpectomy, lymphatic mapping, and left axillary sentinel lymph node biopsy x1.    ATTENDING SURGEON:  Ge De La Vega MD    ANESTHESIA:  General with LMA.    INDICATIONS FOR PROCEDURE:  The patient is a 51-year-old woman who has a nonpalpable stage I left breast cancer.  She had a single wire placed in the lesion to localize the tumor today.    DESCRIPTION OF PROCEDURE:  After informed consent, the patient was brought to the operating room, given a general anesthetic, and LMA was placed.  I injected technetium sulfur colloid and isosulfan blue into her left breast.  She was prepped and draped in the usual fashion.     I started with the lumpectomy first.  I administered local anesthetic to the lower outer portion of her left breast.  I made a radial incision with a scalpel.  The Bovie cautery was used to incise subcutaneous tissues.  I intercepted the guidewire from the skin and left it within the breast tissue.  I then dissected circumferentially around the guidewire to ensure adequate surgical margins.  The specimen was removed, oriented, and sent to the breast center.  Specimen radiograph demonstrated complete excision of the target lesion with adequate margins.  Strict hemostasis was obtained with the Bovie cautery.  Surgical clips were placed in all 4 quadrants of the resection bed to facilitate radiation therapy.     Next, I identified a transcutaneous hot spot in the patient's left axilla.  A local anesthetic was administered and a transverse axillary incision was made with a scalpel.  The Bovie cautery was used to incise subcutaneous tissues.  Dissection proceeded through the clavipectoral fascia.  I identified a blue-stained radioactive lymph node.  This lymph node was removed and had ex vivo counts of 110 counts per second.  After removal of this  lymph node, there were no additional blue, radioactive or palpable lymph nodes.  Strict hemostasis was obtained.  Both incisions were closed with interrupted 3-0 Vicryl suture for the dermal layer and a running 4-0 PDS subcuticular stitch for the skin.  Dermabond was placed, and the patient was taken to the recovery room in stable condition.    Ge De La Vega MD        D: 11/10/2021   T: 11/10/2021   MT: TERE    Name:     DIANA BALDEV J.  MRN:      [id number]        Account:        [id number]   :      1970           Procedure Date: 11/10/2021     Document: [id number]

## 2021-11-10 NOTE — ANESTHESIA CARE TRANSFER NOTE
Patient: Lachelle Duque    Procedure: Procedure(s):  Left wire localized breast lumpectomy with sentinel lymph node biopsy       Diagnosis: Invasive ductal carcinoma of breast, female, left (H) [C50.912]  Diagnosis Additional Information: No value filed.    Anesthesia Type:   General     Note:    Oropharynx: oropharynx clear of all foreign objects  Level of Consciousness: awake  Oxygen Supplementation: room air    Independent Airway: airway patency satisfactory and stable  Dentition: dentition unchanged  Vital Signs Stable: post-procedure vital signs reviewed and stable  Report to RN Given: handoff report given  Patient transferred to: PACU    Handoff Report: Identifed the Patient, Identified the Reponsible Provider, Reviewed the pertinent medical history, Discussed the surgical course, Reviewed Intra-OP anesthesia mangement and issues during anesthesia, Set expectations for post-procedure period and Allowed opportunity for questions and acknowledgement of understanding      Vitals:  Vitals Value Taken Time   /70    Temp     Pulse 72    Resp 18    SpO2 92 % 11/10/21 1117   Vitals shown include unvalidated device data.    Electronically Signed By: ROSALIND Lai CRNA  November 10, 2021  11:19 AM

## 2021-11-10 NOTE — DISCHARGE INSTRUCTIONS
"[facility]  Home Care Following Anesthesia  For 24 hours after surgery:  1. Get plenty of rest.  A responsible adult must stay with you for at least 24 hours after you leave the surgery center.  2. Do not drive or use heavy equipment.  If you have weakness or tingling, don't drive or use heavy equipment until this feeling goes away.   3. Do not drink alcohol.   4. Avoid strenuous or risky activities.  Ask for help when climbing stairs.  5. You may feel lightheaded.  IF so, sit for a few minutes before standing.  Have someone help you get up.   6. If you have nausea (feel sick to your stomach): Drink only clear liquids such as apple juice, ginger ale, broth or 7-Up.  Rest may also help.  Be sure to drink enough fluids.  Move to a regular diet as you feel able.   7. You may have a slight fever.  Call the doctor if your fever is over 100 F (37.7 C) (taken under the tongue) or lasts longer than 24 hours.  8. You may have a dry mouth, a sore throat, muscle aches or trouble sleeping. These should go away after 24 hours.  9. Do not make important or legal decisions.   10. It is recommended to avoid smoking.        Today you received a Marcaine or bupivacaine block to numb the nerves near your surgery site.  This is a block using local anesthetic or \"numbing\" medication injected around the nerves to anesthetize or \"numb\" the area supplied by those nerves.  This block is injected into the muscle layer near your surgical site.  The medication may numb the location where you had surgery for 6-18 hours, but may last up to 24 hours.  If your surgical site is an arm or leg you should be careful with your affected limb, since it is possible to injure your limb without being aware of it due to the numbing.  Until full feeling returns, you should guard against bumping or hitting your limb, and avoid extreme hot or cold temperatures on the skin.  As the block wears off, the feeling will return as " a tingling or prickly sensation near your surgical site.  You will experience more discomfort from your incision as the feeling returns.  You may want to take a pain pill (a narcotic or Tylenol if this was prescribed by your surgeon) when you start to experience mild pain before the pain beccomes more severe.  If your pain medications do not control your pain you should notifiy your surgeon.    Tips for taking pain medications  To get the best pain relief possible, remember these points:    Take pain medications as directed, before pain becomes severe.    Pain medication can upset your stomach: taking it with food may help.    Constipation is a common side effect of pain medication. Drink plenty of  fluids.    Eat foods high in fiber. Take a stool softener if recommended by your doctor or pharmacist.    Do not drink alcohol, drive or operate machinery while taking pain medications.    Ask about other ways to control pain, such as with heat, ice or relaxation.    Tylenol/Acetaminophen Consumption  To help encourage the safe use of acetaminophen, the makers of TYLENOL  have lowered the maximum daily dose for single-ingredient Extra Strength TYLENOL  (acetaminophen) products sold in the [place] from 8 pills per day (4,000 mg) to 6 pills per day (3,000 mg). The dosing interval has also changed from 2 pills every 4-6 hours to 2 pills every 6 hours.    If you feel your pain relief is insufficient, you may take Tylenol/Acetaminophen in addition to your narcotic pain medication.     Be careful not to exceed 3,000 mg of Tylenol/Acetaminophen in a 24 hour period from all sources.    If you are taking extra strength Tylenol/acetaminophen (500 mg), the maximum dose is 6 tablets in 24 hours.    If you are taking regular strength acetaminophen (325 mg), the maximum dose is 9 tablets in 24 hours.    Tylenol 975mg given at 8:13am. Next dose available after 2:15pm. Then follow package instructions.     Call a doctor for any of the  followin. Signs of infection (fever, growing tenderness at the surgery site, a large amount of drainage or bleeding, severe pain, foul-smelling drainage, redness, swelling).  2. It has been over 8 to 10 hours since surgery and you are still not able to urinate (pass water).  3. Headache for over 24 hours.  4. Numbness, tingling or weakness the day after surgery (if you had spinal anesthesia).  5. Signs of Covid-19 infection (temperature over 100 degrees, shortness of breath, cough, loss of taste/smell, generalized body aches, persistent headache, chills, sore throat, nausea/vomiting/diarrhea)  Your doctor is:       Dr. Ge De La Vega, [facility]: [phone]               Or dial [phone] and ask for the resident on call for:  [facility]  For emergency care, call the:  [facility]:  [phone] (TTY for hearing impaired: [phone])

## 2021-11-10 NOTE — ANESTHESIA PREPROCEDURE EVALUATION
Anesthesia Pre-Procedure Evaluation    Patient: Lachelle Duque   MRN: [id number] : 1970        Preoperative Diagnosis: Invasive ductal carcinoma of breast, female, left (H) [C50.912]    Procedure : Procedure(s):  Left wire localized breast lumpectomy with sentinel lymph node biopsy          Past Medical History:   Diagnosis Date     Chronic rhinitis      Environmental allergies 2013     Gastro-oesophageal reflux disease      GERD (gastroesophageal reflux disease) 1/3/2013     Herpes zoster without mention of complication     L eye      Hiatal hernia      PONV (postoperative nausea and vomiting)     PONV      Past Surgical History:   Procedure Laterality Date     ADENOIDECTOMY       ARTHROSCOPY KNEE WITH MENISCAL REPAIR Left 2020    Procedure: Examination under anesthesia Left knee, Left knee arthroscopy, Left meniscus root repair;  Surgeon: Armando Sunshine MD;  Location: [facility]     COLONOSCOPY N/A 10/27/2015    Procedure: COLONOSCOPY;  Surgeon: Duane, William Charles, MD;  Location: [facility] OR     COLONOSCOPY WITH CO2 INSUFFLATION N/A 10/27/2015    Procedure: COLONOSCOPY WITH CO2 INSUFFLATION;  Surgeon: Duane, William Charles, MD;  Location: [facility] OR     HC TOOTH EXTRACTION W/FORCEP      20 yo     LAPAROSCOPIC CHOLECYSTECTOMY  2013    Procedure: LAPAROSCOPIC CHOLECYSTECTOMY;  Laparoscopic Cholecystectomy;  Surgeon: Cindy Soni MD;  Location: [facility] OR     SINUS SURGERY      Deviated septum     wisdom teeth removed[        Allergies   Allergen Reactions     Sulfa Drugs Anaphylaxis      Social History     Tobacco Use     Smoking status: Never Smoker     Smokeless tobacco: Never Used   Substance Use Topics     Alcohol use: Yes     Alcohol/week: 0.8 standard drinks     Types: 1 Standard drinks or equivalent per week     Comment: occasionally      Wt Readings from Last 1 Encounters:   11/10/21 112 kg (247 lb)        Anesthesia Evaluation   Pt has had prior anesthetic.     History  of anesthetic complications  - PONV.      ROS/MED HX  ENT/Pulmonary:  - neg pulmonary ROS     Neurologic:  - neg neurologic ROS     Cardiovascular:  - neg cardiovascular ROS     METS/Exercise Tolerance:     Hematologic:  - neg hematologic  ROS     Musculoskeletal:  - neg musculoskeletal ROS     GI/Hepatic:     (+) GERD, hiatal hernia,     Renal/Genitourinary:  - neg Renal ROS     Endo:  - neg endo ROS     Psychiatric/Substance Use:  - neg psychiatric ROS     Infectious Disease:  - neg infectious disease ROS     Malignancy:  - neg malignancy ROS     Other:  - neg other ROS          Physical Exam    Airway  airway exam normal           Respiratory Devices and Support         Dental  no notable dental history         Cardiovascular   cardiovascular exam normal          Pulmonary   pulmonary exam normal                OUTSIDE LABS:  CBC:   Lab Results   Component Value Date    WBC 7.8 11/08/2021    WBC 7.5 03/30/2020    HGB 13.9 11/08/2021    HGB 12.9 03/30/2020    HCT 43.2 11/08/2021    HCT 39.5 03/30/2020     11/08/2021     03/30/2020     BMP:   Lab Results   Component Value Date     11/08/2021     10/26/2020    POTASSIUM 4.0 11/08/2021    POTASSIUM 4.0 10/26/2020    CHLORIDE 105 11/08/2021    CHLORIDE 107 10/26/2020    CO2 28 11/08/2021    CO2 25 10/26/2020    BUN 17 11/08/2021    BUN 15 10/26/2020    CR 0.69 11/08/2021    CR 0.62 10/26/2020     (H) 11/08/2021     (H) 10/26/2020     COAGS: No results found for: PTT, INR, FIBR  POC:   Lab Results   Component Value Date    HCG Negative 11/10/2021     HEPATIC:   Lab Results   Component Value Date    ALBUMIN 3.9 10/26/2020    PROTTOTAL 8.2 10/26/2020    ALT 22 10/26/2020    AST 11 10/26/2020    ALKPHOS 83 10/26/2020    BILITOTAL 0.3 10/26/2020     OTHER:   Lab Results   Component Value Date    A1C 5.4 09/10/2018    DULCE 10.0 11/08/2021    LIPASE 52 01/03/2013    TSH 1.36 10/26/2020    T4 1.15 09/09/2015       Anesthesia Plan    ASA  Status:  2   NPO Status:  NPO Appropriate    Anesthesia Type: General.     - Airway: LMA   Induction: Intravenous.   Maintenance: Balanced.        Consents    Anesthesia Plan(s) and associated risks, benefits, and realistic alternatives discussed. Questions answered and patient/representative(s) expressed understanding.     - Discussed with:  Patient      - Extended Intubation/Ventilatory Support Discussed: No.      - Patient is DNR/DNI Status: No    Use of blood products discussed: No .     Postoperative Care    Pain management: Multi-modal analgesia (patient requests no narcotics).   PONV prophylaxis: Ondansetron (or other 5HT-3), Dexamethasone or Solumedrol, Background Propofol Infusion     Comments:                Miki Ambrocio MD

## 2021-11-10 NOTE — PROGRESS NOTES
SBAR Wire Localization     SITUATION:  Patient to breast imaging center for imaging guided wire localizations before breast lumpectomy or excision biopsy without sentinel node injection.    BACKGROUND:  Breast imaging cancer, breast abnormality  Ordered procedure completed: Yes  Special needs identified: Yes     ASSESSMENT:  SBAR report called to patient care unit because of unexpected event in radiology: No  Allergies and medication list reviewed prior to procedure. Yes  Skin cleansed with ChloraPrep One-Step.  Anesthesia: approximately 10ml of 1% Lidocaine injection subcutaneous before wire insertion administered by the radiologist.   Gauze dressing over insertion site(s).  Post procedure mammogram completed: Yes    Patient tolerance: Tolerated procedure without issue    RECOMMENDATIONS:  Patient transferred to [facility] in stable condition via wheelchair with Breast Imaging Staff.    Please call [facility] [phone] if there are any questions.

## 2021-11-10 NOTE — ANESTHESIA POSTPROCEDURE EVALUATION
Patient: Lachelle Duque    Procedure: Procedure(s):  Left wire localized breast lumpectomy with sentinel lymph node biopsy       Diagnosis:Invasive ductal carcinoma of breast, female, left (H) [C50.912]  Diagnosis Additional Information: No value filed.    Anesthesia Type:  General    Note:  Disposition: Outpatient   Postop Pain Control: Uneventful            Sign Out: Well controlled pain   PONV: No   Neuro/Psych: Uneventful            Sign Out: Acceptable/Baseline neuro status   Airway/Respiratory: Uneventful            Sign Out: Acceptable/Baseline resp. status   CV/Hemodynamics: Uneventful            Sign Out: Acceptable CV status; No obvious hypovolemia; No obvious fluid overload   Other NRE: NONE   DID A NON-ROUTINE EVENT OCCUR? No           Last vitals:  Vitals Value Taken Time   /70 11/10/21 1130   Temp 36.4  C (97.5  F) 11/10/21 1129   Pulse 81 11/10/21 1130   Resp 11 11/10/21 1130   SpO2 91 % 11/10/21 1130   Vitals shown include unvalidated device data.    Electronically Signed By: Miki Ambrocio MD  November 10, 2021  1:18 PM

## 2021-11-11 ENCOUNTER — PATIENT OUTREACH (OUTPATIENT)
Dept: ONCOLOGY | Facility: CLINIC | Age: 51
End: 2021-11-11
Payer: COMMERCIAL

## 2021-11-11 NOTE — PROGRESS NOTES
POST-OP CALL  Nov 11, 2021    Lachelle Duque is a 51 year old female s/p left lumpectomy and left axillary sentinel lymph node biopsy with Dr. De La Vega on 11/11.   Reports doing well.   Pain: Patient reports pain is controlled with tylenol.   Incisions: Patient denies surgical site swelling, erythema, bleeding or drainage. Reports bruising, will monitor.   Fevers/chills: Patient denies fever/chills.  Eating/drinking: Patient is able to eat and drink without any complaints.   Bowel habits: No concerns.   Urine output: Voiding without difficulty.  Follow up appointment scheduled on 11/23 with Dr. De La Vega.  Patient will call with any questions or concerns.    Barbara Bee PA-C

## 2021-11-15 LAB
PATH REPORT.COMMENTS IMP SPEC: NORMAL
PATH REPORT.COMMENTS IMP SPEC: NORMAL
PATH REPORT.FINAL DX SPEC: NORMAL
PATH REPORT.GROSS SPEC: NORMAL
PATH REPORT.MICROSCOPIC SPEC OTHER STN: NORMAL
PATH REPORT.RELEVANT HX SPEC: NORMAL
PATHOLOGY SYNOPTIC REPORT: NORMAL
PHOTO IMAGE: NORMAL

## 2021-11-16 ENCOUNTER — PATIENT OUTREACH (OUTPATIENT)
Dept: ONCOLOGY | Facility: CLINIC | Age: 51
End: 2021-11-16
Payer: COMMERCIAL

## 2021-11-16 ENCOUNTER — TRANSCRIBE ORDERS (OUTPATIENT)
Dept: ONCOLOGY | Facility: CLINIC | Age: 51
End: 2021-11-16
Payer: COMMERCIAL

## 2021-11-16 DIAGNOSIS — C50.919 BREAST CANCER (H): Primary | ICD-10-CM

## 2021-11-16 NOTE — TELEPHONE ENCOUNTER
RECORDS STATUS - BREAST    RECORDS REQUESTED FROM: [facility]   DATE REQUESTED:    NOTES DETAILS STATUS   OFFICE NOTE from referring provider [facility] Dr. Ge De La Vega   OFFICE NOTE from medical oncologist [facility] 10/22/21:Dr. Ge De La Vega   OFFICE NOTE from surgeon     OFFICE NOTE from radiation oncologist N/A    DISCHARGE SUMMARY from hospital N/A    DISCHARGE REPORT from the  N/A    OPERATIVE REPORT [facility] 11/10/21: Left wire localized breast lumpectomy with sentinel lymph node biopsy   MEDICATION LIST [facility]    CLINICAL TRIAL TREATMENTS TO DATE N/A    LABS     REQUEST BLOCKS FOR ALL BREAST CANCER PTS     PATHOLOGY REPORTS  (Tissue diagnosis, Stage, ER/IL percentage positive and intensity of staining, HER2 IHC, FISH, and all biopsies from breast and any distant metastasis)                 Epic 11/10/21: [id number]  10/15/21: [id number]   GENONOMIC TESTING     TYPE:   (Next Generation Sequencing, including Foundation One testing, and Oncotype score) EPIC 21:Next Generation [id number]    21: Hereditary cancer BRCA1/BRCA2: [id number]    10/15/21: HER 2 JUAN MANUEL FISH: [id number]   IMAGING (NEED IMAGES & REPORT)     CT SCANS     MRI     MAMMO PACS 11/10/21-12   ULTRASOUND PACS 11/10/21-10/08/21   NM PACS 11/10/21   PET     BONE SCAN     BRAIN MRI

## 2021-11-18 ENCOUNTER — ONCOLOGY VISIT (OUTPATIENT)
Dept: ONCOLOGY | Facility: CLINIC | Age: 51
End: 2021-11-18
Attending: INTERNAL MEDICINE
Payer: COMMERCIAL

## 2021-11-18 ENCOUNTER — PRE VISIT (OUTPATIENT)
Dept: ONCOLOGY | Facility: CLINIC | Age: 51
End: 2021-11-18
Payer: COMMERCIAL

## 2021-11-18 ENCOUNTER — PATIENT OUTREACH (OUTPATIENT)
Dept: ONCOLOGY | Facility: CLINIC | Age: 51
End: 2021-11-18

## 2021-11-18 ENCOUNTER — PATIENT OUTREACH (OUTPATIENT)
Dept: ONCOLOGY | Facility: CLINIC | Age: 51
End: 2021-11-18
Payer: COMMERCIAL

## 2021-11-18 VITALS
WEIGHT: 249.5 LBS | SYSTOLIC BLOOD PRESSURE: 132 MMHG | HEART RATE: 89 BPM | HEIGHT: 65 IN | RESPIRATION RATE: 16 BRPM | TEMPERATURE: 98.7 F | DIASTOLIC BLOOD PRESSURE: 83 MMHG | OXYGEN SATURATION: 98 % | BODY MASS INDEX: 41.57 KG/M2

## 2021-11-18 DIAGNOSIS — C50.919 BREAST CANCER (H): ICD-10-CM

## 2021-11-18 DIAGNOSIS — Z17.0 MALIGNANT NEOPLASM OF UPPER-OUTER QUADRANT OF LEFT BREAST IN FEMALE, ESTROGEN RECEPTOR POSITIVE (H): Primary | ICD-10-CM

## 2021-11-18 DIAGNOSIS — C50.412 MALIGNANT NEOPLASM OF UPPER-OUTER QUADRANT OF LEFT BREAST IN FEMALE, ESTROGEN RECEPTOR POSITIVE (H): Primary | ICD-10-CM

## 2021-11-18 LAB — FSH SERPL-ACNC: 35.5 IU/L

## 2021-11-18 PROCEDURE — 99205 OFFICE O/P NEW HI 60 MIN: CPT | Performed by: INTERNAL MEDICINE

## 2021-11-18 PROCEDURE — 36415 COLL VENOUS BLD VENIPUNCTURE: CPT | Performed by: INTERNAL MEDICINE

## 2021-11-18 PROCEDURE — 82670 ASSAY OF TOTAL ESTRADIOL: CPT | Performed by: INTERNAL MEDICINE

## 2021-11-18 PROCEDURE — G0463 HOSPITAL OUTPT CLINIC VISIT: HCPCS

## 2021-11-18 PROCEDURE — 83001 ASSAY OF GONADOTROPIN (FSH): CPT | Performed by: INTERNAL MEDICINE

## 2021-11-18 ASSESSMENT — PAIN SCALES - GENERAL: PAINLEVEL: NO PAIN (0)

## 2021-11-18 ASSESSMENT — MIFFLIN-ST. JEOR: SCORE: 1765.09

## 2021-11-18 NOTE — NURSING NOTE
"Oncology Rooming Note    November 18, 2021 8:17 AM   Lachelle Duque is a 51 year old female who presents for:    Chief Complaint   Patient presents with     Oncology Clinic Visit     BREAST CANCER     Initial Vitals: /83 (BP Location: Right arm, Patient Position: Sitting, Cuff Size: Adult Large)   Pulse 89   Temp 98.7  F (37.1  C) (Oral)   Resp 16   Ht 1.663 m (5' 5.47\")   Wt 113.2 kg (249 lb 8 oz)   LMP 10/31/2021   SpO2 98%   BMI 40.92 kg/m   Estimated body mass index is 40.92 kg/m  as calculated from the following:    Height as of this encounter: 1.663 m (5' 5.47\").    Weight as of this encounter: 113.2 kg (249 lb 8 oz). Body surface area is 2.29 meters squared.  No Pain (0) Comment: Data Unavailable   Patient's last menstrual period was 10/31/2021.  Allergies reviewed: Yes  Medications reviewed: Yes    Medications: Medication refills not needed today.  Pharmacy name entered into [employer]:    [facility] - [place], [place] - [address]  [facility] [place] - [address]  [facility]  [facility] - [place], [place] - [address] [facility] [place], [place] - [phone]    Clinical concerns: None.        Halle Gutierrez CMA            "

## 2021-11-18 NOTE — PROGRESS NOTES
Oncology Consult:    I am seeing Ms. Shanta Duque for a newly diagnosed left breast cancer, ER + VA + her2 negative.      She underwent a screening mammogram on 09/29/2021, which demonstrated possible asymmetry in her left breast at 4-o'clock position.  On 10/08/2021, she underwent a mammogram and ultrasound.  On mammography, there was a spiculated mass, at the 4-o'clock position, measuring 1.3 cm in size.  On ultrasound, the mass measured 1.0 cm in size.  Her lymph nodes were negative by ultrasonography. On 10/15/2021, she underwent a contrast enhanced mammography which demonstrated the lesion to be 1.1 cm.  There are no other suspicious lesions in the rest of her breast.  Also, on 10/15, she underwent a needle biopsy, which demonstrated invasive ductal cancer, grade 1, ER positive, VA positive, HER2 negative. She has not palpated a mass in her breast.  She has not had a prior history of any breast problems.    She met with Dr Ge De La Vega.  She underwent a lumpectomy and sentinel lymph node biopsy.  Pathology revealed a 1.3 cm invasive ductal carcinoma.  1/1 lymph node was positive and measured 2 cm.      She is feeling well and recovering well from her surgery.  She is very nervous thinking about lymph node involvement.      She has little pain.  She wants to know the next best steps.      No lymphedema.  No n/v/d/c.  No f/c.       ROS: 10 point ROS neg other than the symptoms noted above in the HPI.       PAST MEDICAL HISTORY:  No major medical problems.    Current Outpatient Medications   Medication     acyclovir (ZOVIRAX) 400 MG tablet     cetirizine HCl (ZYRTEC ALLERGY) 10 MG CAPS     diclofenac (VOLTAREN) 75 MG EC tablet     diphenhydrAMINE (BENADRYL) 25 MG capsule     fluticasone (FLONASE) 50 MCG/ACT nasal spray     ibuprofen (ADVIL/MOTRIN) 600 MG tablet     omeprazole 20 MG tablet     pseudoePHEDrine (SUDAFED 12 HOUR) 120 MG 12 hr tablet     No current facility-administered medications for this visit.  "    Allergies:  Sulfa drugs     FAMILY HISTORY:  Significant for a maternal aunt with breast cancer.  No history of ovarian cancer.     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  No tobacco use.  .  .  Here with a friend.    She has never had a breast biopsy previously.     PHYSICAL EXAMINATION:  /83 (BP Location: Right arm, Patient Position: Sitting, Cuff Size: Adult Large)   Pulse 89   Temp 98.7  F (37.1  C) (Oral)   Resp 16   Ht 1.663 m (5' 5.47\")   Wt 113.2 kg (249 lb 8 oz)   LMP 10/31/2021   SpO2 98%   BMI 40.92 kg/m    She is a well-appearing woman in no apparent distress.   HEENT: no icterus  NECK: supple  CV: rrr  Lungs: clear  Abd; soft, nt nd + bs  Ext: no edema  Bilateral breast examination reveals scar in left breast and left axilla.  There are no palpable masses.  No lymphedema.       Reviewed her pathology and imaging personally at our multidisciplinary conference.    Pathology reviewed personally by me.    Breast, LEFT, wire localized lumpectomy:  -INVASIVE BREAST CARCINOMA OF NO SPECIAL TYPE (INVASIVE DUCTAL CARCINOMA), FREDI GRADE 1, size 1.1 cm  -Focal ductal carcinoma in situ (DCIS), nuclear grade 2, solid and cribriform types, comprises < 1% of the tumor volume   -No lymphovascular invasion identified  -Margins are uninvolved by invasive carcinoma  -Invasive carcinoma is > 5 mm from all margins  -Margins are uninvolved by DCIS  -DCIS is 1.5 mm from the closest (posterior) margin  -Focal atypical ductal hyperplasia  -Other findings: Columnar cell change and fibrocystic change (including microcysts with apocrine metaplasia)  -Calcifications associated with invasive carcinoma and benign epithelium  -Prior core biopsy site changes  -Invasive carcinoma is estrogen receptor positive (98%, strong intensity) and progesterone receptor positive (90%, moderate intensity) by immunohistochemistry and is HER2 non-amplified by FISH " (HER2:CEN17 ratio 1.1, see prior core biopsy report [id number])  -See below for tumor synoptic     B(2).  Lymph node, left axillary, sentinel #1, excision:  -Macrometastatic carcinoma in one of one lymph node (1/1)  -Metastatic carcinoma is 1.1 cm in greatest dimension  -Focal extranodal extension is present (extent = 1 mm)  -HER2 FISH results will be reported separately by cytogenetics     Lab Results   Component Value Date    WBC 7.8 11/08/2021    WBC 7.5 03/30/2020     Lab Results   Component Value Date    RBC 4.78 11/08/2021    RBC 4.25 03/30/2020     Lab Results   Component Value Date    HGB 13.9 11/08/2021    HGB 12.9 03/30/2020     Lab Results   Component Value Date    HCT 43.2 11/08/2021    HCT 39.5 03/30/2020     No components found for: MCT  Lab Results   Component Value Date    MCV 90 11/08/2021    MCV 93 03/30/2020     Lab Results   Component Value Date    MCH 29.1 11/08/2021    MCH 30.4 03/30/2020     Lab Results   Component Value Date    MCHC 32.2 11/08/2021    MCHC 32.7 03/30/2020     Lab Results   Component Value Date    RDW 13.9 11/08/2021    RDW 13.0 03/30/2020     Lab Results   Component Value Date     11/08/2021     03/30/2020       Recent Labs   Lab Test 11/08/21  1118 10/26/20  1014    138   POTASSIUM 4.0 4.0   CHLORIDE 105 107   CO2 28 25   ANIONGAP 4 6   * 108*   BUN 17 15   CR 0.69 0.62   DULCE 10.0 9.1     Liver Function Studies - Recent Labs   Lab Test 10/26/20  1014   PROTTOTAL 8.2   ALBUMIN 3.9   BILITOTAL 0.3   ALKPHOS 83   AST 11   ALT 22     A/P:  52 y/o female with a new left breast cancer pT1cN1 ER + TX + her2 negative now s/p lumpectomy and SNLB.      Breast cancer-  I reviewed all of Shanta's pathology.  We discussed that she has had complete surgical excision.  She will need adjuvant radiation.  A referral was placed.    I discussed that with lymph node involvement and a hormone positive breast cancer, it will be important to look at genomic profiling with  Oncotype Dx testing.  If this is in the low risk range, I would not recommend chemotherapy.  If it comes back with a score of 11-25, it will be important to discuss her menopausal status, and rediscuss the pros and cons of chemotherapy, though in most of these situations, there is little benefit to chemotherapy as outlined by the ResponDRx trials.  As a result, we will send off oncotype testing, and have her get labs drawn today.    She meets criteria to have genetic testing.  She had blood tests drawn when she met Dr De La Vega revealing a VUS in the BRCA 2 gene.  Referral to genetic counselor placed.    She will need adjuvant endocrine therapy with tamoxifen or an aromatase inhibitor.  We discussed that if she is perimenopausal, then I would advocate for ovarian suppression and an aromatase inhibitor based on the RespondRx trial.      She met with [facility] to discuss supportive care services.  I reviewed with her that she can work through her treatment.  She may want intermittent time off.    I will see her back after we get her oncotype dx testing back.    We discussed healthy lifestyle, reducing alcohol, and once she has recovered to exercise regularly.    Giovanna Alegria MD

## 2021-11-18 NOTE — NURSING NOTE
Patient labs drawn in clinic via venipuncture. Patient tolerated well. See flowsheet for details.      Halle Gutierrez, CARLO on 11/18/2021 at 9:38 AM

## 2021-11-18 NOTE — PROGRESS NOTES
Met with patient to introduce self, provide contact information and answer questions.     Plan:  Oncotype testing  Genetics referral  Radiation Consult    Will call patient as soon as Oncotype results are back.     Encouraged her to call with any additional questions or concerns.

## 2021-11-18 NOTE — LETTER
11/18/2021         RE: Lachelle Duque  [address]  [place] [address]        Dear Colleague,    Thank you for referring your patient, Lachelle Duque, to the [facility]. Please see a copy of my visit note below.    Oncology Consult:    I am seeing Ms. Shanta Duque for a newly diagnosed left breast cancer, ER + LA + her2 negative.      She underwent a screening mammogram on 09/29/2021, which demonstrated possible asymmetry in her left breast at 4-o'clock position.  On 10/08/2021, she underwent a mammogram and ultrasound.  On mammography, there was a spiculated mass, at the 4-o'clock position, measuring 1.3 cm in size.  On ultrasound, the mass measured 1.0 cm in size.  Her lymph nodes were negative by ultrasonography. On 10/15/2021, she underwent a contrast enhanced mammography which demonstrated the lesion to be 1.1 cm.  There are no other suspicious lesions in the rest of her breast.  Also, on 10/15, she underwent a needle biopsy, which demonstrated invasive ductal cancer, grade 1, ER positive, LA positive, HER2 negative. She has not palpated a mass in her breast.  She has not had a prior history of any breast problems.    She met with Dr Ge De La Vega.  She underwent a lumpectomy and sentinel lymph node biopsy.  Pathology revealed a 1.3 cm invasive ductal carcinoma.  1/1 lymph node was positive and measured 2 cm.      She is feeling well and recovering well from her surgery.  She is very nervous thinking about lymph node involvement.      She has little pain.  She wants to know the next best steps.      No lymphedema.  No n/v/d/c.  No f/c.       ROS: 10 point ROS neg other than the symptoms noted above in the HPI.       PAST MEDICAL HISTORY:  No major medical problems.    Current Outpatient Medications   Medication     acyclovir (ZOVIRAX) 400 MG tablet     cetirizine HCl (ZYRTEC ALLERGY) 10 MG CAPS     diclofenac (VOLTAREN) 75 MG EC tablet     diphenhydrAMINE (BENADRYL)  "25 MG capsule     fluticasone (FLONASE) 50 MCG/ACT nasal spray     ibuprofen (ADVIL/MOTRIN) 600 MG tablet     omeprazole 20 MG tablet     pseudoePHEDrine (SUDAFED 12 HOUR) 120 MG 12 hr tablet     No current facility-administered medications for this visit.     Allergies:  Sulfa drugs     FAMILY HISTORY:  Significant for a maternal aunt with breast cancer.  No history of ovarian cancer.     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  No tobacco use.  .  .  Here with a friend.    She has never had a breast biopsy previously.     PHYSICAL EXAMINATION:  /83 (BP Location: Right arm, Patient Position: Sitting, Cuff Size: Adult Large)   Pulse 89   Temp 98.7  F (37.1  C) (Oral)   Resp 16   Ht 1.663 m (5' 5.47\")   Wt 113.2 kg (249 lb 8 oz)   LMP 10/31/2021   SpO2 98%   BMI 40.92 kg/m    She is a well-appearing woman in no apparent distress.   HEENT: no icterus  NECK: supple  CV: rrr  Lungs: clear  Abd; soft, nt nd + bs  Ext: no edema  Bilateral breast examination reveals scar in left breast and left axilla.  There are no palpable masses.  No lymphedema.       Reviewed her pathology and imaging personally at our multidisciplinary conference.    Pathology reviewed personally by me.    Breast, LEFT, wire localized lumpectomy:  -INVASIVE BREAST CARCINOMA OF NO SPECIAL TYPE (INVASIVE DUCTAL CARCINOMA), FREDI GRADE 1, size 1.1 cm  -Focal ductal carcinoma in situ (DCIS), nuclear grade 2, solid and cribriform types, comprises < 1% of the tumor volume   -No lymphovascular invasion identified  -Margins are uninvolved by invasive carcinoma  -Invasive carcinoma is > 5 mm from all margins  -Margins are uninvolved by DCIS  -DCIS is 1.5 mm from the closest (posterior) margin  -Focal atypical ductal hyperplasia  -Other findings: Columnar cell change and fibrocystic change (including microcysts with apocrine metaplasia)  -Calcifications associated with invasive " carcinoma and benign epithelium  -Prior core biopsy site changes  -Invasive carcinoma is estrogen receptor positive (98%, strong intensity) and progesterone receptor positive (90%, moderate intensity) by immunohistochemistry and is HER2 non-amplified by FISH (HER2:CEN17 ratio 1.1, see prior core biopsy report [id number])  -See below for tumor synoptic     B(2).  Lymph node, left axillary, sentinel #1, excision:  -Macrometastatic carcinoma in one of one lymph node (1/1)  -Metastatic carcinoma is 1.1 cm in greatest dimension  -Focal extranodal extension is present (extent = 1 mm)  -HER2 FISH results will be reported separately by cytogenetics     Lab Results   Component Value Date    WBC 7.8 11/08/2021    WBC 7.5 03/30/2020     Lab Results   Component Value Date    RBC 4.78 11/08/2021    RBC 4.25 03/30/2020     Lab Results   Component Value Date    HGB 13.9 11/08/2021    HGB 12.9 03/30/2020     Lab Results   Component Value Date    HCT 43.2 11/08/2021    HCT 39.5 03/30/2020     No components found for: MCT  Lab Results   Component Value Date    MCV 90 11/08/2021    MCV 93 03/30/2020     Lab Results   Component Value Date    MCH 29.1 11/08/2021    MCH 30.4 03/30/2020     Lab Results   Component Value Date    MCHC 32.2 11/08/2021    MCHC 32.7 03/30/2020     Lab Results   Component Value Date    RDW 13.9 11/08/2021    RDW 13.0 03/30/2020     Lab Results   Component Value Date     11/08/2021     03/30/2020       Recent Labs   Lab Test 11/08/21  1118 10/26/20  1014    138   POTASSIUM 4.0 4.0   CHLORIDE 105 107   CO2 28 25   ANIONGAP 4 6   * 108*   BUN 17 15   CR 0.69 0.62   DULCE 10.0 9.1     Liver Function Studies - Recent Labs   Lab Test 10/26/20  1014   PROTTOTAL 8.2   ALBUMIN 3.9   BILITOTAL 0.3   ALKPHOS 83   AST 11   ALT 22     A/P:  52 y/o female with a new left breast cancer pT1cN1 ER + [place] + her2 negative now s/p lumpectomy and SNLB.      Breast cancer-  I reviewed all of Shanta's pathology.   We discussed that she has had complete surgical excision.  She will need adjuvant radiation.  A referral was placed.    I discussed that with lymph node involvement and a hormone positive breast cancer, it will be important to look at genomic profiling with Oncotype Dx testing.  If this is in the low risk range, I would not recommend chemotherapy.  If it comes back with a score of 11-25, it will be important to discuss her menopausal status, and rediscuss the pros and cons of chemotherapy, though in most of these situations, there is little benefit to chemotherapy as outlined by the ResponDRx trials.  As a result, we will send off oncotype testing, and have her get labs drawn today.    She meets criteria to have genetic testing.  She had blood tests drawn when she met Dr De La Vega revealing a VUS in the BRCA 2 gene.  Referral to genetic counselor placed.    She will need adjuvant endocrine therapy with tamoxifen or an aromatase inhibitor.  We discussed that if she is perimenopausal, then I would advocate for ovarian suppression and an aromatase inhibitor based on the RespondRx trial.      She met with [facility] to discuss supportive care services.  I reviewed with her that she can work through her treatment.  She may want intermittent time off.    I will see her back after we get her oncotype dx testing back.    We discussed healthy lifestyle, reducing alcohol, and once she has recovered to exercise regularly.    Giovanna Alegria MD

## 2021-11-20 ENCOUNTER — DOCUMENTATION ONLY (OUTPATIENT)
Dept: ONCOLOGY | Facility: CLINIC | Age: 51
End: 2021-11-20
Payer: COMMERCIAL

## 2021-11-20 NOTE — NURSING NOTE
FMLA forms received via my chart from pt.      Forms to be completed and put in folder for provider to approve.    Completed forms to be attached to my chart for pt.    Soumya Kate CMA ([facility])

## 2021-11-23 ENCOUNTER — ONCOLOGY VISIT (OUTPATIENT)
Dept: ONCOLOGY | Facility: CLINIC | Age: 51
End: 2021-11-23
Attending: SURGERY
Payer: COMMERCIAL

## 2021-11-23 ENCOUNTER — MYC MEDICAL ADVICE (OUTPATIENT)
Dept: ONCOLOGY | Facility: CLINIC | Age: 51
End: 2021-11-23
Payer: COMMERCIAL

## 2021-11-23 VITALS
DIASTOLIC BLOOD PRESSURE: 86 MMHG | OXYGEN SATURATION: 98 % | BODY MASS INDEX: 40.66 KG/M2 | WEIGHT: 247.9 LBS | SYSTOLIC BLOOD PRESSURE: 128 MMHG | TEMPERATURE: 98.8 F | HEART RATE: 76 BPM

## 2021-11-23 DIAGNOSIS — Z17.0 MALIGNANT NEOPLASM OF UPPER-OUTER QUADRANT OF LEFT BREAST IN FEMALE, ESTROGEN RECEPTOR POSITIVE (H): Primary | ICD-10-CM

## 2021-11-23 DIAGNOSIS — C50.412 MALIGNANT NEOPLASM OF UPPER-OUTER QUADRANT OF LEFT BREAST IN FEMALE, ESTROGEN RECEPTOR POSITIVE (H): Primary | ICD-10-CM

## 2021-11-23 PROCEDURE — G0463 HOSPITAL OUTPT CLINIC VISIT: HCPCS

## 2021-11-23 ASSESSMENT — PAIN SCALES - GENERAL: PAINLEVEL: NO PAIN (0)

## 2021-11-23 NOTE — PROGRESS NOTES
HISTORY OF PRESENT ILLNESS:  Lachelle Duque is here for a postoperative visit after undergoing a left lumpectomy and a sentinel lymph node biopsy for stage I, estrogen receptor positive breast cancer on 11/10/2021.  Her pathology showed a 1.1 cm breast cancer.  It was grade 2. The margins were negative, but she did have a lymph node metastasis.  She also had genetic testing and had a BUS.  She has seen Dr. Sarkar who has ordered an Oncotype DX.  She is scheduled to see Genetic Counseling in February for a followup.    PHYSICAL EXAMINATION:  Both of her incisions are healing well with no evidence of infection.    IMPRESSION:  Postoperative check.    PLAN:  She will follow up with Dr. Sarkar.  I will see her in the future if any problems arise.

## 2021-11-23 NOTE — NURSING NOTE
"Oncology Rooming Note    November 23, 2021 8:12 AM   Lachelle Duque is a 51 year old female who presents for:    Chief Complaint   Patient presents with     Oncology Clinic Visit     breast cancer; post-op     Initial Vitals: /86   Pulse 76   Temp 98.8  F (37.1  C) (Oral)   Wt 112.4 kg (247 lb 14.4 oz)   LMP 10/31/2021   SpO2 98%   BMI 40.66 kg/m   Estimated body mass index is 40.66 kg/m  as calculated from the following:    Height as of 11/18/21: 1.663 m (5' 5.47\").    Weight as of this encounter: 112.4 kg (247 lb 14.4 oz). Body surface area is 2.28 meters squared.  No Pain (0) Comment: Data Unavailable   Patient's last menstrual period was 10/31/2021.  Allergies reviewed: Yes  Medications reviewed: Yes    Medications: Medication refills not needed today.  Pharmacy name entered into [employer]:    [facility] - [place], [place] - [address]  [facility] [place] - [address]  [facility]  [facility] - [place], [place] - [address] [facility] - [place], [place] - [phone]    Clinical concerns: none       Aline Zepeda CMA            "

## 2021-11-23 NOTE — LETTER
11/23/2021         RE: Lachelle Duque  [address]  [place] [address]        Dear Colleague,    Thank you for referring your patient, Lachelle Duque, to the [facility]. Please see a copy of my visit note below.    HISTORY OF PRESENT ILLNESS:  Lachelle Duque is here for a postoperative visit after undergoing a left lumpectomy and a sentinel lymph node biopsy for stage I, estrogen receptor positive breast cancer on 11/10/2021.  Her pathology showed a 1.1 cm breast cancer.  It was grade 2. The margins were negative, but she did have a lymph node metastasis.  She also had genetic testing and had a BUS.  She has seen Dr. Sarkar who has ordered an Oncotype DX.  She is scheduled to see Genetic Counseling in February for a followup.    PHYSICAL EXAMINATION:  Both of her incisions are healing well with no evidence of infection.    IMPRESSION:  Postoperative check.    PLAN:  She will follow up with Dr. Sarkar.  I will see her in the future if any problems arise.      Ge De La Vega MD

## 2021-11-24 DIAGNOSIS — C50.412 MALIGNANT NEOPLASM OF UPPER-OUTER QUADRANT OF LEFT BREAST IN FEMALE, ESTROGEN RECEPTOR POSITIVE (H): Primary | ICD-10-CM

## 2021-11-24 DIAGNOSIS — Z17.0 MALIGNANT NEOPLASM OF UPPER-OUTER QUADRANT OF LEFT BREAST IN FEMALE, ESTROGEN RECEPTOR POSITIVE (H): Primary | ICD-10-CM

## 2021-11-24 LAB
ESTRADIOL SERPL HS-MCNC: 6 PG/ML
INTERPRETATION: NORMAL

## 2021-11-28 NOTE — NURSING NOTE
FMLA forms filled out and put in providers folder for review and signature.      Soumya Kate CMA ([facility])

## 2021-11-29 NOTE — NURSING NOTE
LA paperwork completed, checked for accuracy, signed and faxed to  [employer] @ [phone]. A copy was made, sent to scanning and original mailed to patient at home address.    Successful transmission verified in Right Fax.    Soumya Kate CMA

## 2021-11-30 ENCOUNTER — OFFICE VISIT (OUTPATIENT)
Dept: OPTOMETRY | Facility: CLINIC | Age: 51
End: 2021-11-30
Payer: COMMERCIAL

## 2021-11-30 DIAGNOSIS — H52.03 HYPEROPIA, BILATERAL: ICD-10-CM

## 2021-11-30 DIAGNOSIS — H52.223 REGULAR ASTIGMATISM OF BOTH EYES: ICD-10-CM

## 2021-11-30 DIAGNOSIS — H52.4 PRESBYOPIA: ICD-10-CM

## 2021-11-30 DIAGNOSIS — Z01.00 EXAMINATION OF EYES AND VISION: Primary | ICD-10-CM

## 2021-11-30 PROCEDURE — 92014 COMPRE OPH EXAM EST PT 1/>: CPT | Performed by: OPTOMETRIST

## 2021-11-30 PROCEDURE — 92015 DETERMINE REFRACTIVE STATE: CPT | Performed by: OPTOMETRIST

## 2021-11-30 RX ORDER — OLOPATADINE HYDROCHLORIDE 2 MG/ML
1 SOLUTION/ DROPS OPHTHALMIC DAILY
Qty: 2.5 ML | Refills: 11 | Status: CANCELLED | OUTPATIENT
Start: 2021-11-30 | End: 2022-11-30

## 2021-11-30 ASSESSMENT — REFRACTION_MANIFEST
OD_ADD: +2.00
OS_SPHERE: +0.50
OS_ADD: +2.00
OD_AXIS: 020
OS_CYLINDER: +1.25
OD_CYLINDER: +1.25
OS_AXIS: 167
OD_SPHERE: +0.75

## 2021-11-30 ASSESSMENT — CUP TO DISC RATIO
OS_RATIO: 0.1
OD_RATIO: 0.1

## 2021-11-30 ASSESSMENT — SLIT LAMP EXAM - LIDS
COMMENTS: NORMAL
COMMENTS: NORMAL

## 2021-11-30 ASSESSMENT — REFRACTION_WEARINGRX
OS_AXIS: 164
OS_CYLINDER: +0.75
OS_AXIS: 164
OS_CYLINDER: +0.75
OD_AXIS: 020
OD_SPHERE: +0.75
OS_ADD: +2.00
OS_ADD: +2.00
OD_ADD: +2.00
OD_AXIS: 020
SPECS_TYPE: PAL
OS_SPHERE: +0.50
OD_CYLINDER: +1.25
OD_ADD: +2.00
OS_SPHERE: +0.50
OD_CYLINDER: +1.25
OD_SPHERE: +0.75

## 2021-11-30 ASSESSMENT — KERATOMETRY
OD_AXISANGLE2_DEGREES: 95
OS_AXISANGLE2_DEGREES: 84
OD_K2POWER_DIOPTERS: 45.75
OD_K1POWER_DIOPTERS: 44.50
OS_K1POWER_DIOPTERS: 44.25
OS_K2POWER_DIOPTERS: 45.25

## 2021-11-30 ASSESSMENT — EXTERNAL EXAM - LEFT EYE: OS_EXAM: NORMAL

## 2021-11-30 ASSESSMENT — VISUAL ACUITY
OS_CC: 20/30
CORRECTION_TYPE: GLASSES
OS_SC: 20/25
OD_SC: 20/30
OD_CC: 20/30
OD_CC: 20/20
METHOD: SNELLEN - LINEAR
OD_SC+: -1
OS_SC+: -1
OD_CC+: -1
OS_CC+: -2
OS_CC: 20/25

## 2021-11-30 ASSESSMENT — TONOMETRY
OD_IOP_MMHG: 19
IOP_METHOD: TONOPEN
OS_IOP_MMHG: 21

## 2021-11-30 ASSESSMENT — EXTERNAL EXAM - RIGHT EYE: OD_EXAM: NORMAL

## 2021-11-30 ASSESSMENT — CONF VISUAL FIELD
OD_NORMAL: 1
OS_NORMAL: 1

## 2021-11-30 NOTE — PROGRESS NOTES
Department of Radiation Oncology                   [address]  [address] [place]  [address]  Office:  [phone]  Fax:  [phone]   Radiation Oncology Clinic  [address] [place] [address]  Phone:  [phone]  Fax:  [phone]     RE: Lachelle Duque : 1970   MRN: [id number] JESSY: 2021     OUTPATIENT VISIT NOTE       PROBLEM: Invasive ductal carcinoma of the left breast, status post lumpectomy and SLN Bx, kO1wT2l(sn), grade 1, ER+/DE+/HER2-      was seen for initial consultation in the Dept of Therapeutic Radiology on 2021 at the request of Dr. Alegria.    HISTORY OF PRESENT ILLNESS: Shanta is a 50 yo female with a newly diagnosed left breast cancer. Screening mammogram from 21 revealed a possible asymmetry in the left breast at 4:00, posterior depth. Diagnostic mammogram confirmed the spiculated mass in the left breast. On the ultrasound, this was at 4:00, 6 cm from the nipple, measuring 0.9 x 1.0 x 0.7 cm. There was no suspicious adenopathy in the left axilla. Contrast mammogram did not find additional area of enhancement. US guided biopsy on 10/15/21 was consistent with invasive ductal carcinoma, grade 1, ER+/DE+HER2-, Ki-67 10%.     Shanta saw Dr. De La Vega on 10/22/21 and opted for breast conservation surgery. Breast Actionable Panel showed no mutation but did detect a VUS in BRCA2 gene. She underwent wire-localized lumpectomy and SLN biopsy on 11/10/21. Pathology confirmed invasive ductal carcinoma, Madisyn grade 1, measuring 1.1 cm in greatest dimension. Associated DCIS was nuclear grade 2, comprising < 1% of the tumor volume. There was no LVSI. Closest invasive margin was > 5 mm, DCIS was 1.5 mm (posterior margin). / SLN contained a 1.1 cm macrometastasis with 1 mm focal extracapsular extension. HER2 on the lymph node was non-amplified. Final pathologic stage [id number](sn).     She met with Dr. Alegria on 21. Oncotype Dx score is  pending. She is here today to discuss adjuvant radiation therapy.    On interview, Shanta states that she is well healed from the surgery. She denies pain and reports no range of motion deficit. 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     PAST MEDICAL HISTORY:   Past Medical History:   Diagnosis Date     Chronic rhinitis      Environmental allergies 1/18/2013     Gastro-oesophageal reflux disease      GERD (gastroesophageal reflux disease) 1/3/2013     Herpes zoster without mention of complication     L eye      Hiatal hernia      PONV (postoperative nausea and vomiting)     PONV      Past Surgical History:   Procedure Laterality Date     ADENOIDECTOMY  1977     ARTHROSCOPY KNEE WITH MENISCAL REPAIR Left 6/16/2020    Procedure: Examination under anesthesia Left knee, Left knee arthroscopy, Left meniscus root repair;  Surgeon: Armando Sunshine MD;  [facility]     COLONOSCOPY N/A 10/27/2015    Procedure: COLONOSCOPY;  Surgeon: Duane, William Charles, MD;  Location: [facility] OR     COLONOSCOPY WITH CO2 INSUFFLATION N/A 10/27/2015    Procedure: COLONOSCOPY WITH CO2 INSUFFLATION;  Surgeon: Duane, William Charles, MD;  Location: [facility] OR     HC TOOTH EXTRACTION W/FORCEP      20 yo     LAPAROSCOPIC CHOLECYSTECTOMY  1/30/2013    Procedure: LAPAROSCOPIC CHOLECYSTECTOMY;  Laparoscopic Cholecystectomy;  Surgeon: Cindy Soni MD;  Location: [facility] OR     LUMPECTOMY BREAST WITH SENTINEL NODE, COMBINED Left 11/10/2021    Procedure: Left wire localized breast lumpectomy with sentinel lymph node biopsy;  Surgeon: Ge De La Vega MD;  [facility]     SINUS SURGERY  1988    Deviated septum     wisdom teeth removed[       right knee injury with full thickness radial tear of the medial meniscus  Left meniscal root repair 2020    CHEMOTHERAPY HISTORY: None     PAST RADIATION THERAPY HISTORY: None    MEDICATIONS:  Current Outpatient Medications   Medication Sig Dispense  Refill     acyclovir (ZOVIRAX) 400 MG tablet one tid po for 7 days prn for flare ups and one daily for maintenance. 180 tablet 3     cetirizine HCl (ZYRTEC ALLERGY) 10 MG CAPS Take one tablet daily.       diclofenac (VOLTAREN) 75 MG EC tablet Take 1 tablet (75 mg) by mouth 2 times daily as needed for moderate pain 30 tablet 1     diphenhydrAMINE (BENADRYL) 25 MG capsule Take 25 mg by mouth every 6 hours as needed.       fluticasone (FLONASE) 50 MCG/ACT nasal spray INSTILL TWO SPRAYS IN EACH NOSTRIL EVERY DAY 16 g 2     ibuprofen (ADVIL/MOTRIN) 600 MG tablet Take 1 tablet (600 mg) by mouth every 6 hours as needed for other (mild and/or inflammatory pain) 30 tablet 0     omeprazole 20 MG tablet Take 1 tablet (20 mg) by mouth daily Take 30-60 minutes before a meal. 30 tablet 1     pseudoePHEDrine (SUDAFED 12 HOUR) 120 MG 12 hr tablet Take 120 mg by mouth every 12 hours. PRN.          ALLERGIES: is allergic to sulfa drugs.    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     She discontinued her OCP upon diagnosis  She had one period on 10/31, and likely is in the perimenopausal range.     PHYSICAL EXAMINATION:    /88 (BP Location: Right arm, Patient Position: Sitting, Cuff Size: Adult Regular)   Pulse 73   Resp 16   SpO2 97%    Gen: appears well, NAD  HEENT: unremarkable  CV: well perfused  Resp: breathing comfortably on room air  Breasts: deferred  Ext: excellent ROM at bilateral shoulders  Neuro: grossly intact      IMAGING:      ASSESSMENT AND PLAN: In summary, Ms. Duque is a 52 yo female with a newly diagnosed left breast cancer. She is status post lumpectomy and SLN biopsy. Pathology is significant for a grade 1, 1.1 cm invasive ductal carcinoma without LVSI. Margins were widely negative. She did have 1/1 positive SLN, which measured 1.1 cm with focal KELLY.  Her Oncotype Dx score is still pending    We discussed that we will await Oncotype Dx score to determine the timing of radiation therapy. If she requires chemo, we won't simulate and treat her with radiation until she completes chemotherapy. If chemo is not indicated, she can be brought back for simulation soon.    We discussed the rationale of adjuvant radiation therapy, which is to improve the local control in the breast as well as to address microscopic disease in the regional lymphatics. Her treatment field will include left breast, axilla level I and II. Given her young age and presence of macroscopic marli disease, I am inclined to include supraclavicular fossa in the treatment field. She has an outer quadrant lesion and therefore the utility of IM marli chain irradiation is likely to be low. This field will be treated to 4240 cGy in 16 fractions. I additionally recommend a boost of 1000 cGy in 4 fractions to the lumpectomy cavity given her young age.     I described the logistics and side effects in great detail, including using deep inspiration breath hold for heart and lung sparing. She is eager to proceed and signed the consent form. We will get her simulation scheduled based on necessity for chemotherapy.     Thank you for allowing us to participate in this patient's care.  Please feel free to call with any questions or concerns.       Roberta Scales M.D./Ph.D.  Radiation Oncologist   Department of Radiation Oncology  [facility]  Phone: [phone]       I reviewed patient's chart, internal/external medical records, imaging studies (including actual images), labs and pathology reports.  I interviewed and counseled the patient face to face.      70 minutes were spent on the date of the encounter doing chart review, history and exam, documentation and further activities as noted above.     Roberta Scales MD      CC  Patient Care Team:  Suha Beavers APRN CNP as PCP -  General (Family Practice)  Armando Sunshine MD as Assigned Musculoskeletal Provider  Juan Carlos Spann OD as Assigned Surgical Provider  Suha Beavers APRN CNP as Assigned PCP  Ge De La Vega MD as MD (Surgery)  Christi Perez MD as MD (Breast Oncology)  Oly Browning, KAVON as Specialty Care Coordinator (Breast Oncology)  CHRISTI PEREZ

## 2021-11-30 NOTE — PATIENT INSTRUCTIONS
Eyeglass prescription given.    No change in contact lens prescription.    Return in 1 year for a complete eye exam or sooner if needed.    Juan Carlos Spann, BREN    The affects of the dilating drops last for 4- 6 hours.  You will be more sensitive to light and vision will be blurry up close.  Do not drive if you do not feel comfortable.  Mydriatic sunglasses were given if needed.      Optometry Providers       Clinic Locations                                 Telephone Number   Dr. Faith [place] [phone]     [place] [facility] Hours:                [facility] Hours:       [facility] Hours:   [address]   [address]     [address]  [place] [address]   [place] [address] [address]    [place] [address]  Phone: [phone]                    Phone: [phone]     Phone: [phone]                      Monday 8:00-7:00                          Monday 8:00-7:00                          Monday 8:00-7:00              Tuesday 8:00-6:00                          Tuesday 8:00-7:00                          Tuesday 8:00-7:00              Wednesday 8:00-6:00                  Wednesday 8:00-7:00                   Wednesday 8:00-7:00      Thursday 8:00-6:00                        Thursday 8:00-7:00                         Thursday 8:00-7:00            Friday 8:00-5:00                              Friday 8:00-5:00                              Friday 8:00-5:00    [facility] Hours:   [address] [place] [address]  [phone]    Monday 8:00-7:00  Tuesday 8:00-7:00  Wednesday 8:00-7:00  Thursday 8:00-7:00  Friday 8:00-5:00  Please log on to [email or web address] to order your contact lenses.  The link is found on the [facility] Care [facility] page.  As always, Thank you for trusting us with your health care needs!

## 2021-11-30 NOTE — PROGRESS NOTES
Chief Complaint   Patient presents with     Annual Eye Exam     Would like to get updated prescription if there is a significant change. $75 fit fee OK        Previous contact lens wearer? Yes: Acuvue Oasys for Astigmatism   Comfort of contact lenses :Comfortable and not drying   Satisfied with current lenses: Yes        Last Eye Exam: 11/17/2020  Dilated Previously: Yes, side effects of dilation explained today    What are you currently using to see?  glasses and contacts    Distance Vision Acuity: Satisfied with vision    Near Vision Acuity: Satisfied with vision while reading and using computer with glasses and contacts    Eye Comfort: good  Do you use eye drops? 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       OBJECTIVE: See Ophthalmology exam    ASSESSMENT:    ICD-10-CM    1. Examination of eyes and vision  Z01.00 EYE EXAM (SIMPLE-NONBILLABLE)   2. Hyperopia, bilateral  H52.03 REFRACTION   3. Regular astigmatism of both eyes  H52.223 REFRACTION   4. Presbyopia  H52.4 REFRACTION      PLAN:     Patient Instructions   Eyeglass prescription given.    No change in contact lens prescription.    Return in 1 year for a complete eye exam or sooner if needed.    Juan Carlos Spann, OD

## 2021-11-30 NOTE — LETTER
11/30/2021         RE: Lachelle Duque  [address]  [facility] [address]        Dear Colleague,    Thank you for referring your patient, Lachelle Duque, to the [facility]. Please see a copy of my visit note below.    Chief Complaint   Patient presents with     Annual Eye Exam     Would like to get updated prescription if there is a significant change. $75 fit fee OK        Previous contact lens wearer? Yes: Acuvue Oasys for Astigmatism   Comfort of contact lenses :Comfortable and not drying   Satisfied with current lenses: Yes        Last Eye Exam: 11/17/2020  Dilated Previously: Yes, side effects of dilation explained today    What are you currently using to see?  glasses and contacts    Distance Vision Acuity: Satisfied with vision    Near Vision Acuity: Satisfied with vision while reading and using computer with glasses and contacts    Eye Comfort: good  Do you use eye drops? 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       OBJECTIVE: See Ophthalmology exam    ASSESSMENT:    ICD-10-CM    1. Examination of eyes and vision  Z01.00 EYE EXAM (SIMPLE-NONBILLABLE)   2. Hyperopia, bilateral  H52.03 REFRACTION   3. Regular astigmatism of both eyes  H52.223 REFRACTION   4. Presbyopia  H52.4 REFRACTION      PLAN:     Patient Instructions   Eyeglass prescription given.    No change in contact lens prescription.    Return in 1 year for a complete eye exam or sooner if needed.    Juan Carlos Spann, BREN                           Again, thank you for allowing me to participate in the care of your patient.        Sincerely,        Juan Carlos Spann, OD

## 2021-12-02 ENCOUNTER — OFFICE VISIT (OUTPATIENT)
Dept: RADIATION ONCOLOGY | Facility: CLINIC | Age: 51
End: 2021-12-02
Attending: INTERNAL MEDICINE
Payer: COMMERCIAL

## 2021-12-02 VITALS
HEART RATE: 73 BPM | DIASTOLIC BLOOD PRESSURE: 88 MMHG | OXYGEN SATURATION: 97 % | SYSTOLIC BLOOD PRESSURE: 132 MMHG | RESPIRATION RATE: 16 BRPM

## 2021-12-02 DIAGNOSIS — Z17.0 MALIGNANT NEOPLASM OF UPPER-OUTER QUADRANT OF LEFT BREAST IN FEMALE, ESTROGEN RECEPTOR POSITIVE (H): ICD-10-CM

## 2021-12-02 DIAGNOSIS — C50.412 MALIGNANT NEOPLASM OF UPPER-OUTER QUADRANT OF LEFT BREAST IN FEMALE, ESTROGEN RECEPTOR POSITIVE (H): ICD-10-CM

## 2021-12-02 PROCEDURE — G0463 HOSPITAL OUTPT CLINIC VISIT: HCPCS | Performed by: RADIOLOGY

## 2021-12-02 ASSESSMENT — PAIN SCALES - GENERAL: PAINLEVEL: NO PAIN (0)

## 2021-12-02 NOTE — PROGRESS NOTES
INITIAL PATIENT ASSESSMENT    Diagnosis: Invasive ductal carcinoma of the left breast    Prior radiation therapy: None    Prior chemotherapy: None    Prior hormonal therapy:No    Pain Eval:  Denies    Psychosocial  Living arrangements: extensive friends and family in area  Fall Risk: independent   referral needs: Not needed    Advanced Directive: No  Implantable Cardiac Device? No    Onset of menarche: 13  LMP: No LMP recorded.  Onset of menopause: na  Abnormal vaginal bleeding/discharge: No  Are you pregnant? No  Reproductive note: denies chance of pregnancy    Nurse face-to-face time: Level 3:  10 min face to face time          Review of Systems   Musculoskeletal: Positive for joint pain.   All other systems reviewed and are negative.

## 2021-12-02 NOTE — LETTER
2021         RE: Lachelle Duque  [address]  [place] [address]        Dear Colleague,    Thank you for referring your patient, Lachelle Duque, to the [facility] RADIATION ONCOLOGY. Please see a copy of my visit note below.    Department of Radiation Oncology                   [address]  [address] [place]  [address]  Office:  [phone]  Fax:  [phone]   Radiation Oncology Clinic  [address] [place] [address]  Phone:  [phone]  Fax:  [phone]     RE: Lachelle Duque : 1970   MRN: [id number] JESSY: 2021     OUTPATIENT VISIT NOTE       PROBLEM: Invasive ductal carcinoma of the left breast, status post lumpectomy and SLN Bx, [id number](sn), grade 1, ER+/KY+/HER2-      was seen for initial consultation in the Dept of Therapeutic Radiology on 2021 at the request of Dr. Alegria.    HISTORY OF PRESENT ILLNESS: Shanta is a 50 yo female with a newly diagnosed left breast cancer. Screening mammogram from 21 revealed a possible asymmetry in the left breast at 4:00, posterior depth. Diagnostic mammogram confirmed the spiculated mass in the left breast. On the ultrasound, this was at 4:00, 6 cm from the nipple, measuring 0.9 x 1.0 x 0.7 cm. There was no suspicious adenopathy in the left axilla. Contrast mammogram did not find additional area of enhancement. US guided biopsy on 10/15/21 was consistent with invasive ductal carcinoma, grade 1, ER+/KY+HER2-, Ki-67 10%.     Shanta saw Dr. De La Vega on 10/22/21 and opted for breast conservation surgery. Breast Actionable Panel showed no mutation but did detect a VUS in BRCA2 gene. She underwent wire-localized lumpectomy and SLN biopsy on 11/10/21. Pathology confirmed invasive ductal carcinoma, Madisyn grade 1, measuring 1.1 cm in greatest dimension. Associated DCIS was nuclear grade 2, comprising < 1% of the tumor volume. There was no LVSI. Closest invasive margin was  > 5 mm, DCIS was 1.5 mm (posterior margin). 1/1 SLN contained a 1.1 cm macrometastasis with 1 mm focal extracapsular extension. HER2 on the lymph node was non-amplified. Final pathologic stage [id number](sn).     She met with Dr. Alegria on 11/18/21. Oncotype Dx score is pending. She is here today to discuss adjuvant radiation therapy.    On interview, Shanta states that she is well healed from the surgery. She denies pain and reports no range of motion deficit. 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     PAST MEDICAL HISTORY:   Past Medical History:   Diagnosis Date     Chronic rhinitis      Environmental allergies 1/18/2013     Gastro-oesophageal reflux disease      GERD (gastroesophageal reflux disease) 1/3/2013     Herpes zoster without mention of complication     L eye      Hiatal hernia      PONV (postoperative nausea and vomiting)     PONV      Past Surgical History:   Procedure Laterality Date     ADENOIDECTOMY  1977     ARTHROSCOPY KNEE WITH MENISCAL REPAIR Left 6/16/2020    Procedure: Examination under anesthesia Left knee, Left knee arthroscopy, Left meniscus root repair;  Surgeon: Armando Sunshine MD;  Location: [facility] OR     COLONOSCOPY N/A 10/27/2015    Procedure: COLONOSCOPY;  Surgeon: Duane, William Charles, MD;  Location: [facility] OR     COLONOSCOPY WITH CO2 INSUFFLATION N/A 10/27/2015    Procedure: COLONOSCOPY WITH CO2 INSUFFLATION;  Surgeon: Duane, William Charles, MD;  Location: [facility] OR     HC TOOTH EXTRACTION W/FORCEP      18 yo     LAPAROSCOPIC CHOLECYSTECTOMY  1/30/2013    Procedure: LAPAROSCOPIC CHOLECYSTECTOMY;  Laparoscopic Cholecystectomy;  Surgeon: Cindy Soni MD;  Location: [facility] OR     LUMPECTOMY BREAST WITH SENTINEL NODE, COMBINED Left 11/10/2021    Procedure: Left wire localized breast lumpectomy with sentinel lymph node biopsy;  Surgeon: Ge De La Vega MD;  Location: [facility] OR     SINUS SURGERY  1988    Deviated septum     wisdom teeth  removed[       right knee injury with full thickness radial tear of the medial meniscus  Left meniscal root repair 2020    CHEMOTHERAPY HISTORY: None     PAST RADIATION THERAPY HISTORY: None    MEDICATIONS:  Current Outpatient Medications   Medication Sig Dispense Refill     acyclovir (ZOVIRAX) 400 MG tablet one tid po for 7 days prn for flare ups and one daily for maintenance. 180 tablet 3     cetirizine HCl (ZYRTEC ALLERGY) 10 MG CAPS Take one tablet daily.       diclofenac (VOLTAREN) 75 MG EC tablet Take 1 tablet (75 mg) by mouth 2 times daily as needed for moderate pain 30 tablet 1     diphenhydrAMINE (BENADRYL) 25 MG capsule Take 25 mg by mouth every 6 hours as needed.       fluticasone (FLONASE) 50 MCG/ACT nasal spray INSTILL TWO SPRAYS IN EACH NOSTRIL EVERY DAY 16 g 2     ibuprofen (ADVIL/MOTRIN) 600 MG tablet Take 1 tablet (600 mg) by mouth every 6 hours as needed for other (mild and/or inflammatory pain) 30 tablet 0     omeprazole 20 MG tablet Take 1 tablet (20 mg) by mouth daily Take 30-60 minutes before a meal. 30 tablet 1     pseudoePHEDrine (SUDAFED 12 HOUR) 120 MG 12 hr tablet Take 120 mg by mouth every 12 hours. PRN.          ALLERGIES: is allergic to sulfa drugs.    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     She discontinued her OCP upon diagnosis  She had one period on 10/31, and likely is in the perimenopausal range.     PHYSICAL EXAMINATION:    /88 (BP Location: Right arm, Patient Position: Sitting, Cuff Size: Adult Regular)   Pulse 73   Resp 16   SpO2 97%    Gen: appears well, NAD  HEENT: unremarkable  CV: well perfused  Resp: breathing comfortably on room air  Breasts: deferred  Ext: excellent ROM at bilateral shoulders  Neuro: grossly intact      IMAGING:      ASSESSMENT AND PLAN: In summary, Ms. Duque is a 50 yo female with a  newly diagnosed left breast cancer. She is status post lumpectomy and SLN biopsy. Pathology is significant for a grade 1, 1.1 cm invasive ductal carcinoma without LVSI. Margins were widely negative. She did have 1/1 positive SLN, which measured 1.1 cm with focal KELLY. Her Oncotype Dx score is still pending    We discussed that we will await Oncotype Dx score to determine the timing of radiation therapy. If she requires chemo, we won't simulate and treat her with radiation until she completes chemotherapy. If chemo is not indicated, she can be brought back for simulation soon.    We discussed the rationale of adjuvant radiation therapy, which is to improve the local control in the breast as well as to address microscopic disease in the regional lymphatics. Her treatment field will include left breast, axilla level I and II. Given her young age and presence of macroscopic marli disease, I am inclined to include supraclavicular fossa in the treatment field. She has an outer quadrant lesion and therefore the utility of IM marli chain irradiation is likely to be low. This field will be treated to 4240 cGy in 16 fractions. I additionally recommend a boost of 1000 cGy in 4 fractions to the lumpectomy cavity given her young age.     I described the logistics and side effects in great detail, including using deep inspiration breath hold for heart and lung sparing. She is eager to proceed and signed the consent form. We will get her simulation scheduled based on necessity for chemotherapy.     Thank you for allowing us to participate in this patient's care.  Please feel free to call with any questions or concerns.       Roberta Scales M.D./Ph.D.  Radiation Oncologist   Department of Radiation Oncology  [facility]  Phone: [phone]       I reviewed patient's chart, internal/external medical records, imaging studies (including actual images), labs and pathology reports.  I interviewed and counseled  the patient face to face.      70 minutes were spent on the date of the encounter doing chart review, history and exam, documentation and further activities as noted above.     Roberta Scales MD      CC  Patient Care Team:  Suha Beavers APRN CNP as PCP - General (Family Practice)  Armando Sunshine MD as Assigned Musculoskeletal Provider  Juan Carlos Spann OD as Assigned Surgical Provider  Ge De La Vega MD as MD (Surgery)  Giovanna Alegria MD as MD (Breast Oncology)  Oly Browning RN as Specialty Care Coordinator (Breast Oncology)      INITIAL PATIENT ASSESSMENT    Diagnosis: Invasive ductal carcinoma of the left breast    Prior radiation therapy: None    Prior chemotherapy: None    Prior hormonal therapy:No    Pain Eval:  Denies    Psychosocial  Living arrangements: extensive friends and family in area  Fall Risk: independent   referral needs: Not needed    Advanced Directive: No  Implantable Cardiac Device? No    Onset of menarche: 13  LMP: No LMP recorded.  Onset of menopause: na  Abnormal vaginal bleeding/discharge: No  Are you pregnant? No  Reproductive note: denies chance of pregnancy    Nurse face-to-face time: Level 3:  10 min face to face time        Review of Systems   Musculoskeletal: Positive for joint pain.   All other systems reviewed and are negative.

## 2021-12-03 ENCOUNTER — MYC MEDICAL ADVICE (OUTPATIENT)
Dept: ONCOLOGY | Facility: CLINIC | Age: 51
End: 2021-12-03
Payer: COMMERCIAL

## 2021-12-09 ENCOUNTER — ANCILLARY PROCEDURE (OUTPATIENT)
Dept: CT IMAGING | Facility: CLINIC | Age: 51
End: 2021-12-09
Attending: INTERNAL MEDICINE
Payer: COMMERCIAL

## 2021-12-09 DIAGNOSIS — Z17.0 MALIGNANT NEOPLASM OF UPPER-OUTER QUADRANT OF LEFT BREAST IN FEMALE, ESTROGEN RECEPTOR POSITIVE (H): Primary | ICD-10-CM

## 2021-12-09 DIAGNOSIS — C50.412 MALIGNANT NEOPLASM OF UPPER-OUTER QUADRANT OF LEFT BREAST IN FEMALE, ESTROGEN RECEPTOR POSITIVE (H): Primary | ICD-10-CM

## 2021-12-09 DIAGNOSIS — Z17.0 MALIGNANT NEOPLASM OF UPPER-OUTER QUADRANT OF LEFT BREAST IN FEMALE, ESTROGEN RECEPTOR POSITIVE (H): ICD-10-CM

## 2021-12-09 DIAGNOSIS — K76.89 LIVER CYST: ICD-10-CM

## 2021-12-09 DIAGNOSIS — C50.412 MALIGNANT NEOPLASM OF UPPER-OUTER QUADRANT OF LEFT BREAST IN FEMALE, ESTROGEN RECEPTOR POSITIVE (H): ICD-10-CM

## 2021-12-09 PROCEDURE — 71260 CT THORAX DX C+: CPT | Mod: GC | Performed by: RADIOLOGY

## 2021-12-09 PROCEDURE — 74177 CT ABD & PELVIS W/CONTRAST: CPT | Mod: GC | Performed by: RADIOLOGY

## 2021-12-09 RX ORDER — IOPAMIDOL 755 MG/ML
135 INJECTION, SOLUTION INTRAVASCULAR ONCE
Status: COMPLETED | OUTPATIENT
Start: 2021-12-09 | End: 2021-12-09

## 2021-12-09 RX ADMIN — IOPAMIDOL 135 ML: 755 INJECTION, SOLUTION INTRAVASCULAR at 07:13

## 2021-12-15 ENCOUNTER — MYC MEDICAL ADVICE (OUTPATIENT)
Dept: ONCOLOGY | Facility: CLINIC | Age: 51
End: 2021-12-15
Payer: COMMERCIAL

## 2021-12-17 ENCOUNTER — PATIENT OUTREACH (OUTPATIENT)
Dept: ONCOLOGY | Facility: CLINIC | Age: 51
End: 2021-12-17
Payer: COMMERCIAL

## 2021-12-17 NOTE — PROGRESS NOTES
Spoke to Ronit at [employer] regarding the PA for Oncotype.   Authorization approval #[id number]    Called [employer], spoke with Duy to provide the authorization number.     Notified Dr. Alegria of Oncotype result.  Spoke to Shanta to give her the results, she does not need chemotherapy but AI alone after radiation.     Scheduled for liver MRI 12/30 added follow- up with Dr Alegria 1/3/22    Answered all questions to her stated satisfaction.

## 2021-12-20 LAB — SPECIMEN STATUS: NORMAL

## 2021-12-23 ENCOUNTER — OFFICE VISIT (OUTPATIENT)
Dept: RADIATION ONCOLOGY | Facility: CLINIC | Age: 51
End: 2021-12-23
Attending: RADIOLOGY
Payer: COMMERCIAL

## 2021-12-23 DIAGNOSIS — C50.412 MALIGNANT NEOPLASM OF UPPER-OUTER QUADRANT OF LEFT BREAST IN FEMALE, ESTROGEN RECEPTOR POSITIVE (H): Primary | ICD-10-CM

## 2021-12-23 DIAGNOSIS — Z17.0 MALIGNANT NEOPLASM OF UPPER-OUTER QUADRANT OF LEFT BREAST IN FEMALE, ESTROGEN RECEPTOR POSITIVE (H): Primary | ICD-10-CM

## 2021-12-23 PROCEDURE — 77290 THER RAD SIMULAJ FIELD CPLX: CPT | Mod: 26 | Performed by: RADIOLOGY

## 2021-12-23 PROCEDURE — 77332 RADIATION TREATMENT AID(S): CPT | Mod: 26 | Performed by: RADIOLOGY

## 2021-12-23 PROCEDURE — 77290 THER RAD SIMULAJ FIELD CPLX: CPT | Performed by: RADIOLOGY

## 2021-12-23 PROCEDURE — 77332 RADIATION TREATMENT AID(S): CPT | Performed by: RADIOLOGY

## 2021-12-23 NOTE — PROGRESS NOTES
Radiation Therapy Patient Education    Person involved with teaching: Patient    Patient educational needs for self management of treatment-related side effects assessment completed.  [facility] Patient Ed tab contains Patient Learning Assessment    Education Materials Given  Sim pamphlet + schedule    Educational Topics Discussed  Side effects expected, Pain management, Skin care, Activity, Nutrition and weight loss and When to call MD/RN    Response To Teaching  Verbalizes understanding}    Referrals sent: None    Chemotherapy?  No

## 2021-12-27 ENCOUNTER — MYC MEDICAL ADVICE (OUTPATIENT)
Dept: ONCOLOGY | Facility: CLINIC | Age: 51
End: 2021-12-27
Payer: COMMERCIAL

## 2021-12-30 ENCOUNTER — HOSPITAL ENCOUNTER (OUTPATIENT)
Dept: MRI IMAGING | Facility: CLINIC | Age: 51
Discharge: HOME | End: 2021-12-30
Attending: INTERNAL MEDICINE | Admitting: INTERNAL MEDICINE
Payer: COMMERCIAL

## 2021-12-30 DIAGNOSIS — K76.89 LIVER CYST: ICD-10-CM

## 2021-12-30 DIAGNOSIS — Z17.0 MALIGNANT NEOPLASM OF UPPER-OUTER QUADRANT OF LEFT BREAST IN FEMALE, ESTROGEN RECEPTOR POSITIVE (H): ICD-10-CM

## 2021-12-30 DIAGNOSIS — C50.412 MALIGNANT NEOPLASM OF UPPER-OUTER QUADRANT OF LEFT BREAST IN FEMALE, ESTROGEN RECEPTOR POSITIVE (H): ICD-10-CM

## 2021-12-30 PROCEDURE — 255N000002 HC RX 255 OP 636: Performed by: INTERNAL MEDICINE

## 2021-12-30 PROCEDURE — 74183 MRI ABD W/O CNTR FLWD CNTR: CPT | Mod: 26 | Performed by: RADIOLOGY

## 2021-12-30 PROCEDURE — 74183 MRI ABD W/O CNTR FLWD CNTR: CPT

## 2021-12-30 PROCEDURE — A9585 GADOBUTROL INJECTION: HCPCS | Performed by: INTERNAL MEDICINE

## 2021-12-30 RX ORDER — GADOBUTROL 604.72 MG/ML
10 INJECTION INTRAVENOUS ONCE
Status: COMPLETED | OUTPATIENT
Start: 2021-12-30 | End: 2021-12-30

## 2021-12-30 RX ADMIN — GADOBUTROL 10 ML: 604.72 INJECTION INTRAVENOUS at 07:58

## 2022-01-03 ENCOUNTER — ONCOLOGY VISIT (OUTPATIENT)
Dept: ONCOLOGY | Facility: CLINIC | Age: 52
End: 2022-01-03
Attending: INTERNAL MEDICINE
Payer: COMMERCIAL

## 2022-01-03 VITALS
BODY MASS INDEX: 41.02 KG/M2 | HEART RATE: 85 BPM | WEIGHT: 250.1 LBS | TEMPERATURE: 98.7 F | SYSTOLIC BLOOD PRESSURE: 136 MMHG | OXYGEN SATURATION: 98 % | DIASTOLIC BLOOD PRESSURE: 79 MMHG

## 2022-01-03 DIAGNOSIS — Z17.0 MALIGNANT NEOPLASM OF UPPER-OUTER QUADRANT OF LEFT BREAST IN FEMALE, ESTROGEN RECEPTOR POSITIVE (H): Primary | ICD-10-CM

## 2022-01-03 DIAGNOSIS — Z79.811 LONG TERM (CURRENT) USE OF AROMATASE INHIBITORS: ICD-10-CM

## 2022-01-03 DIAGNOSIS — C50.412 MALIGNANT NEOPLASM OF UPPER-OUTER QUADRANT OF LEFT BREAST IN FEMALE, ESTROGEN RECEPTOR POSITIVE (H): Primary | ICD-10-CM

## 2022-01-03 DIAGNOSIS — D13.4 HEPATIC ADENOMA: ICD-10-CM

## 2022-01-03 PROCEDURE — 99214 OFFICE O/P EST MOD 30 MIN: CPT | Performed by: INTERNAL MEDICINE

## 2022-01-03 PROCEDURE — G0463 HOSPITAL OUTPT CLINIC VISIT: HCPCS

## 2022-01-03 RX ORDER — LETROZOLE 2.5 MG/1
2.5 TABLET, FILM COATED ORAL DAILY
Qty: 90 TABLET | Refills: 3 | Status: SHIPPED | OUTPATIENT
Start: 2022-01-03 | End: 2023-01-03

## 2022-01-03 ASSESSMENT — PAIN SCALES - GENERAL: PAINLEVEL: NO PAIN (0)

## 2022-01-03 NOTE — NURSING NOTE
"Oncology Rooming Note    January 3, 2022 1:43 PM   Lachelle Duque is a 51 year old female who presents for:    Chief Complaint   Patient presents with     Oncology Clinic Visit     Breast cancer     Initial Vitals: /79 (BP Location: Left arm, Patient Position: Sitting, Cuff Size: Adult Large)   Pulse 85   Temp 98.7  F (37.1  C) (Oral)   Wt 113.4 kg (250 lb 1.6 oz)   LMP 10/31/2021   SpO2 98%   BMI 41.02 kg/m   Estimated body mass index is 41.02 kg/m  as calculated from the following:    Height as of 11/18/21: 1.663 m (5' 5.47\").    Weight as of this encounter: 113.4 kg (250 lb 1.6 oz). Body surface area is 2.29 meters squared.  No Pain (0) Comment: Data Unavailable   Patient's last menstrual period was 10/31/2021.  Allergies reviewed: Yes  Medications reviewed: Yes    Medications: Medication refills not needed today.  Pharmacy name entered into [employer]:    [facility] - [place], [place] - [address]  [facility] - [place], [place] - [address]  [facility]  [facility] - [place], [place] - [address] [facility] [place], [place] - [address] [phone]    Clinical concerns: No new concerns-discuss scan results today in clinic.        Maggie Marcelo LPN January 3, 2022 1:43 PM                "

## 2022-01-03 NOTE — LETTER
1/3/2022         RE: Lachelle Duque  [address]  [place] [address]        Dear Colleague,    Thank you for referring your patient, Lachelle Duque, to the [facility]. Please see a copy of my visit note below.    Oncology Visit:    I am seeing Ms. Shanta Duque for follow up of a left breast cancer, T1cN1, ER + CT + her2 negative, oncotype Dx 19.      She underwent a screening mammogram on 09/29/2021, which demonstrated possible asymmetry in her left breast at 4-o'clock position.  On 10/08/2021, she underwent a mammogram and ultrasound.  On mammography, there was a spiculated mass, at the 4-o'clock position, measuring 1.3 cm in size.  On ultrasound, the mass measured 1.0 cm in size.  Her lymph nodes were negative by ultrasonography. On 10/15/2021, she underwent a contrast enhanced mammography which demonstrated the lesion to be 1.1 cm.  There are no other suspicious lesions in the rest of her breast.  Also, on 10/15, she underwent a needle biopsy, which demonstrated invasive ductal cancer, grade 1, ER positive, CT positive, HER2 negative. She has not palpated a mass in her breast.  She has not had a prior history of any breast problems.    She met with Dr Ge De La Vega.  She underwent a lumpectomy and sentinel lymph node biopsy.  Pathology revealed a 1.3 cm invasive ductal carcinoma.  1/1 lymph node was positive and measured 2 cm.    Oncotype low at 19.  Labs confirmed postmenopausal.    She met with radiation oncology.  She is scheduled to start later this month.    She has little pain.  She wants to know the next best steps.      No lymphedema.  No n/v/d/c.  No f/c.      She had a CT scan and liver MRI and has questions about the images.     ROS: 10 point ROS neg other than the symptoms noted above in the HPI.       PAST MEDICAL HISTORY:  No major medical problems.    Current Outpatient Medications   Medication     acyclovir (ZOVIRAX) 400 MG tablet     cetirizine HCl  (ZYRTEC ALLERGY) 10 MG CAPS     diclofenac (VOLTAREN) 75 MG EC tablet     diphenhydrAMINE (BENADRYL) 25 MG capsule     fluticasone (FLONASE) 50 MCG/ACT nasal spray     ibuprofen (ADVIL/MOTRIN) 600 MG tablet     omeprazole 20 MG tablet     pseudoePHEDrine (SUDAFED 12 HOUR) 120 MG 12 hr tablet     No current facility-administered medications for this visit.     Allergies:  Sulfa drugs     FAMILY HISTORY:  Significant for a maternal aunt with breast cancer.  No history of ovarian cancer.     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  No tobacco use.  .  .  Here with a friend.    She has never had a breast biopsy previously.     PHYSICAL EXAMINATION:  /79 (BP Location: Left arm, Patient Position: Sitting, Cuff Size: Adult Large)   Pulse 85   Temp 98.7  F (37.1  C) (Oral)   Wt 113.4 kg (250 lb 1.6 oz)   LMP 10/31/2021   SpO2 98%   BMI 41.02 kg/m    She is a well-appearing woman in no apparent distress.   HEENT: no icterus  NECK: supple  CV: rrr  Lungs: clear  Abd; soft, nt nd + bs  Ext: no edema  Bilateral breast examination not performed today.     Reviewed her pathology and imaging personally at our multidisciplinary conference.    Pathology reviewed personally by me.    Breast, LEFT, wire localized lumpectomy:  -INVASIVE BREAST CARCINOMA OF NO SPECIAL TYPE (INVASIVE DUCTAL CARCINOMA), FREDI GRADE 1, size 1.1 cm  -Focal ductal carcinoma in situ (DCIS), nuclear grade 2, solid and cribriform types, comprises < 1% of the tumor volume   -No lymphovascular invasion identified  -Margins are uninvolved by invasive carcinoma  -Invasive carcinoma is > 5 mm from all margins  -Margins are uninvolved by DCIS  -DCIS is 1.5 mm from the closest (posterior) margin  -Focal atypical ductal hyperplasia  -Other findings: Columnar cell change and fibrocystic change (including microcysts with apocrine metaplasia)  -Calcifications associated with invasive carcinoma  and benign epithelium  -Prior core biopsy site changes  -Invasive carcinoma is estrogen receptor positive (98%, strong intensity) and progesterone receptor positive (90%, moderate intensity) by immunohistochemistry and is HER2 non-amplified by FISH (HER2:CEN17 ratio 1.1, see prior core biopsy report [id number])  -See below for tumor synoptic     B(2).  Lymph node, left axillary, sentinel #1, excision:  -Macrometastatic carcinoma in one of one lymph node (1/1)  -Metastatic carcinoma is 1.1 cm in greatest dimension  -Focal extranodal extension is present (extent = 1 mm)  -HER2 FISH results will be reported separately by cytogenetics      Estradiol - 6  FSH - 35    Oncotype Dx 19    A/P:  50 y/o female with a new left breast cancer pT1cN1 ER + DC + her2 negative now s/p lumpectomy and SNLB.      1. Breast cancer-  I reviewed all of Shanta's pathology.  We discussed that she has had complete surgical excision.  She will need adjuvant radiation.     We reviewed her Oncotype Dx score of 19.  Based on the RespondRx trial of women with ER + intermediate oncotype, and lymph node positive disease, there is no benefit to chemotherapy in postmenopausal women.  We reviewed this data, and the follow up curves.      I do not recommend chemotherapy.  She will start radiation therapy later this week.    We discussed using an aromatase inhibitor vs tamoxifen x 5 years, and likely ten years.  We discussed the side effects of both medications.  Given the results of the ATAC trial demonstrating superiority to AI over tamoxifen, I'd favor using letrozole.  Side effects discussed.      2. She meets criteria to have genetic testing.  She was found to have VUS in the BRCA 2 gene.  Referral to genetic counselor placed.     3. She met with CC to discuss supportive care services.  I reviewed with her that she can work through her treatment.  She may want intermittent time off.    4. Bone health - she will need a baseline bisphosphonate.  I  discussed the use of prophylactic zometa IV every six months while on AI.       5. Liver adenomas - will need repeat liver MRI in 2-3 months.    We discussed healthy lifestyle, reducing alcohol, and once she has recovered to exercise regularly.    Giovanna Alegria MD     > 30 min were spent in reviewing imaging, pathology, counseling and coordinating care.

## 2022-01-03 NOTE — PROGRESS NOTES
Oncology Visit:    I am seeing Ms. Shanta Duque for follow up of a left breast cancer, T1cN1, ER + RI + her2 negative, oncotype Dx 19.      She underwent a screening mammogram on 09/29/2021, which demonstrated possible asymmetry in her left breast at 4-o'clock position.  On 10/08/2021, she underwent a mammogram and ultrasound.  On mammography, there was a spiculated mass, at the 4-o'clock position, measuring 1.3 cm in size.  On ultrasound, the mass measured 1.0 cm in size.  Her lymph nodes were negative by ultrasonography. On 10/15/2021, she underwent a contrast enhanced mammography which demonstrated the lesion to be 1.1 cm.  There are no other suspicious lesions in the rest of her breast.  Also, on 10/15, she underwent a needle biopsy, which demonstrated invasive ductal cancer, grade 1, ER positive, RI positive, HER2 negative. She has not palpated a mass in her breast.  She has not had a prior history of any breast problems.    She met with Dr Ge De La Vega.  She underwent a lumpectomy and sentinel lymph node biopsy.  Pathology revealed a 1.3 cm invasive ductal carcinoma.  1/1 lymph node was positive and measured 2 cm.    Oncotype low at 19.  Labs confirmed postmenopausal.    She met with radiation oncology.  She is scheduled to start later this month.    She has little pain.  She wants to know the next best steps.      No lymphedema.  No n/v/d/c.  No f/c.      She had a CT scan and liver MRI and has questions about the images.     ROS: 10 point ROS neg other than the symptoms noted above in the HPI.       PAST MEDICAL HISTORY:  No major medical problems.    Current Outpatient Medications   Medication     acyclovir (ZOVIRAX) 400 MG tablet     cetirizine HCl (ZYRTEC ALLERGY) 10 MG CAPS     diclofenac (VOLTAREN) 75 MG EC tablet     diphenhydrAMINE (BENADRYL) 25 MG capsule     fluticasone (FLONASE) 50 MCG/ACT nasal spray     ibuprofen (ADVIL/MOTRIN) 600 MG tablet     omeprazole 20 MG tablet     pseudoePHEDrine (SUDAFED  12 HOUR) 120 MG 12 hr tablet     No current facility-administered medications for this visit.     Allergies:  Sulfa drugs     FAMILY HISTORY:  Significant for a maternal aunt with breast cancer.  No history of ovarian cancer.     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  No tobacco use.  .  .  Here with a friend.    She has never had a breast biopsy previously.     PHYSICAL EXAMINATION:  /79 (BP Location: Left arm, Patient Position: Sitting, Cuff Size: Adult Large)   Pulse 85   Temp 98.7  F (37.1  C) (Oral)   Wt 113.4 kg (250 lb 1.6 oz)   LMP 10/31/2021   SpO2 98%   BMI 41.02 kg/m    She is a well-appearing woman in no apparent distress.   HEENT: no icterus  NECK: supple  CV: rrr  Lungs: clear  Abd; soft, nt nd + bs  Ext: no edema  Bilateral breast examination not performed today.     Reviewed her pathology and imaging personally at our multidisciplinary conference.    Pathology reviewed personally by me.    Breast, LEFT, wire localized lumpectomy:  -INVASIVE BREAST CARCINOMA OF NO SPECIAL TYPE (INVASIVE DUCTAL CARCINOMA), FREDI GRADE 1, size 1.1 cm  -Focal ductal carcinoma in situ (DCIS), nuclear grade 2, solid and cribriform types, comprises < 1% of the tumor volume   -No lymphovascular invasion identified  -Margins are uninvolved by invasive carcinoma  -Invasive carcinoma is > 5 mm from all margins  -Margins are uninvolved by DCIS  -DCIS is 1.5 mm from the closest (posterior) margin  -Focal atypical ductal hyperplasia  -Other findings: Columnar cell change and fibrocystic change (including microcysts with apocrine metaplasia)  -Calcifications associated with invasive carcinoma and benign epithelium  -Prior core biopsy site changes  -Invasive carcinoma is estrogen receptor positive (98%, strong intensity) and progesterone receptor positive (90%, moderate intensity) by immunohistochemistry and is HER2 non-amplified by FISH (HER2:CEN17  ratio 1.1, see prior core biopsy report [id number])  -See below for tumor synoptic     B(2).  Lymph node, left axillary, sentinel #1, excision:  -Macrometastatic carcinoma in one of one lymph node (1/1)  -Metastatic carcinoma is 1.1 cm in greatest dimension  -Focal extranodal extension is present (extent = 1 mm)  -HER2 FISH results will be reported separately by cytogenetics      Estradiol - 6  FSH - 35    Oncotype Dx 19    A/P:  52 y/o female with a new left breast cancer pT1cN1 ER + TN + her2 negative now s/p lumpectomy and SNLB.      1. Breast cancer-  I reviewed all of Shanta's pathology.  We discussed that she has had complete surgical excision.  She will need adjuvant radiation.     We reviewed her Oncotype Dx score of 19.  Based on the RespondRx trial of women with ER + intermediate oncotype, and lymph node positive disease, there is no benefit to chemotherapy in postmenopausal women.  We reviewed this data, and the follow up curves.      I do not recommend chemotherapy.  She will start radiation therapy later this week.    We discussed using an aromatase inhibitor vs tamoxifen x 5 years, and likely ten years.  We discussed the side effects of both medications.  Given the results of the ATAC trial demonstrating superiority to AI over tamoxifen, I'd favor using letrozole.  Side effects discussed.      2. She meets criteria to have genetic testing.  She was found to have VUS in the BRCA 2 gene.  Referral to genetic counselor placed.     3. She met with [facility] to discuss supportive care services.  I reviewed with her that she can work through her treatment.  She may want intermittent time off.    4. Bone health - she will need a baseline bisphosphonate.  I discussed the use of prophylactic zometa IV every six months while on AI.       5. Liver adenomas - will need repeat liver MRI in 2-3 months.    We discussed healthy lifestyle, reducing alcohol, and once she has recovered to exercise regularly.    Giovanna Alegria MD      > 30 min were spent in reviewing imaging, pathology, counseling and coordinating care.

## 2022-01-05 ENCOUNTER — APPOINTMENT (OUTPATIENT)
Dept: RADIATION ONCOLOGY | Facility: CLINIC | Age: 52
End: 2022-01-05
Attending: RADIOLOGY
Payer: COMMERCIAL

## 2022-01-05 PROCEDURE — 77280 THER RAD SIMULAJ FIELD SMPL: CPT | Mod: 26 | Performed by: RADIOLOGY

## 2022-01-05 PROCEDURE — 77280 THER RAD SIMULAJ FIELD SMPL: CPT | Performed by: RADIOLOGY

## 2022-01-06 ENCOUNTER — APPOINTMENT (OUTPATIENT)
Dept: RADIATION ONCOLOGY | Facility: CLINIC | Age: 52
End: 2022-01-06
Attending: RADIOLOGY
Payer: COMMERCIAL

## 2022-01-06 PROCEDURE — G6002 STEREOSCOPIC X-RAY GUIDANCE: HCPCS | Mod: 26 | Performed by: RADIOLOGY

## 2022-01-06 PROCEDURE — 77412 RADIATION TX DELIVERY LVL 3: CPT | Performed by: RADIOLOGY

## 2022-01-06 PROCEDURE — 77387 GUIDANCE FOR RADJ TX DLVR: CPT | Performed by: RADIOLOGY

## 2022-01-07 ENCOUNTER — APPOINTMENT (OUTPATIENT)
Dept: RADIATION ONCOLOGY | Facility: CLINIC | Age: 52
End: 2022-01-07
Attending: RADIOLOGY
Payer: COMMERCIAL

## 2022-01-07 PROCEDURE — 77412 RADIATION TX DELIVERY LVL 3: CPT | Performed by: RADIOLOGY

## 2022-01-07 PROCEDURE — G6002 STEREOSCOPIC X-RAY GUIDANCE: HCPCS | Mod: 26 | Performed by: RADIOLOGY

## 2022-01-07 PROCEDURE — 77387 GUIDANCE FOR RADJ TX DLVR: CPT | Performed by: RADIOLOGY

## 2022-01-09 ENCOUNTER — HEALTH MAINTENANCE LETTER (OUTPATIENT)
Age: 52
End: 2022-01-09

## 2022-01-10 ENCOUNTER — OFFICE VISIT (OUTPATIENT)
Dept: RADIATION ONCOLOGY | Facility: CLINIC | Age: 52
End: 2022-01-10
Attending: RADIOLOGY
Payer: COMMERCIAL

## 2022-01-10 VITALS
DIASTOLIC BLOOD PRESSURE: 77 MMHG | HEART RATE: 84 BPM | SYSTOLIC BLOOD PRESSURE: 127 MMHG | OXYGEN SATURATION: 97 % | WEIGHT: 248 LBS | BODY MASS INDEX: 40.68 KG/M2

## 2022-01-10 DIAGNOSIS — Z17.0 MALIGNANT NEOPLASM OF UPPER-OUTER QUADRANT OF LEFT BREAST IN FEMALE, ESTROGEN RECEPTOR POSITIVE (H): Primary | ICD-10-CM

## 2022-01-10 DIAGNOSIS — C50.412 MALIGNANT NEOPLASM OF UPPER-OUTER QUADRANT OF LEFT BREAST IN FEMALE, ESTROGEN RECEPTOR POSITIVE (H): Primary | ICD-10-CM

## 2022-01-10 PROCEDURE — 77387 GUIDANCE FOR RADJ TX DLVR: CPT | Performed by: RADIOLOGY

## 2022-01-10 PROCEDURE — 77412 RADIATION TX DELIVERY LVL 3: CPT | Performed by: RADIOLOGY

## 2022-01-10 PROCEDURE — G6002 STEREOSCOPIC X-RAY GUIDANCE: HCPCS | Mod: 26 | Performed by: RADIOLOGY

## 2022-01-10 NOTE — LETTER
Date:January 12, 2022      Provider requested that no letter be sent. Do not send.       [facility]

## 2022-01-10 NOTE — PROGRESS NOTES
[facility] PHYSICIANS  SPECIALIZING IN BREAKTHROUGHS  Radiation Oncology    On Treatment Visit Note      Lachelle Duque      Date: Ugo 10, 2022   MRN: [id number]   : 1970  Diagnosis: Breast cancer      Reason for Visit:  On Radiation Treatment Visit     Treatment Summary to Date  Treatment Site: Lt breast + Ax Current Dose: 785/5240 cGy Fractions: 3/20      Chemotherapy  Chemo concurrent with radx?: No    Subjective:  Ms. Duque is seen for her weekly on-treatment visit today. She is doing well. She is using aquaphor as directed.     Nursing ROS:   Nutrition Alteration  Diet Type: Patient's Preference  Nutrition Note: Appetite good  Skin  Skin Reaction: 0 - No changes           Gastrointestinal  GI Note: WNL     Psychosocial  Psychosocial Note: feeling well  Pain Assessment  0-10 Pain Scale: 0      Objective:   /77   Pulse 84   Wt 112.5 kg (248 lb)   SpO2 97%   BMI 40.68 kg/m    Gen: Appears well, in no acute distress  Skin: No erythema    Labs:  CBC RESULTS: Recent Labs   Lab Test 21  1118   WBC 7.8   RBC 4.78   HGB 13.9   HCT 43.2   MCV 90   MCH 29.1   MCHC 32.2   RDW 13.9        ELECTROLYTES:  Recent Labs   Lab Test 21  1118      POTASSIUM 4.0   CHLORIDE 105   DULCE 10.0   CO2 28   BUN 17   CR 0.69   *       Assessment:    Tolerating radiation therapy well.  All questions and concerns addressed.    Toxicities:  Pain: Grade 0: No toxicity  Dermatitis: Grade 0: No toxicity     Plan:   1. Continue current therapy.    2. Skin care discussed      Mosaiq chart and setup information reviewed  Ports checked    Medication Review  Med Note: No changes per pt    Educational Topic Discussed  Education Instructions: reviewed          15 minutes were spent on the date of the encounter doing chart review, history and exam, documentation and further activities as noted above.           Roberta Scales MD

## 2022-01-10 NOTE — LETTER
1/10/2022         RE: Lachelle Duque  [address]  [place] [address]        Dear Colleague,    Thank you for referring your patient, Lachelle Duque, to the [facility] RADIATION ONCOLOGY. Please see a copy of my visit note below.    [facility] PHYSICIANS  SPECIALIZING IN BREAKTHROUGHS  Radiation Oncology    On Treatment Visit Note      Lachelle Duque      Date: Ugo 10, 2022   MRN: [id number]   : 1970  Diagnosis: Breast cancer      Reason for Visit:  On Radiation Treatment Visit     Treatment Summary to Date  Treatment Site: Lt breast + Ax Current Dose: 785/5240 cGy Fractions: 3/20      Chemotherapy  Chemo concurrent with radx?: No    Subjective:  Ms. Duque is seen for her weekly on-treatment visit today. She is doing well. She is using aquaphor as directed.     Nursing ROS:   Nutrition Alteration  Diet Type: Patient's Preference  Nutrition Note: Appetite good  Skin  Skin Reaction: 0 - No changes           Gastrointestinal  GI Note: WNL     Psychosocial  Psychosocial Note: feeling well  Pain Assessment  0-10 Pain Scale: 0      Objective:   /77   Pulse 84   Wt 112.5 kg (248 lb)   SpO2 97%   BMI 40.68 kg/m    Gen: Appears well, in no acute distress  Skin: No erythema    Labs:  CBC RESULTS: Recent Labs   Lab Test 21  1118   WBC 7.8   RBC 4.78   HGB 13.9   HCT 43.2   MCV 90   MCH 29.1   MCHC 32.2   RDW 13.9        ELECTROLYTES:  Recent Labs   Lab Test 21  1118      POTASSIUM 4.0   CHLORIDE 105   DULCE 10.0   CO2 28   BUN 17   CR 0.69   *       Assessment:    Tolerating radiation therapy well.  All questions and concerns addressed.    Toxicities:  Pain: Grade 0: No toxicity  Dermatitis: Grade 0: No toxicity     Plan:   1. Continue current therapy.    2. Skin care discussed      Mosaiq chart and setup information reviewed  Ports checked    Medication Review  Med Note: No changes per pt    Educational Topic Discussed  Education  Instructions: reviewed          15 minutes were spent on the date of the encounter doing chart review, history and exam, documentation and further activities as noted above.           Roberta Scales MD        Again, thank you for allowing me to participate in the care of your patient.        Sincerely,        Roberta Scales MD

## 2022-01-11 ENCOUNTER — APPOINTMENT (OUTPATIENT)
Dept: RADIATION ONCOLOGY | Facility: CLINIC | Age: 52
End: 2022-01-11
Attending: RADIOLOGY
Payer: COMMERCIAL

## 2022-01-11 PROCEDURE — 77412 RADIATION TX DELIVERY LVL 3: CPT | Performed by: RADIOLOGY

## 2022-01-11 PROCEDURE — G6002 STEREOSCOPIC X-RAY GUIDANCE: HCPCS | Mod: 26 | Performed by: RADIOLOGY

## 2022-01-11 PROCEDURE — 77387 GUIDANCE FOR RADJ TX DLVR: CPT | Performed by: RADIOLOGY

## 2022-01-12 ENCOUNTER — APPOINTMENT (OUTPATIENT)
Dept: RADIATION ONCOLOGY | Facility: CLINIC | Age: 52
End: 2022-01-12
Attending: RADIOLOGY
Payer: COMMERCIAL

## 2022-01-12 PROCEDURE — 77412 RADIATION TX DELIVERY LVL 3: CPT | Performed by: RADIOLOGY

## 2022-01-12 PROCEDURE — 77427 RADIATION TX MANAGEMENT X5: CPT | Performed by: RADIOLOGY

## 2022-01-12 PROCEDURE — 77387 GUIDANCE FOR RADJ TX DLVR: CPT | Performed by: RADIOLOGY

## 2022-01-12 PROCEDURE — G6002 STEREOSCOPIC X-RAY GUIDANCE: HCPCS | Mod: 26 | Performed by: RADIOLOGY

## 2022-01-12 PROCEDURE — 77336 RADIATION PHYSICS CONSULT: CPT | Performed by: RADIOLOGY

## 2022-01-13 ENCOUNTER — APPOINTMENT (OUTPATIENT)
Dept: RADIATION ONCOLOGY | Facility: CLINIC | Age: 52
End: 2022-01-13
Attending: RADIOLOGY
Payer: COMMERCIAL

## 2022-01-13 PROCEDURE — 77412 RADIATION TX DELIVERY LVL 3: CPT | Performed by: RADIOLOGY

## 2022-01-13 PROCEDURE — 77387 GUIDANCE FOR RADJ TX DLVR: CPT | Performed by: RADIOLOGY

## 2022-01-14 ENCOUNTER — APPOINTMENT (OUTPATIENT)
Dept: RADIATION ONCOLOGY | Facility: CLINIC | Age: 52
End: 2022-01-14
Attending: RADIOLOGY
Payer: COMMERCIAL

## 2022-01-14 PROCEDURE — G6002 STEREOSCOPIC X-RAY GUIDANCE: HCPCS | Mod: 26 | Performed by: RADIOLOGY

## 2022-01-14 PROCEDURE — 77412 RADIATION TX DELIVERY LVL 3: CPT | Performed by: RADIOLOGY

## 2022-01-14 PROCEDURE — 77387 GUIDANCE FOR RADJ TX DLVR: CPT | Performed by: RADIOLOGY

## 2022-01-17 ENCOUNTER — OFFICE VISIT (OUTPATIENT)
Dept: RADIATION ONCOLOGY | Facility: CLINIC | Age: 52
End: 2022-01-17
Attending: RADIOLOGY
Payer: COMMERCIAL

## 2022-01-17 VITALS
BODY MASS INDEX: 40.68 KG/M2 | SYSTOLIC BLOOD PRESSURE: 144 MMHG | OXYGEN SATURATION: 99 % | DIASTOLIC BLOOD PRESSURE: 85 MMHG | WEIGHT: 248 LBS | HEART RATE: 76 BPM

## 2022-01-17 DIAGNOSIS — Z17.0 MALIGNANT NEOPLASM OF UPPER-OUTER QUADRANT OF LEFT BREAST IN FEMALE, ESTROGEN RECEPTOR POSITIVE (H): Primary | ICD-10-CM

## 2022-01-17 DIAGNOSIS — C50.412 MALIGNANT NEOPLASM OF UPPER-OUTER QUADRANT OF LEFT BREAST IN FEMALE, ESTROGEN RECEPTOR POSITIVE (H): Primary | ICD-10-CM

## 2022-01-17 PROCEDURE — G6002 STEREOSCOPIC X-RAY GUIDANCE: HCPCS | Mod: 26 | Performed by: RADIOLOGY

## 2022-01-17 PROCEDURE — 77334 RADIATION TREATMENT AID(S): CPT | Performed by: RADIOLOGY

## 2022-01-17 PROCEDURE — 77387 GUIDANCE FOR RADJ TX DLVR: CPT | Performed by: RADIOLOGY

## 2022-01-17 PROCEDURE — 77321 SPECIAL TELETX PORT PLAN: CPT | Mod: 26 | Performed by: RADIOLOGY

## 2022-01-17 PROCEDURE — 77321 SPECIAL TELETX PORT PLAN: CPT | Performed by: RADIOLOGY

## 2022-01-17 PROCEDURE — 77334 RADIATION TREATMENT AID(S): CPT | Mod: 26 | Performed by: RADIOLOGY

## 2022-01-17 PROCEDURE — 77412 RADIATION TX DELIVERY LVL 3: CPT | Performed by: RADIOLOGY

## 2022-01-17 NOTE — PROGRESS NOTES
[facility] PHYSICIANS  SPECIALIZING IN BREAKTHROUGHS  Radiation Oncology    On Treatment Visit Note      Lachelle Duque      Date: 2022   MRN: [id number]   : 1970  Diagnosis: Breast cancer      Reason for Visit:  On Radiation Treatment Visit     Treatment Summary to Date  Treatment Site: Lt breast + Ax Current Dose: 2120/5240 cGy Fractions:       Chemotherapy  Chemo concurrent with radx?: No    Subjective:   Ms. Duque is seen for her weekly on treatment visit. She is doing well and continuing to work on [employer]. Energy level is ok. No pain. She is continuing to use aquaphor for skin care.     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    Labs:  CBC RESULTS: Recent Labs   Lab Test 21  1118   WBC 7.8   RBC 4.78   HGB 13.9   HCT 43.2   MCV 90   MCH 29.1   MCHC 32.2   RDW 13.9        ELECTROLYTES:  Recent Labs   Lab Test 21  1118      POTASSIUM 4.0   CHLORIDE 105   DULCE 10.0   CO2 28   BUN 17   CR 0.69   *       Assessment:    Tolerating radiation therapy well.  All questions and concerns addressed.    Toxicities:  Fatigue: Grade 1: Fatigue relieved by rest  Pain: Grade 0: No toxicity  Dermatitis: Grade 1: Faint erythema or dry desquamation     Plan:   1. Continue current therapy.    2. Skin care discussed. Continue at least BID aquaphor to treatment field.      PhoneTelliq chart and setup information reviewed  Ports  checked    Medication Review  Med Note: No changes per pt    Educational Topic Discussed  Education Instructions: reviewed    The patient was seen and assessed with staff, Dr. Scales, who agrees with the above assessment and plan.      Suly Sorto MD PGY3  Department of Radiation Oncology  [phone] Clinic  [phone] Pager      I saw and examined the patient with the resident.  I have reviewed and edited the resident's note and agree with the plan of care.          15 minutes were spent on the date of the encounter doing chart review, history and exam, documentation and further activities as noted above.         Roberta Scales MD

## 2022-01-17 NOTE — LETTER
2022         RE: Lachelle Duque  [address]  [place] [address]        Dear Colleague,    Thank you for referring your patient, Lachelle Duque, to the [facility] RADIATION ONCOLOGY. Please see a copy of my visit note below.    [facility] PHYSICIANS  SPECIALIZING IN BREAKTHROUGHS  Radiation Oncology    On Treatment Visit Note      Lachelle Duque      Date: 2022   MRN: [id number]   : 1970  Diagnosis: Breast cancer      Reason for Visit:  On Radiation Treatment Visit     Treatment Summary to Date  Treatment Site: Lt breast + Ax Current Dose: 2120/5240 cGy Fractions:       Chemotherapy  Chemo concurrent with radx?: No    Subjective:   Ms. Duque is seen for her weekly on treatment visit. She is doing well and continuing to work on [employer]. Energy level is ok. No pain. She is continuing to use aquaphor for skin care.     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    Labs:  CBC RESULTS: Recent Labs   Lab Test 21  1118   WBC 7.8   RBC 4.78   HGB 13.9   HCT 43.2   MCV 90   MCH 29.1   MCHC 32.2   RDW 13.9        ELECTROLYTES:  Recent Labs   Lab Test 21  1118      POTASSIUM 4.0   CHLORIDE 105   DULCE 10.0   CO2 28   BUN 17   CR 0.69   *       Assessment:    Tolerating radiation therapy well.  All questions and concerns addressed.    Toxicities:  Fatigue: Grade 1: Fatigue relieved by  rest  Pain: Grade 0: No toxicity  Dermatitis: Grade 1: Faint erythema or dry desquamation     Plan:   1. Continue current therapy.    2. Skin care discussed. Continue at least BID aquaphor to treatment field.      Mosaiq chart and setup information reviewed  Ports checked    Medication Review  Med Note: No changes per pt    Educational Topic Discussed  Education Instructions: reviewed    The patient was seen and assessed with staff, Dr. Scales, who agrees with the above assessment and plan.      Suly Sorto MD PGY3  Department of Radiation Oncology  [phone] Clinic  [phone] Pager      I saw and examined the patient with the resident.  I have reviewed and edited the resident's note and agree with the plan of care.          15 minutes were spent on the date of the encounter doing chart review, history and exam, documentation and further activities as noted above.         Roberta Scales MD

## 2022-01-18 ENCOUNTER — APPOINTMENT (OUTPATIENT)
Dept: RADIATION ONCOLOGY | Facility: CLINIC | Age: 52
End: 2022-01-18
Attending: RADIOLOGY
Payer: COMMERCIAL

## 2022-01-18 PROCEDURE — 77412 RADIATION TX DELIVERY LVL 3: CPT | Performed by: RADIOLOGY

## 2022-01-18 PROCEDURE — 77387 GUIDANCE FOR RADJ TX DLVR: CPT | Performed by: RADIOLOGY

## 2022-01-18 PROCEDURE — G6002 STEREOSCOPIC X-RAY GUIDANCE: HCPCS | Mod: 26 | Performed by: RADIOLOGY

## 2022-01-19 ENCOUNTER — APPOINTMENT (OUTPATIENT)
Dept: RADIATION ONCOLOGY | Facility: CLINIC | Age: 52
End: 2022-01-19
Attending: RADIOLOGY
Payer: COMMERCIAL

## 2022-01-19 PROCEDURE — 77387 GUIDANCE FOR RADJ TX DLVR: CPT | Performed by: RADIOLOGY

## 2022-01-19 PROCEDURE — 77412 RADIATION TX DELIVERY LVL 3: CPT | Performed by: RADIOLOGY

## 2022-01-19 PROCEDURE — 77427 RADIATION TX MANAGEMENT X5: CPT | Mod: GC | Performed by: RADIOLOGY

## 2022-01-19 PROCEDURE — 77336 RADIATION PHYSICS CONSULT: CPT | Performed by: RADIOLOGY

## 2022-01-19 PROCEDURE — G6002 STEREOSCOPIC X-RAY GUIDANCE: HCPCS | Mod: 26 | Performed by: RADIOLOGY

## 2022-01-20 ENCOUNTER — APPOINTMENT (OUTPATIENT)
Dept: RADIATION ONCOLOGY | Facility: CLINIC | Age: 52
End: 2022-01-20
Attending: RADIOLOGY
Payer: COMMERCIAL

## 2022-01-20 PROCEDURE — 77412 RADIATION TX DELIVERY LVL 3: CPT | Performed by: RADIOLOGY

## 2022-01-20 PROCEDURE — G6002 STEREOSCOPIC X-RAY GUIDANCE: HCPCS | Mod: 26 | Performed by: RADIOLOGY

## 2022-01-20 PROCEDURE — 77387 GUIDANCE FOR RADJ TX DLVR: CPT | Performed by: RADIOLOGY

## 2022-01-21 ENCOUNTER — APPOINTMENT (OUTPATIENT)
Dept: RADIATION ONCOLOGY | Facility: CLINIC | Age: 52
End: 2022-01-21
Attending: RADIOLOGY
Payer: COMMERCIAL

## 2022-01-21 PROCEDURE — G6002 STEREOSCOPIC X-RAY GUIDANCE: HCPCS | Mod: 26 | Performed by: RADIOLOGY

## 2022-01-21 PROCEDURE — 77412 RADIATION TX DELIVERY LVL 3: CPT | Performed by: RADIOLOGY

## 2022-01-21 PROCEDURE — 77387 GUIDANCE FOR RADJ TX DLVR: CPT | Performed by: RADIOLOGY

## 2022-01-24 ENCOUNTER — OFFICE VISIT (OUTPATIENT)
Dept: RADIATION ONCOLOGY | Facility: CLINIC | Age: 52
End: 2022-01-24
Attending: RADIOLOGY
Payer: COMMERCIAL

## 2022-01-24 VITALS
SYSTOLIC BLOOD PRESSURE: 117 MMHG | WEIGHT: 248 LBS | BODY MASS INDEX: 40.68 KG/M2 | DIASTOLIC BLOOD PRESSURE: 77 MMHG | HEART RATE: 81 BPM | OXYGEN SATURATION: 98 %

## 2022-01-24 DIAGNOSIS — Z17.0 MALIGNANT NEOPLASM OF UPPER-OUTER QUADRANT OF LEFT BREAST IN FEMALE, ESTROGEN RECEPTOR POSITIVE (H): Primary | ICD-10-CM

## 2022-01-24 DIAGNOSIS — C50.412 MALIGNANT NEOPLASM OF UPPER-OUTER QUADRANT OF LEFT BREAST IN FEMALE, ESTROGEN RECEPTOR POSITIVE (H): Primary | ICD-10-CM

## 2022-01-24 PROCEDURE — 77280 THER RAD SIMULAJ FIELD SMPL: CPT | Mod: 26 | Performed by: RADIOLOGY

## 2022-01-24 PROCEDURE — 77280 THER RAD SIMULAJ FIELD SMPL: CPT | Performed by: RADIOLOGY

## 2022-01-24 PROCEDURE — 77412 RADIATION TX DELIVERY LVL 3: CPT | Performed by: RADIOLOGY

## 2022-01-24 NOTE — LETTER
2022         RE: Lachelle Duque  [address]  [place] [address]        Dear Colleague,    Thank you for referring your patient, Lachelle Duque, to the [facility] RADIATION ONCOLOGY. Please see a copy of my visit note below.    [facility] PHYSICIANS  SPECIALIZING IN BREAKTHROUGHS  Radiation Oncology    On Treatment Visit Note      Lachelle Duque      Date: 2022   MRN: [id number]   : 1970  Diagnosis: Breast cancer      Reason for Visit:  On Radiation Treatment Visit     Treatment Summary to Date  Treatment Site: Lt breast + Ax Current Dose: 3280/5240 cGy Fractions:       Chemotherapy  Chemo concurrent with radx?: No    Subjective: Ms. Duque is seen for her weekly on-treatment visit. Continuing to work full time and denies changes in energy level. Some swelling in the breast but not noting pain. Some darkening in the inframammary fold and axilla, but no pain or skin breakdown. Continues to use aquaphor twice daily. She noticed her back mildly red when getting out of the shower over the weekend.      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 Left inframmary fold tanned without desquamation, and mild darkening of the left axilla without breakdown or pain.    Labs:  CBC RESULTS: Recent Labs   Lab Test 21  1118   WBC 7.8   RBC 4.78   HGB 13.9   HCT 43.2   MCV 90   MCH 29.1   MCHC 32.2   RDW 13.9         ELECTROLYTES:  Recent Labs   Lab Test 11/08/21  1118      POTASSIUM 4.0   CHLORIDE 105   DULCE 10.0   CO2 28   BUN 17   CR 0.69   *       Assessment:    Tolerating radiation therapy well.  All questions and concerns addressed.    Toxicities:  Fatigue: Grade 0: No toxicity  Pain: Grade 0: No toxicity  Dermatitis: Grade 1: Faint erythema or dry desquamation     Plan:   1. Continue current therapy.    2. Skin care discussed. Continue aquaphor BID. Discussed erythema on the back may occur due to SCV coverage. Continue same skin care regimen to this area.   3. Fatigue / Energy: at patient's baseline. She is continuing to work full time and doing well.       Mosaiq chart and setup information reviewed  Ports checked    Medication Review  Med Note: No changes per pt    Educational Topic Discussed  Education Instructions: reviewed    The patient was seen and assessed with staff, Dr. Scales, who agrees with the above assessment and plan.      Suly Sorto MD PGY3  Department of Radiation Oncology  [phone] Clinic  [phone] Pager      I saw and examined the patient with the resident.  I have reviewed and edited the resident's note and agree with the plan of care.      15 minutes were spent on the date of the encounter doing chart review, history and exam, documentation and further activities as noted above.       Roberta Scales MD        Again, thank you for allowing me to participate in the care of your patient.        Sincerely,        Roberta Scales MD

## 2022-01-24 NOTE — PROGRESS NOTES
[facility] PHYSICIANS  SPECIALIZING IN BREAKTHROUGHS  Radiation Oncology    On Treatment Visit Note      Lachelle Duque      Date: 2022   MRN: [id number]   : 1970  Diagnosis: Breast cancer      Reason for Visit:  On Radiation Treatment Visit     Treatment Summary to Date  Treatment Site: Lt breast + Ax Current Dose: 3280/5240 cGy Fractions:       Chemotherapy  Chemo concurrent with radx?: No    Subjective: Ms. Duque is seen for her weekly on-treatment visit. Continuing to work full time and denies changes in energy level. Some swelling in the breast but not noting pain. Some darkening in the inframammary fold and axilla, but no pain or skin breakdown. Continues to use aquaphor twice daily. She noticed her back mildly red when getting out of the shower over the weekend.      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 Left inframmary fold tanned without desquamation, and mild darkening of the left axilla without breakdown or pain.    Labs:  CBC RESULTS: Recent Labs   Lab Test 21  1118   WBC 7.8   RBC 4.78   HGB 13.9   HCT 43.2   MCV 90   MCH 29.1   MCHC 32.2   RDW 13.9        ELECTROLYTES:  Recent Labs   Lab Test 21  1118      POTASSIUM 4.0   CHLORIDE 105   DULCE 10.0   CO2 28   BUN 17   CR 0.69   *       Assessment:    Tolerating radiation therapy well.  All questions and concerns  addressed.    Toxicities:  Fatigue: Grade 0: No toxicity  Pain: Grade 0: No toxicity  Dermatitis: Grade 1: Faint erythema or dry desquamation     Plan:   1. Continue current therapy.    2. Skin care discussed. Continue aquaphor BID. Discussed erythema on the back may occur due to SCV coverage. Continue same skin care regimen to this area.   3. Fatigue / Energy: at patient's baseline. She is continuing to work full time and doing well.       Mosaiq chart and setup information reviewed  Ports checked    Medication Review  Med Note: No changes per pt    Educational Topic Discussed  Education Instructions: reviewed    The patient was seen and assessed with staff, Dr. Scales, who agrees with the above assessment and plan.      Suly Sorto MD PGY3  Department of Radiation Oncology  [phone] Clinic  [phone] Pager      I saw and examined the patient with the resident.  I have reviewed and edited the resident's note and agree with the plan of care.      15 minutes were spent on the date of the encounter doing chart review, history and exam, documentation and further activities as noted above.       oRberta Scales MD

## 2022-01-24 NOTE — LETTER
Date:January 25, 2022      Provider requested that no letter be sent. Do not send.       [employer]

## 2022-01-25 ENCOUNTER — APPOINTMENT (OUTPATIENT)
Dept: RADIATION ONCOLOGY | Facility: CLINIC | Age: 52
End: 2022-01-25
Attending: RADIOLOGY
Payer: COMMERCIAL

## 2022-01-25 PROCEDURE — 77412 RADIATION TX DELIVERY LVL 3: CPT | Performed by: RADIOLOGY

## 2022-01-25 PROCEDURE — 77387 GUIDANCE FOR RADJ TX DLVR: CPT | Performed by: RADIOLOGY

## 2022-01-26 ENCOUNTER — APPOINTMENT (OUTPATIENT)
Dept: RADIATION ONCOLOGY | Facility: CLINIC | Age: 52
End: 2022-01-26
Attending: RADIOLOGY
Payer: COMMERCIAL

## 2022-01-26 PROCEDURE — 77336 RADIATION PHYSICS CONSULT: CPT | Performed by: RADIOLOGY

## 2022-01-26 PROCEDURE — 77387 GUIDANCE FOR RADJ TX DLVR: CPT | Performed by: RADIOLOGY

## 2022-01-26 PROCEDURE — 77427 RADIATION TX MANAGEMENT X5: CPT | Mod: GC | Performed by: RADIOLOGY

## 2022-01-26 PROCEDURE — G6002 STEREOSCOPIC X-RAY GUIDANCE: HCPCS | Mod: 26 | Performed by: RADIOLOGY

## 2022-01-26 PROCEDURE — 77412 RADIATION TX DELIVERY LVL 3: CPT | Performed by: RADIOLOGY

## 2022-01-27 ENCOUNTER — APPOINTMENT (OUTPATIENT)
Dept: RADIATION ONCOLOGY | Facility: CLINIC | Age: 52
End: 2022-01-27
Attending: RADIOLOGY
Payer: COMMERCIAL

## 2022-01-27 PROCEDURE — 77412 RADIATION TX DELIVERY LVL 3: CPT | Performed by: RADIOLOGY

## 2022-01-27 PROCEDURE — G6002 STEREOSCOPIC X-RAY GUIDANCE: HCPCS | Mod: 26 | Performed by: RADIOLOGY

## 2022-01-27 PROCEDURE — 77387 GUIDANCE FOR RADJ TX DLVR: CPT | Performed by: RADIOLOGY

## 2022-01-28 ENCOUNTER — APPOINTMENT (OUTPATIENT)
Dept: RADIATION ONCOLOGY | Facility: CLINIC | Age: 52
End: 2022-01-28
Attending: RADIOLOGY
Payer: COMMERCIAL

## 2022-01-28 PROCEDURE — 77412 RADIATION TX DELIVERY LVL 3: CPT | Performed by: RADIOLOGY

## 2022-01-31 ENCOUNTER — LAB (OUTPATIENT)
Dept: LAB | Facility: CLINIC | Age: 52
End: 2022-01-31
Attending: INTERNAL MEDICINE
Payer: COMMERCIAL

## 2022-01-31 ENCOUNTER — ANCILLARY PROCEDURE (OUTPATIENT)
Dept: BONE DENSITY | Facility: CLINIC | Age: 52
End: 2022-01-31
Attending: INTERNAL MEDICINE
Payer: COMMERCIAL

## 2022-01-31 ENCOUNTER — OFFICE VISIT (OUTPATIENT)
Dept: RADIATION ONCOLOGY | Facility: CLINIC | Age: 52
End: 2022-01-31
Attending: RADIOLOGY
Payer: COMMERCIAL

## 2022-01-31 VITALS
WEIGHT: 247.6 LBS | OXYGEN SATURATION: 97 % | DIASTOLIC BLOOD PRESSURE: 79 MMHG | SYSTOLIC BLOOD PRESSURE: 130 MMHG | HEART RATE: 84 BPM | BODY MASS INDEX: 40.61 KG/M2

## 2022-01-31 DIAGNOSIS — C50.412 MALIGNANT NEOPLASM OF UPPER-OUTER QUADRANT OF LEFT BREAST IN FEMALE, ESTROGEN RECEPTOR POSITIVE (H): ICD-10-CM

## 2022-01-31 DIAGNOSIS — Z17.0 MALIGNANT NEOPLASM OF UPPER-OUTER QUADRANT OF LEFT BREAST IN FEMALE, ESTROGEN RECEPTOR POSITIVE (H): ICD-10-CM

## 2022-01-31 DIAGNOSIS — C50.412 MALIGNANT NEOPLASM OF UPPER-OUTER QUADRANT OF LEFT BREAST IN FEMALE, ESTROGEN RECEPTOR POSITIVE (H): Primary | ICD-10-CM

## 2022-01-31 DIAGNOSIS — Z17.0 MALIGNANT NEOPLASM OF UPPER-OUTER QUADRANT OF LEFT BREAST IN FEMALE, ESTROGEN RECEPTOR POSITIVE (H): Primary | ICD-10-CM

## 2022-01-31 DIAGNOSIS — D13.4 HEPATIC ADENOMA: ICD-10-CM

## 2022-01-31 DIAGNOSIS — Z79.811 LONG TERM (CURRENT) USE OF AROMATASE INHIBITORS: ICD-10-CM

## 2022-01-31 LAB
ALBUMIN SERPL-MCNC: 4 G/DL (ref 3.4–5)
ALP SERPL-CCNC: 103 U/L (ref 40–150)
ALT SERPL W P-5'-P-CCNC: 47 U/L (ref 0–50)
ANION GAP SERPL CALCULATED.3IONS-SCNC: 3 MMOL/L (ref 3–14)
AST SERPL W P-5'-P-CCNC: 24 U/L (ref 0–45)
BASOPHILS # BLD AUTO: 0 10E3/UL (ref 0–0.2)
BASOPHILS NFR BLD AUTO: 1 %
BILIRUB SERPL-MCNC: 0.2 MG/DL (ref 0.2–1.3)
BUN SERPL-MCNC: 21 MG/DL (ref 7–30)
CALCIUM SERPL-MCNC: 10.1 MG/DL (ref 8.5–10.1)
CHLORIDE BLD-SCNC: 109 MMOL/L (ref 94–109)
CO2 SERPL-SCNC: 25 MMOL/L (ref 20–32)
CREAT SERPL-MCNC: 0.6 MG/DL (ref 0.52–1.04)
EOSINOPHIL # BLD AUTO: 0.1 10E3/UL (ref 0–0.7)
EOSINOPHIL NFR BLD AUTO: 2 %
ERYTHROCYTE [DISTWIDTH] IN BLOOD BY AUTOMATED COUNT: 12.4 % (ref 10–15)
ESTRADIOL SERPL-MCNC: 16 PG/ML
FSH SERPL-ACNC: 46.5 IU/L
GFR SERPL CREATININE-BSD FRML MDRD: >90 ML/MIN/1.73M2
GLUCOSE BLD-MCNC: 102 MG/DL (ref 70–99)
HCT VFR BLD AUTO: 39.1 % (ref 35–47)
HGB BLD-MCNC: 13 G/DL (ref 11.7–15.7)
IMM GRANULOCYTES # BLD: 0 10E3/UL
IMM GRANULOCYTES NFR BLD: 0 %
LYMPHOCYTES # BLD AUTO: 1.1 10E3/UL (ref 0.8–5.3)
LYMPHOCYTES NFR BLD AUTO: 18 %
MCH RBC QN AUTO: 29.3 PG (ref 26.5–33)
MCHC RBC AUTO-ENTMCNC: 33.2 G/DL (ref 31.5–36.5)
MCV RBC AUTO: 88 FL (ref 78–100)
MONOCYTES # BLD AUTO: 0.4 10E3/UL (ref 0–1.3)
MONOCYTES NFR BLD AUTO: 7 %
NEUTROPHILS # BLD AUTO: 4.2 10E3/UL (ref 1.6–8.3)
NEUTROPHILS NFR BLD AUTO: 72 %
NRBC # BLD AUTO: 0 10E3/UL
NRBC BLD AUTO-RTO: 0 /100
PLATELET # BLD AUTO: 233 10E3/UL (ref 150–450)
POTASSIUM BLD-SCNC: 4.2 MMOL/L (ref 3.4–5.3)
PROT SERPL-MCNC: 8 G/DL (ref 6.8–8.8)
RBC # BLD AUTO: 4.43 10E6/UL (ref 3.8–5.2)
SODIUM SERPL-SCNC: 137 MMOL/L (ref 133–144)
WBC # BLD AUTO: 5.9 10E3/UL (ref 4–11)

## 2022-01-31 PROCEDURE — 80053 COMPREHEN METABOLIC PANEL: CPT

## 2022-01-31 PROCEDURE — 77080 DXA BONE DENSITY AXIAL: CPT | Performed by: INTERNAL MEDICINE

## 2022-01-31 PROCEDURE — 36415 COLL VENOUS BLD VENIPUNCTURE: CPT

## 2022-01-31 PROCEDURE — 77412 RADIATION TX DELIVERY LVL 3: CPT | Performed by: RADIOLOGY

## 2022-01-31 PROCEDURE — 85025 COMPLETE CBC W/AUTO DIFF WBC: CPT

## 2022-01-31 PROCEDURE — 82670 ASSAY OF TOTAL ESTRADIOL: CPT

## 2022-01-31 PROCEDURE — 77427 RADIATION TX MANAGEMENT X5: CPT | Performed by: RADIOLOGY

## 2022-01-31 PROCEDURE — 83001 ASSAY OF GONADOTROPIN (FSH): CPT

## 2022-01-31 NOTE — LETTER
2022         RE: Lachelle Duque  [address]  [place] [address]        Dear Colleague,    Thank you for referring your patient, Lachelle Duque, to the [facility] RADIATION ONCOLOGY. Please see a copy of my visit note below.    [facility] PHYSICIANS  SPECIALIZING IN BREAKTHROUGHS  Radiation Oncology    On Treatment Visit Note      Lachelle Duque      Date: 2022   MRN: [id number]   : 1970  Diagnosis: Breast cancer      Reason for Visit:  On Radiation Treatment Visit     Treatment Summary to Date  Treatment Site: Lt breast + Ax Current Dose: 4740/5240 cGy Fractions:       Chemotherapy  Chemo concurrent with radx?: No    ED Visit/Hosiptal Admission: None     Treatment Breaks: None      Subjective:   Shanta is feeling a little tired this week, partially attributing to her work schedule. She is starting long shifts. She has mild itching over the sternal area. She has not noted any skin breakdown.     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 Patchy papular rash over sternum. Dusky appearance in the axilla. No overt breakdown.     Labs:  CBC RESULTS: Recent Labs   Lab Test 21  1118   WBC 7.8   RBC 4.78   HGB 13.9   HCT 43.2   MCV 90   MCH 29.1   MCHC 32.2   RDW 13.9        ELECTROLYTES:  Recent Labs   Lab Test 21  1118      POTASSIUM 4.0   CHLORIDE 105    DULCE 10.0   CO2 28   BUN 17   CR 0.69   *       Assessment:    Tolerating radiation therapy well.  All questions and concerns addressed.    Toxicities:  Fatigue: Grade 1: Fatigue relieved by rest  Dermatitis: Grade 1: Faint erythema or dry desquamation    Plan:   1. Continue current therapy.    2. She will finish treatment in 2 more fractions.  3. Follow up with Marcia Guillen NP in 1 month.  4. For itching, she may use 1% hydrocortisone cream. Discussed potential skin breakdown and management.       Mosaiq chart and setup information reviewed  Ports checked    Medication Review  Med Note: No changes per pt    Educational Topic Discussed  Additional Instructions: D/C Instructions reviewed with pt   Education Instructions: reviewed        Roberta Scales MD/PhD  [phone] clinic  Pager [phone]    Please do not send letter to referring physician.          15 minutes were spent on the date of the encounter doing chart review, history and exam, documentation and further activities as noted above.       Roberta Scales MD      Again, thank you for allowing me to participate in the care of your patient.        Sincerely,        Roberta Scalse MD

## 2022-01-31 NOTE — PROGRESS NOTES
[facility] PHYSICIANS  SPECIALIZING IN BREAKTHROUGHS  Radiation Oncology    On Treatment Visit Note      Lachelle Duque      Date: 2022   MRN: [id number]   : 1970  Diagnosis: Breast cancer      Reason for Visit:  On Radiation Treatment Visit     Treatment Summary to Date  Treatment Site: Lt breast + Ax Current Dose: 4740/5240 cGy Fractions:       Chemotherapy  Chemo concurrent with radx?: No    ED Visit/Hosiptal Admission: None     Treatment Breaks: None      Subjective:   Shanta is feeling a little tired this week, partially attributing to her work schedule. She is starting long shifts. She has mild itching over the sternal area. She has not noted any skin breakdown.     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 Patchy papular rash over sternum. Dusky appearance in the axilla. No overt breakdown.     Labs:  CBC RESULTS: Recent Labs   Lab Test 21  1118   WBC 7.8   RBC 4.78   HGB 13.9   HCT 43.2   MCV 90   MCH 29.1   MCHC 32.2   RDW 13.9        ELECTROLYTES:  Recent Labs   Lab Test 21  1118      POTASSIUM 4.0   CHLORIDE 105   DULCE 10.0   CO2 28   BUN 17   CR 0.69   *       Assessment:    Tolerating radiation therapy well.  All questions and concerns addressed.    Toxicities:  Fatigue: Grade 1: Fatigue relieved by rest  Dermatitis: Grade 1: Faint erythema or dry  desquamation    Plan:   1. Continue current therapy.    2. She will finish treatment in 2 more fractions.  3. Follow up with Marica Guillen NP in 1 month.  4. For itching, she may use 1% hydrocortisone cream. Discussed potential skin breakdown and management.       Mosaiq chart and setup information reviewed  Ports checked    Medication Review  Med Note: No changes per pt    Educational Topic Discussed  Additional Instructions: D/C Instructions reviewed with pt   Education Instructions: reviewed        Roberta Scales MD/PhD  [phone] clinic  Pager [phone]    Please do not send letter to referring physician.          15 minutes were spent on the date of the encounter doing chart review, history and exam, documentation and further activities as noted above.       Roberta Scales MD

## 2022-01-31 NOTE — PATIENT INSTRUCTIONS
Continuing Management of the Effects of Radiation Treatment    The side effects of radiation therapy should gradually decrease in 2 to 3 weeks after you have finished radiation.  Some effects take longer to resolve.    Skin reactions: Continue Aquaphor until follow up.  Skin changes (such as redness or irritation) should begin to get better gradually.  Some people will have a permanent change in skin color.  Their skin may be more pink or  tan  than the untreated skin.  The skin may be thinner or more fragile than before treatment.  Continue to use a gentle moisturizing lotion for several months.  You should always protect the skin in the area that was treated by using sunscreen of spf 30 or higher.      Other skin care instructions:    Fatigue caused by radiation therapy will decrease and your energy will improve.    For concerns or questions call Department of Therapeutic Radiology [phone]    Follow up in 1 month with Maine BUSTAMANTE

## 2022-01-31 NOTE — NURSING NOTE
Chief Complaint   Patient presents with     Labs Only     blood drawn via VPT by LPN.     JODEE Gore LPN

## 2022-01-31 NOTE — LETTER
Date:February 1, 2022      Provider requested that no letter be sent. Do not send.       [facility]

## 2022-02-01 ENCOUNTER — APPOINTMENT (OUTPATIENT)
Dept: RADIATION ONCOLOGY | Facility: CLINIC | Age: 52
End: 2022-02-01
Attending: RADIOLOGY
Payer: COMMERCIAL

## 2022-02-01 PROCEDURE — 77412 RADIATION TX DELIVERY LVL 3: CPT | Performed by: RADIOLOGY

## 2022-02-02 ENCOUNTER — APPOINTMENT (OUTPATIENT)
Dept: RADIATION ONCOLOGY | Facility: CLINIC | Age: 52
End: 2022-02-02
Attending: RADIOLOGY
Payer: COMMERCIAL

## 2022-02-02 PROCEDURE — 77412 RADIATION TX DELIVERY LVL 3: CPT | Performed by: RADIOLOGY

## 2022-02-02 PROCEDURE — 77336 RADIATION PHYSICS CONSULT: CPT | Performed by: RADIOLOGY

## 2022-02-04 ENCOUNTER — ONCOLOGY VISIT (OUTPATIENT)
Dept: RADIATION ONCOLOGY | Facility: CLINIC | Age: 52
End: 2022-02-04
Payer: COMMERCIAL

## 2022-02-08 NOTE — PROCEDURES
Radiotherapy Treatment Summary          Date of Report: 2022     PATIENT: BALDEV ORTIZ  MEDICAL RECORD NO: [id number]  : 1970     DIAGNOSIS: C50.519 Malignant neoplasm of lower-outer quadrant of unspecified female breast  INTENT OF RADIOTHERAPY: Cure  PATHOLOGY:  Invasive ductal carcinoma, ER+/NM+/HER2-                                STAGE: IIA  CONCURRENT SYSTEMIC THERAPY:  No                  Details of the treatments summarized below are found in records kept in the Department of Radiation Oncology at [facility].     Treatment Summary:  Radiation Oncology - Course: 1 Protocol:   Treatment Site Current Dose Modality From To Elapsed Days Fx.  1 L Breast + Axilla  4,240 cGy 10x/18x  1/06/2022  2022  21 16  2 L SCV  4,240 cGy 18 X  1/06/2022  2022  21 16  1 L Breast Boost  1,000 cGy e15  2022   5  4          Dose per Fraction:    1) Whole left breast and regional lymphatics: 265 cGy per fraction,   2) Lumpectomy cavity boost: 250 cGy per fraction      Total Dose:  5240 cGy            COMMENTS: Ms. Ortiz is a 51 year old woman with a hormone receptor positive left sided breast cancer.   This was detected on routine screening mammogram on 21. It was located at 4:00, 6 cm from the nipple.   Biopsy showed invasive ductal carcinoma, grade 1, ER+/NM+HER2-, Ki-67 10%. Breast Actionable Panel showed no   mutation but did detect a VUS in BRCA2 gene. She underwent wire-localized lumpectomy and SLN biopsy on   11/10/21. Pathology confirmed invasive ductal carcinoma, [place] grade 1, and measuring 1.1 cm in greatest   dimension. Associated DCIS was nuclear grade 2, comprising < 1% of the tumor volume. There was no LVSI.   Closest invasive margin was > 5 mm, DCIS was 1.5 mm (posterior margin).  SLN contained a 1.1 cm macrometastasis   with 1 mm focal extracapsular extension. HER2 on the lymph node was non-amplified. Final pathologic stage   [id number](sn). Oncotype Dx was 19  and thus no chemotherapy was indicated.      She underwent adjuvant radiation as detailed above. She tolerated treatment without issue or interruption. She continued   to work full-time, and by the end of treatment, she did note some fatigue but still worked full time. She had as expected   Grade 1 dermatitis of the left breast and axilla. She continued with daily skin care as recommended, and was   recommended over the counter 1% hydrocortisone cream for pruritus as needed.              ED visits/hospitalizations: None     Missed treatments: None     Acute Toxicity Profile by CTC v5.0:  Fatigue: Grade 1: Fatigue relieved by rest  Dermatitis: Grade 1: Faint erythema or dry desquamation     PAIN MANAGEMENT:  Not applicable; for pruritus, was recommended as needed over the counter 1%   hydrocortisone cream                            FOLLOW UP PLAN:   1) Follow-up in Radiation Oncology with Nurse Practitioner, Maine Kennedy in 1 month, 3/3/22                             Resident Physician: Suly Sorto M.D.   Staff Physician: Roberta Scales M.D. PhD  Physicist: Eva ySed, PhD     CC: MD Giovanna Page MD                                     Radiation Oncology:  [facility] [address] [place] [address]

## 2022-02-10 ENCOUNTER — VIRTUAL VISIT (OUTPATIENT)
Dept: ONCOLOGY | Facility: CLINIC | Age: 52
End: 2022-02-10
Attending: INTERNAL MEDICINE
Payer: COMMERCIAL

## 2022-02-10 DIAGNOSIS — Z17.0 MALIGNANT NEOPLASM OF UPPER-OUTER QUADRANT OF LEFT BREAST IN FEMALE, ESTROGEN RECEPTOR POSITIVE (H): Primary | ICD-10-CM

## 2022-02-10 DIAGNOSIS — Z80.3 FAMILY HISTORY OF MALIGNANT NEOPLASM OF BREAST: ICD-10-CM

## 2022-02-10 DIAGNOSIS — C50.412 MALIGNANT NEOPLASM OF UPPER-OUTER QUADRANT OF LEFT BREAST IN FEMALE, ESTROGEN RECEPTOR POSITIVE (H): Primary | ICD-10-CM

## 2022-02-10 DIAGNOSIS — Z80.0 FAMILY HISTORY OF COLON CANCER: ICD-10-CM

## 2022-02-10 DIAGNOSIS — Z80.41 FAMILY HISTORY OF MALIGNANT NEOPLASM OF OVARY: ICD-10-CM

## 2022-02-10 PROCEDURE — 96040 HC GENETIC COUNSELING, EACH 30 MINUTES: CPT | Mod: 95,GT | Performed by: GENETIC COUNSELOR, MS

## 2022-02-10 NOTE — PROGRESS NOTES
2/10/2022    Shanta is a 51 year old who is being evaluated via a billable video visit.      How would you like to obtain your AVS? MyChart  If the video visit is dropped, the invitation should be resent by: Text to cell phone: [phone]  Will anyone else be joining your video visit? No    Video-Visit Details  Type of service:  Video Visit  Video Start Time: 8:58am  Video End Time:9:42am  Originating Location (pt. Location): Home  Distant Location (provider location):  [facility]   Platform used for Video Visit: "[employer]    Referring Provider: Giovanna Alegria MD    Presenting Information:   Given concerns regarding the potential for COVID-19 exposure during a clinic visit, Shanta elected for a video genetic counseling visit through the Cancer Risk Management Program to discuss her personal and family history of breast, ovarian, and colon cancer. We reviewed this history, cancer screening recommendations, and available genetic testing options.    Personal History:  Lachelle is a 51 year old female. She was diagnosed with invasive ductal carcinoma with DCIS of her left breast at age 51; the tumor was ER/NH+, Her2-. Treatment has included a lumpectomy, radiation, and endocrine therapy.    Lachelle has her ovaries, fallopian tubes and uterus in place, and she has had no ovarian cancer screening to date.      She has regular mammograms and her most recent mammogram in September 2021 identified this cancer. Her most recent colonoscopy in October 2015 was normal and follow-up was recommended in five years. Aside from dermatologic exams as needed, she does not regularly do any other cancer screening at this time.    Family History: (Please see scanned pedigree for detailed family history information)    One maternal aunt was diagnosed with breast cancer in her 50's and passed away at age 59; treatment included a bilateral mastectomy and radiation.    Her daughter was diagnosed with CML at age  "35.    One maternal great aunt's (grandmother's sister) son was diagnosed with esophageal cancer and passed away at age 59.    Shanta's father is 69 and was diagnosed with colon cancer at age 42; he has not pursued genetic testing.    One paternal aunt was diagnosed with and passed away from ovarian cancer in her 40's.    Her maternal ethnicity is [place] and [place]. Her paternal ethnicity is [place]. There is no known Ashkenazi Baptist ancestry on either side of her family.    Genetic Testing Result: Variant of Uncertain Significance (VUS)  In order to make upcoming treatment decisions, Dr. De La Vega ordered the Breast Actionable Panel through the [facility] and Silvios blood was drawn on 10/22/2021.    Lachelle was found to have a variant of uncertain significance (VUS) in the BRCA2 gene. This variant is called c.6248T>C (p.[id number]). No other variants or mutations were found in the BRCA2 gene. Given the uncertain significance of this result, medical management decisions should NOT be made based on this test result alone.    Of note, Lachelle tested negative for mutations in the following genes by sequencing and deletion/duplication analysis: FELICIA, BRCA1, CDH1, CHEK2, NBN, NF1, PALB2, PTEN, STK11, and TP53. We reviewed the autosomal dominant inheritance of these genes. Lachelle cannot pass on a mutation in any of these genes to her children based on this test result. Mutations in these genes do not skip generations.      A copy of the test report can be found in the Laboratory tab, dated 11/8/2021, and named \"Next generation sequencing\".     Interpretation:  We discussed several different interpretations of this inconclusive test result. It is not clear if this variant in the BRCA2 gene is associated with increased cancer risk.  1. This variant may be a benign change that does not increase cancer risk.  2. This variant may be a harmful mutation that causes Hereditary Breast and Ovarian Cancer " syndrome and autosomal recessive Fanconi anemia.    The laboratory is working to determine if this variant is harmful or benign, and they will contact Dr. De La Vega if it is reclassified. If this variant is determined to be a benign change, there may be a different gene or combination of genes and environment that are associated with the cancers in this family.    It is also important to consider that her other relatives with cancer may have had a mutation in one of the genes tested and Shanta did not inherit it. If that is the case, Silvios breast cancer may be sporadic and caused by random cellular changes.    Inheritance:  We reviewed the autosomal dominant inheritance of this variant in the BRCA2 gene. We discussed that her brother has a 50% risk of having the same variant; her other extended relatives may carry the variant, as well. Because it is unclear what, if any, risk is associated with this variant, clinical genetic testing for this BRCA2 variant alone is not recommended for relatives.    Additional Testing Considerations:  We then discussed that it is still possible Lachelle does carry a currently identifiable gene mutation that increase her risk for cancer.    We reviewed that there are additional genes that could cause increased risk for breast, colon, ovarian, and related cancers that were not included in the Breast Actionable Panel. For example, individuals with Egan syndrome (due to a MLH1, MSH2, MSH6, PMS2, or EPCAM gene mutation) are at increased risk for colon, ovarian, and potentially breast cancer. As many of these genes present with overlapping features in a family and accurate cancer risk cannot always be established based upon the pedigree analysis alone, it would be reasonable for Lachelle to consider a larger panel to analyze additional genes.    We reviewed Shanta's expanded genetic testing options: actionable breast/gynecologic/colon cancer panel (Hereditary Breast/Gyn/Colon Cancer Actionable  Panel) and expanded pan-cancer panels (Comprehensive Hereditary Cancer 40 or 85 Gene Panel). Lachelle expressed interest in only the actionable genes. She opted for the Hereditary Breast/Gyn/Colon Cancer Actionable Panel.    Genetic testing is available for 29 (18 additional) genes associated with hereditary cancer: APC, FELICIA, AXIN2, BARD1, BMPR1A, BRCA1, BRCA2, BRIP1, CDH1, CHEK2, EPCAM, GREM1, MLH1, MSH2, MSH3, MSH6, MUTYH, NF1, NTHL1, PALB2, PMS2, POLD1, POLE, PTEN, RAD51C, RAD51D, SMAD4, STK11, and TP53.    We discussed that many of the genes in the panel are associated with specific hereditary cancer syndromes and published management guidelines: Hereditary Breast and Ovarian Cancer syndrome (BRCA1, BRCA2), Egan syndrome (MLH1, MSH2, MSH6, PMS2, EPCAM), Familial Adenomatous Polyposis (APC), Hereditary Diffuse Gastric Cancer (CDH1), Juvenile Polyposis syndrome (BMPR1A, SMAD4), Cowden syndrome (PTEN), Li Fraumeni syndrome (TP53), Peutz-Jeghers syndrome (STK11), MUTYH Associated Polyposis (MUTYH), and Neurofibromatosis type 1 (NF1).     The FELICIA, AXIN2, BARD1, BRIP1, CHEK2, GREM1, MSH3, NTHL1, PALB2, POLD1, POLE, RAD51C, and RAD51D genes are associated with increased cancer risk and have published management guidelines for certain cancers.      Consent was obtained over the video. Genetic testing using the Hereditary Breast/Gyn/Colon Cancer Actionable Panel will be performed by the [facility] using her already collected blood sample. Turn around time: 3-4 weeks.    Medical Management: For Lachelle, we reviewed that the information from genetic testing may determine:    additional cancer screening for which Lachelle may qualify (i.e. mammogram and breast MRI, more frequent colonoscopies, more frequent dermatologic exams, etc.),    options for risk reducing surgeries Lachelle could consider (i.e. bilateral mastectomy, surgery to remove her ovaries and/or uterus, etc.),      and  targeted chemotherapies if she were to develop certain cancers in the future (i.e. immunotherapy for individuals with Egan syndrome, PARP inhibitors, etc.).     These recommendations and possible targeted chemotherapies will be discussed in detail once genetic testing is completed.     Plan:  1) Today we reviewed Shanta's Breast Actionable Panel results.  2) Lachelle elected to proceed with additional genetic testing using the Hereditary Breast/Gyn/Colon Cancer Actionable Panel offered by the [facility]. No additional blood sample is needed.  3) The results should be available in 3-4 weeks.  4) Lachelle will be contacted to schedule a virtual visit to discuss the results.    Judit New MS, [facility]  Licensed, Certified Genetic Counselor  Office: [phone]  Pager: [phone]

## 2022-02-10 NOTE — LETTER
2/10/2022     RE: Lachelle Duque  [address]  [place] [address]    Dear Colleague,    Thank you for referring your patient, Lachelle Duque, to the [facility]. Please see a copy of my visit note below.    2/10/2022    Shanta is a 51 year old who is being evaluated via a billable video visit.      How would you like to obtain your AVS? MyChart  If the video visit is dropped, the invitation should be resent by: Text to cell phone: [phone]  Will anyone else be joining your video visit? No    Video-Visit Details  Type of service:  Video Visit  Video Start Time: 8:58am  Video End Time:9:42am  Originating Location (pt. Location): Home  Distant Location (provider location):  [facility]   Platform used for Video Visit: Maozhao    Referring Provider: Giovanna Alegria MD    Presenting Information:   Given concerns regarding the potential for COVID-19 exposure during a clinic visit, Shanta elected for a video genetic counseling visit through the Cancer Risk Management Program to discuss her personal and family history of breast, ovarian, and colon cancer. We reviewed this history, cancer screening recommendations, and available genetic testing options.    Personal History:  Lachelle is a 51 year old female. 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 Treatment has included a lumpectomy, radiation, and endocrine therapy.    Lachelle has her ovaries, fallopian tubes and uterus in place, and she has had no ovarian cancer screening to date.      She has regular mammograms and her most recent mammogram in September 2021 identified this cancer. Her most recent colonoscopy in October 2015 was normal and follow-up was recommended in five years. Aside from dermatologic exams as needed, she does not regularly do any other cancer screening at this time.    Family History: (Please see scanned  "pedigree for detailed family history information)    One maternal aunt was diagnosed with breast cancer in her 50's and passed away at age 59; treatment included a bilateral mastectomy and radiation.    Her daughter was diagnosed with CML at age 35.    One maternal great aunt's (grandmother's sister) son was diagnosed with esophageal cancer and passed away at age 59.    Shanta's father is 69 and was diagnosed with colon cancer at age 42; he has not pursued genetic testing.    One paternal aunt was diagnosed with and passed away from ovarian cancer in her 40's.    Her maternal ethnicity is [place] and [place]. Her paternal ethnicity is [place]ese. There is no known Ashkenazi Latter-day ancestry on either side of her family.    Genetic Testing Result: Variant of Uncertain Significance (VUS)  In order to make upcoming treatment decisions, Dr. De La Vega ordered the Breast Actionable Panel through the [facility] and Yariel blood was drawn on 10/22/2021.    Lachelle was found to have a variant of uncertain significance (VUS) in the BRCA2 gene. This variant is called c.6248T>C (p.[id number]). No other variants or mutations were found in the BRCA2 gene. Given the uncertain significance of this result, medical management decisions should NOT be made based on this test result alone.    Of note, Lachelle tested negative for mutations in the following genes by sequencing and deletion/duplication analysis: FELICIA, BRCA1, CDH1, CHEK2, NBN, NF1, PALB2, PTEN, STK11, and TP53. We reviewed the autosomal dominant inheritance of these genes. Lachelle cannot pass on a mutation in any of these genes to her children based on this test result. Mutations in these genes do not skip generations.      A copy of the test report can be found in the Laboratory tab, dated 11/8/2021, and named \"Next generation sequencing\".     Interpretation:  We discussed several different interpretations of this inconclusive test result. It is not clear " if this variant in the BRCA2 gene is associated with increased cancer risk.  1. This variant may be a benign change that does not increase cancer risk.  2. This variant may be a harmful mutation that causes Hereditary Breast and Ovarian Cancer syndrome and autosomal recessive Fanconi anemia.    The laboratory is working to determine if this variant is harmful or benign, and they will contact Dr. De La Vega if it is reclassified. If this variant is determined to be a benign change, there may be a different gene or combination of genes and environment that are associated with the cancers in this family.    It is also important to consider that her other relatives with cancer may have had a mutation in one of the genes tested and Shanta did not inherit it. If that is the case, Shanta's breast cancer may be sporadic and caused by random cellular changes.    Inheritance:  We reviewed the autosomal dominant inheritance of this variant in the BRCA2 gene. We discussed that her brother has a 50% risk of having the same variant; her other extended relatives may carry the variant, as well. Because it is unclear what, if any, risk is associated with this variant, clinical genetic testing for this BRCA2 variant alone is not recommended for relatives.    Additional Testing Considerations:  We then discussed that it is still possible Lachelle does carry a currently identifiable gene mutation that increase her risk for cancer.    We reviewed that there are additional genes that could cause increased risk for breast, colon, ovarian, and related cancers that were not included in the Breast Actionable Panel. For example, individuals with Egan syndrome (due to a MLH1, MSH2, MSH6, PMS2, or EPCAM gene mutation) are at increased risk for colon, ovarian, and potentially breast cancer. As many of these genes present with overlapping features in a family and accurate cancer risk cannot always be established based upon the pedigree analysis alone,  it would be reasonable for Lachelle to consider a larger panel to analyze additional genes.    We reviewed Shanta's expanded genetic testing options: actionable breast/gynecologic/colon cancer panel (Hereditary Breast/Gyn/Colon Cancer Actionable Panel) and expanded pan-cancer panels (Comprehensive Hereditary Cancer 40 or 85 Gene Panel). Lachelle expressed interest in only the actionable genes. She opted for the Hereditary Breast/Gyn/Colon Cancer Actionable Panel.    Genetic testing is available for 29 (18 additional) genes associated with hereditary cancer: APC, FELICIA, AXIN2, BARD1, BMPR1A, BRCA1, BRCA2, BRIP1, CDH1, CHEK2, EPCAM, GREM1, MLH1, MSH2, MSH3, MSH6, MUTYH, NF1, NTHL1, PALB2, PMS2, POLD1, POLE, PTEN, RAD51C, RAD51D, SMAD4, STK11, and TP53.    We discussed that many of the genes in the panel are associated with specific hereditary cancer syndromes and published management guidelines: Hereditary Breast and Ovarian Cancer syndrome (BRCA1, BRCA2), Egan syndrome (MLH1, MSH2, MSH6, PMS2, EPCAM), Familial Adenomatous Polyposis (APC), Hereditary Diffuse Gastric Cancer (CDH1), Juvenile Polyposis syndrome (BMPR1A, SMAD4), Cowden syndrome (PTEN), Li Fraumeni syndrome (TP53), Peutz-Jeghers syndrome (STK11), MUTYH Associated Polyposis (MUTYH), and Neurofibromatosis type 1 (NF1).     The FELICIA, AXIN2, BARD1, BRIP1, CHEK2, GREM1, MSH3, NTHL1, PALB2, POLD1, POLE, RAD51C, and RAD51D genes are associated with increased cancer risk and have published management guidelines for certain cancers.      Consent was obtained over the video. Genetic testing using the Hereditary Breast/Gyn/Colon Cancer Actionable Panel will be performed by the [facility] using her already collected blood sample. Turn around time: 3-4 weeks.    Medical Management: For Lachelle, we reviewed that the information from genetic testing may determine:    additional cancer screening for which Lachelle may qualify (i.e.  mammogram and breast MRI, more frequent colonoscopies, more frequent dermatologic exams, etc.),    options for risk reducing surgeries Lachelle could consider (i.e. bilateral mastectomy, surgery to remove her ovaries and/or uterus, etc.),      and targeted chemotherapies if she were to develop certain cancers in the future (i.e. immunotherapy for individuals with Egan syndrome, PARP inhibitors, etc.).     These recommendations and possible targeted chemotherapies will be discussed in detail once genetic testing is completed.     Plan:  1) Today we reviewed Shanta's Breast Actionable Panel results.  2) Lachelle elected to proceed with additional genetic testing using the Hereditary Breast/Gyn/Colon Cancer Actionable Panel offered by the [facility]. No additional blood sample is needed.  3) The results should be available in 3-4 weeks.  4) Lachelle will be contacted to schedule a virtual visit to discuss the results.    Judit New MS, [facility]  Licensed, Certified Genetic Counselor  Office: [phone]  Pager: [phone]

## 2022-02-14 NOTE — PATIENT INSTRUCTIONS
Assessing Cancer Risk  Only about 5-10% of cancers are thought to be due to an inherited cancer susceptibility gene.    These families often have:    Several people with the same or related types of cancer    Cancers diagnosed at a young age (before age 50)    Individuals with more than one primary cancer    Multiple generations of the family affected with cancer    Some people may be candidates for genetic testing of more than one gene.  For these families, genetic testing using a cancer panel may be offered.  These panels will test different genes known to increase the risk for breast, ovarian, uterine, and/or other cancers. All of the genes discussed below have published clinical management guidelines for individuals who are found to carry a mutation. The purpose of this handout is to serve as a brief summary of the genes analyzed by the panels used to inquire about hereditary breast and gynecologic cancer:  FELICIA, BRCA1, BRCA2, BRIP1, CDH1, CHEK2, MLH1, MSH2, MSH6, PMS2, EPCAM, PTEN, PALB2, RAD51C, RAD51D, and TP53.  ______________________________________________________________________________  Hereditary Breast and Ovarian Cancer Syndrome   (BRCA1 and BRCA2)  A single mutation in one of the copies of BRCA1 or BRCA2 increases the risk for breast and ovarian cancer, among others.  The risk for pancreatic cancer and melanoma may also be slightly increased in some families.  The chart below shows the chance that someone with a BRCA mutation would develop cancer in his or her lifetime1,2,3,4.        A person s ethnic background is also important to consider, as individuals of Ashkenazi Adventism ancestry have a higher chance of having a BRCA gene mutation.  There are three BRCA mutations that occur more frequently in this population.    Egan Syndrome   (MLH1, MSH2, MSH6, PMS2, and EPCAM)  Currently five genes are known to cause Egan Syndrome: MLH1, MSH2, MSH6, PMS2, and EPCAM.  A single mutation in one of the  Egan Syndrome genes increases the risk for colon, endometrial, ovarian, and stomach cancers.  Other cancers that occur less commonly in Egan Syndrome include urinary tract, skin, and brain cancers.  The chart below shows the chance that a person with Egan syndrome would develop cancer in his or her lifetime5.      *Cancer risk varies depending on Egan syndrome gene found    Cowden Syndrome   (PTEN)  Cowden syndrome is a hereditary condition that increases the risk for breast, thyroid, endometrial, colon, and kidney cancer.  Cowden syndrome is caused by a mutation in the PTEN gene.  A single mutation in one of the copies of PTEN causes Cowden syndrome and increases cancer risk.  The chart below shows the chance that someone with a PTEN mutation would develop cancer in their lifetime6,7.  Other benign features seen in some individuals with Cowden syndrome include benign skin lesions (facial papules, keratoses, lipomas), learning disability, autism, thyroid nodules, colon polyps, and larger head size.      *One recent study found breast cancer risk to be increased to 85%    Li-Fraumeni Syndrome   (TP53)  Li-Fraumeni Syndrome (LFS) is a cancer predisposition syndrome caused by a mutation in the TP53 gene. A single mutation in one of the copies of TP53 increases the risk for multiple cancers. Individuals with LFS are at an increased risk for developing cancer at a young age. The lifetime risk for development of a LFS-associated cancer is 50% by age 30 and 90% by age 60.   Core Cancers: Sarcomas, Breast, Brain, Lung, Leukemias/Lymphomas, Adrenocortical carcinomas  Other Cancers: Gastrointestinal, Thyroid, Skin, Genitourinary    Hereditary Diffuse Gastric Cancer   (CDH1)  Currently, one gene is known to cause hereditary diffuse gastric cancer (HDGC): CDH1.  Individuals with HDGC are at increased risk for diffuse gastric cancer and lobular breast cancer. Of people diagnosed with HDGC, 30-50% have a mutation in the CDH1  gene.  This suggests there are likely other genes that may cause HDGC that have not been identified yet.      Lifetime Cancer Risks    General Population HDGC    Diffuse Gastric  <1% ~80%   Breast 12% 39-52%         Additional Genes  FELICIA  FELICIA is a moderate-risk breast cancer gene. Women who have a mutation in FELICIA can have between a 2-4 fold increased risk for breast cancer compared to the general population8. FELICIA mutations have also been associated with increased risk for pancreatic cancer, however an estimate of this cancer risk is not well understood9. Individuals who inherit two FELICIA mutations have a condition called ataxia-telangiectasia (AT).  This rare autosomal recessive condition affects the nervous system and immune system, and is associated with progressive cerebellar ataxia beginning in childhood.  Individuals with ataxia-telangiectasia often have a weakened immune system and have an increased risk for childhood cancers.    PALB2  Mutations in PALB2 have been shown to increase the risk of breast cancer up to 33-58% in some families; where individuals fall within this risk range is dependent upon family [id number]. PALB2 mutations have also been associated with increased risk for pancreatic cancer, although this risk has not been quantified yet.  Individuals who inherit two PALB2 mutations--one from their mother and one from their father--have a condition called Fanconi Anemia.  This rare autosomal recessive condition is associated with short stature, developmental delay, bone marrow failure, and increased risk for childhood cancers.    CHEK2   CHEK2 is a moderate-risk breast cancer gene.  Women who have a mutation in CHEK2 have around a 2-fold increased risk for breast cancer compared to the general population, and this risk may be higher depending upon family history.11,12,13 Mutations in CHEK2 have also been shown to increase the risk of a number of other cancers, including colon and prostate, however  these cancer risks are currently not well understood.    BRIP1, RAD51C and RAD51D  Mutations in BRIP1, RAD51C, and RAD51D have been shown to increase the risk of ovarian cancer and possibly female breast cancer as well14,15 .       Lifetime Cancer Risk    General Population BRIP1 RAD51C RAD51D   Ovarian 1-2% ~5-8% ~5-9% ~7-15%           Inheritance  All of the cancer syndromes reviewed above are inherited in an autosomal dominant pattern.  This means that if a parent has a mutation, each of his or her children will have a 50% chance of inheriting that same mutation.  Therefore, each child--male or female--would have a 50% chance of being at increased risk for developing cancer.      Image obtained from Genetics Home Reference, 2013     Mutations in some genes can occur de sarahi, which means that a person s mutation occurred for the first time in them and was not inherited from a parent.  Now that they have the mutation, however, it can be passed on to future generations.    Genetic Testing  Genetic testing involves a blood test and will look at the genetic information in the FELICIA, BRCA1, BRCA2, BRIP1, CDH1, CHEK2, MLH1, MSH2, MSH6, PMS2, EPCAM, PTEN, PALB2, RAD51C, RAD51D, and TP53 genes for any harmful mutations that are associated with increased cancer risk.  If possible, it is recommended that the person(s) who has had cancer be tested before other family members.  That person will give us the most useful information about whether or not a specific gene is associated with the cancer in the family.    Results  There are three possible results of genetic testing:    Positive--a harmful mutation was identified in one or more of the genes    Negative--no mutation was identified in any of the genes on this panel    Variant of unknown significance--a variation in one of the genes was identified, but it is unclear how this impacts cancer risk in the family    Advantages and Disadvantages   There are advantages and  disadvantages to genetic testing.    Advantages    May clarify your cancer risk    Can help you make medical decisions    May explain the cancers in your family    May give useful information to your family members (if you share your results)    Disadvantages    Possible negative emotional impact of learning about inherited cancer risk    Uncertainty in interpreting a negative test result in some situations    Possible genetic discrimination concerns (see below)    Genetic Information Nondiscrimination Act (MITCH)  MITCH is a federal law that protects individuals from health insurance or employment discrimination based on a genetic test result alone.  Although rare, there are currently no legal discrimination protections in terms of life insurance, long term care, or disability insurances.  Visit the [employer] website to learn more.    Reducing Cancer Risk  All of the genes described above have nationally recognized cancer screening guidelines that would be recommended for individuals who test positive.  In addition to increased cancer screening, surgeries may be offered or recommended to reduce cancer risk.  Recommendations are based upon an individual s genetic test result as well as their personal and family history of cancer.    Questions to Think About Regarding Genetic Testing:    What effect will the test result have on me and my relationship with my family members if I have an inherited gene mutation?  If I don t have a gene mutation?    Should I share my test results, and how will my family react to this news, which may also affect them?    Are my children ready to learn new information that may one day affect their own health?    Hereditary Cancer Resources    FORCE: Facing Our Risk of Cancer Empowered facingourrisk.org   Bright Pink bebrightpink.org   Li-Fraumeni Syndrome Association lfsassociation.org   PTEN World PTENworld.com   No stomach for cancer, Inc.  nostomachforcancer.org   Stomach cancer relief network Scrnet.org   Collaborative Group of the Americas on Inherited Colorectal Cancer (CGA) cgaicc.com    Cancer Care cancercare.org   American Cancer Society (ACS) cancer.org   National Cancer [place] (NCI) cancer.gov     Please call us if you have any questions or concerns.   Cancer Risk Management Program [phone] ([phone])  ? Liu Mcneil, MS, [facility] [phone]  ? Shawanda Perales, MS, [facility]  [phone]  ? Lorrie Segal, MS, [facility]  [phone]  ? Judit Shaq, MS, [facility] [phone]  ? Jessica Yisel, MS, [facility] [phone]  ? Maria Luz Charles, MS, [facility]  [phone]  ? Susan Robles, MS  [phone]    References  1. Destiny GLEZ, Jesús PDP, Crystal S, Sadia SUBRAMANIAN, Souleymane JE, Erick JL, Balwinder N, Lupis H, Zeb O, Chen A, Naima B, Radizeferino P, Manjose a S, Gideon DM, Borden N, Rabia E, Lissy H, Zane E, Tahir J, Gronalisha J, Estela B, Astrid H, Thorlacius S, Eerola H, Nevsadafna H, Nelly K, Liat OP. Average risks of breast and ovarian cancer associated with BRCA1 or BRCA2 mutations detected in case series unselected for family history: a combined analysis of 222 studies. Am J Hum Luisa. 2003;72:1117-30.  2. Tony N, Sushila M, Hardik G.  BRCA1 and BRCA2 Hereditary Breast and Ovarian Cancer. Gene Reviews online. 2013.  3. Prashant YC, Alexa S, Nani G, Morales S. Breast cancer risk among male BRCA1 and BRCA2 mutation carriers. J Natl Cancer Inst. 2007;99:1811-4.  4. Ricky LARA, Judy I, Hans J, Bright E, Danielle ER, Walter F. Risk of breast cancer in male BRCA2 carriers. J Med Luisa. 2010;47:710-1.  5. National Comprehensive Cancer Network. Clinical practice guidelines in oncology, colorectal cancer screening. Available online (registration required). 2015.  6. Livan CARVALHO, Reid J, Mahesh J, Lalo LA, Jacob BOLAND, Eng C. Lifetime cancer risks in individuals with germline PTEN mutations. Clin Cancer Res. 2012;18:400-7.  7. Pilarski R. Cowden  Syndrome: A Critical Review of the Clinical Literature. J Luisa . 2009:18:13-27.  8. Dominique A, Marcelo D, Jason S, Tomasa P, Dominique T, Too M, Teo B, Idalia H, Marlene R, Javed K, Mikael L, Ricky DG, Gideon D, Memo DF, Perla MR, The Breast Cancer Susceptibility Collaboration ([place]) & Jose SALAZAR. FELICIA mutations that cause ataxia-telangiectasia are breast cancer susceptibility alleles. Nature Genetics. 2006;38:873-875  9. Lisandro N , Dev Y, Negra J, Penny L, Dinora GM , Mario ML, Gallinger S, Kinney AG, Syngal S, Yudi ML, Pawel J , Tim R, Adriane SZ, Alexys JR, Chavo VE, Jovanni M, Voraven B, Jasbir N, Fausto RH, Ren KW, and Kim AP. FELICIA mutations in patients with hereditary pancreatic cancer. Cancer Discover. 2012;2:41-46  10. Destiny MELGAR, et al. Breast-Cancer Risk in Families with Mutations in PALB2. NEJM. 2014; 371(6):497-506.  11. CHEK2 Breast Cancer Case-Control Consortium. CHEK2*1100delC and susceptibility to breast cancer: A collaborative analysis involving 10,860 breast cancer cases and 9,065 controls from 10 studies. Am J Hum Luisa, 74 (2004), pp. 1445-2267  12. Corinne T, Nicanor S, Hoa K, et al. Spectrum of Mutations in BRCA1, BRCA2, CHEK2, and TP53 in Families at High Risk of Breast Cancer. MEGHAN. 2006;295(12):4797-5871.   13. Umberto C, Darrel D, Em A, et al. Risk of breast cancer in women with a CHEK2 mutation with and without a family history of breast cancer. J Clin Oncol. 2011;29:6919-7480.  14. Liam H, Morena E, Paul SJ, et al. Contribution of germline mutations in the RAD51B, RAD51C, and RAD51D genes to ovarian cancer in the population. J Clin Oncol. 2015;33(26):5094-0447. Doi:10.1200/JCO.2015.61.2408.  15. Saima T, Brando DE JESUS, David P, et al. Mutations in BRIP1 confer high risk of ovarian cancer. Vanessa Luisa. 2011;43(11):1880-1044. doi:10.1038/ng.955.

## 2022-02-15 DIAGNOSIS — Z80.0 FAMILY HISTORY OF COLON CANCER: ICD-10-CM

## 2022-02-15 DIAGNOSIS — Z80.3 FAMILY HISTORY OF MALIGNANT NEOPLASM OF BREAST: ICD-10-CM

## 2022-02-15 DIAGNOSIS — Z80.41 FAMILY HISTORY OF MALIGNANT NEOPLASM OF OVARY: ICD-10-CM

## 2022-02-15 DIAGNOSIS — C50.412 MALIGNANT NEOPLASM OF UPPER-OUTER QUADRANT OF LEFT BREAST IN FEMALE, ESTROGEN RECEPTOR POSITIVE (H): ICD-10-CM

## 2022-02-15 DIAGNOSIS — Z17.0 MALIGNANT NEOPLASM OF UPPER-OUTER QUADRANT OF LEFT BREAST IN FEMALE, ESTROGEN RECEPTOR POSITIVE (H): ICD-10-CM

## 2022-02-15 PROCEDURE — G0452 MOLECULAR PATHOLOGY INTERPR: HCPCS | Mod: 26 | Performed by: STUDENT IN AN ORGANIZED HEALTH CARE EDUCATION/TRAINING PROGRAM

## 2022-02-23 ENCOUNTER — ANCILLARY PROCEDURE (OUTPATIENT)
Dept: MRI IMAGING | Facility: CLINIC | Age: 52
End: 2022-02-23
Attending: INTERNAL MEDICINE
Payer: COMMERCIAL

## 2022-02-23 DIAGNOSIS — Z17.0 MALIGNANT NEOPLASM OF UPPER-OUTER QUADRANT OF LEFT BREAST IN FEMALE, ESTROGEN RECEPTOR POSITIVE (H): ICD-10-CM

## 2022-02-23 DIAGNOSIS — D13.4 HEPATIC ADENOMA: ICD-10-CM

## 2022-02-23 DIAGNOSIS — C50.412 MALIGNANT NEOPLASM OF UPPER-OUTER QUADRANT OF LEFT BREAST IN FEMALE, ESTROGEN RECEPTOR POSITIVE (H): ICD-10-CM

## 2022-02-23 PROCEDURE — A9581 GADOXETATE DISODIUM INJ: HCPCS | Performed by: RADIOLOGY

## 2022-02-23 PROCEDURE — 74183 MRI ABD W/O CNTR FLWD CNTR: CPT | Performed by: RADIOLOGY

## 2022-02-23 NOTE — DISCHARGE INSTRUCTIONS
MRI Contrast Discharge Instructions    The IV contrast you received today will pass out of your body in your  urine. This will happen in the next 24 hours. You will not feel this process.  Your urine will not change color.    Drink at least 4 extra glasses of water or juice today (unless your doctor  has restricted your fluids). This reduces the stress on your kidneys.  You may take your regular medicines.    If you are on dialysis: It is best to have dialysis today.    If you have a reaction: Most reactions happen right away. If you have  any new symptoms after leaving the hospital (such as hives or swelling),  call your hospital at the correct number below. Or call your family doctor.  If you have breathing distress or wheezing, call 911.    Special instructions: ***    I have read and understand the above information.    Signature:______________________________________ Date:___________    Staff:__________________________________________ Date:___________     Time:__________    [facility] Departments:    ___[place]: [phone]  ___[facility]: [phone]  ___[place]: [phone] ___[facility]: [phone]  ___[facility]: [phone]  ___[facility]: [phone]  ___[phone]  ___[facility]: [phone]  ___[phone]

## 2022-03-03 ENCOUNTER — OFFICE VISIT (OUTPATIENT)
Dept: RADIATION ONCOLOGY | Facility: CLINIC | Age: 52
End: 2022-03-03
Attending: RADIOLOGY
Payer: COMMERCIAL

## 2022-03-03 ENCOUNTER — ONCOLOGY VISIT (OUTPATIENT)
Dept: ONCOLOGY | Facility: CLINIC | Age: 52
End: 2022-03-03
Attending: INTERNAL MEDICINE
Payer: COMMERCIAL

## 2022-03-03 VITALS
SYSTOLIC BLOOD PRESSURE: 110 MMHG | BODY MASS INDEX: 40.35 KG/M2 | WEIGHT: 246 LBS | TEMPERATURE: 98.6 F | DIASTOLIC BLOOD PRESSURE: 77 MMHG | HEART RATE: 84 BPM | OXYGEN SATURATION: 99 %

## 2022-03-03 DIAGNOSIS — C50.412 MALIGNANT NEOPLASM OF UPPER-OUTER QUADRANT OF LEFT BREAST IN FEMALE, ESTROGEN RECEPTOR POSITIVE (H): Primary | ICD-10-CM

## 2022-03-03 DIAGNOSIS — C50.912 INVASIVE DUCTAL CARCINOMA OF BREAST, FEMALE, LEFT (H): ICD-10-CM

## 2022-03-03 DIAGNOSIS — C50.912 INVASIVE DUCTAL CARCINOMA OF BREAST, FEMALE, LEFT (H): Primary | ICD-10-CM

## 2022-03-03 DIAGNOSIS — Z79.811 LONG TERM (CURRENT) USE OF AROMATASE INHIBITORS: ICD-10-CM

## 2022-03-03 DIAGNOSIS — Z17.0 MALIGNANT NEOPLASM OF UPPER-OUTER QUADRANT OF LEFT BREAST IN FEMALE, ESTROGEN RECEPTOR POSITIVE (H): Primary | ICD-10-CM

## 2022-03-03 PROCEDURE — G0463 HOSPITAL OUTPT CLINIC VISIT: HCPCS

## 2022-03-03 PROCEDURE — 99214 OFFICE O/P EST MOD 30 MIN: CPT | Performed by: INTERNAL MEDICINE

## 2022-03-03 RX ORDER — HEPARIN SODIUM (PORCINE) LOCK FLUSH IV SOLN 100 UNIT/ML 100 UNIT/ML
5 SOLUTION INTRAVENOUS
Status: CANCELLED | OUTPATIENT
Start: 2022-06-07

## 2022-03-03 RX ORDER — ZOLEDRONIC ACID 0.04 MG/ML
4 INJECTION, SOLUTION INTRAVENOUS ONCE
Status: CANCELLED | OUTPATIENT
Start: 2022-06-07 | End: 2022-06-07

## 2022-03-03 RX ORDER — HEPARIN SODIUM,PORCINE 10 UNIT/ML
5 VIAL (ML) INTRAVENOUS
Status: CANCELLED | OUTPATIENT
Start: 2022-06-07

## 2022-03-03 ASSESSMENT — PAIN SCALES - GENERAL: PAINLEVEL: NO PAIN (0)

## 2022-03-03 NOTE — LETTER
3/3/2022         RE: Lachelle Duque  [address]  [place] [address]        Dear Colleague,    Thank you for referring your patient, Lachelle Duque, to the [facility]. Please see a copy of my visit note below.    Oncology Visit:    I am seeing Ms. Shanta Duque for follow up of a left breast cancer, T1cN1, ER + ID + her2 negative, oncotype Dx 19.      She underwent a screening mammogram on 09/29/2021, which demonstrated possible asymmetry in her left breast at 4-o'clock position.  On 10/08/2021, she underwent a mammogram and ultrasound.  On mammography, there was a spiculated mass, at the 4-o'clock position, measuring 1.3 cm in size.  On ultrasound, the mass measured 1.0 cm in size.  Her lymph nodes were negative by ultrasonography. On 10/15/2021, she underwent a contrast enhanced mammography which demonstrated the lesion to be 1.1 cm.  There are no other suspicious lesions in the rest of her breast.  Also, on 10/15, she underwent a needle biopsy, which demonstrated invasive ductal cancer, grade 1, ER positive, ID positive, HER2 negative. She has not palpated a mass in her breast.  She has not had a prior history of any breast problems.    She met with Dr Ge De La Vega.  She underwent a lumpectomy and sentinel lymph node biopsy.  Pathology revealed a 1.3 cm invasive ductal carcinoma.  1/1 lymph node was positive and measured 2 cm.    Oncotype low at 19.  Labs confirmed postmenopausal.  Completed radiation on 2/2/2022  Started letrozole 2/3/2022    Interval history:  She is recovering well from radiation. Skin change have recovered. She has fatigue, but it is improving. She states fatigue started the week after radiation finished. She is doing well on letrozole. She has some joint pain, worse in the morning, but subsides with movement. She has a bad right knee where she tore her meniscus and she takes glucosamine and tylenol for that daily which also helps with the other  joint pain. She will undergo knee replacement  after her vacation to [place]. She also has hot flashes throughout the day made worse by eating certain things, caffeine and alcohol. Hot flashes do not wake her up at night. No fever, chills, unintended weight loss, new lumps, edema, bone pain, SOB, or chest pain.     ROS: 10 point ROS neg other than the symptoms noted above in the HPI.       PAST MEDICAL HISTORY:  No major medical problems.    Current Outpatient Medications   Medication     cetirizine HCl (ZYRTEC ALLERGY) 10 MG CAPS     letrozole (FEMARA) 2.5 MG tablet     omeprazole 20 MG tablet     acyclovir (ZOVIRAX) 400 MG tablet     diclofenac (VOLTAREN) 75 MG EC tablet     diphenhydrAMINE (BENADRYL) 25 MG capsule     fluticasone (FLONASE) 50 MCG/ACT nasal spray     ibuprofen (ADVIL/MOTRIN) 600 MG tablet     pseudoePHEDrine (SUDAFED 12 HOUR) 120 MG 12 hr tablet     No current facility-administered medications for this visit.     Allergies:  Sulfa drugs     FAMILY HISTORY:  Significant for a maternal aunt with breast cancer.  No history of ovarian cancer.     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  No tobacco use.  .  .  Here with a friend.    She has never had a breast biopsy previously.     PHYSICAL EXAMINATION:  /77 (BP Location: Right arm, Patient Position: Sitting, Cuff Size: Adult Large)   Pulse 84   Temp 98.6  F (37  C) (Oral)   Wt 111.6 kg (246 lb)   LMP 10/31/2021   SpO2 99%   BMI 40.35 kg/m    She is a well-appearing woman in no apparent distress.   HEENT: no icterus  NECK: supple  CV: rrr  Lungs: clear  Abd; soft, nt nd + bs  Ext: no edema  Bilateral breast examination: full examination not performed today.  No axillary adenopathy, skin changes from radiation are healing well.  Minimal hyperpigmentation.     Reviewed her pathology and imaging personally at our multidisciplinary conference.    Pathology reviewed  personally by me.    Breast, LEFT, wire localized lumpectomy:  -INVASIVE BREAST CARCINOMA OF NO SPECIAL TYPE (INVASIVE DUCTAL CARCINOMA), FREDI GRADE 1, size 1.1 cm  -Focal ductal carcinoma in situ (DCIS), nuclear grade 2, solid and cribriform types, comprises < 1% of the tumor volume   -No lymphovascular invasion identified  -Margins are uninvolved by invasive carcinoma  -Invasive carcinoma is > 5 mm from all margins  -Margins are uninvolved by DCIS  -DCIS is 1.5 mm from the closest (posterior) margin  -Focal atypical ductal hyperplasia  -Other findings: Columnar cell change and fibrocystic change (including microcysts with apocrine metaplasia)  -Calcifications associated with invasive carcinoma and benign epithelium  -Prior core biopsy site changes  -Invasive carcinoma is estrogen receptor positive (98%, strong intensity) and progesterone receptor positive (90%, moderate intensity) by immunohistochemistry and is HER2 non-amplified by FISH (HER2:CEN17 ratio 1.1, see prior core biopsy report [id number])  -See below for tumor synoptic     B(2).  Lymph node, left axillary, sentinel #1, excision:  -Macrometastatic carcinoma in one of one lymph node (1/1)  -Metastatic carcinoma is 1.1 cm in greatest dimension  -Focal extranodal extension is present (extent = 1 mm)  -HER2 FISH results will be reported separately by cytogenetics      Estradiol - 6  FSH - 35    Oncotype Dx 19    We personally reviewed her liver MRI and dexa scan.    A/P:  52 y/o female with a new left breast cancer pT1cN1 ER + OR + her2 negative now s/p lumpectomy and SNLB.      1. Breast cancer-  I reviewed all of Shanta's pathology. She has completed radiation and is one month post letrozole treatment. She is doing well on it with no concerns for recurrance. We discussed a mammogram at 6 months post radiation (August 2022) and 3 month follow-ups. She will follow-up in 3 months with survivorship clinic and then 6 months with Dr. Alegria after  mammogram.    2. Bone health - Her DEXA scan had lowest T-score of -0.9.   We discussed the use of prophylactic zometa IV every six months while on AI. Given her upcoming knee surgery and good bone score, we would recommend recovery from that prior to starting zometa.    3. Liver lesions - consistent with adenomas.      4. Encouraged exercise of at least 150 min per week, healthy diet and limiting alcohol use.    Patient seen with medical student, Nimisha Pryor, MS3    Pt was seen and evaluated with Nimisha Pryor.  I edited the above note to reflect my evaluation.  Examination was performed by myself and outlined above.    Giovanna Alegria MD     > 30 min were spent in reviewing imaging, pathology, counseling and coordinating care.      Again, thank you for allowing me to participate in the care of your patient.        Sincerely,        Giovanna Alegria MD

## 2022-03-03 NOTE — PROGRESS NOTES
Oncology Visit:    I am seeing Ms. Shanta Duque for follow up of a left breast cancer, T1cN1, ER + MO + her2 negative, oncotype Dx 19.      She underwent a screening mammogram on 09/29/2021, which demonstrated possible asymmetry in her left breast at 4-o'clock position.  On 10/08/2021, she underwent a mammogram and ultrasound.  On mammography, there was a spiculated mass, at the 4-o'clock position, measuring 1.3 cm in size.  On ultrasound, the mass measured 1.0 cm in size.  Her lymph nodes were negative by ultrasonography. On 10/15/2021, she underwent a contrast enhanced mammography which demonstrated the lesion to be 1.1 cm.  There are no other suspicious lesions in the rest of her breast.  Also, on 10/15, she underwent a needle biopsy, which demonstrated invasive ductal cancer, grade 1, ER positive, MO positive, HER2 negative. She has not palpated a mass in her breast.  She has not had a prior history of any breast problems.    She met with Dr Ge De La Vega.  She underwent a lumpectomy and sentinel lymph node biopsy.  Pathology revealed a 1.3 cm invasive ductal carcinoma.  1/1 lymph node was positive and measured 2 cm.    Oncotype low at 19.  Labs confirmed postmenopausal.  Completed radiation on 2/2/2022  Started letrozole 2/3/2022    Interval history:  She is recovering well from radiation. Skin change have recovered. She has fatigue, but it is improving. She states fatigue started the week after radiation finished. She is doing well on letrozole. She has some joint pain, worse in the morning, but subsides with movement. She has a bad right knee where she tore her meniscus and she takes glucosamine and tylenol for that daily which also helps with the other joint pain. She will undergo knee replacement April 19th after her vacation to [place]. She also has hot flashes throughout the day made worse by eating certain things, caffeine and alcohol. Hot flashes do not wake her up at night. No fever, chills, unintended  weight loss, new lumps, edema, bone pain, SOB, or chest pain.     ROS: 10 point ROS neg other than the symptoms noted above in the HPI.       PAST MEDICAL HISTORY:  No major medical problems.    Current Outpatient Medications   Medication     cetirizine HCl (ZYRTEC ALLERGY) 10 MG CAPS     letrozole (FEMARA) 2.5 MG tablet     omeprazole 20 MG tablet     acyclovir (ZOVIRAX) 400 MG tablet     diclofenac (VOLTAREN) 75 MG EC tablet     diphenhydrAMINE (BENADRYL) 25 MG capsule     fluticasone (FLONASE) 50 MCG/ACT nasal spray     ibuprofen (ADVIL/MOTRIN) 600 MG tablet     pseudoePHEDrine (SUDAFED 12 HOUR) 120 MG 12 hr tablet     No current facility-administered medications for this visit.     Allergies:  Sulfa drugs     FAMILY HISTORY:  Significant for a maternal aunt with breast cancer.  No history of ovarian cancer.     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  .  .  Here with a friend.    She has never had a breast biopsy previously.     PHYSICAL EXAMINATION:  /77 (BP Location: Right arm, Patient Position: Sitting, Cuff Size: Adult Large)   Pulse 84   Temp 98.6  F (37  C) (Oral)   Wt 111.6 kg (246 lb)   LMP 10/31/2021   SpO2 99%   BMI 40.35 kg/m    She is a well-appearing woman in no apparent distress.   HEENT: no icterus  NECK: supple  CV: rrr  Lungs: clear  Abd; soft, nt nd + bs  Ext: no edema  Bilateral breast examination: full examination not performed today.  No axillary adenopathy, skin changes from radiation are healing well.  Minimal hyperpigmentation.     Reviewed her pathology and imaging personally at our multidisciplinary conference.    Pathology reviewed personally by me.    Breast, LEFT, wire localized lumpectomy:  -INVASIVE BREAST CARCINOMA OF NO SPECIAL TYPE (INVASIVE DUCTAL CARCINOMA), FREDI GRADE 1, size 1.1 cm  -Focal ductal carcinoma in situ (DCIS), nuclear grade 2, solid and cribriform types, comprises <  1% of the tumor volume   -No lymphovascular invasion identified  -Margins are uninvolved by invasive carcinoma  -Invasive carcinoma is > 5 mm from all margins  -Margins are uninvolved by DCIS  -DCIS is 1.5 mm from the closest (posterior) margin  -Focal atypical ductal hyperplasia  -Other findings: Columnar cell change and fibrocystic change (including microcysts with apocrine metaplasia)  -Calcifications associated with invasive carcinoma and benign epithelium  -Prior core biopsy site changes  -Invasive carcinoma is estrogen receptor positive (98%, strong intensity) and progesterone receptor positive (90%, moderate intensity) by immunohistochemistry and is HER2 non-amplified by FISH (HER2:CEN17 ratio 1.1, see prior core biopsy report [id number])  -See below for tumor synoptic     B(2).  Lymph node, left axillary, sentinel #1, excision:  -Macrometastatic carcinoma in one of one lymph node (1/1)  -Metastatic carcinoma is 1.1 cm in greatest dimension  -Focal extranodal extension is present (extent = 1 mm)  -HER2 FISH results will be reported separately by cytogenetics      Estradiol - 6  FSH - 35    Oncotype Dx 19    We personally reviewed her liver MRI and dexa scan.    A/P:  50 y/o female with a new left breast cancer pT1cN1 ER + [place] + her2 negative now s/p lumpectomy and SNLB.      1. Breast cancer-  I reviewed all of Shanta's pathology. She has completed radiation and is one month post letrozole treatment. She is doing well on it with no concerns for recurrance. We discussed a mammogram at 6 months post radiation (August 2022) and 3 month follow-ups. She will follow-up in 3 months with survivorship clinic and then 6 months with Dr. Alegria after mammogram.    2. Bone health - Her DEXA scan had lowest T-score of -0.9.   We discussed the use of prophylactic zometa IV every six months while on AI. Given her upcoming knee surgery and good bone score, we would recommend recovery from that prior to starting zometa.    3.  Liver lesions - consistent with adenomas.      4. Encouraged exercise of at least 150 min per week, healthy diet and limiting alcohol use.    Patient seen with medical student, Nimisha Pryor, MS3    Pt was seen and evaluated with Nimisha Pryor.  I edited the above note to reflect my evaluation.  Examination was performed by myself and outlined above.    Giovanna Alegria MD     > 30 min were spent in reviewing imaging, pathology, counseling and coordinating care.

## 2022-03-03 NOTE — PROGRESS NOTES
Radiation Oncology Follow-up Visit  March 3, 2022        Lachelle Duque  MRN: [id number]   : 1970     DISEASE TREATED:   Invasive ductal carcinoma of the left breast, stage II [id number], ER positive/RI positive/HER-2 negative    RADIATION THERAPY DELIVERED:   5240 cGy completed 2022    INTERVAL SINCE COMPLETION OF RADIATION THERAPY:   1 month    SUBJECTIVE:   Lachelle Duque is a 51 year old female who is here today for routine 1 month follow up after completing radiation therapy.  She has seen healing of her skin reaction and continues to moisturize.  She has been not doing her stretching and range of motion exercises for chest and shoulder.  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  Fatigue is slowly improving.  She has seen medical oncology.  She is on letrozole and does have joint pain from that especially in the morning.    ROS:  Complete review of systems is negative except for symptoms discussed in subjective above.    Current Outpatient Medications   Medication     acyclovir (ZOVIRAX) 400 MG tablet     cetirizine HCl (ZYRTEC ALLERGY) 10 MG CAPS     diclofenac (VOLTAREN) 75 MG EC tablet     diphenhydrAMINE (BENADRYL) 25 MG capsule     fluticasone (FLONASE) 50 MCG/ACT nasal spray     ibuprofen (ADVIL/MOTRIN) 600 MG tablet     letrozole (FEMARA) 2.5 MG tablet     omeprazole 20 MG tablet     pseudoePHEDrine (SUDAFED 12 HOUR) 120 MG 12 hr tablet     No current facility-administered medications for this visit.          Allergies   Allergen Reactions     Sulfa Drugs Anaphylaxis       Past Medical History:   Diagnosis Date     Chronic rhinitis      Environmental allergies 2013     Gastro-oesophageal reflux disease      GERD (gastroesophageal reflux disease) 1/3/2013     Herpes zoster without mention of complication     L eye      Hiatal hernia      PONV (postoperative nausea and vomiting)     PONV         PHYSICAL EXAM:  LMP 10/31/2021   Gen: Alert,  in NAD  Breast: Skin is well-healed.  Psychiatric: Appropriate mood and affect      LABS AND IMAGING:  Reviewed.    IMPRESSION:   Ms. Duque is a 51 year old female with a invasive ductal carcinoma of the left breast s/p 1 month out from radiation and doing well.    PLAN:   Patient has recovered nicely from acute side effects of radiation therapy.  Discussed long term care with continued moisturizing of the treated breast, stretching and range of motion exercises, and sun screen.  Patient should also have TSH because of treated SCV.  Patient will follow up with medical oncology for continued care and imaging.   Discussed to come in or call with any concerns or questions that may develop.        Maine Kennedy, NP  Radiation Oncology  [facility]

## 2022-03-03 NOTE — NURSING NOTE
"Oncology Rooming Note    March 3, 2022 8:00 AM   Lachelle Duque is a 51 year old female who presents for:    Chief Complaint   Patient presents with     Oncology Clinic Visit     breast cancer     Initial Vitals: There were no vitals taken for this visit. Estimated body mass index is 40.61 kg/m  as calculated from the following:    Height as of 11/18/21: 1.663 m (5' 5.47\").    Weight as of 1/31/22: 112.3 kg (247 lb 9.6 oz). There is no height or weight on file to calculate BSA.  Data Unavailable Comment: Data Unavailable   No LMP recorded.  Allergies reviewed: Yes  Medications reviewed: Yes    Medications: Medication refills not needed today.  Pharmacy name entered into [facility]:    [facility] - [place], [place] - [address] [facility] - [place], [place] - [address]  [facility]  [facility] - [place], [place] - [address] [facility] [place], [place] - [phone]    Clinical concerns: none       Imer Scales            "

## 2022-03-03 NOTE — LETTER
3/3/2022         RE: Lachelle Duque  [address]  [place] [address]        Dear Colleague,    Thank you for referring your patient, Lachelle Duque, to the [facility]COLOG[facility]. Please see a copy of my visit note below.    Radiation Oncology Follow-up Visit  March 3, 2022        Lachelle Duque  MRN: [id number]   : 1970     DISEASE TREATED:   Invasive ductal carcinoma of the left breast, stage II [id number], ER positive/IL positive/HER-2 negative    RADIATION THERAPY DELIVERED:   5240 cGy completed 2022    INTERVAL SINCE COMPLETION OF RADIATION THERAPY:   1 month    SUBJECTIVE:   Lachelle Duque is a 51 year old female who is here today for routine 1 month follow up after completing radiation therapy.  She has seen healing of her skin reaction and continues to moisturize.  She has been not doing her stretching and range of motion exercises for chest and shoulder.  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  Fatigue is slowly improving.  She has seen medical oncology.  She is on letrozole and does have joint pain from that especially in the morning.    ROS:  Complete review of systems is negative except for symptoms discussed in subjective above.    Current Outpatient Medications   Medication     acyclovir (ZOVIRAX) 400 MG tablet     cetirizine HCl (ZYRTEC ALLERGY) 10 MG CAPS     diclofenac (VOLTAREN) 75 MG EC tablet     diphenhydrAMINE (BENADRYL) 25 MG capsule     fluticasone (FLONASE) 50 MCG/ACT nasal spray     ibuprofen (ADVIL/MOTRIN) 600 MG tablet     letrozole (FEMARA) 2.5 MG tablet     omeprazole 20 MG tablet     pseudoePHEDrine (SUDAFED 12 HOUR) 120 MG 12 hr tablet     No current facility-administered medications for this visit.          Allergies   Allergen Reactions     Sulfa Drugs Anaphylaxis       Past Medical History:   Diagnosis Date     Chronic rhinitis      Environmental allergies 2013      Gastro-oesophageal reflux disease      GERD (gastroesophageal reflux disease) 1/3/2013     Herpes zoster without mention of complication     L eye      Hiatal hernia      PONV (postoperative nausea and vomiting)     PONV         PHYSICAL EXAM:  LMP 10/31/2021   Gen: Alert, in NAD  Breast: Skin is well-healed.  Psychiatric: Appropriate mood and affect      LABS AND IMAGING:  Reviewed.    IMPRESSION:   Ms. Duque is a 51 year old female with a invasive ductal carcinoma of the left breast s/p 1 month out from radiation and doing well.    PLAN:   Patient has recovered nicely from acute side effects of radiation therapy.  Discussed long term care with continued moisturizing of the treated breast, stretching and range of motion exercises, and sun screen.  Patient should also have TSH because of treated SCV.  Patient will follow up with medical oncology for continued care and imaging.   Discussed to come in or call with any concerns or questions that may develop.        Maine Kennedy NP  Radiation Oncology  [facility]

## 2022-03-06 ENCOUNTER — HEALTH MAINTENANCE LETTER (OUTPATIENT)
Age: 52
End: 2022-03-06

## 2022-03-07 ENCOUNTER — TELEPHONE (OUTPATIENT)
Dept: NURSING | Facility: CLINIC | Age: 52
End: 2022-03-07
Payer: COMMERCIAL

## 2022-03-07 ENCOUNTER — MYC MEDICAL ADVICE (OUTPATIENT)
Dept: ORTHOPEDICS | Facility: CLINIC | Age: 52
End: 2022-03-07
Payer: COMMERCIAL

## 2022-03-07 NOTE — TELEPHONE ENCOUNTER
Reason for Call:  Other appointment    Detailed comments: She has a Knee surgery on 4/19 and needs a Covid test done. I can not see the BK schedule. She will be out of town and gets back on 4/16 so she can only do a Covid test late on 4/16 or sun 4/17 . I found nothing but got her in on the 18th in [place]. If anything is sooner she would like     Phone Number Patient can be reached at: Cell number on file:    Telephone Information:   Mobile [phone]       Best Time: Anytime    Can we leave a detailed message on this number? YES    Call taken on 3/7/2022 at 4:40 PM by Jodie Cohen

## 2022-03-08 NOTE — TELEPHONE ENCOUNTER
Called danny and alden to call back and see if she would like a covid test appt for 4/16/22 at 4:40pm.

## 2022-03-09 DIAGNOSIS — Z11.59 ENCOUNTER FOR SCREENING FOR OTHER VIRAL DISEASES: Primary | ICD-10-CM

## 2022-03-16 ENCOUNTER — VIRTUAL VISIT (OUTPATIENT)
Dept: ONCOLOGY | Facility: CLINIC | Age: 52
End: 2022-03-16
Attending: GENETIC COUNSELOR, MS
Payer: COMMERCIAL

## 2022-03-16 DIAGNOSIS — Z80.3 FAMILY HISTORY OF MALIGNANT NEOPLASM OF BREAST: ICD-10-CM

## 2022-03-16 DIAGNOSIS — Z17.0 MALIGNANT NEOPLASM OF UPPER-OUTER QUADRANT OF LEFT BREAST IN FEMALE, ESTROGEN RECEPTOR POSITIVE (H): Primary | ICD-10-CM

## 2022-03-16 DIAGNOSIS — C50.412 MALIGNANT NEOPLASM OF UPPER-OUTER QUADRANT OF LEFT BREAST IN FEMALE, ESTROGEN RECEPTOR POSITIVE (H): Primary | ICD-10-CM

## 2022-03-16 DIAGNOSIS — Z80.0 FAMILY HISTORY OF COLON CANCER: ICD-10-CM

## 2022-03-16 DIAGNOSIS — Z80.41 FAMILY HISTORY OF MALIGNANT NEOPLASM OF OVARY: ICD-10-CM

## 2022-03-16 DIAGNOSIS — M25.561 RIGHT KNEE PAIN, UNSPECIFIED CHRONICITY: Primary | ICD-10-CM

## 2022-03-16 PROCEDURE — 999N000069 HC STATISTIC GENETIC COUNSELING, < 16 MIN: Mod: GT,95 | Performed by: GENETIC COUNSELOR, MS

## 2022-03-16 NOTE — PROGRESS NOTES
3/16/2022    Shanta is a 51 year old who is being evaluated via a billable video visit.      How would you like to obtain your AVS? MyChart  If the video visit is dropped, the invitation should be resent by: Text to cell phone: [phone]  Will anyone else be joining your video visit? No    Video-Visit Details  Type of service:  Video Visit  Video Start Time: 8:00am  Video End Time:8:11am  Originating Location (pt. Location): Home  Distant Location (provider location):  [facility]CER [facility]   Platform used for Video Visit: Light Harmonic    Referring Provider: Giovanna Alegria MD    Presenting Information:  I spoke to Lachelle by video today to discuss her genetic testing results. Her blood was originally drawn on 10/22/2021 for the Breast Actionable Panel. When we met on 2/10/2022, Shanta elected to proceed with additional genetic testing using the Hereditary Breast/Gyn/Colon Cancer Actionable Panel offered by the [facility].This testing was done because of Lachelle's personal and family history of breast, ovarian, and colon cancer.    Genetic Testing Result: Multiple Variants of Uncertain Significance (VUS)  Lachelle was found to have three variants of uncertain significance (VUS).    BRCA2 c.6248T>C (p.[id number])      POLD1 c.653G>A (p.Wdf240Hnt)     BRIP1 c.1984G>A (p.Tfw672Tho)    No other variants or mutations were found in these genes. Given the uncertain significance of this result, medical management decisions should NOT be made based on this test result alone.    Of note, Lachelle tested negative for mutations in the following genes by sequencing and deletion/duplication analysis: APC, FELICIA, AXIN2, BARD1, BMPR1A, BRCA1, CDH1, CHEK2, EPCAM (deletions/duplications only), GREM1 (deletions/duplications only), MLH1, MSH2, MSH3, MSH6, MUTYH, NF1, NTHL1, PALB2, PMS2, POLE, PTEN, RAD51C, RAD51D, SMAD4, STK11, TP53.      A copy of the test report can be found in the  "Laboratory tab, dated 2/15/2022, and named \"Next generation sequencing\".     Interpretation:  We discussed several different interpretations of this inconclusive test result. It is not clear if any of these variants are associated with increased cancer risk. These variants may be benign changes that do not increased cancer risk, or they may be harmful mutations that cause increased risk for certain cancers.    BRCA2: Individuals with one harmful mutation in the BRCA2 gene have Hereditary Breast and Ovarian Cancer syndrome and are carriers of autosomal recessive Fanconi anemia.    BRIP1: Individuals who carry one harmful mutation in the BRIP1 gene are at increased risk for ovarian cancer and are carriers of autosomal recessive Fanconi anemia.    POLD1: Individuals who carry one harmful mutation in the POLD1 gene are at increased risk for colon cancer and polyps.    The laboratory is working to determine if any of these variants are harmful or benign, and they will contact me if any of them are reclassified. If these variants are determined to be benign, there may be a different gene or combination of genes and environment that are associated with the cancers in this family that are not identifiable using this test. As such, Shanta is encouraged to contact me regularly to review any new genetic testing options that may be appropriate for her.    It is also important to consider that her other relatives with cancer, such as her father and/or paternal aunt, may have a mutation in one of the genes tested and Shanta did not inherit it. If that is the case, Silvios breast cancer may be sporadic and caused by random cellular changes.    Inheritance:  We reviewed the autosomal dominant inheritance of the variants in these three genes.  We discussed that Lachelle's brother has a 50% chance to carry each of these variants. Because it is unclear what, if any, risk is associated with these variants, clinical genetic testing for " these three variants alone is not recommended for relatives.    Screening:  Based on this inconclusive test result, it is important for Lachelle and her relatives to refer back to the family history for appropriate cancer screening.      Shanta should continue to follow all breast cancer treatment and follow-up recommendations as made by her medical providers.    Lachelle s close female relatives remain at increased risk for breast cancer given their family history. Breast cancer screening is generally recommended to begin approximately 10 years younger than the earliest age of breast cancer diagnosis in the family, or at age 40, whichever comes first. In this family, screening may begin at age 39. Breast screening options should be discussed with an individual's primary care provider and a genetic counselor, to determine at what age to begin screening, what screening is appropriate, and if additional screening (such as breast MRI) is necessary based on personal/family history factors.  Per current National Comprehensive Cancer Network (NCCN) guidelines, individuals with a first degree relative with colorectal cancer diagnosed at any age (such as her father) should begin colonoscopy at age 40, or 10 years before the earliest diagnosis of colorectal cancer, whichever is first.  In this family, colonoscopy should start at age 32 and be repeated every 5 years, or based on colonoscopy findings. This would apply to Shanta and her brother. These recommendations may change based on personal and family history as well as personal preference, and should be discussed with an individual's physician.        Due to Lachelle's family history of ovarian cancer, Lachelle and her other close paternal female relatives remain at slightly increased risk for ovarian cancer. We discussed available ovarian cancer screening (pelvic exams, CA-125 blood tests, and transvaginal ultrasounds) as well as the significant limitations of this screening.  As such, this screening is not typically recommended. That being said, women in this family should discuss this screening and the signs and symptoms of ovarian cancer with their primary OB/GYN provider, as they may have individualized recommendations.    Other population cancer screening options, such as those recommended by the American Cancer Society and NCCN, are also appropriate for Lachelle and her family. These screening recommendations may change if there are changes to Lachelle's personal and/or family history of cancer. Final screening recommendations should be made by each individual's primary care provider.      Additional Testing Considerations:  It is still possible Lachelle does carry a currently identifiable gene mutation that increases her risk for breast and/or other cancers. The option remains of pursuing a larger gene panel covering more moderate risk genes associated with hereditary cancer. Lachelle is encouraged to contact me if she wishes to readdress these larger gene panel options.    Also, although Lachelle's genetic testing result is inconclusive, other relatives may still carry a harmful gene mutation associated with hereditary cancer. Genetic counseling is recommended for Shanta's father to discuss his genetic testing options. Shanta's brother could also consider meeting with a genetic counselor. If any of these relatives do pursue genetic testing, Lachelle is encouraged to contact me so that we may review the impact of their test results on her.    Summary:  We do not have an explanation for Lachelle's breast cancer. While no harmful genetic changes were identified, Lachelle may still be at risk for certain cancers due to family history, environmental factors, or other genetic causes not identified by this test. Because of that, it is important that she continue with cancer screening based on her personal and family history as discussed above.    Genetic testing is rapidly advancing, and new  cancer susceptibility genes will most likely be identified in the future. Therefore, I encouraged Lachelle to contact me regularly or if there are changes in her personal or family history. This may change how we assess her cancer risk, screening, and the testing we would offer.    Plan:  1. Lachelle was provided a copy of her test results via YOUnite.    2. She plans to follow-up with her medical providers, as needed.  3. She should contact me regularly, or sooner if her family history changes.  4. I will attempt to contact Lachelle if the laboratory informs me that these variants have been reclassified. This may change screening and testing recommendations for Lachelle and her relatives.    If Lachelle has any further questions, I encouraged her to contact me at [phone].    Judit New MS, [facility] [place]ensed, Certified Genetic Counselor  Office: [phone]  Pager: [phone]

## 2022-03-16 NOTE — LETTER
Cancer Risk Management  Program Locations    [facility]  [facility]  [facility]  Mailing Address  Cancer Risk Management Program  [facility]  [address]  [place] [address]    New patient appointments  [phone]  March 21, 2022    Lachelle J Neto  [address] [facility] [address]      Dear Lachelle,    It was a pleasure speaking with you recently regarding your genetic testing results. Here is a copy of the progress note summarizing our conversation. If you have any additional questions, please feel free to call.    3/16/2022    Shanta is a 51 year old who is being evaluated via a billable video visit.      How would you like to obtain your AVS? MyChart  If the video visit is dropped, the invitation should be resent by: Text to cell phone: [phone]  Will anyone else be joining your video visit? No    Video-Visit Details  Type of service:  Video Visit  Video Start Time: 8:00am  Video End Time:8:11am  Originating Location (pt. Location): Home  Distant Location (provider location):  [facility]   Platform used for Video Visit: Robert    Referring Provider: Giovanna Alegria MD    Presenting Information:  I spoke to Lachelle by video today to discuss her genetic testing results. Her blood was originally drawn on 10/22/2021 for the Breast Actionable Panel. When we met on 2/10/2022, Shanta elected to proceed with additional genetic testing using the Hereditary Breast/Gyn/Colon Cancer Actionable Panel offered by the [facility].This testing was done because of Lachelle's personal and family history of breast, ovarian, and colon cancer.    Genetic Testing Result: Multiple Variants of Uncertain Significance (VUS)  Lachelle was found to have three variants of uncertain  "significance (VUS).    BRCA2 c.6248T>C (p.[id number])      POLD1 c.653G>A (p.Hzx246Jny)     BRIP1 c.1984G>A (p.Ipr736Nnr)    No other variants or mutations were found in these genes. Given the uncertain significance of this result, medical management decisions should NOT be made based on this test result alone.    Of note, Lachelle tested negative for mutations in the following genes by sequencing and deletion/duplication analysis: APC, FELICIA, AXIN2, BARD1, BMPR1A, BRCA1, CDH1, CHEK2, EPCAM (deletions/duplications only), GREM1 (deletions/duplications only), MLH1, MSH2, MSH3, MSH6, MUTYH, NF1, NTHL1, PALB2, PMS2, POLE, PTEN, RAD51C, RAD51D, SMAD4, STK11, TP53.      A copy of the test report can be found in the Laboratory tab, dated 2/15/2022, and named \"Next generation sequencing\".     Interpretation:  We discussed several different interpretations of this inconclusive test result. It is not clear if any of these variants are associated with increased cancer risk. These variants may be benign changes that do not increased cancer risk, or they may be harmful mutations that cause increased risk for certain cancers.    BRCA2: Individuals with one harmful mutation in the BRCA2 gene have Hereditary Breast and Ovarian Cancer syndrome and are carriers of autosomal recessive Fanconi anemia.    BRIP1: Individuals who carry one harmful mutation in the BRIP1 gene are at increased risk for ovarian cancer and are carriers of autosomal recessive Fanconi anemia.    POLD1: Individuals who carry one harmful mutation in the POLD1 gene are at increased risk for colon cancer and polyps.    The laboratory is working to determine if any of these variants are harmful or benign, and they will contact me if any of them are reclassified. If these variants are determined to be benign, there may be a different gene or combination of genes and environment that are associated with the cancers in this family that are not identifiable using this test. " As such, Shanta is encouraged to contact me regularly to review any new genetic testing options that may be appropriate for her.    It is also important to consider that her other relatives with cancer, such as her father and/or paternal aunt, may have a mutation in one of the genes tested and Shanta did not inherit it. If that is the case, Silvios breast cancer may be sporadic and caused by random cellular changes.    Inheritance:  We reviewed the autosomal dominant inheritance of the variants in these three genes.  We discussed that Lachelle's brother has a 50% chance to carry each of these variants. Because it is unclear what, if any, risk is associated with these variants, clinical genetic testing for these three variants alone is not recommended for relatives.      Screening:  Based on this inconclusive test result, it is important for Lachelle and her relatives to refer back to the family history for appropriate cancer screening.      Shanta should continue to follow all breast cancer treatment and follow-up recommendations as made by her medical providers.    Lachelle s close female relatives remain at increased risk for breast cancer given their family history. Breast cancer screening is generally recommended to begin approximately 10 years younger than the earliest age of breast cancer diagnosis in the family, or at age 40, whichever comes first. In this family, screening may begin at age 39. Breast screening options should be discussed with an individual's primary care provider and a genetic counselor, to determine at what age to begin screening, what screening is appropriate, and if additional screening (such as breast MRI) is necessary based on personal/family history factors.  Per current National Comprehensive Cancer Network (NCCN) guidelines, individuals with a first degree relative with colorectal cancer diagnosed at any age (such as her father) should begin colonoscopy at age 40, or 10 years before the  earliest diagnosis of colorectal cancer, whichever is first.  In this family, colonoscopy should start at age 32 and be repeated every 5 years, or based on colonoscopy findings. This would apply to Shanta and her brother. These recommendations may change based on personal and family history as well as personal preference, and should be discussed with an individual's physician.        Due to Lachelle's family history of ovarian cancer, Lachelle and her other close paternal female relatives remain at slightly increased risk for ovarian cancer. We discussed available ovarian cancer screening (pelvic exams, CA-125 blood tests, and transvaginal ultrasounds) as well as the significant limitations of this screening. As such, this screening is not typically recommended. That being said, women in this family should discuss this screening and the signs and symptoms of ovarian cancer with their primary OB/GYN provider, as they may have individualized recommendations.    Other population cancer screening options, such as those recommended by the American Cancer Society and NCCN, are also appropriate for Lachelle and her family. These screening recommendations may change if there are changes to Lachelle's personal and/or family history of cancer. Final screening recommendations should be made by each individual's primary care provider.      Additional Testing Considerations:  It is still possible Lachelle does carry a currently identifiable gene mutation that increases her risk for breast and/or other cancers. The option remains of pursuing a larger gene panel covering more moderate risk genes associated with hereditary cancer. Lachelle is encouraged to contact me if she wishes to readdress these larger gene panel options.    Also, although Lachelle's genetic testing result is inconclusive, other relatives may still carry a harmful gene mutation associated with hereditary cancer. Genetic counseling is recommended for Shanta's father to  discuss his genetic testing options. Shanta's brother could also consider meeting with a genetic counselor. If any of these relatives do pursue genetic testing, Lachelle is encouraged to contact me so that we may review the impact of their test results on her.    Summary:  We do not have an explanation for Lachelle's breast cancer. While no harmful genetic changes were identified, Lachelle may still be at risk for certain cancers due to family history, environmental factors, or other genetic causes not identified by this test. Because of that, it is important that she continue with cancer screening based on her personal and family history as discussed above.    Genetic testing is rapidly advancing, and new cancer susceptibility genes will most likely be identified in the future. Therefore, I encouraged Lachelle to contact me regularly or if there are changes in her personal or family history. This may change how we assess her cancer risk, screening, and the testing we would offer.    Plan:  1. Lachelle was provided a copy of her test results via [employer].    2. She plans to follow-up with her medical providers, as needed.  3. She should contact me regularly, or sooner if her family history changes.  4. I will attempt to contact Lachelle if the laboratory informs me that these variants have been reclassified. This may change screening and testing recommendations for Lachelle and her relatives.    If Lachelle has any further questions, I encouraged her to contact me at [phone].    Judit New MS, [facility] [place]  Licensed, Certified Genetic Counselor  Office: [phone]  Pager: [phone]

## 2022-03-16 NOTE — LETTER
3/16/2022         RE: Lachelle Duque  [address]  [place] [address]        Dear Colleague,    Thank you for referring your patient, Lachelle Duque, to the [facility]. Please see a copy of my visit note below.    3/16/2022    Shanta is a 51 year old who is being evaluated via a billable video visit.      How would you like to obtain your AVS? MyChart  If the video visit is dropped, the invitation should be resent by: Text to cell phone: [phone]  Will anyone else be joining your video visit? No    Video-Visit Details  Type of service:  Video Visit  Video Start Time: 8:00am  Video End Time:8:11am  Originating Location (pt. Location): Home  Distant Location (provider location):  [facility]   Platform used for Video Visit: [employer]    Referring Provider: Giovanna Alegria MD    Presenting Information:  I spoke to Lachelle by video today to discuss her genetic testing results. Her blood was originally drawn on 10/22/2021 for the Breast Actionable Panel. When we met on 2/10/2022, Shanta elected to proceed with additional genetic testing using the Hereditary Breast/Gyn/Colon Cancer Actionable Panel offered by the [facility].This testing was done because of Lachelle's personal and family history of breast, ovarian, and colon cancer.    Genetic Testing Result: Multiple Variants of Uncertain Significance (VUS)  Lachelle was found to have three variants of uncertain significance (VUS).    BRCA2 c.6248T>C (p.[id number])      POLD1 c.653G>A (p.Ywt436Ebm)     BRIP1 c.1984G>A (p.Twy844Ilx)    No other variants or mutations were found in these genes. Given the uncertain significance of this result, medical management decisions should NOT be made based on this test result alone.    Of note, Lachelle tested negative for mutations in the following genes by sequencing and deletion/duplication analysis: APC, FELICIA, AXIN2, BARD1,  "BMPR1A, BRCA1, CDH1, CHEK2, EPCAM (deletions/duplications only), GREM1 (deletions/duplications only), MLH1, MSH2, MSH3, MSH6, MUTYH, NF1, NTHL1, PALB2, PMS2, POLE, PTEN, RAD51C, RAD51D, SMAD4, STK11, TP53.      A copy of the test report can be found in the Laboratory tab, dated 2/15/2022, and named \"Next generation sequencing\".     Interpretation:  We discussed several different interpretations of this inconclusive test result. It is not clear if any of these variants are associated with increased cancer risk. These variants may be benign changes that do not increased cancer risk, or they may be harmful mutations that cause increased risk for certain cancers.    BRCA2: Individuals with one harmful mutation in the BRCA2 gene have Hereditary Breast and Ovarian Cancer syndrome and are carriers of autosomal recessive Fanconi anemia.    BRIP1: Individuals who carry one harmful mutation in the BRIP1 gene are at increased risk for ovarian cancer and are carriers of autosomal recessive Fanconi anemia.    POLD1: Individuals who carry one harmful mutation in the POLD1 gene are at increased risk for colon cancer and polyps.    The laboratory is working to determine if any of these variants are harmful or benign, and they will contact me if any of them are reclassified. If these variants are determined to be benign, there may be a different gene or combination of genes and environment that are associated with the cancers in this family that are not identifiable using this test. As such, Shanta is encouraged to contact me regularly to review any new genetic testing options that may be appropriate for her.    It is also important to consider that her other relatives with cancer, such as her father and/or paternal aunt, may have a mutation in one of the genes tested and Shanta did not inherit it. If that is the case, Shanta's breast cancer may be sporadic and caused by random cellular changes.    Inheritance:  We reviewed the " autosomal dominant inheritance of the variants in these three genes.  We discussed that Lachelle's brother has a 50% chance to carry each of these variants. Because it is unclear what, if any, risk is associated with these variants, clinical genetic testing for these three variants alone is not recommended for relatives.    Screening:  Based on this inconclusive test result, it is important for Lachelle and her relatives to refer back to the family history for appropriate cancer screening.      Shanta should continue to follow all breast cancer treatment and follow-up recommendations as made by her medical providers.    Lachelle s close female relatives remain at increased risk for breast cancer given their family history. Breast cancer screening is generally recommended to begin approximately 10 years younger than the earliest age of breast cancer diagnosis in the family, or at age 40, whichever comes first. In this family, screening may begin at age 39. Breast screening options should be discussed with an individual's primary care provider and a genetic counselor, to determine at what age to begin screening, what screening is appropriate, and if additional screening (such as breast MRI) is necessary based on personal/family history factors.  Per current National Comprehensive Cancer Network (NCCN) guidelines, individuals with a first degree relative with colorectal cancer diagnosed at any age (such as her father) should begin colonoscopy at age 40, or 10 years before the earliest diagnosis of colorectal cancer, whichever is first.  In this family, colonoscopy should start at age 32 and be repeated every 5 years, or based on colonoscopy findings. This would apply to Shanta and her brother. These recommendations may change based on personal and family history as well as personal preference, and should be discussed with an individual's physician.        Due to Lachelle's family history of ovarian cancer, Lachelle and her  other close paternal female relatives remain at slightly increased risk for ovarian cancer. We discussed available ovarian cancer screening (pelvic exams, CA-125 blood tests, and transvaginal ultrasounds) as well as the significant limitations of this screening. As such, this screening is not typically recommended. That being said, women in this family should discuss this screening and the signs and symptoms of ovarian cancer with their primary OB/GYN provider, as they may have individualized recommendations.    Other population cancer screening options, such as those recommended by the American Cancer Society and NCCN, are also appropriate for Lachelle and her family. These screening recommendations may change if there are changes to Lachelle's personal and/or family history of cancer. Final screening recommendations should be made by each individual's primary care provider.      Additional Testing Considerations:  It is still possible Lachelle does carry a currently identifiable gene mutation that increases her risk for breast and/or other cancers. The option remains of pursuing a larger gene panel covering more moderate risk genes associated with hereditary cancer. Lachelle is encouraged to contact me if she wishes to readdress these larger gene panel options.    Also, although Lachelle's genetic testing result is inconclusive, other relatives may still carry a harmful gene mutation associated with hereditary cancer. Genetic counseling is recommended for Shanta's father to discuss his genetic testing options. Shanta's brother could also consider meeting with a genetic counselor. If any of these relatives do pursue genetic testing, Lachelle is encouraged to contact me so that we may review the impact of their test results on her.    Summary:  We do not have an explanation for Lachelle's breast cancer. While no harmful genetic changes were identified, Lachelle may still be at risk for certain cancers due to family history,  environmental factors, or other genetic causes not identified by this test. Because of that, it is important that she continue with cancer screening based on her personal and family history as discussed above.    Genetic testing is rapidly advancing, and new cancer susceptibility genes will most likely be identified in the future. Therefore, I encouraged Lachelle to contact me regularly or if there are changes in her personal or family history. This may change how we assess her cancer risk, screening, and the testing we would offer.    Plan:  1. Lachelle was provided a copy of her test results via [employer].    2. She plans to follow-up with her medical providers, as needed.  3. She should contact me regularly, or sooner if her family history changes.  4. I will attempt to contact Lachelle if the laboratory informs me that these variants have been reclassified. This may change screening and testing recommendations for Lachelle and her relatives.    If Lachelle has any further questions, I encouraged her to contact me at [phone].            Again, thank you for allowing me to participate in the care of your patient.      Sincerely,    Judit New, GC

## 2022-03-17 ENCOUNTER — TELEPHONE (OUTPATIENT)
Dept: ORTHOPEDICS | Facility: CLINIC | Age: 52
End: 2022-03-17

## 2022-03-17 ENCOUNTER — ANCILLARY PROCEDURE (OUTPATIENT)
Dept: GENERAL RADIOLOGY | Facility: CLINIC | Age: 52
End: 2022-03-17
Attending: ORTHOPAEDIC SURGERY
Payer: COMMERCIAL

## 2022-03-17 ENCOUNTER — OFFICE VISIT (OUTPATIENT)
Dept: ORTHOPEDICS | Facility: CLINIC | Age: 52
End: 2022-03-17
Payer: COMMERCIAL

## 2022-03-17 DIAGNOSIS — M25.561 RIGHT KNEE PAIN, UNSPECIFIED CHRONICITY: ICD-10-CM

## 2022-03-17 DIAGNOSIS — G89.29 CHRONIC PAIN OF RIGHT KNEE: Primary | ICD-10-CM

## 2022-03-17 DIAGNOSIS — M25.561 CHRONIC PAIN OF RIGHT KNEE: Primary | ICD-10-CM

## 2022-03-17 DIAGNOSIS — M25.561 RIGHT KNEE PAIN, UNSPECIFIED CHRONICITY: Primary | ICD-10-CM

## 2022-03-17 PROCEDURE — 99214 OFFICE O/P EST MOD 30 MIN: CPT | Performed by: ORTHOPAEDIC SURGERY

## 2022-03-17 PROCEDURE — 73562 X-RAY EXAM OF KNEE 3: CPT | Mod: RT | Performed by: RADIOLOGY

## 2022-03-17 NOTE — NURSING NOTE
Reason For Visit:   Chief Complaint   Patient presents with     RECHECK     1 month before surgery; xrays completed  DOS 4/19/22 -  Procedure: right knee examination under anesthesia, knee arthroscopy, meniscus root repair        ?  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  Work status?  Full time.  Date of surgery: s/p left knee arthroscopy, transosseous repair of left medial meniscus root DOS: 2/7/2020  Smoker: No  Request smoking cessation information: No    Sane Score  Right  knee - Affected  Left Knee- 95  Right Knee- 65      Tammie Moya, ATC

## 2022-03-17 NOTE — TELEPHONE ENCOUNTER
Procedure: Meniscus root repair  Facility: [facility]  Length: 90 minutes  Anesthesia: Choice  Post-op appointments needed: 2 weeks provider only, 6 weeks with provider only.  Surgery packet/instructions given to patient?  Yes      Already scheduled, post op appointments made, Pre-op exam and covid test are already scheduled.    Magui Saldaña RN    Pre-Operative Teaching Flowsheet     Person(s) involved in teaching: Patient     Motivation Level:  Receptive (willing/able to accept information) and asks appropriate questions where applicable: Yes  Any cultural factors/Alevism beliefs that may influence understanding or compliance? No     Patient demonstrates understanding of the following:  Pre-operative planning, including the necessary appointments and preparation needed prior to surgery: Yes  Which situations necessitate calling provider and whom to contact: Yes  Pain management techniques pre and post op: Yes  How, and when, to access community resources: Yes         Additional Teaching Concerns Addressed:   Post-operative living arrangements and necessary adaptations to living environment. With Brother     Instructional Materials Used/Given: Yes, pre-op packet printed from AVS with additional system forms added (COVID Testing Handout, Map, etc). Pre-op soap given (if in clinic).     Time spent with patient: 20 minutes.

## 2022-03-17 NOTE — PROGRESS NOTES
Shanta returns to my clinic today.  She is status post left knee medial meniscus root repair.  She has a standing offer (and in fact is scheduled for a right knee medial meniscus repair April 2022).  I saw her in the fall and at that time I offered her surgery.  She had been recently diagnosed with breast cancer was undergoing treatment and wanted to forego treatment of her meniscus root until the spring.  In light of this I asked her to come back to my clinic to get a new x-ray today.    She reports that she is overall feeling well.  Her cancer treatment is gone well for her.  She does have some joint aches and pains.    Examination today shows no signs of infection.  Good motion.  Ligaments stable.  New radiographs obtained today do show trace joint space narrowing though overall there is good joint space remaining.  Evidence of tunnel position from her left knee is noted.    Clinical assessment: Status post left knee meniscus root repair.  SANE score 95.  Doing very well     Right knee medial meniscus root tear x-rays show no progressive arthritis and therefore I think she is a candidate to proceed with meniscus root repair surgery in the right side.    Plan: Long discussion today with the patient.  Reviewed the diagnosis potential treatment options.  I again reviewed with her the diagnosis of meniscus root tears.  The surgical treatment including meniscus repair.  The limitations of surgery.  The expected course of recovery.  We will arrange her follow-up visits as well as therapy.  I will plan to see her at the time of surgery.

## 2022-03-17 NOTE — PATIENT INSTRUCTIONS
[facility]  [address] [place] [address]  Phone [phone]  Fax [phone]    SURGICAL INFORMATION & INSTRUCTIONS  Dr. Armando Sunshine  Name of Surgery: Meniscus root repair    Date of Surgery: 4/19/22    If you need to reschedule/schedule your surgery, please contact Bonita, our surgery scheduler at [place], at [phone].    Arrival Time and Time of Surgery:     You will receive a pre-op phone call about 3 days before surgery to confirm your time of surgery and arrival time.     Location of Surgery:     ? [facility]  [address] [place] [address]  5th floor check-in  Phone [phone]  Fax [phone]  [email or web address]      Medical Leave Forms    ? Please fax any medical leave forms from your employer/school to [phone]. It can take up to 3 business days to complete the forms. We will fax them back to the number listed on the forms, if you would like a copy, please let us know and we will mail a copy to you. Do not bring with on day of surgery as the forms may get lost.    Prior to surgery    ? Call your insurance company  Ask if you need pre-approval for your surgery.  If pre-approval is needed, please call our surgery scheduler for assistance with the pre-approval process.   If you do not have insurance, please let us know.     ? Schedule an appointment with a Primary Care Provider for a Pre-Op Physical.  This should be done within 30 days of surgery  If you do not have a primary care provider, you may call [phone], for an appointment.  Please have your office note and any labs or tests faxed to the facility where you are having surgery. Please be sure to request a copy of your pre-op physical and bring it with you on the day of surgery.      Tell your provider if you have any of the following:   - IF you have a pacemaker or an ICD (implanted cardiac defibrillator). If you do,  please bring the ID card with you on the day of surgery  - IF you're a smoker. People who smoke have a higher risk of infection after surgery. Ask your provider how you can quit smoking.  - If you have diabetes, work with your provider to control your blood sugar. If its not well-controlled, we may need to delay surgery (or you may have problems healing afterward).  - If your surgeon asks you to see your dentist: You'll need to complete any dental work before surgery. Your dentist must send a letter to your surgery center saying it's ok to do the surgery.    ? 7-14 days BEFORE surgery  ? Stop taking any and all weight-loss drugs (phentermine-topiramate, orlistat, buproprion-naltrexone, liraglutide, etc) 14 days before surgery.   ? Stop taking aspirin, Plavix or aspirin products 10 days before surgery or as directed by your doctor.  ? Stop taking non-steroidal anti-inflammatory medications (naproxen/Aleve, ibuprofen/Advil/Motrin, celecoxib/Celebrex, meloxicam/Mobic) 3 days before surgery or as directed by your surgeon.  This will reduce the risk of bleeding during surgery.  ? Stop taking fish oil (Omega-3-fatty acid) 1 week before surgery.  ? It is OK to take acetaminophen (Tylenol) up until 2 hours prior to surgery.  ? Take prescription medications as directed and discuss which medications to take or hold prior to your surgery with your primary care doctor.   ? If you have diabetes, ask your primary care doctor or endocrinologist how you should take your medication(s).    ? COVID-19 Testing Prior to Surgery (see included handout)  o 3-4 days prior to surgery  o Call [phone] or [phone] to schedule  o Please stay at home and out of contact with other people after your COVID test    ? 24 hours BEFORE surgery  Stop drinking alcohol (beer, wine, liquor) at least 24-hours before and after your surgery.     ? Evening BEFORE surgery  - You may eat a normal meal the night before surgery, but do not eat anything 8  hours prior to surgery.    - Take a shower - to help wash away bacteria (germs) from your skin.  It s normal to have bacteria on your skin and skin protects us from these germs.  When you have surgery, we cut the skin.  Sometimes germs get into the cuts and cause infection (illness caused by germs).  By following the showering instructions and using the special soap, you will lower the number of germs on your skin.  This decreases your chance of an infection.    - Buy or get 8 ounces of antiseptic surgical soap called 4% CHG.  Common brands of this soap are Hibiclens and Exidine.    - You can find it this soap at your local pharmacy, clinic or retail store.  If you have trouble finding it, ask your pharmacist to help you find the right substitute.  OK to use generic unscented soap if not able to purchase surgical soap.  - Do not shave within 12 inches of your incision (surgical cut) area for at least 3 days before surgery.  Shaving can make small cuts in the skin. This puts you at a higher risk of infection.    Items you will need for each shower:   - Newly washed towel  - 4 ounces of one of the recommended soaps    Follow these instructions, the evening before surgery  - Wash your hair and body with your regular shampoo and soap. Make sure you rinse the shampoo and soap from your hair and body.  - Using clean hands, apply about 2 ounces of soap gently on your skin from the neck to your toes. Use on your groin area last. Do not use this soap on your face or head. If you get any soap in your eyes, ears or mouth, rinse right away.  - Once the soap has been on your skin for at least 1 minute, rinse off completely and repeat washing with the surgical soap one more time.  - Rinse well and dry off using a clean towel.  If you feel any tingling, itching or other irritation, rinse right away. It is normal to feel some coolness on the skin after using the antiseptic soap. Your skin may feel a bit dry after the shower, but do  not use any lotions, creams or moisturizers. Do not use hair spray or other products in your hair.  - Dress in freshly washed clothes or pajamas. Use fresh pillowcases and sheets on your bed.    ? Day of Surgery  - You may drink only clear liquids from 8 hours up to 2 hours prior to surgery or as directed by your surgeon.  Clear liquids include: Water, Pedialyte, Gatorade, apple juice and liquids you can read through. Please avoid liquids that are red or orange in color.   - Do NOT drink: milk, liquids that have pulp, orange juice, and lemonade or tomato juice.   - Do NOT chew gum, chew tobacco or suck on hard candy.    - Take another shower          Follow these instructions:      - Wash your hair and body with your regular shampoo and soap. Make sure you rinse the shampoo and soap from your hair and body.  - Using clean hands, apply about 2 ounces of soap gently on your skin from the neck to your toes. Use on your groin area last. Do not use this soap on your face or head. If you get any soap in your eyes, ears or mouth, rinse right away.  - Once the soap has been on your skin for at least 1 minute, rinse off completely and repeat washing with the surgical soap one more time.  - Rinse well and dry off using a clean towel.  If you feel any tingling, itching or other irritation, rinse right away. It is normal to feel some coolness on the skin after using the antiseptic soap. Your skin may feel a bit dry after the shower, but do not use any lotions, creams or moisturizers. Do not use hair spray or other products in your hair.  - Dress in freshly washed clothes.  - Do not wear deodorant, cologne, lotion, makeup, nail polish or jewelry to surgery.    ? If there is any change in your health PRIOR to your surgery, please contact your surgeon's office.  Such as a fever, body aches, fatigue, any flu-like symptoms, rash, or any new injury to related body part.    ? Arrange for someone to drive you home after surgery.     will need to be a responsible adult (18 years or older) that will provide transportation to and from surgery and stay in the waiting room during your surgery. You may not drive yourself or take public transportation to and from surgery.    ? Arrange for someone to stay with you for 24 hours after you go home.   This person must be a responsible adult (18 years or older).    ? Bring these items to the hospital/surgery center:   ? Insurance card(s)  ? Money for parking and co-pays, if needed  ? A list of all the medications you take, including vitamins, minerals, herbs and over the counter medications.    ? A copy of your Advance Health Care Directive, if you have one.  This tells us what treatment(s) you would or would not want, and who would make health care decisions, if you could no longer speak for yourself.    ? A case for glasses, contact lenses, hearing aids or dentures.   ? Your inhaler or CPAP machine, if you use these at home    ? Leave extra cash, jewelry and other valuables at home.       ? Other information:   Sleep Apnea: Let your nurse know if you have a history of sleep apnea, only if you are having surgery at the [facility].    When you arrive for surgery  When you get to the surgery center/hospital, you will:  - Check in. If you are under age 18, you must be with a parent or legal guardian.  - Sign consent forms, if you haven t already. These forms state that you know the risks and benefits of surgery. When you sign the forms, you give us permission to do the surgery. Do not sign them unless you understand what will happen during and after your surgery. If you have any questions about your surgery, ask to speak with your doctor before you sign the forms. If you don t understand the answers, ask again.  - Receive a copy of the Patient s Bill of Rights. If you do not receive a copy, please ask for one.  - Change into hospital clothes. Your belongings will be placed in a bag.  We will return them to you after surgery.  - Meet with the anesthesia provider. He or she will tell you what kind of anesthesia (medicine) will be used to keep you comfortable during surgery.  - Remember: it s okay to remind doctors and nurses to wash their hands before touching you.  - In most cases, your surgeon will use a marker to write his or her initials on the surgery site. This ensures that the exact site is operated on.  - For safety reasons, we will ask you the same questions many times. For example, we may ask your name and birth date over and over again.  - Friends and family can stay with you until it s time for surgery. While you re in surgery, they will be in the waiting area. Please note that cell phones are not allowed in some patient care areas.  - If you have questions about what will happen in the operating room, talk to your care team.  - You will meet with an anesthesiologist, before your surgery.  He or she may reference types of anesthesia commonly used for surgeries:   o General:  This involves the use of an IV for injection of medication and anesthesia. You are put into a sleep and have a breathing tube to assist you with breathing.  o Sedation:  You are asleep, but not so deply that you need a breathing tube.   o Local or Regional: a nerve is injected to numb the surgical area.  o Spinal: you are numbed from the waist down with medicine injected into your back.  o Femoral Nerve Block:  Anesthesia injected into the groin of leg which you are having surgery on.      After surgery  We will move you to a recovery room, where we will watch you closely. If you have any pain or discomfort, tell your nurse. He or she will try to make you comfortable.    - If you are staying overnight, we will move you to your hospital room after you are awake.  - If you are going home, we will move you to another room. Friends and family may be able to join you. The length of time you spend in recovery depend on  the type of medicine you received, your medical condition, the type of surgery you had, or your response to the anesthesia given during your procedure.  - When you are discharged from the recovery room, the nurses will review instructions with you and your caregiver.  - Please wash your hands every time you touch the wound or change bandages or dressings.  - Do not submerge the wound in water for 2-3 weeks after surgery.  You may not use a bathtub or hot tub until the wound is closed. Any open area can be a source of incoming bacteria, so it is better to be on the safe side and avoid water submersion until your wound is fully healed.  Keep all dressings clean and dry.   - If you had surgery on your arm or leg, elevate it as much as possible to help reduce swelling.  - You may put ice on the surgical area for comfort, keeping ice on area for up to 20 minutes then off for 40 minutes.  You may do this the first few days after surgery to help reduce pain and swelling.   - Many surgical wounds will have small white strips of tape on them called steri-strips. These are to help support the incision and surrounding skin. Do not remove these. The edges will curl and fall off on their own, typically within 7-10 days with normal showering and hygiene.   - Drink at least 8-10 glasses of liquid each day to help your body heal.  - Keep your lungs clear by coughing and taking deep breaths every couple hours.  This is especially important the first 48 hours after surgery.    - Notify your doctor if you have any of the following:   o Fever of 101 F or higher  o Numbness and/or tingling  o Increased pain, redness or swelling  o Drainage from wound  o Prolonged or uncontrolled bleeding  o Difficulty with movement    ? Physical Therapy  You will start physical therapy 3-5 days after surgery. Please set up an appointment before your surgery with a physical therapist of your choice. Many places are booked out 1-2 weeks, so be sure to  reach out as soon as you can. If you have questions or are needing a physical therapy order faxed, please contact our clinic.     Follow-up Appointments, in Clinic  If you don't already have an appointment scheduled, please call to set up an appointment at [phone].      ? Post-Op appointments with provider (1 and 6 weeks post op)    Dealing with pain  A nurse will check your comfort level often during your stay. He or she will work with you to manage your pain.  It s expected that you will have pain after surgery.  Our goal is to reduce or minimize pain by:   - Medicine doesn t work the same for everyone. If your medicine isn t working, tell your nurse. There may be other medicines or treatments we can try.  - Medication Refills.  If you need refills on your pain medication, please call the clinic as soon as possible.  It may take 72-business hours to obtain a refill.  Refills must be picked up at check-in 2, [facility] or mailed to a pharmacy of your choice.    - It is expected that you will wean off the pain medications in a timely manner.   - Constipation is a common side effect of pain medication, decreased activity and anesthesia from surgery.  Take a stool softener as prescribed by your doctor at the time of discharge.  You may also use over the counter medications as needed.  Be sure to increase your fiber (fruits and vegetables) and your water intake.      Please call the clinic at [phone], if you experience any problems or have questions.  If you are having an emergency, always call 911 or seek immediate evaluation at the Emergency Room.    Thank you for selecting [facility] for your care!  ---------------------------------------------        Thanks for coming today.  [facility]  [address] [place] [address]    To schedule future appointments in Ortho Clinic, you may call [phone].    To schedule ordered imaging or an injection  ordered by your provider:  Call Central Imaging Injection scheduling line: [phone]    MyChart available online at:  [email or web address]    Please call if any further questions or concerns ([phone]).  Clinic hours 8 am to 5 pm.    Return to clinic (call) if symptoms worsen or fail to improve.

## 2022-03-17 NOTE — LETTER
3/17/2022         RE: Lachelle Duque  [address] [place] [address]        Dear Colleague,    Thank you for referring your patient, Lachelle Duque, to the [facility]. Please see a copy of my visit note below.    Shanta returns to my clinic today.  She is status post left knee medial meniscus root repair.  She has a standing offer (and in fact is scheduled for a right knee medial meniscus repair April 2022).  I saw her in the fall and at that time I offered her surgery.  She had been recently diagnosed with breast cancer was undergoing treatment and wanted to forego treatment of her meniscus root until the spring.  In light of this I asked her to come back to my clinic to get a new x-ray today.    She reports that she is overall feeling well.  Her cancer treatment is gone well for her.  She does have some joint aches and pains.    Examination today shows no signs of infection.  Good motion.  Ligaments stable.  New radiographs obtained today do show trace joint space narrowing though overall there is good joint space remaining.  Evidence of tunnel position from her left knee is noted.    Clinical assessment: Status post left knee meniscus root repair.  SANE score 95.  Doing very well     Right knee medial meniscus root tear x-rays show no progressive arthritis and therefore I think she is a candidate to proceed with meniscus root repair surgery in the right side.    Plan: Long discussion today with the patient.  Reviewed the diagnosis potential treatment options.  I again reviewed with her the diagnosis of meniscus root tears.  The surgical treatment including meniscus repair.  The limitations of surgery.  The expected course of recovery.  We will arrange her follow-up visits as well as therapy.  I will plan to see her at the time of surgery.      Again, thank you for allowing me to participate in the care of your patient.        Sincerely,        Armando Colon  MD Bita

## 2022-03-21 NOTE — PATIENT INSTRUCTIONS
Genetic Testing  You had a blood test that looked at the genetic information in one or more genes associated with increased cancer risk.  The testing looked for any harmful changes that would stop this particular gene from working like it should.  It is important to remember that everyone has variants in multiple genes in their bodies that do not affect how the gene works.  These changes are benign and do not cause disease or increase cancer risk.  The term often used to describe these variants is benign polymorphism.  If an individual is found to have a benign polymorphism, their genetic test result is reported as Negative.    Results  There are three possible results of any genetic test:    Positive--a harmful mutation was identified    Negative--no mutation was identified    Variant of unknown significance--a variation in one of the genes was identified, but it is unclear how this impacts cancer risk in the family    Variant of Unknown Significance (VUS)  A VUS was identified in your blood sample.  Scientists currently do not know if this variant is harmful or if it is a benign polymorphism not associated with any increased risk of cancer.   There are several reasons for this lack of knowledge, but likely your VUS has either never been seen before or has only been seen in a small number of individuals.  Until your VUS is studied in more detail and/or seen in more families, scientists are not able to predict if it is a harmful mutation or a benign polymorphism.  Therefore, the cause of the cancer in you and your relatives is still unknown.          Reclassification  Genetic testing laboratories and researchers are constantly working to determine the importance of variants of unknown significance.  Most laboratories will notify the genetic counselor when a patient s VUS has been reclassified as a harmful mutation or a benign polymorphism.      Some families may be candidates for participation in the  laboratory s variant research programs to help determine the importance of their VUS.  Only the testing laboratory can decide who is eligible for participation.  Participating in these programs does not guarantee that families will learn the significance of their VUS immediately.  It could be months or years before a VUS is reclassified.       Screening Recommendations  Cancer screening recommendations for patients with a VUS should be based on their personal and family history rather than the VUS itself.  This may include increased cancer screening for certain individuals in the family.  Your genetic counselor and health care provider can help make appropriate recommendations for you and your relatives.      It is usually not recommended that other relatives have genetic testing for the VUS unless it is done as part of the laboratory s variant research program because an individual s test results should not influence their cancer screening until we determine the importance of the VUS.  Your genetic counselor can help you and your relatives understand the risks and benefits of all genetic testing and cancer screening options.    Please call us if you have any questions or concerns.     Cancer Risk Management Program [phone] ([phone])  ? Liu Mcneil, MS [facility] [phone]  ? Susan Robles, MS, [facility] [phone]  ? Shawanda Perales, MS, [facility]  [phone]  ? Lorrie Segal, MS, [facility]  [phone]  ? Maria Luz Soto, MS, [facility]  [phone]  ? Judit New, MS, [facility] [phone]  ? Jessica Morillo, MS, [facility] [phone]

## 2022-04-05 ENCOUNTER — DOCUMENTATION ONLY (OUTPATIENT)
Dept: ORTHOPEDICS | Facility: CLINIC | Age: 52
End: 2022-04-05
Payer: COMMERCIAL

## 2022-04-16 ENCOUNTER — LAB (OUTPATIENT)
Dept: URGENT CARE | Facility: URGENT CARE | Age: 52
End: 2022-04-16
Payer: COMMERCIAL

## 2022-04-16 DIAGNOSIS — Z11.59 ENCOUNTER FOR SCREENING FOR OTHER VIRAL DISEASES: ICD-10-CM

## 2022-04-16 PROCEDURE — U0003 INFECTIOUS AGENT DETECTION BY NUCLEIC ACID (DNA OR RNA); SEVERE ACUTE RESPIRATORY SYNDROME CORONAVIRUS 2 (SARS-COV-2) (CORONAVIRUS DISEASE [COVID-19]), AMPLIFIED PROBE TECHNIQUE, MAKING USE OF HIGH THROUGHPUT TECHNOLOGIES AS DESCRIBED BY CMS-2020-01-R: HCPCS

## 2022-04-16 PROCEDURE — U0005 INFEC AGEN DETEC AMPLI PROBE: HCPCS

## 2022-04-17 LAB — SARS-COV-2 RNA RESP QL NAA+PROBE: NEGATIVE

## 2022-04-18 ENCOUNTER — ANESTHESIA EVENT (OUTPATIENT)
Dept: SURGERY | Facility: AMBULATORY SURGERY CENTER | Age: 52
End: 2022-04-18
Payer: COMMERCIAL

## 2022-04-18 ENCOUNTER — OFFICE VISIT (OUTPATIENT)
Dept: FAMILY MEDICINE | Facility: CLINIC | Age: 52
End: 2022-04-18
Payer: COMMERCIAL

## 2022-04-18 VITALS
SYSTOLIC BLOOD PRESSURE: 120 MMHG | RESPIRATION RATE: 18 BRPM | BODY MASS INDEX: 40.29 KG/M2 | TEMPERATURE: 98.4 F | HEIGHT: 66 IN | DIASTOLIC BLOOD PRESSURE: 79 MMHG | HEART RATE: 106 BPM | WEIGHT: 250.7 LBS | OXYGEN SATURATION: 96 %

## 2022-04-18 DIAGNOSIS — M25.561 RIGHT KNEE PAIN, UNSPECIFIED CHRONICITY: ICD-10-CM

## 2022-04-18 DIAGNOSIS — E66.01 MORBID OBESITY (H): ICD-10-CM

## 2022-04-18 DIAGNOSIS — C50.912 INVASIVE DUCTAL CARCINOMA OF BREAST, FEMALE, LEFT (H): ICD-10-CM

## 2022-04-18 DIAGNOSIS — Z01.818 PREOP GENERAL PHYSICAL EXAM: Primary | ICD-10-CM

## 2022-04-18 LAB
ERYTHROCYTE [DISTWIDTH] IN BLOOD BY AUTOMATED COUNT: 13.4 % (ref 10–15)
HCT VFR BLD AUTO: 39.9 % (ref 35–47)
HGB BLD-MCNC: 12.7 G/DL (ref 11.7–15.7)
MCH RBC QN AUTO: 28.7 PG (ref 26.5–33)
MCHC RBC AUTO-ENTMCNC: 31.8 G/DL (ref 31.5–36.5)
MCV RBC AUTO: 90 FL (ref 78–100)
PLATELET # BLD AUTO: 226 10E3/UL (ref 150–450)
RBC # BLD AUTO: 4.42 10E6/UL (ref 3.8–5.2)
WBC # BLD AUTO: 5.5 10E3/UL (ref 4–11)

## 2022-04-18 PROCEDURE — 36415 COLL VENOUS BLD VENIPUNCTURE: CPT | Performed by: NURSE PRACTITIONER

## 2022-04-18 PROCEDURE — 99214 OFFICE O/P EST MOD 30 MIN: CPT | Performed by: NURSE PRACTITIONER

## 2022-04-18 PROCEDURE — 85027 COMPLETE CBC AUTOMATED: CPT | Performed by: NURSE PRACTITIONER

## 2022-04-18 RX ORDER — NALOXONE HYDROCHLORIDE 0.4 MG/ML
0.2 INJECTION, SOLUTION INTRAMUSCULAR; INTRAVENOUS; SUBCUTANEOUS
Status: DISCONTINUED | OUTPATIENT
Start: 2022-04-18 | End: 2022-04-20 | Stop reason: HOSPADM

## 2022-04-18 RX ORDER — NALOXONE HYDROCHLORIDE 0.4 MG/ML
0.4 INJECTION, SOLUTION INTRAMUSCULAR; INTRAVENOUS; SUBCUTANEOUS
Status: DISCONTINUED | OUTPATIENT
Start: 2022-04-18 | End: 2022-04-20 | Stop reason: HOSPADM

## 2022-04-18 ASSESSMENT — PAIN SCALES - GENERAL: PAINLEVEL: MILD PAIN (3)

## 2022-04-18 NOTE — RESULT ENCOUNTER NOTE
Nate Womack,   Your CBC is normal. Good luck with surgery.   Thank you,  ROSALIND Chandler, NP-C  [facility]

## 2022-04-18 NOTE — Clinical Note
FYI: pre-op for tomorrow. No concerns. COVID swab negative on 4/16 Thank you, ROSALIND Chandler, NP-C [facility]

## 2022-04-18 NOTE — H&P (VIEW-ONLY)
[address] [address]  Phone: [phone]  Primary Provider: Suha Beavers  Pre-op Performing Provider: GALILEO VERGARA      PREOPERATIVE EVALUATION:  Today's date: 4/18/2022    Lachelle Duque is a 52 year old female who presents for a preoperative evaluation.    Surgical Information:  Surgery/Procedure: right knee examination under anesthesia, knee arthroscopy, meniscus root repair  Surgery Location: [facility]  Surgeon: Dr. Sunshine  Surgery Date: 04/19/21  Time of Surgery: 8:00am  Where patient plans to recover: At home with family  Fax number for surgical facility: Note does not need to be faxed, will be available electronically in Epic.    Type of Anesthesia Anticipated: Choice    Assessment & Plan     The proposed surgical procedure is considered INTERMEDIATE risk.    Preop general physical exam/Right knee pain, unspecified chronicity  Chronic right knee pain, repair meniscus. COVID test negative on 4/16/22. No concerns  - CBC with platelets; Future  - CBC with platelets    Morbid obesity (H)  Stable, needs to work on healthy nutrition/exercise but likely to improve after knee surgery    Invasive ductal carcinoma of breast, female, left (H)  Stable, next follow up with oncology in June           Risks and Recommendations:  The patient has the following additional risks and recommendations for perioperative complications:   - Morbid obesity (BMI >40)    Medication Instructions:  Patient is to take all scheduled medications on the day of surgery    RECOMMENDATION:  APPROVAL GIVEN to proceed with proposed procedure, without further diagnostic evaluation.    Subjective     HPI related to upcoming procedure: chronic knee pain      Preop Questions 4/18/2022   1. Have you ever had a heart attack or stroke? No   2. Have you ever had surgery on your heart or blood vessels, such as a stent placement, a coronary artery  bypass, or surgery on an artery in your head, neck, heart, or legs? No   3. Do you have chest pain with activity? No   4. Do you have a history of  heart failure? No   5. Do you currently have a cold, bronchitis or symptoms of other infection? No   6. Do you have a cough, shortness of breath, or wheezing? No   7. Do you or anyone in your family have previous history of blood clots? YES - mother had blood clot   8. Do you or does anyone in your family have a serious bleeding problem such as prolonged bleeding following surgeries or cuts? No   9. Have you ever had problems with anemia or been told to take iron pills? No   10. Have you had any abnormal blood loss such as black, tarry or bloody stools, or abnormal vaginal bleeding? No   11. Have you ever had a blood transfusion? No   12. Are you willing to have a blood transfusion if it is medically needed before, during, or after your surgery? Yes   13. Have you or any of your relatives ever had problems with anesthesia? YES - VERY NAUSEATED POST-OP   14. Do you have sleep apnea, excessive snoring or daytime drowsiness? No   15. Do you have any artifical heart valves or other implanted medical devices like a pacemaker, defibrillator, or continuous glucose monitor? No   16. Do you have artificial joints? No   17. Are you allergic to latex? No   18. Is there any chance that you may be pregnant? No     Health Care Directive:  Patient does not have a Health Care Directive or Living Will: Discussed advance care planning with patient; however, patient declined at this time.    Preoperative Review of :   reviewed - no record of controlled substances prescribed.      Status of Chronic Conditions:  See problem list for active medical problems.  Problems all longstanding and stable, except as noted/documented.  See ROS for pertinent symptoms related to these conditions.      Review of Systems  CONSTITUTIONAL: NEGATIVE for fever, chills, change in  weight  INTEGUMENTARY/SKIN: NEGATIVE for worrisome rashes, moles or lesions  EYES: NEGATIVE for vision changes or irritation  ENT/MOUTH: NEGATIVE for ear, mouth and throat problems  RESP: NEGATIVE for significant cough or SOB  CV: NEGATIVE for chest pain, palpitations or peripheral edema  GI: NEGATIVE for nausea, abdominal pain, heartburn, or change in bowel habits  : NEGATIVE for frequency, dysuria, or hematuria  MUSCULOSKELETAL: NEGATIVE for significant arthralgias or myalgia  NEURO: NEGATIVE for weakness, dizziness or paresthesias  ENDOCRINE: NEGATIVE for temperature intolerance, skin/hair changes  HEME: NEGATIVE for bleeding problems  PSYCHIATRIC: NEGATIVE for changes in mood or affect    Patient Active Problem List    Diagnosis Date Noted     Long term (current) use of aromatase inhibitors 03/03/2022     Priority: Medium     Right knee pain, unspecified chronicity 11/05/2021     Priority: Medium     Added automatically from request for surgery [id number]       Invasive ductal carcinoma of breast, female, left (H) 10/25/2021     Priority: Medium     Added automatically from request for surgery [id number]       Chronic pain of left knee 01/06/2020     Priority: Medium     Added automatically from request for surgery [id number]       Obesity (BMI 35.0-39.9) with comorbidity (H) 09/10/2018     Priority: Medium     Environmental allergies 01/18/2013     Priority: Medium     Cholelithiasis 01/09/2013     Priority: Medium     IMO update changed this record. Please review for accuracy       Biliary colic 01/09/2013     Priority: Medium     Hiatal hernia 01/03/2013     Priority: Medium     GERD (gastroesophageal reflux disease) 01/03/2013     Priority: Medium     Vitamin D deficiency 08/26/2011     Priority: Medium     Obesity 05/09/2011     Priority: Medium     Hyperlipidemia LDL goal <130 09/09/2010     Priority: Medium     Strong family history of premature CAD  [place] Risk Score: 4% (16 Total Points)         Family  history of colon cancer 08/17/2009     Priority: Medium     9% chance of having Egan Syndrome. Will have patient do pelvic US to evaluate ovaries and uterus.        Encounter for other general counseling or advice on contraception 07/19/2007     Priority: Medium     Diagnosis updated by automated process. Provider to review and confirm.       Herpes zoster 07/19/2007     Priority: Medium     L eye.  Followed by Dr. Solis at [facility]   Has periodic flare ups  Problem list name updated by automated process. Provider to review        Past Medical History:   Diagnosis Date     Chronic rhinitis      Environmental allergies 1/18/2013     Gastro-oesophageal reflux disease      GERD (gastroesophageal reflux disease) 1/3/2013     Herpes zoster without mention of complication     L eye      Hiatal hernia      PONV (postoperative nausea and vomiting)     PONV     Past Surgical History:   Procedure Laterality Date     ADENOIDECTOMY  1977     ARTHROSCOPY KNEE WITH MENISCAL REPAIR Left 6/16/2020    Procedure: Examination under anesthesia Left knee, Left knee arthroscopy, Left meniscus root repair;  Surgeon: Armando Sunshine MD;  [facility]     COLONOSCOPY N/A 10/27/2015    Procedure: COLONOSCOPY;  Surgeon: Duane, William Charles, MD;  Location: [facility] OR     COLONOSCOPY WITH CO2 INSUFFLATION N/A 10/27/2015    Procedure: COLONOSCOPY WITH CO2 INSUFFLATION;  Surgeon: Duane, William Charles, MD;  Location: [facility] OR     HC TOOTH EXTRACTION W/FORCEP      18 yo     LAPAROSCOPIC CHOLECYSTECTOMY  1/30/2013    Procedure: LAPAROSCOPIC CHOLECYSTECTOMY;  Laparoscopic Cholecystectomy;  Surgeon: Cindy Soni MD;  Location: [facility] OR     LUMPECTOMY BREAST WITH SENTINEL NODE, COMBINED Left 11/10/2021    Procedure: Left wire localized breast lumpectomy with sentinel lymph node biopsy;  Surgeon: Ge De La Vega MD;  [place] [facility] [place]     SINUS SURGERY  1988    Deviated septum     wisdom teeth removed[       Current  "Outpatient Medications   Medication Sig Dispense Refill     cetirizine HCl 10 MG CAPS Take one tablet daily.       letrozole (FEMARA) 2.5 MG tablet Take 1 tablet (2.5 mg) by mouth daily 90 tablet 3     omeprazole 20 MG tablet Take 1 tablet (20 mg) by mouth daily Take 30-60 minutes before a meal. 30 tablet 1       Allergies   Allergen Reactions     Sulfa Drugs Anaphylaxis        Social History     Tobacco Use     Smoking status: Never Smoker     Smokeless tobacco: Never Used   Substance Use Topics     Alcohol use: Yes     Alcohol/week: 0.8 standard drinks     Types: 1 Standard drinks or equivalent per week     Comment: occasionally     Family History   Problem Relation Age of Onset     C.A.D. Father         bypass     Diabetes Father      Cancer - colorectal Father 42     Coronary Artery Disease Father      Gastrointestinal Disease Mother         diverticulosis     Arthritis Mother         fibromyalgia, lupus, rheumatoid     Anesthesia Reaction Mother         nausea     Asthma Mother      Lipids Mother      Diabetes Mother      Macular Degeneration Other      Cancer Other 29        CML     Breast Cancer Other      Colon Polyps Brother      C.A.D. Maternal Grandfather      Cerebrovascular Disease Maternal Grandfather      Coronary Artery Disease Maternal Grandfather      Hypertension Maternal Grandmother      Glaucoma Maternal Grandmother      Gastrointestinal Disease Maternal Grandmother         diverticulosis     Depression Brother      Arthritis Brother         fibromyalgia     Anesthesia Reaction Brother         nausea     Cancer Maternal Aunt      Breast Cancer Maternal Aunt      Coronary Artery Disease Maternal Uncle      Thyroid Disease No family hx of      Prostate Cancer No family hx of      History   Drug Use No         Objective     /79 (BP Location: Right arm, Patient Position: Sitting, Cuff Size: Adult Large)   Pulse 106   Temp 98.4  F (36.9  C) (Oral)   Resp 18   Ht 1.676 m (5' 6\")   Wt 113.7 " kg (250 lb 11.2 oz)   LMP 10/31/2021   SpO2 96%   BMI 40.46 kg/m      Physical Exam    GENERAL APPEARANCE: healthy, alert and no distress     EYES: EOMI, PERRL     HENT: ear canals and TM's normal and nose and mouth without ulcers or lesions     NECK: no adenopathy, no asymmetry, masses, or scars and thyroid normal to palpation     RESP: lungs clear to auscultation - no rales, rhonchi or wheezes     CV: regular rates and rhythm, normal S1 S2, no S3 or S4 and no murmur, click or rub     ABDOMEN:  soft, obese, nontender, no HSM or masses and bowel sounds normal     MS: extremities normal- no gross deformities noted, no evidence of inflammation in joints, FROM in all extremities.     SKIN: no suspicious lesions or rashes     NEURO: Normal strength and tone, sensory exam grossly normal, mentation intact and speech normal     PSYCH: mentation appears normal. and affect normal/bright     LYMPHATICS: No cervical adenopathy    Recent Labs   Lab Test 01/31/22  1131 11/08/21  1118   HGB 13.0 13.9    277    137   POTASSIUM 4.2 4.0   CR 0.60 0.69        Diagnostics:  Labs pending at this time.  Results will be reviewed when available.   No EKG required, no history of coronary heart disease, significant arrhythmia, peripheral arterial disease or other structural heart disease.    Revised Cardiac Risk Index (RCRI):  The patient has the following serious cardiovascular risks for perioperative complications:   - No serious cardiac risks = 0 points     RCRI Interpretation: 0 points: Class I (very low risk - 0.4% complication rate)           Signed Electronically by:  ROSALIND Chandler, NPJoneC  [facility]  Copy of this evaluation report is provided to requesting physician.

## 2022-04-18 NOTE — PROGRESS NOTES
[address] [address]  Phone: [phone]  Primary Provider: Suha Beavers  Pre-op Performing Provider: GALILEO VERGARA      PREOPERATIVE EVALUATION:  Today's date: 4/18/2022    Lachelle Duque is a 52 year old female who presents for a preoperative evaluation.    Surgical Information:  Surgery/Procedure: right knee examination under anesthesia, knee arthroscopy, meniscus root repair  Surgery Location: [facility]  Surgeon: Dr. Sunshine  Surgery Date: 04/19/21  Time of Surgery: 8:00am  Where patient plans to recover: At home with family  Fax number for surgical facility: Note does not need to be faxed, will be available electronically in Epic.    Type of Anesthesia Anticipated: Choice    Assessment & Plan     The proposed surgical procedure is considered INTERMEDIATE risk.    Preop general physical exam/Right knee pain, unspecified chronicity  Chronic right knee pain, repair meniscus. COVID test negative on 4/16/22. No concerns  - CBC with platelets; Future  - CBC with platelets    Morbid obesity (H)  Stable, needs to work on healthy nutrition/exercise but likely to improve after knee surgery    Invasive ductal carcinoma of breast, female, left (H)  Stable, next follow up with oncology in June           Risks and Recommendations:  The patient has the following additional risks and recommendations for perioperative complications:   - Morbid obesity (BMI >40)    Medication Instructions:  Patient is to take all scheduled medications on the day of surgery    RECOMMENDATION:  APPROVAL GIVEN to proceed with proposed procedure, without further diagnostic evaluation.    Subjective     HPI related to upcoming procedure: chronic knee pain      Preop Questions 4/18/2022   1. Have you ever had a heart attack or stroke? No   2. Have you ever had surgery on your heart or blood vessels, such as a stent placement, a coronary artery  bypass, or surgery on an artery in your head, neck, heart, or legs? No   3. Do you have chest pain with activity? No   4. Do you have a history of  heart failure? No   5. Do you currently have a cold, bronchitis or symptoms of other infection? No   6. Do you have a cough, shortness of breath, or wheezing? No   7. Do you or anyone in your family have previous history of blood clots? YES - mother had blood clot   8. Do you or does anyone in your family have a serious bleeding problem such as prolonged bleeding following surgeries or cuts? No   9. Have you ever had problems with anemia or been told to take iron pills? No   10. Have you had any abnormal blood loss such as black, tarry or bloody stools, or abnormal vaginal bleeding? No   11. Have you ever had a blood transfusion? No   12. Are you willing to have a blood transfusion if it is medically needed before, during, or after your surgery? Yes   13. Have you or any of your relatives ever had problems with anesthesia? YES - VERY NAUSEATED POST-OP   14. Do you have sleep apnea, excessive snoring or daytime drowsiness? No   15. Do you have any artifical heart valves or other implanted medical devices like a pacemaker, defibrillator, or continuous glucose monitor? No   16. Do you have artificial joints? No   17. Are you allergic to latex? No   18. Is there any chance that you may be pregnant? No     Health Care Directive:  Patient does not have a Health Care Directive or Living Will: Discussed advance care planning with patient; however, patient declined at this time.    Preoperative Review of :   reviewed - no record of controlled substances prescribed.      Status of Chronic Conditions:  See problem list for active medical problems.  Problems all longstanding and stable, except as noted/documented.  See ROS for pertinent symptoms related to these conditions.      Review of Systems  CONSTITUTIONAL: NEGATIVE for fever, chills, change in  weight  INTEGUMENTARY/SKIN: NEGATIVE for worrisome rashes, moles or lesions  EYES: NEGATIVE for vision changes or irritation  ENT/MOUTH: NEGATIVE for ear, mouth and throat problems  RESP: NEGATIVE for significant cough or SOB  CV: NEGATIVE for chest pain, palpitations or peripheral edema  GI: NEGATIVE for nausea, abdominal pain, heartburn, or change in bowel habits  : NEGATIVE for frequency, dysuria, or hematuria  MUSCULOSKELETAL: NEGATIVE for significant arthralgias or myalgia  NEURO: NEGATIVE for weakness, dizziness or paresthesias  ENDOCRINE: NEGATIVE for temperature intolerance, skin/hair changes  HEME: NEGATIVE for bleeding problems  PSYCHIATRIC: NEGATIVE for changes in mood or affect    Patient Active Problem List    Diagnosis Date Noted     Long term (current) use of aromatase inhibitors 03/03/2022     Priority: Medium     Right knee pain, unspecified chronicity 11/05/2021     Priority: Medium     Added automatically from request for surgery [id number]       Invasive ductal carcinoma of breast, female, left (H) 10/25/2021     Priority: Medium     Added automatically from request for surgery [id number]       Chronic pain of left knee 01/06/2020     Priority: Medium     Added automatically from request for surgery [id number]       Obesity (BMI 35.0-39.9) with comorbidity (H) 09/10/2018     Priority: Medium     Environmental allergies 01/18/2013     Priority: Medium     Cholelithiasis 01/09/2013     Priority: Medium     IMO update changed this record. Please review for accuracy       Biliary colic 01/09/2013     Priority: Medium     Hiatal hernia 01/03/2013     Priority: Medium     GERD (gastroesophageal reflux disease) 01/03/2013     Priority: Medium     Vitamin D deficiency 08/26/2011     Priority: Medium     Obesity 05/09/2011     Priority: Medium     Hyperlipidemia LDL goal <130 09/09/2010     Priority: Medium     Strong family history of premature CAD  [place] Risk Score: 4% (16 Total Points)         Family  history of colon cancer 08/17/2009     Priority: Medium     9% chance of having Egan Syndrome. Will have patient do pelvic US to evaluate ovaries and uterus.        Encounter for other general counseling or advice on contraception 07/19/2007     Priority: Medium     Diagnosis updated by automated process. Provider to review and confirm.       Herpes zoster 07/19/2007     Priority: Medium     L eye.  Followed by Dr. Solis at [facility]   Has periodic flare ups  Problem list name updated by automated process. Provider to review        Past Medical History:   Diagnosis Date     Chronic rhinitis      Environmental allergies 1/18/2013     Gastro-oesophageal reflux disease      GERD (gastroesophageal reflux disease) 1/3/2013     Herpes zoster without mention of complication     L eye      Hiatal hernia      PONV (postoperative nausea and vomiting)     PONV     Past Surgical History:   Procedure Laterality Date     ADENOIDECTOMY  1977     ARTHROSCOPY KNEE WITH MENISCAL REPAIR Left 6/16/2020    Procedure: Examination under anesthesia Left knee, Left knee arthroscopy, Left meniscus root repair;  Surgeon: Armando Sunshine MD;  [facility] OR     COLONOSCOPY N/A 10/27/2015    Procedure: COLONOSCOPY;  Surgeon: Duane, William Charles, MD;  Location: [facility] OR     COLONOSCOPY WITH CO2 INSUFFLATION N/A 10/27/2015    Procedure: COLONOSCOPY WITH CO2 INSUFFLATION;  Surgeon: Duane, William Charles, MD;  Location: [facility] OR     HC TOOTH EXTRACTION W/FORCEP      20 yo     LAPAROSCOPIC CHOLECYSTECTOMY  1/30/2013    Procedure: LAPAROSCOPIC CHOLECYSTECTOMY;  Laparoscopic Cholecystectomy;  Surgeon: Cindy Soni MD;  Location: [facility] OR     LUMPECTOMY BREAST WITH SENTINEL NODE, COMBINED Left 11/10/2021    Procedure: Left wire localized breast lumpectomy with sentinel lymph node biopsy;  Surgeon: eG De La Vega MD;  Location: [facility]     SINUS SURGERY  1988    Deviated septum     wisdom teeth removed[       Current  "Outpatient Medications   Medication Sig Dispense Refill     cetirizine HCl 10 MG CAPS Take one tablet daily.       letrozole (FEMARA) 2.5 MG tablet Take 1 tablet (2.5 mg) by mouth daily 90 tablet 3     omeprazole 20 MG tablet Take 1 tablet (20 mg) by mouth daily Take 30-60 minutes before a meal. 30 tablet 1       Allergies   Allergen Reactions     Sulfa Drugs Anaphylaxis        Social History     Tobacco Use     Smoking status: Never Smoker     Smokeless tobacco: Never Used   Substance Use Topics     Alcohol use: Yes     Alcohol/week: 0.8 standard drinks     Types: 1 Standard drinks or equivalent per week     Comment: occasionally     Family History   Problem Relation Age of Onset     C.A.D. Father         bypass     Diabetes Father      Cancer - colorectal Father 42     Coronary Artery Disease Father      Gastrointestinal Disease Mother         diverticulosis     Arthritis Mother         fibromyalgia, lupus, rheumatoid     Anesthesia Reaction Mother         nausea     Asthma Mother      Lipids Mother      Diabetes Mother      Macular Degeneration Other      Cancer Other 29        CML     Breast Cancer Other      Colon Polyps Brother      C.A.D. Maternal Grandfather      Cerebrovascular Disease Maternal Grandfather      Coronary Artery Disease Maternal Grandfather      Hypertension Maternal Grandmother      Glaucoma Maternal Grandmother      Gastrointestinal Disease Maternal Grandmother         diverticulosis     Depression Brother      Arthritis Brother         fibromyalgia     Anesthesia Reaction Brother         nausea     Cancer Maternal Aunt      Breast Cancer Maternal Aunt      Coronary Artery Disease Maternal Uncle      Thyroid Disease No family hx of      Prostate Cancer No family hx of      History   Drug Use No         Objective     /79 (BP Location: Right arm, Patient Position: Sitting, Cuff Size: Adult Large)   Pulse 106   Temp 98.4  F (36.9  C) (Oral)   Resp 18   Ht 1.676 m (5' 6\")   Wt 113.7 " kg (250 lb 11.2 oz)   LMP 10/31/2021   SpO2 96%   BMI 40.46 kg/m      Physical Exam    GENERAL APPEARANCE: healthy, alert and no distress     EYES: EOMI, PERRL     HENT: ear canals and TM's normal and nose and mouth without ulcers or lesions     NECK: no adenopathy, no asymmetry, masses, or scars and thyroid normal to palpation     RESP: lungs clear to auscultation - no rales, rhonchi or wheezes     CV: regular rates and rhythm, normal S1 S2, no S3 or S4 and no murmur, click or rub     ABDOMEN:  soft, obese, nontender, no HSM or masses and bowel sounds normal     MS: extremities normal- no gross deformities noted, no evidence of inflammation in joints, FROM in all extremities.     SKIN: no suspicious lesions or rashes     NEURO: Normal strength and tone, sensory exam grossly normal, mentation intact and speech normal     PSYCH: mentation appears normal. and affect normal/bright     LYMPHATICS: No cervical adenopathy    Recent Labs   Lab Test 01/31/22  1131 11/08/21  1118   HGB 13.0 13.9    277    137   POTASSIUM 4.2 4.0   CR 0.60 0.69        Diagnostics:  Labs pending at this time.  Results will be reviewed when available.   No EKG required, no history of coronary heart disease, significant arrhythmia, peripheral arterial disease or other structural heart disease.    Revised Cardiac Risk Index (RCRI):  The patient has the following serious cardiovascular risks for perioperative complications:   - No serious cardiac risks = 0 points     RCRI Interpretation: 0 points: Class I (very low risk - 0.4% complication rate)           Signed Electronically by:  ROSALIND Chandler, NPJoneC  [facility]  Copy of this evaluation report is provided to requesting physician.

## 2022-04-18 NOTE — PATIENT INSTRUCTIONS
Preparing for Your Surgery  Getting started  A nurse will call you to review your health history and instructions. They will give you an arrival time based on your scheduled surgery time. Please be ready to share:    Your doctor's clinic name and phone number    Your medical, surgical and anesthesia history    A list of allergies and sensitivities    A list of medicines, including herbal treatments and over-the-counter drugs    Whether the patient has a legal guardian (ask how to send us the papers in advance)  Please tell us if you're pregnant--or if there's any chance you might be pregnant. Some surgeries may injure a fetus (unborn baby), so they require a pregnancy test. Surgeries that are safe for a fetus don't always need a test, and you can choose whether to have one.   If you have a child who's having surgery, please ask for a copy of Preparing for Your Child's Surgery.    Preparing for surgery    Within 30 days of surgery: Have a pre-op exam (sometimes called an H&P, or History and Physical). This can be done at a clinic or pre-operative center.  ? If you're having a , you may not need this exam. Talk to your care team.    At your pre-op exam, talk to your care team about all medicines you take. If you need to stop any medicines before surgery, ask when to start taking them again.  ? We do this for your safety. Many medicines can make you bleed too much during surgery. Some change how well surgery (anesthesia) drugs work.    Call your insurance company to let them know you're having surgery. (If you don't have insurance, call [phone].)    Call your clinic if there's any change in your health. This includes signs of a cold or flu (sore throat, runny nose, cough, rash, fever). It also includes a scrape or scratch near the surgery site.    If you have questions on the day of surgery, call your hospital or surgery center.  COVID testing  You may need to be tested for COVID-19 before having  surgery. If so, your surgical team will give you instructions for scheduling this test, separate from your preoperative history and physical.  Eating and drinking guidelines  For your safety: Unless your surgeon tells you otherwise, follow the guidelines below.    Eat and drink as usual until 8 hours before surgery. After that, no food or milk.    Drink clear liquids until 2 hours before surgery. These are liquids you can see through, like water, Gatorade and Propel Water. You may also have black coffee and tea (no cream or milk).    Nothing by mouth within 2 hours of surgery. This includes gum, candy and breath mints.    If you drink alcohol: Stop drinking it the night before surgery.    If your care team tells you to take medicine on the morning of surgery, it's okay to take it with a sip of water.  Preventing infection    Shower or bathe the night before and morning of your surgery. Follow the instructions your clinic gave you. (If no instructions, use regular soap.)    Don't shave or clip hair near your surgery site. We'll remove the hair if needed.    Don't smoke or vape the morning of surgery. You may chew nicotine gum up to 2 hours before surgery. A nicotine patch is okay.  ? Note: Some surgeries require you to completely quit smoking and nicotine. Check with your surgeon.    Your care team will make every effort to keep you safe from infection. We will:  ? Clean our hands often with soap and water (or an alcohol-based hand rub).  ? Clean the skin at your surgery site with a special soap that kills germs.  ? Give you a special gown to keep you warm. (Cold raises the risk of infection.)  ? Wear special hair covers, masks, gowns and gloves during surgery.  ? Give antibiotic medicine, if prescribed. Not all surgeries need antibiotics.  What to bring on the day of surgery    Photo ID and insurance card    Copy of your health care directive, if you have one    Glasses and hearing aides (bring cases)  ? You can't  wear contacts during surgery    Inhaler and eye drops, if you use them (tell us about these when you arrive)    CPAP machine or breathing device, if you use them    A few personal items, if spending the night    If you have . . .  ? A pacemaker, ICD (cardiac defibrillator) or other implant: Bring the ID card.  ? An implanted stimulator: Bring the remote control.  ? A legal guardian: Bring a copy of the certified (court-stamped) guardianship papers.  Please remove any jewelry, including body piercings. Leave jewelry and other valuables at home.  If you're going home the day of surgery    You must have a responsible adult drive you home. They should stay with you overnight as well.    If you don't have someone to stay with you, and you aren't safe to go home alone, we may keep you overnight. Insurance often won't pay for this.  After surgery  If it's hard to control your pain or you need more pain medicine, please call your surgeon's office.  Questions?   If you have any questions for your care team, list them here: _________________________________________________________________________________________________________________________________________________________________________ ____________________________________ ____________________________________ ____________________________________  For informational purposes only. Not to replace the advice of your health care provider. Copyright   2003, 2019 [facility]. All rights reserved. Clinically reviewed by Vicki Quiros MD. [employer] [id number] - REV 07/21.

## 2022-04-19 ENCOUNTER — ANESTHESIA (OUTPATIENT)
Dept: SURGERY | Facility: AMBULATORY SURGERY CENTER | Age: 52
End: 2022-04-19
Payer: COMMERCIAL

## 2022-04-19 ENCOUNTER — HOSPITAL ENCOUNTER (OUTPATIENT)
Facility: AMBULATORY SURGERY CENTER | Age: 52
Discharge: HOME | End: 2022-04-19
Attending: ORTHOPAEDIC SURGERY
Payer: COMMERCIAL

## 2022-04-19 VITALS
OXYGEN SATURATION: 96 % | BODY MASS INDEX: 40.18 KG/M2 | RESPIRATION RATE: 12 BRPM | HEIGHT: 66 IN | TEMPERATURE: 97.6 F | SYSTOLIC BLOOD PRESSURE: 156 MMHG | HEART RATE: 90 BPM | DIASTOLIC BLOOD PRESSURE: 66 MMHG | WEIGHT: 250 LBS

## 2022-04-19 DIAGNOSIS — M25.562 CHRONIC PAIN OF LEFT KNEE: Primary | ICD-10-CM

## 2022-04-19 DIAGNOSIS — M25.561 RIGHT KNEE PAIN, UNSPECIFIED CHRONICITY: ICD-10-CM

## 2022-04-19 DIAGNOSIS — G89.29 CHRONIC PAIN OF LEFT KNEE: Primary | ICD-10-CM

## 2022-04-19 LAB
HCG UR QL: NEGATIVE
INTERNAL QC OK POCT: NORMAL
POCT KIT EXPIRATION DATE: NORMAL
POCT KIT LOT NUMBER: NORMAL

## 2022-04-19 PROCEDURE — 81025 URINE PREGNANCY TEST: CPT | Performed by: PATHOLOGY

## 2022-04-19 PROCEDURE — 29882 ARTHRS KNE SRG MNISC RPR M/L: CPT | Mod: RT

## 2022-04-19 PROCEDURE — 29882 ARTHRS KNE SRG MNISC RPR M/L: CPT | Mod: RT | Performed by: ORTHOPAEDIC SURGERY

## 2022-04-19 DEVICE — IMP ANCHOR ARTHREX BIO-SWIVELOCK 5.5MM AR-2323BCC: Type: IMPLANTABLE DEVICE | Site: KNEE | Status: FUNCTIONAL

## 2022-04-19 RX ORDER — PROPOFOL 10 MG/ML
INJECTION, EMULSION INTRAVENOUS PRN
Status: DISCONTINUED | OUTPATIENT
Start: 2022-04-19 | End: 2022-04-19

## 2022-04-19 RX ORDER — OXYCODONE HYDROCHLORIDE 5 MG/1
5 TABLET ORAL EVERY 4 HOURS PRN
Status: DISCONTINUED | OUTPATIENT
Start: 2022-04-19 | End: 2022-04-20 | Stop reason: HOSPADM

## 2022-04-19 RX ORDER — ACETAMINOPHEN 325 MG/1
975 TABLET ORAL ONCE
Status: COMPLETED | OUTPATIENT
Start: 2022-04-19 | End: 2022-04-19

## 2022-04-19 RX ORDER — FENTANYL CITRATE 50 UG/ML
25-50 INJECTION, SOLUTION INTRAMUSCULAR; INTRAVENOUS
Status: DISCONTINUED | OUTPATIENT
Start: 2022-04-19 | End: 2022-04-19 | Stop reason: HOSPADM

## 2022-04-19 RX ORDER — FENTANYL CITRATE 50 UG/ML
25 INJECTION, SOLUTION INTRAMUSCULAR; INTRAVENOUS
Status: DISCONTINUED | OUTPATIENT
Start: 2022-04-19 | End: 2022-04-20 | Stop reason: HOSPADM

## 2022-04-19 RX ORDER — OXYCODONE HYDROCHLORIDE 5 MG/1
5-10 TABLET ORAL EVERY 4 HOURS PRN
Qty: 10 TABLET | Refills: 0 | Status: SHIPPED | OUTPATIENT
Start: 2022-04-19 | End: 2022-06-02

## 2022-04-19 RX ORDER — OXYCODONE HYDROCHLORIDE 5 MG/1
5 TABLET ORAL
Status: DISCONTINUED | OUTPATIENT
Start: 2022-04-19 | End: 2022-04-20 | Stop reason: HOSPADM

## 2022-04-19 RX ORDER — ONDANSETRON 2 MG/ML
INJECTION INTRAMUSCULAR; INTRAVENOUS PRN
Status: DISCONTINUED | OUTPATIENT
Start: 2022-04-19 | End: 2022-04-19

## 2022-04-19 RX ORDER — CEFAZOLIN SODIUM 2 G/50ML
2 SOLUTION INTRAVENOUS SEE ADMIN INSTRUCTIONS
Status: DISCONTINUED | OUTPATIENT
Start: 2022-04-19 | End: 2022-04-19 | Stop reason: HOSPADM

## 2022-04-19 RX ORDER — PROPOFOL 10 MG/ML
INJECTION, EMULSION INTRAVENOUS CONTINUOUS PRN
Status: DISCONTINUED | OUTPATIENT
Start: 2022-04-19 | End: 2022-04-19

## 2022-04-19 RX ORDER — ACETAMINOPHEN 325 MG/1
975 TABLET ORAL ONCE
Status: DISCONTINUED | OUTPATIENT
Start: 2022-04-19 | End: 2022-04-19 | Stop reason: HOSPADM

## 2022-04-19 RX ORDER — IBUPROFEN 600 MG/1
600 TABLET, FILM COATED ORAL EVERY 6 HOURS PRN
Qty: 30 TABLET | Refills: 1 | Status: SHIPPED | OUTPATIENT
Start: 2022-04-19

## 2022-04-19 RX ORDER — CEFAZOLIN SODIUM 2 G/50ML
2 SOLUTION INTRAVENOUS
Status: COMPLETED | OUTPATIENT
Start: 2022-04-19 | End: 2022-04-19

## 2022-04-19 RX ORDER — FLUMAZENIL 0.1 MG/ML
0.2 INJECTION, SOLUTION INTRAVENOUS
Status: DISCONTINUED | OUTPATIENT
Start: 2022-04-19 | End: 2022-04-19 | Stop reason: HOSPADM

## 2022-04-19 RX ORDER — ONDANSETRON 2 MG/ML
4 INJECTION INTRAMUSCULAR; INTRAVENOUS EVERY 30 MIN PRN
Status: DISCONTINUED | OUTPATIENT
Start: 2022-04-19 | End: 2022-04-20 | Stop reason: HOSPADM

## 2022-04-19 RX ORDER — FENTANYL CITRATE 50 UG/ML
25 INJECTION, SOLUTION INTRAMUSCULAR; INTRAVENOUS EVERY 5 MIN PRN
Status: DISCONTINUED | OUTPATIENT
Start: 2022-04-19 | End: 2022-04-19 | Stop reason: HOSPADM

## 2022-04-19 RX ORDER — KETOROLAC TROMETHAMINE 30 MG/ML
INJECTION, SOLUTION INTRAMUSCULAR; INTRAVENOUS PRN
Status: DISCONTINUED | OUTPATIENT
Start: 2022-04-19 | End: 2022-04-19

## 2022-04-19 RX ORDER — DEXAMETHASONE SODIUM PHOSPHATE 4 MG/ML
INJECTION, SOLUTION INTRA-ARTICULAR; INTRALESIONAL; INTRAMUSCULAR; INTRAVENOUS; SOFT TISSUE PRN
Status: DISCONTINUED | OUTPATIENT
Start: 2022-04-19 | End: 2022-04-19

## 2022-04-19 RX ORDER — ONDANSETRON 4 MG/1
4 TABLET, ORALLY DISINTEGRATING ORAL EVERY 30 MIN PRN
Status: DISCONTINUED | OUTPATIENT
Start: 2022-04-19 | End: 2022-04-20 | Stop reason: HOSPADM

## 2022-04-19 RX ORDER — ASPIRIN 81 MG/1
162 TABLET, CHEWABLE ORAL DAILY
Qty: 60 TABLET | Refills: 0 | Status: SHIPPED | OUTPATIENT
Start: 2022-04-20 | End: 2022-06-02

## 2022-04-19 RX ORDER — ONDANSETRON 4 MG/1
4-8 TABLET, ORALLY DISINTEGRATING ORAL EVERY 8 HOURS PRN
Qty: 10 TABLET | Refills: 0 | Status: SHIPPED | OUTPATIENT
Start: 2022-04-19 | End: 2022-09-19

## 2022-04-19 RX ORDER — HYDROMORPHONE HYDROCHLORIDE 1 MG/ML
0.2 INJECTION, SOLUTION INTRAMUSCULAR; INTRAVENOUS; SUBCUTANEOUS EVERY 5 MIN PRN
Status: DISCONTINUED | OUTPATIENT
Start: 2022-04-19 | End: 2022-04-19 | Stop reason: HOSPADM

## 2022-04-19 RX ORDER — MEPERIDINE HYDROCHLORIDE 25 MG/ML
12.5 INJECTION INTRAMUSCULAR; INTRAVENOUS; SUBCUTANEOUS
Status: DISCONTINUED | OUTPATIENT
Start: 2022-04-19 | End: 2022-04-20 | Stop reason: HOSPADM

## 2022-04-19 RX ORDER — LIDOCAINE HYDROCHLORIDE 20 MG/ML
INJECTION, SOLUTION INFILTRATION; PERINEURAL PRN
Status: DISCONTINUED | OUTPATIENT
Start: 2022-04-19 | End: 2022-04-19

## 2022-04-19 RX ORDER — KETAMINE HYDROCHLORIDE 10 MG/ML
INJECTION, SOLUTION INTRAMUSCULAR; INTRAVENOUS PRN
Status: DISCONTINUED | OUTPATIENT
Start: 2022-04-19 | End: 2022-04-19

## 2022-04-19 RX ORDER — BUPIVACAINE HYDROCHLORIDE AND EPINEPHRINE 2.5; 5 MG/ML; UG/ML
INJECTION, SOLUTION INFILTRATION; PERINEURAL PRN
Status: DISCONTINUED | OUTPATIENT
Start: 2022-04-19 | End: 2022-04-19 | Stop reason: HOSPADM

## 2022-04-19 RX ORDER — SODIUM CHLORIDE, SODIUM LACTATE, POTASSIUM CHLORIDE, CALCIUM CHLORIDE 600; 310; 30; 20 MG/100ML; MG/100ML; MG/100ML; MG/100ML
INJECTION, SOLUTION INTRAVENOUS CONTINUOUS
Status: DISCONTINUED | OUTPATIENT
Start: 2022-04-19 | End: 2022-04-20 | Stop reason: HOSPADM

## 2022-04-19 RX ORDER — SODIUM CHLORIDE, SODIUM LACTATE, POTASSIUM CHLORIDE, CALCIUM CHLORIDE 600; 310; 30; 20 MG/100ML; MG/100ML; MG/100ML; MG/100ML
INJECTION, SOLUTION INTRAVENOUS CONTINUOUS
Status: DISCONTINUED | OUTPATIENT
Start: 2022-04-19 | End: 2022-04-19 | Stop reason: HOSPADM

## 2022-04-19 RX ORDER — ACETAMINOPHEN 325 MG/1
650 TABLET ORAL EVERY 4 HOURS PRN
Qty: 50 TABLET | Refills: 1 | Status: SHIPPED | OUTPATIENT
Start: 2022-04-19

## 2022-04-19 RX ORDER — ACETAMINOPHEN 325 MG/1
650 TABLET ORAL
Status: DISCONTINUED | OUTPATIENT
Start: 2022-04-19 | End: 2022-04-20 | Stop reason: HOSPADM

## 2022-04-19 RX ADMIN — DEXAMETHASONE SODIUM PHOSPHATE 4 MG: 4 INJECTION, SOLUTION INTRA-ARTICULAR; INTRALESIONAL; INTRAMUSCULAR; INTRAVENOUS; SOFT TISSUE at 08:09

## 2022-04-19 RX ADMIN — FENTANYL CITRATE 25 MCG: 50 INJECTION, SOLUTION INTRAMUSCULAR; INTRAVENOUS at 09:25

## 2022-04-19 RX ADMIN — PROPOFOL 200 MG: 10 INJECTION, EMULSION INTRAVENOUS at 08:08

## 2022-04-19 RX ADMIN — KETOROLAC TROMETHAMINE 30 MG: 30 INJECTION, SOLUTION INTRAMUSCULAR; INTRAVENOUS at 08:58

## 2022-04-19 RX ADMIN — LIDOCAINE HYDROCHLORIDE 100 MG: 20 INJECTION, SOLUTION INFILTRATION; PERINEURAL at 08:08

## 2022-04-19 RX ADMIN — FENTANYL CITRATE 25 MCG: 50 INJECTION, SOLUTION INTRAMUSCULAR; INTRAVENOUS at 09:15

## 2022-04-19 RX ADMIN — ACETAMINOPHEN 975 MG: 325 TABLET ORAL at 07:21

## 2022-04-19 RX ADMIN — CEFAZOLIN SODIUM 2 G: 2 SOLUTION INTRAVENOUS at 08:03

## 2022-04-19 RX ADMIN — HYDROMORPHONE HYDROCHLORIDE 0.2 MG: 1 INJECTION, SOLUTION INTRAMUSCULAR; INTRAVENOUS; SUBCUTANEOUS at 09:36

## 2022-04-19 RX ADMIN — HYDROMORPHONE HYDROCHLORIDE 0.2 MG: 1 INJECTION, SOLUTION INTRAMUSCULAR; INTRAVENOUS; SUBCUTANEOUS at 09:46

## 2022-04-19 RX ADMIN — OXYCODONE HYDROCHLORIDE 5 MG: 5 TABLET ORAL at 09:43

## 2022-04-19 RX ADMIN — KETAMINE HYDROCHLORIDE 20 MG: 10 INJECTION, SOLUTION INTRAMUSCULAR; INTRAVENOUS at 08:27

## 2022-04-19 RX ADMIN — SODIUM CHLORIDE, SODIUM LACTATE, POTASSIUM CHLORIDE, CALCIUM CHLORIDE: 600; 310; 30; 20 INJECTION, SOLUTION INTRAVENOUS at 08:03

## 2022-04-19 RX ADMIN — ONDANSETRON 4 MG: 2 INJECTION INTRAMUSCULAR; INTRAVENOUS at 08:09

## 2022-04-19 RX ADMIN — ONDANSETRON 4 MG: 2 INJECTION INTRAMUSCULAR; INTRAVENOUS at 09:16

## 2022-04-19 RX ADMIN — FENTANYL CITRATE 25 MCG: 50 INJECTION, SOLUTION INTRAMUSCULAR; INTRAVENOUS at 09:30

## 2022-04-19 RX ADMIN — PROPOFOL 200 MCG/KG/MIN: 10 INJECTION, EMULSION INTRAVENOUS at 08:56

## 2022-04-19 RX ADMIN — PROPOFOL 200 MCG/KG/MIN: 10 INJECTION, EMULSION INTRAVENOUS at 08:32

## 2022-04-19 RX ADMIN — PROPOFOL 200 MCG/KG/MIN: 10 INJECTION, EMULSION INTRAVENOUS at 08:09

## 2022-04-19 RX ADMIN — KETAMINE HYDROCHLORIDE 30 MG: 10 INJECTION, SOLUTION INTRAMUSCULAR; INTRAVENOUS at 08:09

## 2022-04-19 RX ADMIN — HYDROMORPHONE HYDROCHLORIDE 0.2 MG: 1 INJECTION, SOLUTION INTRAMUSCULAR; INTRAVENOUS; SUBCUTANEOUS at 09:41

## 2022-04-19 RX ADMIN — HYDROMORPHONE HYDROCHLORIDE 0.2 MG: 1 INJECTION, SOLUTION INTRAMUSCULAR; INTRAVENOUS; SUBCUTANEOUS at 09:31

## 2022-04-19 RX ADMIN — HYDROMORPHONE HYDROCHLORIDE 0.2 MG: 1 INJECTION, SOLUTION INTRAMUSCULAR; INTRAVENOUS; SUBCUTANEOUS at 09:51

## 2022-04-19 RX ADMIN — FENTANYL CITRATE 25 MCG: 50 INJECTION, SOLUTION INTRAMUSCULAR; INTRAVENOUS at 09:20

## 2022-04-19 NOTE — ANESTHESIA POSTPROCEDURE EVALUATION
Patient: Lachelle Duque    Procedure: Procedure(s):  right knee examination under anesthesia, knee arthroscopy, meniscus root repair       Anesthesia Type:  MAC    Note:  Disposition: Outpatient   Postop Pain Control: Uneventful            Sign Out: Well controlled pain   PONV: No   Neuro/Psych: Uneventful            Sign Out: Acceptable/Baseline neuro status   Airway/Respiratory: Uneventful            Sign Out: Acceptable/Baseline resp. status   CV/Hemodynamics: Uneventful            Sign Out: Acceptable CV status; No obvious hypovolemia; No obvious fluid overload   Other NRE: NONE   DID A NON-ROUTINE EVENT OCCUR? No           Last vitals:  Vitals Value Taken Time   /90 04/19/22 0945   Temp 36.5  C (97.7  F) 04/19/22 0912   Pulse 78 04/19/22 0957   Resp 8 04/19/22 0957   SpO2 96 % 04/19/22 0957   Vitals shown include unvalidated device data.    Electronically Signed By: Saad Rodriguez MD, MD  April 19, 2022  10:08 AM

## 2022-04-19 NOTE — ANESTHESIA CARE TRANSFER NOTE
Patient: Lachelle Duque    Procedure: Procedure(s):  right knee examination under anesthesia, knee arthroscopy, meniscus root repair       Diagnosis: Right knee pain, unspecified chronicity [M25.561]  Diagnosis Additional Information: No value filed.    Anesthesia Type:   MAC     Note:    Oropharynx: oropharynx clear of all foreign objects and spontaneously breathing  Level of Consciousness: awake  Oxygen Supplementation: face mask  Level of Supplemental Oxygen (L/min / FiO2): 6  Independent Airway: airway patency satisfactory and stable  Dentition: dentition unchanged  Vital Signs Stable: post-procedure vital signs reviewed and stable  Report to RN Given: handoff report given  Patient transferred to: PACU    Handoff Report: Identifed the Patient, Identified the Reponsible Provider, Reviewed the pertinent medical history, Discussed the surgical course, Reviewed Intra-OP anesthesia mangement and issues during anesthesia, Set expectations for post-procedure period and Allowed opportunity for questions and acknowledgement of understanding      Vitals:  Vitals Value Taken Time   /128 04/19/22 0909   Temp 36.5  C (97.7  F) 04/19/22 0912   Pulse 78 04/19/22 0912   Resp 9 04/19/22 0912   SpO2 97 % 04/19/22 0912   Vitals shown include unvalidated device data.    Electronically Signed By: ROSALIND Gonzales CRNA  April 19, 2022  9:13 AM

## 2022-04-19 NOTE — INTERVAL H&P NOTE
"I have reviewed the surgical (or preoperative) H&P that is linked to this encounter, and examined the patient. There are no significant changes    Clinical Conditions Present on Arrival:  Clinically Significant Risk Factors Present on Admission                   # Severe Obesity: Estimated body mass index is 40.35 kg/m  as calculated from the following:    Height as of this encounter: 1.676 m (5' 6\").    Weight as of this encounter: 113.4 kg (250 lb).       "

## 2022-04-19 NOTE — DISCHARGE INSTRUCTIONS
[facility]  Home Care Following Anesthesia  For 24 hours after surgery:  Get plenty of rest.  A responsible adult must stay with you for at least 24 hours after you leave the surgery center.  Do not drive or use heavy equipment.  If you have weakness or tingling, don't drive or use heavy equipment until this feeling goes away.   Do not drink alcohol.   Avoid strenuous or risky activities.  Ask for help when climbing stairs.  You may feel lightheaded.  IF so, sit for a few minutes before standing.  Have someone help you get up.   If you have nausea (feel sick to your stomach): Drink only clear liquids such as apple juice, ginger ale, broth or 7-Up.  Rest may also help.  Be sure to drink enough fluids.  Move to a regular diet as you feel able.   You may have a slight fever.  Call the doctor if your fever is over 100 F (37.7 C) (taken under the tongue) or lasts longer than 24 hours.  You may have a dry mouth, a sore throat, muscle aches or trouble sleeping. These should go away after 24 hours.  Do not make important or legal decisions.   It is recommended to avoid smoking.               Tips for taking pain medications  To get the best pain relief possible, remember these points:  Take pain medications as directed, before pain becomes severe.  Pain medication can upset your stomach: taking it with food may help.  Constipation is a common side effect of pain medication. Drink plenty of  fluids.  Eat foods high in fiber. Take a stool softener if recommended by your doctor or pharmacist.  Do not drink alcohol, drive or operate machinery while taking pain medications.  Ask about other ways to control pain, such as with heat, ice or relaxation.    Tylenol/Acetaminophen Consumption  To help encourage the safe use of acetaminophen, the makers of TYLENOL  have lowered the maximum daily dose for single-ingredient Extra Strength TYLENOL  (acetaminophen) products sold in the [place] from 8 pills  per day (4,000 mg) to 6 pills per day (3,000 mg). The dosing interval has also changed from 2 pills every 4-6 hours to 2 pills every 6 hours.  If you feel your pain relief is insufficient, you may take Tylenol/Acetaminophen in addition to your narcotic pain medication.   Be careful not to exceed 3,000 mg of Tylenol/Acetaminophen in a 24 hour period from all sources.  If you are taking extra strength Tylenol/acetaminophen (500 mg), the maximum dose is 6 tablets in 24 hours.  If you are taking regular strength acetaminophen (325 mg), the maximum dose is 9 tablets in 24 hours.    Call a doctor for any of the following:  Signs of infection (fever, growing tenderness at the surgery site, a large amount of drainage or bleeding, severe pain, foul-smelling drainage, redness, swelling).  It has been over 8 to 10 hours since surgery and you are still not able to urinate (pass water).  Headache for over 24 hours.  Signs of Covid-19 infection (temperature over 100 degrees, shortness of breath, cough, loss of taste/smell, generalized body aches, persistent headache, chills, sore throat, nausea/vomiting/diarrhea)      Your doctor is:       Dr. Armando Sunshine, Orthopaedics: [phone]   (Monday-Friday 8-4)            Or for After Hours concerns: dial [phone] and ask for the resident on call for:  Orthopaedics  For emergency care, call the:  [facility] Emergency Department: [phone] (TTY for hearing impaired: [phone])

## 2022-04-19 NOTE — OP NOTE
PREOPERATIVE DIAGNOSIS:   1. Right Medial meniscus root tear    POSTOPERATIVE DIAGNOSIS:  1. Right Medial meniscus root tear    PROCEDURE:  1. Examination under anesthesia Right Knee  2. Righrt Knee arthroscopy  3. Transosseous repair of Right medial meniscus root    DATE OF SURGERY: 04/19/22      SURGEON: Armando Sunshine MD    ASSISTANT: Flako Merchant PA-C. The Assistance of Ms. Merchant was necessary for patient positioning, arthroscopic visualization, meniscus repair    RESIDENT OR FELLOW: Freddie Combs MD    OPERATIVE INDICATIONS: Lachelle Duque is a pleasant 52 year old female who I saw through my orthopedic clinic with a history, physical, imaging consistent with a medial meniscus root tears .  I reviewed with the patient the risks, benefits, complications, techniques and alternatives to surgery.  We reviewed the expected course of recovery and the potential expected outcomes.  I specifically discussed with her that there is not yet clear evidence that a successful surgery ultimately decreases the risk of knee replacement surgery.  I was very clear that it would not reverse the degenerative changes that had already occurred.  The patient understood both the risks and benefits and desired to proceed despite the risks.    OPERATIVE DETAILS: In the preoperative area the patient's informed consent was reviewed and they desired to proceed.  The Right leg was marked and the patient was in agreement.  The patient was taken to the operating room where a timeout was performed and all parties were in agreement.  Preoperative antibiotics were given within 1 hour of the time of incision.  The patient was placed in the supine position and surrendered to LMA anesthesia.  No tourniquet was applied.  Egg crate was placed beneath the well leg and a side post was utilized.  The operative leg was prepped and draped in the usual sterile fashion.     Examination Under Anesthesia: Range of motion was 0 to 125 degrees.  Stable to  varus and valgus stress testing.  Stable anterior and posterior drawer testing.  No pivot shift.  Lachman 0, 1 quadrant medial lateral translation of the patella.    Anterior medial and anterolateral arthroscopic portals were created and the diagnostic arthroscopy was performed with the following findings: There were no loose bodies within the suprapatellar pouch, medial gutter, lateral gutter.  Lateral femoral condyle and lateral tibial plateau showed overall well-preserved cartilage.  ACL and PCL were intact.  Central trochlea showed intact cartilage.  Patella showed grade 1 softening.  Medial tibial plateau cartilage was grade 2 in the far posterior medial aspect otherwise it apeared normal diffuse.  Medial femoral condyle was diffuse grade 3, no grade 4.  Clear tear of the medial meniscus root.  Lateral meniscus was intact.    At this time a cannula was placed on the medial portal and a meniscus scorpion was used to place 2, 0-fiber link sutures in a grasping fashion in the posterior horn of the medial meniscus.  At this time a multiuse guide was introduced and a 2.4 mm drill to pin was placed into the anatomic insertion of the medial meniscus posterior root.  We prepared the footprint with a curette prior to placing the pin.  The pin was advanced under arthroscopic localization and we overreamed with a 5 mm reamer.  A passing suture was placed through the tunnel and her sutures and the meniscus were routed through the tibia.  Maximum traction was applied to the sutures and excellent reduction of the meniscus to the tibia was noted.  No further instability probing.  Excellent fixation.    At this time on the anterior medial tibia 2.4 mm drill to pin was placed just distal to our aperture on the anteromedial tibial cortex we reamed with a 5 mm reamer tapping was performed and a 5.5 swivel lock was placed.  Final arthroscopic images showed good position and excellent reduction of the meniscus to the tibial  plateau    Copious irrigation was performed an a layered closure was initiated, sterile dressings were applied and the patient was transferred to the recovery room in stable condition with stable vital signs.    ESTIMATED BLOOD LOSS: 5 ml    TOURNIQUET TIME: No tourniquet was placed.    COMPLICATIONS: None apparent.    DRAINS: None.    SPECIMENS: None.     POSTOPERATIVE PLAN:  1. Toe-touch weightbearing x 6weeks   2. Range of motion 0 to 90 degrees  3. Shower on day 3.  No submerging the wounds.   4. Physical therapy to start within 1 week  5. Follow-up with me in 1 week.  6. 182 mg ASA for dvt prophylaxis x 4 weeks.  7. At 6 weeks begin weightbearing as tolerated  8. No running or impact sports for 4 to 6 months.  No kneeling or squatting for as long as possible

## 2022-04-22 ENCOUNTER — TELEPHONE (OUTPATIENT)
Dept: ORTHOPEDICS | Facility: CLINIC | Age: 52
End: 2022-04-22
Payer: COMMERCIAL

## 2022-04-22 NOTE — TELEPHONE ENCOUNTER
[facility] Call Center    Phone Message    May a detailed message be left on voicemail: yes     Reason for Call Please call Judy ( [employer] ) with Surgery Notes from Patient Surgery on 04/19/2022  Action Taken: Message routed to:  [facility] ([facility]): dina    Travel Screening: Not Applicable

## 2022-04-25 ENCOUNTER — MYC MEDICAL ADVICE (OUTPATIENT)
Dept: ORTHOPEDICS | Facility: CLINIC | Age: 52
End: 2022-04-25
Payer: COMMERCIAL

## 2022-04-25 ENCOUNTER — DOCUMENTATION ONLY (OUTPATIENT)
Dept: ORTHOPEDICS | Facility: CLINIC | Age: 52
End: 2022-04-25
Payer: COMMERCIAL

## 2022-04-26 ENCOUNTER — THERAPY VISIT (OUTPATIENT)
Dept: PHYSICAL THERAPY | Facility: CLINIC | Age: 52
End: 2022-04-26
Attending: ORTHOPAEDIC SURGERY
Payer: COMMERCIAL

## 2022-04-26 DIAGNOSIS — M25.561 RIGHT KNEE PAIN, UNSPECIFIED CHRONICITY: ICD-10-CM

## 2022-04-26 DIAGNOSIS — M25.561 CHRONIC PAIN OF RIGHT KNEE: ICD-10-CM

## 2022-04-26 DIAGNOSIS — G89.29 CHRONIC PAIN OF RIGHT KNEE: ICD-10-CM

## 2022-04-26 DIAGNOSIS — Z47.89 AFTERCARE FOLLOWING SURGERY OF THE MUSCULOSKELETAL SYSTEM: ICD-10-CM

## 2022-04-26 PROCEDURE — 97140 MANUAL THERAPY 1/> REGIONS: CPT | Mod: GP | Performed by: PHYSICAL THERAPIST

## 2022-04-26 PROCEDURE — 97110 THERAPEUTIC EXERCISES: CPT | Mod: GP | Performed by: PHYSICAL THERAPIST

## 2022-04-26 PROCEDURE — 97161 PT EVAL LOW COMPLEX 20 MIN: CPT | Mod: GP | Performed by: PHYSICAL THERAPIST

## 2022-04-26 ASSESSMENT — ACTIVITIES OF DAILY LIVING (ADL)
RAW_SCORE: 46
HOW_WOULD_YOU_RATE_THE_CURRENT_FUNCTION_OF_YOUR_KNEE_DURING_YOUR_USUAL_DAILY_ACTIVITIES_ON_A_SCALE_FROM_0_TO_100_WITH_100_BEING_YOUR_LEVEL_OF_KNEE_FUNCTION_PRIOR_TO_YOUR_INJURY_AND_0_BEING_THE_INABILITY_TO_PERFORM_ANY_OF_YOUR_USUAL_DAILY_ACTIVITIES?: 50
KNEEL ON THE FRONT OF YOUR KNEE: I AM UNABLE TO DO THE ACTIVITY
RISE FROM A CHAIR: ACTIVITY IS MINIMALLY DIFFICULT
SQUAT: ACTIVITY IS SOMEWHAT DIFFICULT
SWELLING: THE SYMPTOM AFFECTS MY ACTIVITY SLIGHTLY
WALK: ACTIVITY IS SOMEWHAT DIFFICULT
LIMPING: I DO NOT HAVE THE SYMPTOM
PAIN: I HAVE THE SYMPTOM BUT IT DOES NOT AFFECT MY ACTIVITY
WEAKNESS: THE SYMPTOM AFFECTS MY ACTIVITY SLIGHTLY
GO DOWN STAIRS: ACTIVITY IS SOMEWHAT DIFFICULT
STIFFNESS: THE SYMPTOM AFFECTS MY ACTIVITY SLIGHTLY
AS_A_RESULT_OF_YOUR_KNEE_INJURY,_HOW_WOULD_YOU_RATE_YOUR_CURRENT_LEVEL_OF_DAILY_ACTIVITY?: SEVERELY ABNORMAL
STAND: ACTIVITY IS SOMEWHAT DIFFICULT
KNEE_ACTIVITY_OF_DAILY_LIVING_SUM: 46
KNEE_ACTIVITY_OF_DAILY_LIVING_SCORE: 65.71
SIT WITH YOUR KNEE BENT: ACTIVITY IS NOT DIFFICULT
GO UP STAIRS: ACTIVITY IS FAIRLY DIFFICULT
HOW_WOULD_YOU_RATE_THE_OVERALL_FUNCTION_OF_YOUR_KNEE_DURING_YOUR_USUAL_DAILY_ACTIVITIES?: ABNORMAL
GIVING WAY, BUCKLING OR SHIFTING OF KNEE: I DO NOT HAVE THE SYMPTOM

## 2022-04-26 NOTE — LETTER
[facility]  [address] [address] [address]  [phone]    2022    Re: Lachelle Duque   :   1970  MRN:  [id number]   REFERRING PHYSICIAN:   MD [facility]  Date of Initial Evaluation:  2022  Visits:  Rxs Used: 1  Reason for Referral:     Right knee pain, unspecified chronicity  Chronic pain of right knee  Aftercare following surgery of the musculoskeletal system    EVALUATION SUMMARY    Physical Therapy Initial Evaluation  Subjective:  The history is provided by the patient. No  was used.   Patient Health History  aLchelle Duque being seen for s/p R arthroscopy medial root repair.     Problem began: 2022.   Problem occurred: stepped down wrong   Pain is reported as 3/10 on pain scale.  General health as reported by patient is good.  Pertinent medical history includes: cancer, menopausal and overweight.   Red flags:  Pain at rest/night.  Medical allergies: none. Other medical allergies details: sulfa.   Surgeries include:  Cancer surgery. Other surgery history details: lumpectomy (11/10/2021; with daily radiation); L knee arthroscopy 2020).    Current medications:  Anti-inflammatory and other. Other medications details: Letrazole.    Current occupation is RN.        Therapist Generated HPI Evaluation  Problem details: Patient presents to PT today with c/o R knee discomfort due to recent surgery procedure.  DOS: 2022: s/p R knee arthroscopy with Medial Root Repair.  Patient stated the pain in the R knee started Labor day of 2021 after helping family move.    PMHx: L knee arthroscopy 2020. 2021 Breast Lumpectomy..         Type of problem:  Right knee.    This is a new condition.  Condition occurred with:  Insidious onset.  Where condition occurred: for unknown reasons.  Patient reports pain:  In the joint.  Pain is  described as aching and is constant.  Re: Lachelle HEDRICK Duque   :   1970    Radiates to: none. Pain is the same all the time.  Since onset symptoms are unchanged.  Associated symptoms:  Loss of motion/stiffness, edema and loss of strength. Symptoms are exacerbated by activity, transfers, descending stairs, ascending stairs, bending/squatting, walking and weight bearing    Special tests included:  X-ray and MRI.    Restrictions due to condition include:  Currently not working due to present treatment.  Barriers include:  Stairs.    Knee Activity of Daily Living Score: 65.71            Objective:  Gait:  TTWB  Gait Type:  Antalgic   Assistive Devices:  Crutches    Hip Evaluation  Hip Strength:    Flexion:   Right: 3/5   Pain:  Abduction:  Right: 3/5    Pain:       Knee Evaluation:  ROM:  Strength wnl knee: MMT not done on R side;  slight extension lag noted with SLR flex.  AROM  Extension: Left:    Right:  10  Flexion: Left:   Right: 68    Strength:   Quad Set Right: Fair    Pain:    Palpation:    Right knee tenderness present at:  Medial Joint Line; Lateral Joint Line and Patellar Tendon    Edema:  Edema of the knee: visible pitting edema noted around the R patella.    Mobility Testing:    Patellofemoral Medial:  Right: hypomobile  Patellofemoral Lateral:  Right: hypomobile  Patellofemoral Superior:  Right: hypomobile  Patellofemoral Inferior:  Right: hypomobile    Assessment/Plan:    Patient is a 52 year old female with right side knee complaints.    Patient has the following significant findings with corresponding treatment plan.                Diagnosis 1:  S/p R Knee Arthroscopy Medial Root repair  Pain -  hot/cold therapy and manual therapy  Decreased ROM/flexibility - manual therapy and therapeutic exercise  Decreased joint mobility - manual therapy and therapeutic exercise  Decreased strength - therapeutic exercise and therapeutic activities  Re: Lachelle J Neto   :   1970    Edema - cold  therapy  Impaired gait - gait training and assistive devices  Impaired muscle performance - neuro re-education  Decreased function - therapeutic activities    Therapy Evaluation Codes:   1) History comprised of:   Personal factors that impact the plan of care:      Past/current experiences.    Comorbidity factors that impact the plan of care are:      Cancer, Menopausal and Overweight.     Medications impacting care: Anti-inflammatory and Femara.  2) Examination of Body Systems comprised of:   Body structures and functions that impact the plan of care:      Knee.   Activity limitations that impact the plan of care are:      Bending, Driving, Lifting, Sitting, Squatting/kneeling, Stairs, Standing, Walking, Working and transfers.  3) Clinical presentation characteristics are:   Stable/Uncomplicated.  4) Decision-Making    Low complexity using standardized patient assessment instrument and/or measureable assessment of functional outcome.  Cumulative Therapy Evaluation is: Low complexity.    Previous and current functional limitations:  (See Goal Flow Sheet for this information)    Short term and Long term goals: (See Goal Flow Sheet for this information)   Communication ability:  Patient appears to be able to clearly communicate and understand verbal and written communication and follow directions correctly.  Treatment Explanation - The following has been discussed with the patient:   RX ordered/plan of care  Anticipated outcomes  Possible risks and side effects  This patient would benefit from PT intervention to resume normal activities.   Rehab potential is good.  Frequency/Duration:  1 X week, once daily; every other week x 6 weeks then 1-2x/week for 6 weeks.  Discharge Plan:  Achieve all LTG.  Independent in home treatment program.  Reach maximal therapeutic benefit.  Please refer to the daily flowsheet for treatment today, total treatment time and time spent performing 1:1 timed codes.     Thank you for your  referral.    INQUIRIES  Therapist: Fani Clifton [address] [address]  Phone: [phone]  Fax: [phone]

## 2022-04-26 NOTE — PROGRESS NOTES
Physical Therapy Initial Evaluation  Subjective:  The history is provided by the patient. No  was used.   Patient Health History  Lachelle Duque being seen for s/p R arthroscopy medial root repair.     Problem began: 4/19/2022.   Problem occurred: stepped down wrong   Pain is reported as 3/10 on pain scale.  General health as reported by patient is good.  Pertinent medical history includes: cancer, menopausal and overweight.   Red flags:  Pain at rest/night.  Medical allergies: none. Other medical allergies details: sulfa.   Surgeries include:  Cancer surgery. Other surgery history details: lumpectomy (11/10/2021; with daily radiation); L knee arthroscopy 6/2020).    Current medications:  Anti-inflammatory and other. Other medications details: Letrazole.    Current occupation is RN.                     Therapist Generated HPI Evaluation  Problem details: Patient presents to PT today with c/o R knee discomfort due to recent surgery procedure.  DOS: 4/19/2022: s/p R knee arthroscopy with Medial Root Repair.  Patient stated the pain in the R knee started Labor day of 2021 after helping family move.    PMHx: L knee arthroscopy 6/2020. 11/2021 Breast Lumpectomy..         Type of problem:  Right knee.    This is a new condition.  Condition occurred with:  Insidious onset.  Where condition occurred: for unknown reasons.  Patient reports pain:  In the joint.  Pain is described as aching and is constant.  Radiates to: none. Pain is the same all the time.  Since onset symptoms are unchanged.  Associated symptoms:  Loss of motion/stiffness, edema and loss of strength. Symptoms are exacerbated by activity, transfers, descending stairs, ascending stairs, bending/squatting, walking and weight bearing    Special tests included:  X-ray and MRI.    Restrictions due to condition include:  Currently not working due to present treatment.  Barriers include:  Stairs.           Knee Activity of Daily Living Score:  65.71            Objective:    Gait:  TTWB  Gait Type:  Antalgic   Assistive Devices:  Crutches                                                   Hip Evaluation    Hip Strength:    Flexion:   Right: 3/5   Pain:                      Abduction:  Right: 3/5    Pain:                           Knee Evaluation:  ROM:  Strength wnl knee: MMT not done on R side;  slight extension lag noted with SLR flex.  AROM      Extension: Left:    Right:  10  Flexion: Left:   Right: 68        Strength:           Quad Set Right: Fair    Pain:      Palpation:      Right knee tenderness present at:  Medial Joint Line; Lateral Joint Line and Patellar Tendon  Edema:  Edema of the knee: visible pitting edema noted around the R patella.    Mobility Testing:      Patellofemoral Medial:  Right: hypomobile  Patellofemoral Lateral:  Right: hypomobile  Patellofemoral Superior:  Right: hypomobile  Patellofemoral Inferior:  Right: hypomobile        General     ROS    Assessment/Plan:    Patient is a 52 year old female with right side knee complaints.    Patient has the following significant findings with corresponding treatment plan.                Diagnosis 1:  S/p R Knee Arthroscopy Medial Root repair  Pain -  hot/cold therapy and manual therapy  Decreased ROM/flexibility - manual therapy and therapeutic exercise  Decreased joint mobility - manual therapy and therapeutic exercise  Decreased strength - therapeutic exercise and therapeutic activities  Edema - cold therapy  Impaired gait - gait training and assistive devices  Impaired muscle performance - neuro re-education  Decreased function - therapeutic activities    Therapy Evaluation Codes:   1) History comprised of:   Personal factors that impact the plan of care:      Past/current experiences.    Comorbidity factors that impact the plan of care are:      Cancer, Menopausal and Overweight.     Medications impacting care: Anti-inflammatory and Femara.  2) Examination of Body Systems comprised  of:   Body structures and functions that impact the plan of care:      Knee.   Activity limitations that impact the plan of care are:      Bending, Driving, Lifting, Sitting, Squatting/kneeling, Stairs, Standing, Walking, Working and transfers.  3) Clinical presentation characteristics are:   Stable/Uncomplicated.  4) Decision-Making    Low complexity using standardized patient assessment instrument and/or measureable assessment of functional outcome.  Cumulative Therapy Evaluation is: Low complexity.    Previous and current functional limitations:  (See Goal Flow Sheet for this information)    Short term and Long term goals: (See Goal Flow Sheet for this information)     Communication ability:  Patient appears to be able to clearly communicate and understand verbal and written communication and follow directions correctly.  Treatment Explanation - The following has been discussed with the patient:   RX ordered/plan of care  Anticipated outcomes  Possible risks and side effects  This patient would benefit from PT intervention to resume normal activities.   Rehab potential is good.    Frequency/Duration:  1 X week, once daily; every other week x 6 weeks then 1-2x/week for 6 weeks.     Discharge Plan:  Achieve all LTG.  Independent in home treatment program.  Reach maximal therapeutic benefit.    Please refer to the daily flowsheet for treatment today, total treatment time and time spent performing 1:1 timed codes.

## 2022-04-28 ENCOUNTER — OFFICE VISIT (OUTPATIENT)
Dept: ORTHOPEDICS | Facility: CLINIC | Age: 52
End: 2022-04-28
Payer: COMMERCIAL

## 2022-04-28 DIAGNOSIS — G89.29 CHRONIC PAIN OF RIGHT KNEE: Primary | ICD-10-CM

## 2022-04-28 DIAGNOSIS — M25.561 CHRONIC PAIN OF RIGHT KNEE: Primary | ICD-10-CM

## 2022-04-28 PROCEDURE — 99024 POSTOP FOLLOW-UP VISIT: CPT | Performed by: ORTHOPAEDIC SURGERY

## 2022-04-28 NOTE — NURSING NOTE
Reason For Visit:   Chief Complaint   Patient presents with     RECHECK     ! Week post op       ?  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  Work status?  Full time.  Date of injury: [id number]  Type of injury: age.  Date of surgery: 4/19/22  1. Type of surgery: Examination under anesthesia Right Knee  2. Righrt Knee arthroscopy  3. Transosseous repair of Right medial meniscus root    Smoker: No  Request smoking cessation information: No    Sane Score  Right  knee - Affected  Left Knee- 90  Right Knee- 0      Felicity Pena, EMT

## 2022-04-28 NOTE — PROGRESS NOTES
DIAGNOSIS:   1. Left meniscus root tear  2. Right meniscus root tear    PROCEDURES:  1. Left knee transosseous repair of medial meniscus root date of surgery 6/16/2020  2. Right knee transosseous repair of medial meniscus root; date of surgery 4/19/2022    HISTORY:  Doing well 1 week following right knee surgery.  Pain controlled.  Never took any opioids.  Seeing therapy.    EXAM:     General: Awake, Alert, and oriented. Articulates and communicates with a normal affect     Right lower Extremity:    Incisions well healed without evidence of infection    No post-operative effusion or ecchymosis    Range of motion 0 to 90 degrees    Ligaments stable     Neurovascularly intact    IMAGING:  None    ASSESSMENT:  1. 1 week following right medial meniscus root repair    PLAN:   Toe-touch weightbearing x6 weeks  Range of motion 0 to 90 degrees x 6 weeks  Sutures removed in clinic  Leave steri-strips in place until they fall off  OK to shower allowing water to run over incision  No soaking, scrubbing, baths, or lake for 1 additional week  Continue PT as scheduled   Pain medications reviewed and no refills required.   Operative report provided and arthroscopic images reviewed  Follow up at 6 weeks from the date of surgery with no new X-Rays needed

## 2022-04-28 NOTE — LETTER
4/28/2022         RE: Lachelle Duque  [address]  [place] [address]        Dear Colleague,    Thank you for referring your patient, Lachelle Duque, to the [facility]. Please see a copy of my visit note below.    DIAGNOSIS:   1. Left meniscus root tear  2. Right meniscus root tear    PROCEDURES:  1. Left knee transosseous repair of medial meniscus root date of surgery 6/16/2020  2. Right knee transosseous repair of medial meniscus root; date of surgery 4/19/2022    HISTORY:  Doing well 1 week following right knee surgery.  Pain controlled.  Never took any opioids.  Seeing therapy.    EXAM:     General: Awake, Alert, and oriented. Articulates and communicates with a normal affect     Right lower Extremity:    Incisions well healed without evidence of infection    No post-operative effusion or ecchymosis    Range of motion 0 to 90 degrees    Ligaments stable     Neurovascularly intact    IMAGING:  None    ASSESSMENT:  1. 1 week following right medial meniscus root repair    PLAN:   Toe-touch weightbearing x6 weeks  Range of motion 0 to 90 degrees x 6 weeks  Sutures removed in clinic  Leave steri-strips in place until they fall off  OK to shower allowing water to run over incision  No soaking, scrubbing, baths, or lake for 1 additional week  Continue PT as scheduled   Pain medications reviewed and no refills required.   Operative report provided and arthroscopic images reviewed  Follow up at 6 weeks from the date of surgery with no new X-Rays needed          Again, thank you for allowing me to participate in the care of your patient.        Sincerely,        Armando Sunshine MD

## 2022-05-10 ENCOUNTER — THERAPY VISIT (OUTPATIENT)
Dept: PHYSICAL THERAPY | Facility: CLINIC | Age: 52
End: 2022-05-10
Payer: COMMERCIAL

## 2022-05-10 DIAGNOSIS — M25.561 CHRONIC PAIN OF RIGHT KNEE: Primary | ICD-10-CM

## 2022-05-10 DIAGNOSIS — G89.29 CHRONIC PAIN OF RIGHT KNEE: Primary | ICD-10-CM

## 2022-05-10 DIAGNOSIS — Z47.89 AFTERCARE FOLLOWING SURGERY OF THE MUSCULOSKELETAL SYSTEM: ICD-10-CM

## 2022-05-10 PROCEDURE — 97110 THERAPEUTIC EXERCISES: CPT | Mod: GP | Performed by: PHYSICAL THERAPIST

## 2022-05-10 PROCEDURE — 97012 MECHANICAL TRACTION THERAPY: CPT | Mod: GP | Performed by: PHYSICAL THERAPIST

## 2022-05-17 ENCOUNTER — THERAPY VISIT (OUTPATIENT)
Dept: PHYSICAL THERAPY | Facility: CLINIC | Age: 52
End: 2022-05-17
Payer: COMMERCIAL

## 2022-05-17 DIAGNOSIS — M25.561 CHRONIC PAIN OF RIGHT KNEE: Primary | ICD-10-CM

## 2022-05-17 DIAGNOSIS — Z47.89 AFTERCARE FOLLOWING SURGERY OF THE MUSCULOSKELETAL SYSTEM: ICD-10-CM

## 2022-05-17 DIAGNOSIS — G89.29 CHRONIC PAIN OF RIGHT KNEE: Primary | ICD-10-CM

## 2022-05-17 PROCEDURE — 97140 MANUAL THERAPY 1/> REGIONS: CPT | Mod: GP | Performed by: PHYSICAL THERAPIST

## 2022-05-17 PROCEDURE — 97110 THERAPEUTIC EXERCISES: CPT | Mod: GP | Performed by: PHYSICAL THERAPIST

## 2022-05-23 NOTE — PROGRESS NOTES
CANCER SURVIVORSHIP VISIT-virtual  Jun 2, 2022    Care Team   Primary Care Provider Suha Beavers   Surgeon  Ge De La Vega MD   Radiation Oncologist Roberta Scales MD   Medical Oncologist  Giovanna Alegria MD       REASON FOR VISIT: end of treatment survivorship visit for history of breast cancer    CANCER HISTORY AND TREATMENT:    History of left-sided stage 1A breast cancer, ER/RI positive, HER2 negative, diagnosed in 2021  *Diagnosis: 10/15/2021. Screening mammogram.   *Surgery: 11/10/2021, left lumpectomy and sentinel lymph node biopsy. Invasive ductal carcinoma of breast. [id number], stage IA. ER/RI positive, HER2 negative. Oncotype score 19 (low).   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  ? BRCA2 c.6248T>C (p.[id number])    ? POLD1 c.653G>A (p.Zer839Thh)   ? BRIP1 c.1984G>A (p.Spa296Bxy)     No other variants or mutations were found in these genes. Given the uncertain significance of this result, medical management decisions should NOT be made based on this test result alone.     Of note, Lachelle tested negative for mutations in the following genes by sequencing and deletion/duplication analysis: APC, FELICIA, AXIN2, BARD1, BMPR1A, BRCA1, CDH1, CHEK2, EPCAM (deletions/duplications only), GREM1 (deletions/duplications only), MLH1, MSH2, MSH3, MSH6, MUTYH, NF1, NTHL1, PALB2, PMS2, POLE, PTEN, RAD51C, RAD51D, SMAD4, STK11, TP53  *Other information:      INTERVAL HISTORY:     Shanta is here for an end of treatment visit.  She has had an eventful year with a diagnosis of breast cancer, her treatment, and a meniscal repair in April.  She reports to coping well but she has noticed more some insomnia lately.  She is taking  melatonin and Unisom prn with relief.     She has recovered well from surgery and radiation.  She has no issues with range of motion and she stretches daily.  No new lumps/bumps or new pain.     She has tolerated tolerating letrozole fairly well.  She does have arthralgias in the morning are tolerable.  She has noted body composition changes.  She has hot flashes but she had these preceding her diagnosis and they have not worsened.  They do not interfere with her function.    She has no sexual health concerns today    Her calcium level is little bit high today.  She is taking calcium supplementation 600 mg a day this is not new.  She feels asymptomatic in regards to this.    Past Medical History:   Diagnosis Date     Chronic rhinitis      Environmental allergies 1/18/2013     Gastro-oesophageal reflux disease      GERD (gastroesophageal reflux disease) 1/3/2013     Herpes zoster without mention of complication     L eye      Hiatal hernia      PONV (postoperative nausea and vomiting)     PONV       Current Outpatient Medications   Medication Sig Dispense Refill     acetaminophen (TYLENOL) 325 MG tablet Take 2 tablets (650 mg) by mouth every 4 hours as needed for mild pain 50 tablet 1     cetirizine HCl 10 MG CAPS Take one tablet daily.       ibuprofen (ADVIL/MOTRIN) 600 MG tablet Take 1 tablet (600 mg) by mouth every 6 hours as needed for moderate pain 30 tablet 1     letrozole (FEMARA) 2.5 MG tablet Take 1 tablet (2.5 mg) by mouth daily 90 tablet 3     omeprazole 20 MG tablet Take 1 tablet (20 mg) by mouth daily Take 30-60 minutes before a meal. 30 tablet 1     ondansetron (ZOFRAN-ODT) 4 MG ODT tab Take 1-2 tablets (4-8 mg) by mouth every 8 hours as needed for nausea (Patient not taking: Reported on 6/2/2022) 10 tablet 0          Allergies   Allergen Reactions     Sulfa Drugs Anaphylaxis       Family History   Problem Relation Age of Onset     C.A.D. Father         bypass     Diabetes Father      Cancer -  colorectal Father 42     Coronary Artery Disease Father      Gastrointestinal Disease Mother         diverticulosis     Arthritis Mother         fibromyalgia, lupus, rheumatoid     Anesthesia Reaction Mother         nausea     Asthma Mother      Lipids Mother      Diabetes Mother      Macular Degeneration Other      Cancer Other 29        CML     Breast Cancer Other      Colon Polyps Brother      C.A.D. Maternal Grandfather      Cerebrovascular Disease Maternal Grandfather      Coronary Artery Disease Maternal Grandfather      Hypertension Maternal Grandmother      Glaucoma Maternal Grandmother      Gastrointestinal Disease Maternal Grandmother         diverticulosis     Depression Brother      Arthritis Brother         fibromyalgia     Anesthesia Reaction Brother         nausea     Cancer Maternal Aunt      Breast Cancer Maternal Aunt      Coronary Artery Disease Maternal Uncle      Thyroid Disease No family hx of      Prostate Cancer No family hx of         Social history:  Living situation: Single lives in [place]  Occupation: Nurse on [employer], oncology  Tobacco use: never  ETOH use: 3 drinks a week  Exercise: Recovering from surgery, doing PT  Diet: mainly discussed goals    Physical exam  BP (!) 146/86 (BP Location: Right arm)   Pulse 100   Temp 98.1  F (36.7  C) (Oral)   Resp 16   Wt 115.6 kg (254 lb 12.8 oz)   LMP 10/31/2021   SpO2 98%   BMI 41.13 kg/m    General: No acute distress  HEENT: Sclera anicteric. Conjunctiva non-injected.  Lymph: No lymphadenopathy in neck, supraclavicular, and axillary areas  Heart: Regular, rate, and rhythm  Lungs: Clear to ascultation bilaterally  Breasts: s/p lumpectomy and radiation on left. Left breast has lumpectomy and sentinel node biopsy scar. Left nipple is slightly more inverted than the right and the areola larger. No palpable lumps.  Extremities: no lower extremity edema  Neuro: Cranial nerves grossly intact    LABS:   Latest Reference Range & Units 06/02/22  07:56   Creatinine 0.52 - 1.04 mg/dL 0.58   GFR Estimate >60 mL/min/1.73m2 >90 [1]   Calcium 8.5 - 10.1 mg/dL 10.8 (H)   Albumin 3.4 - 5.0 g/dL 4.0   (H): Data is abnormally high  [1] Effective December 21, 2021 eGFRcr in adults is calculated using the 2021 CKD-EPI creatinine equation which includes age and gender (Beatriz et al., NE, DOI: 10.1056/JUXNhz5053484)  Corrected calcium is 10.8    IMPRESSION/PLAN:    History of left-sided stage 1A breast cancer, ER/OR positive, HER2 negative, diagnosed in 2021  Treated with lumpectomy, radiation and she is on letrozole.   Discussed what to expect with follow-up.  She will follow-up with medical oncology every 3 to 4 months for the first 3 years and then she can go to every 6-month visits as long as she is on endocrine therapy 5-10 years).  She is scheduled for her post radiation mammogram in September and then this will be done annually  Follow-up with Dr Alegria in September    Aromatase inhibitor symptoms of arthralgia of and hot flashes  We talked about nonpharmacologic management such as taking her aromatase inhibitor at night with a full glass of water.  We also talked about pharmacologic management of both her arthralgias and hot flashes but they are not bothersome enough at this time to warrant indication.  She notes that she can talk to Dr. Alegria, Nelli, or myself about this if it becomes bothersome    Mild asymptomatic hypercalcemia.   Zometa will reduce this today  Recheck (ionized calcium) in Sept. Work up could be indicated if continues to be high.   Sooner if new symptoms    3 genetic variants of unknown significance  Discussed that it would be good to check in with genetic counseling in 5 years to see if there has been any changes in the status of her VUS    Potential late effects related to surgery/radiation:  -Risk of lymphedema: If she notices any swelling in her arm, she should be offered a referral to lymphedema physical therapy.     Potential late effects  related to radiation:  - Possible radiation side effects include cardiotoxicity, lung injury, fracture, and second malignancies. She should seek medical attention if she notices any new skin lesions in the area of radiation. If she should develop any shortness of breath, hemoptysis, cough, or new pain, I would advise further evaluation with CT or X-ray.     Potential effects related to endocrine therapy:  Risk of developing osteoporosis: Last DEXA 01/22 with a lowest T-score of -0.1, Recommend weightbearing exercises 2-3 days per week, and to supplement with 1200 mg of calcium, 1000 international units of vitamin D daily. Plan to start zometa today.  Discussed that we will likely follow serial DEXAs every 2 years while on treatment.     General late effects screenings and recommendations  -Coping: She is coping overall well but has been through a lot this past year and is processing it now and is having some insomnia. She is interested in meeting with Joe Taylor.     -Sexual health. She is not having vaginal dryness but we discussed vaginal moisturizers.    -Cancer screening. She should undergo routine screening for women in her age group.     -Healthy lifestyle. Studies have identified a goal BMI between 20-25 with exercising 150 minutes weekly at moderate intensity. She should see the eye doctor every 1-2 years, and dentist every 6 months for cleanings. She should not use any tobacco. She should minimize alcohol intake.  We discussed resources such as [facility], weight management clinic, or meeting with a dietitian if she would like any help with this goal. BMI 41.     Non-urgent scheduling should be complete within 7-10 business days. However, if you have not received a phone call from scheduling within 3 business days, you may call to schedule at [phone] option 5, option 2. This is the same number to call to make changes tor if you have questions about the schedule.    Gave resources for our [facility] Cancer  Survivorship class series and mailings list for educational opportunities. [email or web address]    Cancer treatment summary was sent electronically to the patient and her PCP.      The total time of this encounter amounted to 60 minutes.This time included time spent with the patient, prep work, ordering tests, creating a cancer treatment summary, and performing post visit documentation.    ANALIA Charlton, PA-C  [facility] Cancer Survivorship Progam

## 2022-05-31 ENCOUNTER — THERAPY VISIT (OUTPATIENT)
Dept: PHYSICAL THERAPY | Facility: CLINIC | Age: 52
End: 2022-05-31
Payer: COMMERCIAL

## 2022-05-31 DIAGNOSIS — M25.561 CHRONIC PAIN OF RIGHT KNEE: Primary | ICD-10-CM

## 2022-05-31 DIAGNOSIS — Z47.89 AFTERCARE FOLLOWING SURGERY OF THE MUSCULOSKELETAL SYSTEM: ICD-10-CM

## 2022-05-31 DIAGNOSIS — G89.29 CHRONIC PAIN OF RIGHT KNEE: Primary | ICD-10-CM

## 2022-05-31 PROCEDURE — 97112 NEUROMUSCULAR REEDUCATION: CPT | Mod: GP | Performed by: PHYSICAL THERAPIST

## 2022-05-31 PROCEDURE — 97110 THERAPEUTIC EXERCISES: CPT | Mod: GP | Performed by: PHYSICAL THERAPIST

## 2022-06-02 ENCOUNTER — ONCOLOGY SURVIVORSHIP VISIT (OUTPATIENT)
Dept: ONCOLOGY | Facility: CLINIC | Age: 52
End: 2022-06-02
Attending: PHYSICIAN ASSISTANT
Payer: COMMERCIAL

## 2022-06-02 ENCOUNTER — APPOINTMENT (OUTPATIENT)
Dept: LAB | Facility: CLINIC | Age: 52
End: 2022-06-02
Attending: INTERNAL MEDICINE
Payer: COMMERCIAL

## 2022-06-02 ENCOUNTER — INFUSION THERAPY VISIT (OUTPATIENT)
Dept: ONCOLOGY | Facility: CLINIC | Age: 52
End: 2022-06-02
Attending: INTERNAL MEDICINE
Payer: COMMERCIAL

## 2022-06-02 ENCOUNTER — PATIENT OUTREACH (OUTPATIENT)
Dept: ONCOLOGY | Facility: CLINIC | Age: 52
End: 2022-06-02

## 2022-06-02 VITALS
BODY MASS INDEX: 41.13 KG/M2 | HEART RATE: 100 BPM | TEMPERATURE: 98.1 F | OXYGEN SATURATION: 98 % | WEIGHT: 254.8 LBS | RESPIRATION RATE: 16 BRPM | DIASTOLIC BLOOD PRESSURE: 86 MMHG | SYSTOLIC BLOOD PRESSURE: 146 MMHG

## 2022-06-02 DIAGNOSIS — Z17.0 MALIGNANT NEOPLASM OF UPPER-OUTER QUADRANT OF LEFT BREAST IN FEMALE, ESTROGEN RECEPTOR POSITIVE (H): Primary | ICD-10-CM

## 2022-06-02 DIAGNOSIS — C50.912 INVASIVE DUCTAL CARCINOMA OF BREAST, FEMALE, LEFT (H): ICD-10-CM

## 2022-06-02 DIAGNOSIS — F43.29 ADJUSTMENT DISORDER WITH OTHER SYMPTOM: ICD-10-CM

## 2022-06-02 DIAGNOSIS — Z79.811 LONG TERM (CURRENT) USE OF AROMATASE INHIBITORS: Primary | ICD-10-CM

## 2022-06-02 DIAGNOSIS — M25.561 ARTHRALGIA OF BOTH KNEES: ICD-10-CM

## 2022-06-02 DIAGNOSIS — M25.562 ARTHRALGIA OF BOTH KNEES: ICD-10-CM

## 2022-06-02 DIAGNOSIS — E83.52 HYPERCALCEMIA: ICD-10-CM

## 2022-06-02 DIAGNOSIS — C50.412 MALIGNANT NEOPLASM OF UPPER-OUTER QUADRANT OF LEFT BREAST IN FEMALE, ESTROGEN RECEPTOR POSITIVE (H): Primary | ICD-10-CM

## 2022-06-02 LAB
ALBUMIN SERPL-MCNC: 4 G/DL (ref 3.4–5)
CALCIUM SERPL-MCNC: 10.8 MG/DL (ref 8.5–10.1)
CREAT SERPL-MCNC: 0.58 MG/DL (ref 0.52–1.04)
GFR SERPL CREATININE-BSD FRML MDRD: >90 ML/MIN/1.73M2

## 2022-06-02 PROCEDURE — 82565 ASSAY OF CREATININE: CPT | Performed by: INTERNAL MEDICINE

## 2022-06-02 PROCEDURE — G0463 HOSPITAL OUTPT CLINIC VISIT: HCPCS

## 2022-06-02 PROCEDURE — 82310 ASSAY OF CALCIUM: CPT | Performed by: INTERNAL MEDICINE

## 2022-06-02 PROCEDURE — 36415 COLL VENOUS BLD VENIPUNCTURE: CPT | Performed by: INTERNAL MEDICINE

## 2022-06-02 PROCEDURE — 82040 ASSAY OF SERUM ALBUMIN: CPT | Performed by: INTERNAL MEDICINE

## 2022-06-02 PROCEDURE — 258N000003 HC RX IP 258 OP 636: Performed by: INTERNAL MEDICINE

## 2022-06-02 PROCEDURE — 250N000011 HC RX IP 250 OP 636: Performed by: INTERNAL MEDICINE

## 2022-06-02 PROCEDURE — 99215 OFFICE O/P EST HI 40 MIN: CPT | Performed by: PHYSICIAN ASSISTANT

## 2022-06-02 PROCEDURE — 96365 THER/PROPH/DIAG IV INF INIT: CPT

## 2022-06-02 RX ORDER — ZOLEDRONIC ACID 0.04 MG/ML
4 INJECTION, SOLUTION INTRAVENOUS ONCE
Status: COMPLETED | OUTPATIENT
Start: 2022-06-02 | End: 2022-06-02

## 2022-06-02 RX ADMIN — ZOLEDRONIC ACID 4 MG: 0.04 INJECTION, SOLUTION INTRAVENOUS at 09:50

## 2022-06-02 RX ADMIN — SODIUM CHLORIDE 250 ML: 9 INJECTION, SOLUTION INTRAVENOUS at 09:46

## 2022-06-02 ASSESSMENT — PAIN SCALES - GENERAL: PAINLEVEL: NO PAIN (0)

## 2022-06-02 NOTE — PROGRESS NOTES
Infusion Nursing Note:  Lachelle Duque presents today for Zometa.    Patient seen by provider today: Yes: Polly Kimble PA-C   present during visit today: Not Applicable.    Note: Patient here for her first Zometa infusion.  Confirmed with patient that she has received information about this new medication.  Reviewed with patient potential side effects.  Patient verbalized understanding and had no further questions at this time.    Intravenous Access:  Peripheral IV placed in lab today.    Treatment Conditions:  Lab Results   Component Value Date     01/31/2022    POTASSIUM 4.2 01/31/2022    CR 0.58 06/02/2022    DULCE 10.8 (H) 06/02/2022    BILITOTAL 0.2 01/31/2022    ALBUMIN 4.0 06/02/2022    ALT 47 01/31/2022    AST 24 01/31/2022     Results reviewed, labs MET treatment parameters, ok to proceed with treatment.    Post Infusion Assessment:  Patient tolerated infusion without incident.  Blood return noted pre and post infusion.  Site patent and intact, free from redness, edema or discomfort.  No evidence of extravasations.  Access discontinued per protocol.       Discharge Plan:   Patient declined prescription refills.  Discharge instructions reviewed with: Patient.  Patient and/or family verbalized understanding of discharge instructions and all questions answered.  AVS to patient via [employer].  Patient will return 9/1/22 for next provider appointment.   Patient discharged in stable condition accompanied by: self.  Departure Mode: Ambulatory with crutches.  Face to Face time: 0.      NA CHICAS RN

## 2022-06-02 NOTE — NURSING NOTE
"Oncology Rooming Note    June 2, 2022 8:03 AM   Lachelle Duque is a 52 year old female who presents for:    Chief Complaint   Patient presents with     Blood Draw     Labs drawn via piv start by RN. Vitals taken.     Initial Vitals: BP (!) 146/86 (BP Location: Right arm)   Pulse 100   Temp 98.1  F (36.7  C) (Oral)   Resp 16   Wt 115.6 kg (254 lb 12.8 oz)   LMP 10/31/2021   SpO2 98%   BMI 41.13 kg/m   Estimated body mass index is 41.13 kg/m  as calculated from the following:    Height as of 4/19/22: 1.676 m (5' 6\").    Weight as of this encounter: 115.6 kg (254 lb 12.8 oz). Body surface area is 2.32 meters squared.  No Pain (0) Comment: Data Unavailable   Patient's last menstrual period was 10/31/2021.  Allergies reviewed: Yes  Medications reviewed: Yes    Medications: Medication refills not needed today.  Pharmacy name entered into [employer]:    [facility] - [place], [place] - [address]  [facility] - [place], [place] - [address]  [facility]  [facility] - [place], [place] - [address] [facility] [place], [place] - [phone]    Clinical concerns: none       Jeanette Davis, EMT            "

## 2022-06-02 NOTE — NURSING NOTE
Chief Complaint   Patient presents with     Blood Draw     Labs drawn via piv start by RN. Vitals taken.     Labs drawn from PIV placed by RN. Line flushed with saline. Vitals taken. Pt checked in for appointment(s).    Kristen Vallecillo RN

## 2022-06-07 ENCOUNTER — THERAPY VISIT (OUTPATIENT)
Dept: PHYSICAL THERAPY | Facility: CLINIC | Age: 52
End: 2022-06-07
Payer: COMMERCIAL

## 2022-06-07 DIAGNOSIS — Z47.89 AFTERCARE FOLLOWING SURGERY OF THE MUSCULOSKELETAL SYSTEM: ICD-10-CM

## 2022-06-07 DIAGNOSIS — M25.561 CHRONIC PAIN OF RIGHT KNEE: Primary | ICD-10-CM

## 2022-06-07 DIAGNOSIS — G89.29 CHRONIC PAIN OF RIGHT KNEE: Primary | ICD-10-CM

## 2022-06-07 PROCEDURE — 97112 NEUROMUSCULAR REEDUCATION: CPT | Mod: GP | Performed by: PHYSICAL THERAPIST

## 2022-06-07 PROCEDURE — 97110 THERAPEUTIC EXERCISES: CPT | Mod: GP | Performed by: PHYSICAL THERAPIST

## 2022-06-07 NOTE — LETTER
[facility]  [address] [facility] [address]  [phone]    2022    Re: Lachelle Duque   :   1970  MRN:  [id number]   REFERRING PHYSICIAN:   Armando Nunez*    [facility]    Date of Initial Evaluation:  2022  Visits:  Rxs Used: 5  Reason for Referral:     Chronic pain of right knee  Aftercare following surgery of the musculoskeletal system     PROGRESS  REPORT    Progress reporting period is from 2022 to 2022.       SUBJECTIVE  Subjective changes noted by patient:  Patient has no complaints regarding the R knee today; Doing HEP as she tolerates (had adverse reaction to CA treatment last week).  Using 1 Crutch as needed.    Current pain level is : 1/10.     Previous pain level was  : 3/10.   Changes in function:  Yes (See Goal flowsheet attached for changes in current functional level)  Adverse reaction to treatment or activity: None    OBJECTIVE  Changes noted in objective findings:      R knee AROM: 1-110.    Gait: antalgic; lacks full R knee extension.     ASSESSMENT/PLAN  Updated problem list and treatment plan: Diagnosis 1:  S/p R Knee Arthroscopy  Pain -  hot/cold therapy and manual therapy  Decreased ROM/flexibility - manual therapy and therapeutic exercise  Decreased strength - therapeutic exercise and therapeutic activities  Decreased proprioception - neuro re-education and therapeutic activities  Impaired gait - gait training  Impaired muscle performance - neuro re-education  Decreased function - therapeutic activities  STG/LTGs have been met or progress has been made towards goals:  Yes (See Goal flow sheet completed today.)  Assessment of Progress: The patient's condition is improving.  The patient's condition has potential to improve.  Self Management Plans:  Patient has been instructed in a home treatment program.   Lachelle Duque   :    1970    I have re-evaluated this patient and find that the nature, scope, duration and intensity of the therapy is appropriate for the medical condition of the patient.  Lachelle continues to require the following intervention to meet STG and LTG's:  PT    Recommendations:  This patient would benefit from continued therapy.     Frequency:  1-2 X week, once daily  Duration:  for 6 weeks    Please refer to the daily flowsheet for treatment today, total treatment time and time spent performing 1:1 timed codes.    Thank you for your referral.    INQUIRIES  [facility] [address] [address]  Phone: [phone]  Fax: [phone]

## 2022-06-07 NOTE — PROGRESS NOTES
Subjective:  HPI  Physical Exam                    Objective:  System    Physical Exam    General     ROS    Assessment/Plan:    PROGRESS  REPORT    Progress reporting period is from 4/26/2022 to 6/7/2022.       SUBJECTIVE  Subjective changes noted by patient:  Patient has no complaints regarding the R knee today; Doing HEP as she tolerates (had adverse reaction to CA treatment last week).  Using 1 Crutch as needed.    Current pain level is : 1/10.     Previous pain level was  : 3/10.   Changes in function:  Yes (See Goal flowsheet attached for changes in current functional level)  Adverse reaction to treatment or activity: None    OBJECTIVE  Changes noted in objective findings:      R knee AROM: 1-110.    Gait: antalgic; lacks full R knee extension.     ASSESSMENT/PLAN  Updated problem list and treatment plan: Diagnosis 1:  S/p R Knee Arthroscopy  Pain -  hot/cold therapy and manual therapy  Decreased ROM/flexibility - manual therapy and therapeutic exercise  Decreased strength - therapeutic exercise and therapeutic activities  Decreased proprioception - neuro re-education and therapeutic activities  Impaired gait - gait training  Impaired muscle performance - neuro re-education  Decreased function - therapeutic activities  STG/LTGs have been met or progress has been made towards goals:  Yes (See Goal flow sheet completed today.)  Assessment of Progress: The patient's condition is improving.  The patient's condition has potential to improve.  Self Management Plans:  Patient has been instructed in a home treatment program.  I have re-evaluated this patient and find that the nature, scope, duration and intensity of the therapy is appropriate for the medical condition of the patient.  Lachelle continues to require the following intervention to meet STG and LTG's:  PT    Recommendations:  This patient would benefit from continued therapy.     Frequency:  1-2 X week, once daily  Duration:  for 6 weeks        Please refer  to the daily flowsheet for treatment today, total treatment time and time spent performing 1:1 timed codes.

## 2022-06-09 ENCOUNTER — OFFICE VISIT (OUTPATIENT)
Dept: ORTHOPEDICS | Facility: CLINIC | Age: 52
End: 2022-06-09
Payer: COMMERCIAL

## 2022-06-09 DIAGNOSIS — M25.561 CHRONIC PAIN OF RIGHT KNEE: Primary | ICD-10-CM

## 2022-06-09 DIAGNOSIS — G89.29 CHRONIC PAIN OF RIGHT KNEE: Primary | ICD-10-CM

## 2022-06-09 PROCEDURE — 99024 POSTOP FOLLOW-UP VISIT: CPT | Performed by: ORTHOPAEDIC SURGERY

## 2022-06-09 ASSESSMENT — PAIN SCALES - GENERAL: PAINLEVEL: MODERATE PAIN (4)

## 2022-06-09 NOTE — NURSING NOTE
Reason For Visit:   Chief Complaint   Patient presents with     Right Knee - Surgical Followup     6 week post op       ?  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  Work status?  Full time.  Date of injury: [id number]  Type of injury: age.  Date of surgery: 4/19/22  1. Type of surgery: Examination under anesthesia Right Knee  2. Righrt Knee arthroscopy  3. Transosseous repair of Right medial meniscus root     Smoker: No  Request smoking cessation information: No     Sane Score  Right  knee - Affected  Left Knee- 90  Right Knee- 60    Armando Concepcion, EMT

## 2022-06-09 NOTE — PROGRESS NOTES
DIAGNOSIS:   1. Left meniscus root tear  2. Right meniscus root tear    PROCEDURES:  1. Left knee transosseous repair of medial meniscus root date of surgery 6/16/2020  2. Right knee transosseous repair of medial meniscus root; date of surgery 4/19/2022    HISTORY:  Doing well 6 weeks out from surgery.  Pain control.  Off opioids.  Feels ready to return to work in early July.    EXAM:     General: Awake, Alert, and oriented. Articulates and communicates with a normal affect     Right lower Extremity:    Incisions well healed without evidence of infection    No post-operative effusion or ecchymosis    Range of motion 0 to 90 degrees    Ligaments stable     Neurovascularly intact    IMAGING:  None    ASSESSMENT:  1. 6 weeks following right medial meniscus root repair    PLAN:   Weightbearing as tolerated  Range of motion as tolerated  No running jumping no kneeling or squatting  Return to work July 11  As she is comfortable with the pathway going forward she can follow-up with me on an as-needed basis.  I did tell her that normally I see people again at the 6-week william however I think in her case and she feels very comfortable progressing through the postoperative protocol she can follow-up with me on an as-needed basis.

## 2022-06-09 NOTE — LETTER
6/9/2022         RE: Lachelle Duque  [address]  [place] [address]        Dear Colleague,    Thank you for referring your patient, Lachelle Duque, to the [facility]. Please see a copy of my visit note below.    DIAGNOSIS:   1. Left meniscus root tear  2. Right meniscus root tear    PROCEDURES:  1. Left knee transosseous repair of medial meniscus root date of surgery 6/16/2020  2. Right knee transosseous repair of medial meniscus root; date of surgery 4/19/2022    HISTORY:  Doing well 6 weeks out from surgery.  Pain control.  Off opioids.  Feels ready to return to work in early July.    EXAM:     General: Awake, Alert, and oriented. Articulates and communicates with a normal affect     Right lower Extremity:    Incisions well healed without evidence of infection    No post-operative effusion or ecchymosis    Range of motion 0 to 90 degrees    Ligaments stable     Neurovascularly intact    IMAGING:  None    ASSESSMENT:  1. 6 weeks following right medial meniscus root repair    PLAN:   Weightbearing as tolerated  Range of motion as tolerated  No running jumping no kneeling or squatting  Return to work July 11  As she is comfortable with the pathway going forward she can follow-up with me on an as-needed basis.  I did tell her that normally I see people again at the 6-week william however I think in her case and she feels very comfortable progressing through the postoperative protocol she can follow-up with me on an as-needed basis.        Again, thank you for allowing me to participate in the care of your patient.        Sincerely,        Armando Sunshine MD

## 2022-06-10 ENCOUNTER — VIRTUAL VISIT (OUTPATIENT)
Dept: ONCOLOGY | Facility: CLINIC | Age: 52
End: 2022-06-10
Attending: PHYSICIAN ASSISTANT
Payer: COMMERCIAL

## 2022-06-10 DIAGNOSIS — F43.29 ADJUSTMENT DISORDER WITH OTHER SYMPTOM: ICD-10-CM

## 2022-06-10 PROCEDURE — 90832 PSYTX W PT 30 MINUTES: CPT | Mod: 95 | Performed by: PSYCHOLOGIST

## 2022-06-10 NOTE — Clinical Note
6/10/2022         RE: Lachelle Duque  [address]  [place] [address]        Dear Colleague,    Thank you for referring your patient, Lachelle Duque, to the [facility]. Please see a copy of my visit note below.    No notes on file    Again, thank you for allowing me to participate in the care of your patient.        Sincerely,        Armando Taylor PsyD

## 2022-06-13 ENCOUNTER — DOCUMENTATION ONLY (OUTPATIENT)
Dept: ORTHOPEDICS | Facility: CLINIC | Age: 52
End: 2022-06-13
Payer: COMMERCIAL

## 2022-06-13 NOTE — PROGRESS NOTES
[facility] Oncology- Psychotherapy (Provider Oversight- Dr. Joe Vyas)                                    Progress Note    Patient Name: Lachelle Duque  Date: 6/10/22         Service Type: Individual      Session Start Time: 10:00  Session End Time: 10:30     Session Length: 30 minutes    Session #: 1    Attendees: Client attended alone    Service Modality:  Video Visit:      Provider verified identity through the following two step process.  Patient provided:  Patient photo    Telemedicine Visit: The patient's condition can be safely assessed and treated via synchronous audio and visual telemedicine encounter.      Reason for Telemedicine Visit: Services only offered telehealth    Originating Site (Patient Location): Patient's home    Distant Site (Provider Location): Provider Remote Setting- Home Office    Consent:  The patient/guardian has verbally consented to: the potential risks and benefits of telemedicine (video visit) versus in person care; bill my insurance or make self-payment for services provided; and responsibility for payment of non-covered services.     Patient would like the video invitation sent by:  My Chart    Mode of Communication:  Video Conference via [employer]    As the provider I attest to compliance with applicable laws and regulations related to telemedicine.    DATA  Interactive Complexity: No  Crisis: No     Current / Ongoing Stressors and Concerns:   Cancer treatment related side-effects     Treatment Objective(s) Addressed in This Session:   identify coping strategies to more effectively address stressors     Intervention:   Motivational Interviewing: to help her reduce anxiety       ASSESSMENT: Current Emotional / Mental Status (status of significant symptoms):   Risk status (Self / Other harm or suicidal ideation)   Patient denies current fears or concerns for personal safety.   Patient denies current or recent suicidal ideation or behaviors.   Patient denies current or  recent homicidal ideation or behaviors.   Patient denies current or recent self injurious behavior or ideation.   Patient denies other safety concerns.   Patient reports there has been no change in risk factors since their last session.     Patient reports there has been no change in protective factors since their last session.     Recommended that patient call 911 or go to the local ED should there be a change in any of these risk factors.     Appearance:   Appropriate    Eye Contact:   Good    Psychomotor Behavior: Normal    Attitude:   Cooperative    Orientation:   All   Speech    Rate / Production: Normal     Volume:  Normal    Mood:    Normal   Affect:    Appropriate    Thought Content:  Clear    Thought Form:  Coherent  Logical    Insight:    Good      Medication Review:   No changes to current psychiatric medication(s)     Medication Compliance:   Yes     Changes in Health Issues:   None reported     Chemical Use Review:   Substance Use: Chemical use reviewed, no active concerns identified      Tobacco Use: No current tobacco use.      Diagnosis:  1. Adjustment disorder with other symptom        Collateral Reports Completed:   Not Applicable    PLAN: (Patient Tasks / Therapist Tasks / Other)  Continue to use the activity pacing to reduce anxiety, increase activity levels, and maintain her energy levels throughout the day.         Armando Taylor, Isidro                                                         ______________________________________________________________________    Individual Treatment Plan    Patient's Name: Lachelle Duque  YOB: 1970    Date of Creation: 6/10/22  Date Treatment Plan Last Reviewed/Revised:     DSM5 Diagnoses: 300.09 (F41.8) Other Specified Anxiety Disorder   Psychosocial / Contextual Factors: cancer treatment, low energy levels  Anticipated number of session for this episode of care: 5  Anticipation frequency of session: Every other week  Anticipated Duration  of each session: 16-37 minutes  Treatment plan will be reviewed in 90 days or when goals have been changed.       MeasurableTreatment Goal(s) related to diagnosis / functional impairment(s)  Goal 1: Patient will use activity pacing to manage fatigue    I will know I've met my goal when I know how long to engage in physical activities.      Patient has reviewed and agreed to the above plan.      Armando Taylor PsyD  June 13, 2022

## 2022-06-14 ENCOUNTER — THERAPY VISIT (OUTPATIENT)
Dept: PHYSICAL THERAPY | Facility: CLINIC | Age: 52
End: 2022-06-14
Payer: COMMERCIAL

## 2022-06-14 DIAGNOSIS — Z47.89 AFTERCARE FOLLOWING SURGERY OF THE MUSCULOSKELETAL SYSTEM: ICD-10-CM

## 2022-06-14 DIAGNOSIS — G89.29 CHRONIC PAIN OF RIGHT KNEE: Primary | ICD-10-CM

## 2022-06-14 DIAGNOSIS — M25.561 CHRONIC PAIN OF RIGHT KNEE: Primary | ICD-10-CM

## 2022-06-14 PROCEDURE — 97110 THERAPEUTIC EXERCISES: CPT | Mod: GP | Performed by: PHYSICAL THERAPIST

## 2022-06-14 PROCEDURE — 97112 NEUROMUSCULAR REEDUCATION: CPT | Mod: GP | Performed by: PHYSICAL THERAPIST

## 2022-06-16 ENCOUNTER — THERAPY VISIT (OUTPATIENT)
Dept: PHYSICAL THERAPY | Facility: CLINIC | Age: 52
End: 2022-06-16
Payer: COMMERCIAL

## 2022-06-16 DIAGNOSIS — G89.29 CHRONIC PAIN OF RIGHT KNEE: Primary | ICD-10-CM

## 2022-06-16 DIAGNOSIS — Z47.89 AFTERCARE FOLLOWING SURGERY OF THE MUSCULOSKELETAL SYSTEM: ICD-10-CM

## 2022-06-16 DIAGNOSIS — M25.561 CHRONIC PAIN OF RIGHT KNEE: Primary | ICD-10-CM

## 2022-06-16 PROCEDURE — 97112 NEUROMUSCULAR REEDUCATION: CPT | Mod: GP | Performed by: PHYSICAL THERAPIST

## 2022-06-16 PROCEDURE — 97110 THERAPEUTIC EXERCISES: CPT | Mod: GP | Performed by: PHYSICAL THERAPIST

## 2022-06-21 ENCOUNTER — THERAPY VISIT (OUTPATIENT)
Dept: PHYSICAL THERAPY | Facility: CLINIC | Age: 52
End: 2022-06-21
Payer: COMMERCIAL

## 2022-06-21 DIAGNOSIS — M25.561 CHRONIC PAIN OF RIGHT KNEE: Primary | ICD-10-CM

## 2022-06-21 DIAGNOSIS — G89.29 CHRONIC PAIN OF RIGHT KNEE: Primary | ICD-10-CM

## 2022-06-21 DIAGNOSIS — Z47.89 AFTERCARE FOLLOWING SURGERY OF THE MUSCULOSKELETAL SYSTEM: ICD-10-CM

## 2022-06-21 PROCEDURE — 97110 THERAPEUTIC EXERCISES: CPT | Mod: GP | Performed by: PHYSICAL THERAPIST

## 2022-06-21 PROCEDURE — 97112 NEUROMUSCULAR REEDUCATION: CPT | Mod: GP | Performed by: PHYSICAL THERAPIST

## 2022-06-23 ENCOUNTER — THERAPY VISIT (OUTPATIENT)
Dept: PHYSICAL THERAPY | Facility: CLINIC | Age: 52
End: 2022-06-23
Payer: COMMERCIAL

## 2022-06-23 DIAGNOSIS — G89.29 CHRONIC PAIN OF RIGHT KNEE: Primary | ICD-10-CM

## 2022-06-23 DIAGNOSIS — M25.561 CHRONIC PAIN OF RIGHT KNEE: Primary | ICD-10-CM

## 2022-06-23 DIAGNOSIS — Z47.89 AFTERCARE FOLLOWING SURGERY OF THE MUSCULOSKELETAL SYSTEM: ICD-10-CM

## 2022-06-23 PROCEDURE — 97112 NEUROMUSCULAR REEDUCATION: CPT | Mod: GP | Performed by: PHYSICAL THERAPIST

## 2022-06-23 PROCEDURE — 97110 THERAPEUTIC EXERCISES: CPT | Mod: GP | Performed by: PHYSICAL THERAPIST

## 2022-06-28 ENCOUNTER — THERAPY VISIT (OUTPATIENT)
Dept: PHYSICAL THERAPY | Facility: CLINIC | Age: 52
End: 2022-06-28
Payer: COMMERCIAL

## 2022-06-28 DIAGNOSIS — G89.29 CHRONIC PAIN OF RIGHT KNEE: Primary | ICD-10-CM

## 2022-06-28 DIAGNOSIS — M25.561 CHRONIC PAIN OF RIGHT KNEE: Primary | ICD-10-CM

## 2022-06-28 DIAGNOSIS — Z47.89 AFTERCARE FOLLOWING SURGERY OF THE MUSCULOSKELETAL SYSTEM: ICD-10-CM

## 2022-06-28 PROCEDURE — 97112 NEUROMUSCULAR REEDUCATION: CPT | Mod: GP | Performed by: PHYSICAL THERAPIST

## 2022-06-28 PROCEDURE — 97110 THERAPEUTIC EXERCISES: CPT | Mod: GP | Performed by: PHYSICAL THERAPIST

## 2022-06-30 ENCOUNTER — THERAPY VISIT (OUTPATIENT)
Dept: PHYSICAL THERAPY | Facility: CLINIC | Age: 52
End: 2022-06-30
Payer: COMMERCIAL

## 2022-06-30 DIAGNOSIS — G89.29 CHRONIC PAIN OF RIGHT KNEE: Primary | ICD-10-CM

## 2022-06-30 DIAGNOSIS — Z47.89 AFTERCARE FOLLOWING SURGERY OF THE MUSCULOSKELETAL SYSTEM: ICD-10-CM

## 2022-06-30 DIAGNOSIS — M25.561 CHRONIC PAIN OF RIGHT KNEE: Primary | ICD-10-CM

## 2022-06-30 PROCEDURE — 97110 THERAPEUTIC EXERCISES: CPT | Mod: GP | Performed by: PHYSICAL THERAPIST

## 2022-06-30 PROCEDURE — 97112 NEUROMUSCULAR REEDUCATION: CPT | Mod: GP | Performed by: PHYSICAL THERAPIST

## 2022-06-30 PROCEDURE — 97530 THERAPEUTIC ACTIVITIES: CPT | Mod: GP | Performed by: PHYSICAL THERAPIST

## 2022-06-30 NOTE — PROGRESS NOTES
Subjective:      Physical Exam                    Objective:  System    Physical Exam    General     ROS    Assessment/Plan:    PROGRESS  REPORT    Progress reporting period is from 06/07/2022 to 06/30/2022.       SUBJECTIVE  Subjective changes noted by patient: Pt reported that they are doing well, with minimal knee pain. Pt believes slight pain today may be due to Letrozol bc she has noticed generalized joint pain since starting medication. (Pt will follow-up w/ oncologist regarding medication & possible side effect. Feels it doesn't interfere with her daily life.)    Current pain level is: 0-1/10 (verbal pain scale)     Previous pain level was  3/10.   Changes in function:  Yes (See Goal flowsheet attached for changes in current functional level)  Adverse reaction to treatment or activity: None    OBJECTIVE  Changes noted in objective findings:  Yes, please refer directly below.  Objective: Pt is progressing well & performed good recriprocal pattern ascending 7 steps. Also performed descending 7 steps with R leg controlling descent in a step-to-pattern. Pt is also improving in strength & in balance (w/ increasing diffulty of exercises). Pt's gait also improved, no AD necessary, no hip hike observed.    ASSESSMENT/PLAN  Updated problem list and treatment plan: Diagnosis 1: Chronic R knee pain ~ aftercare following surgery  STG/LTGs have been met or progress has been made towards goals:  Yes (See Goal flow sheet completed today.)  Assessment of Progress: The patient's condition is improving.  Self Management Plans:  Patient has been instructed in a home treatment program.  Patient is independent in a home treatment program.  Patient  has been instructed in self management of symptoms.  Patient is independent in self management of symptoms.  I have re-evaluated this patient and find that the nature, scope, duration and intensity of the therapy is appropriate for the medical condition of the patient.  Lachelle  continues to require the following intervention to meet STG and LTG's:  PT    Recommendations:  This patient would benefit from continued therapy.     Frequency:  1 X week, once daily  Duration:  for 6 weeks        Please refer to the daily flowsheet for treatment today, total treatment time and time spent performing 1:1 timed codes.

## 2022-07-06 ENCOUNTER — THERAPY VISIT (OUTPATIENT)
Dept: PHYSICAL THERAPY | Facility: CLINIC | Age: 52
End: 2022-07-06
Payer: COMMERCIAL

## 2022-07-06 DIAGNOSIS — M25.561 CHRONIC PAIN OF RIGHT KNEE: Primary | ICD-10-CM

## 2022-07-06 DIAGNOSIS — G89.29 CHRONIC PAIN OF RIGHT KNEE: Primary | ICD-10-CM

## 2022-07-06 DIAGNOSIS — Z47.89 AFTERCARE FOLLOWING SURGERY OF THE MUSCULOSKELETAL SYSTEM: ICD-10-CM

## 2022-07-06 PROCEDURE — 97112 NEUROMUSCULAR REEDUCATION: CPT | Mod: GP

## 2022-07-06 PROCEDURE — 97110 THERAPEUTIC EXERCISES: CPT | Mod: GP

## 2022-07-15 ENCOUNTER — VIRTUAL VISIT (OUTPATIENT)
Dept: ONCOLOGY | Facility: CLINIC | Age: 52
End: 2022-07-15
Attending: PSYCHOLOGIST
Payer: COMMERCIAL

## 2022-07-15 DIAGNOSIS — Z53.9 ERRONEOUS ENCOUNTER--DISREGARD: Primary | ICD-10-CM

## 2022-07-15 NOTE — LETTER
7/15/2022         RE: Lachelle Duque  [address]  [place] [address]        Dear Colleague,    Thank you for referring your patient, Lachelle Duque, to the [facility]. Please see a copy of my visit note below.      This encounter was opened in error. Please disregard.      Again, thank you for allowing me to participate in the care of your patient.      Sincerely,    Armando Taylor PsyD

## 2022-07-19 ENCOUNTER — THERAPY VISIT (OUTPATIENT)
Dept: PHYSICAL THERAPY | Facility: CLINIC | Age: 52
End: 2022-07-19
Payer: COMMERCIAL

## 2022-07-19 DIAGNOSIS — Z47.89 AFTERCARE FOLLOWING SURGERY OF THE MUSCULOSKELETAL SYSTEM: ICD-10-CM

## 2022-07-19 DIAGNOSIS — M25.561 CHRONIC PAIN OF RIGHT KNEE: Primary | ICD-10-CM

## 2022-07-19 DIAGNOSIS — G89.29 CHRONIC PAIN OF RIGHT KNEE: Primary | ICD-10-CM

## 2022-07-19 PROCEDURE — 97110 THERAPEUTIC EXERCISES: CPT | Mod: GP | Performed by: PHYSICAL THERAPIST

## 2022-07-19 PROCEDURE — 97112 NEUROMUSCULAR REEDUCATION: CPT | Mod: GP | Performed by: PHYSICAL THERAPIST

## 2022-07-28 ENCOUNTER — THERAPY VISIT (OUTPATIENT)
Dept: PHYSICAL THERAPY | Facility: CLINIC | Age: 52
End: 2022-07-28
Payer: COMMERCIAL

## 2022-07-28 DIAGNOSIS — M25.561 CHRONIC PAIN OF RIGHT KNEE: Primary | ICD-10-CM

## 2022-07-28 DIAGNOSIS — G89.29 CHRONIC PAIN OF RIGHT KNEE: Primary | ICD-10-CM

## 2022-07-28 DIAGNOSIS — Z47.89 AFTERCARE FOLLOWING SURGERY OF THE MUSCULOSKELETAL SYSTEM: ICD-10-CM

## 2022-07-28 PROCEDURE — 97530 THERAPEUTIC ACTIVITIES: CPT | Mod: GP | Performed by: PHYSICAL THERAPIST

## 2022-07-28 PROCEDURE — 97112 NEUROMUSCULAR REEDUCATION: CPT | Mod: GP | Performed by: PHYSICAL THERAPIST

## 2022-07-28 PROCEDURE — 97110 THERAPEUTIC EXERCISES: CPT | Mod: GP | Performed by: PHYSICAL THERAPIST

## 2022-08-04 ENCOUNTER — THERAPY VISIT (OUTPATIENT)
Dept: PHYSICAL THERAPY | Facility: CLINIC | Age: 52
End: 2022-08-04
Payer: COMMERCIAL

## 2022-08-04 DIAGNOSIS — M25.561 CHRONIC PAIN OF RIGHT KNEE: Primary | ICD-10-CM

## 2022-08-04 DIAGNOSIS — G89.29 CHRONIC PAIN OF RIGHT KNEE: Primary | ICD-10-CM

## 2022-08-04 DIAGNOSIS — Z47.89 AFTERCARE FOLLOWING SURGERY OF THE MUSCULOSKELETAL SYSTEM: ICD-10-CM

## 2022-08-04 PROCEDURE — 97112 NEUROMUSCULAR REEDUCATION: CPT | Mod: GP | Performed by: PHYSICAL THERAPIST

## 2022-08-04 PROCEDURE — 97110 THERAPEUTIC EXERCISES: CPT | Mod: GP | Performed by: PHYSICAL THERAPIST

## 2022-08-09 ENCOUNTER — THERAPY VISIT (OUTPATIENT)
Dept: PHYSICAL THERAPY | Facility: CLINIC | Age: 52
End: 2022-08-09
Payer: COMMERCIAL

## 2022-08-09 DIAGNOSIS — G89.29 CHRONIC PAIN OF RIGHT KNEE: Primary | ICD-10-CM

## 2022-08-09 DIAGNOSIS — Z47.89 AFTERCARE FOLLOWING SURGERY OF THE MUSCULOSKELETAL SYSTEM: ICD-10-CM

## 2022-08-09 DIAGNOSIS — M25.561 CHRONIC PAIN OF RIGHT KNEE: Primary | ICD-10-CM

## 2022-08-09 PROCEDURE — 97110 THERAPEUTIC EXERCISES: CPT | Mod: GP | Performed by: PHYSICAL THERAPIST

## 2022-08-09 PROCEDURE — 97112 NEUROMUSCULAR REEDUCATION: CPT | Mod: GP | Performed by: PHYSICAL THERAPIST

## 2022-08-09 ASSESSMENT — ACTIVITIES OF DAILY LIVING (ADL)
KNEE_ACTIVITY_OF_DAILY_LIVING_SCORE: 84.29
SWELLING: THE SYMPTOM AFFECTS MY ACTIVITY SLIGHTLY
SQUAT: ACTIVITY IS NOT DIFFICULT
STAND: ACTIVITY IS NOT DIFFICULT
PAIN: I HAVE THE SYMPTOM BUT IT DOES NOT AFFECT MY ACTIVITY
SIT WITH YOUR KNEE BENT: ACTIVITY IS NOT DIFFICULT
KNEEL ON THE FRONT OF YOUR KNEE: I AM UNABLE TO DO THE ACTIVITY
STIFFNESS: THE SYMPTOM AFFECTS MY ACTIVITY SLIGHTLY
RAW_SCORE: 59
WEAKNESS: I DO NOT HAVE THE SYMPTOM
KNEE_ACTIVITY_OF_DAILY_LIVING_SUM: 59
RISE FROM A CHAIR: ACTIVITY IS NOT DIFFICULT
HOW_WOULD_YOU_RATE_THE_OVERALL_FUNCTION_OF_YOUR_KNEE_DURING_YOUR_USUAL_DAILY_ACTIVITIES?: NEARLY NORMAL
GIVING WAY, BUCKLING OR SHIFTING OF KNEE: I DO NOT HAVE THE SYMPTOM
WALK: ACTIVITY IS NOT DIFFICULT
HOW_WOULD_YOU_RATE_THE_CURRENT_FUNCTION_OF_YOUR_KNEE_DURING_YOUR_USUAL_DAILY_ACTIVITIES_ON_A_SCALE_FROM_0_TO_100_WITH_100_BEING_YOUR_LEVEL_OF_KNEE_FUNCTION_PRIOR_TO_YOUR_INJURY_AND_0_BEING_THE_INABILITY_TO_PERFORM_ANY_OF_YOUR_USUAL_DAILY_ACTIVITIES?: 90
GO UP STAIRS: ACTIVITY IS NOT DIFFICULT
AS_A_RESULT_OF_YOUR_KNEE_INJURY,_HOW_WOULD_YOU_RATE_YOUR_CURRENT_LEVEL_OF_DAILY_ACTIVITY?: NEARLY NORMAL
GO DOWN STAIRS: ACTIVITY IS MINIMALLY DIFFICULT
LIMPING: I DO NOT HAVE THE SYMPTOM

## 2022-08-09 NOTE — PROGRESS NOTES
Subjective:  HPI  Physical Exam       Knee Activity of Daily Living Score: 84.29            Objective:  System    Physical Exam    General     ROS    Assessment/Plan:    PROGRESS  REPORT    Progress reporting period is from 6/30/2022 to 8/9/2022.       SUBJECTIVE  Subjective changes noted by patient:   Pt traveled to [place] this past weekend; drives where long and knee stiffened up (along with the entire body); tolerated moving boxes from truck to storage shed.  Currently having no issues with R knee currently    Current pain level is : 0/10.     Previous pain level was  : 3/10.   Changes in function:  Yes (See Goal flowsheet attached for changes in current functional level)  Adverse reaction to treatment or activity: None    OBJECTIVE  Changes noted in objective findings:      Gait: normal,  R knee strength: good,  R knee AROM: 115-5     ASSESSMENT/PLAN  Updated problem list and treatment plan: Diagnosis 1:  S/p R knee arthrscopy  Impaired muscle performance - neuro re-education  Decreased function - therapeutic activities  STG/LTGs have been met or progress has been made towards goals:  Yes (See Goal flow sheet completed today.)  Assessment of Progress: The patient's condition is improving.  The patient's condition has potential to improve.  Self Management Plans:  Patient has been instructed in a home treatment program.  I have re-evaluated this patient and find that the nature, scope, duration and intensity of the therapy is appropriate for the medical condition of the patient.  Lachelle continues to require the following intervention to meet STG and LTG's:  PT    Recommendations:  Patient to continue with HEP and return in 1 month if needed. If patient does not return this will be considered the discharge note.    Please refer to the daily flowsheet for treatment today, total treatment time and time spent performing 1:1 timed codes.

## 2022-08-09 NOTE — LETTER
[facility]  [address] [facility] [address]  [phone]    August 10, 2022    Re: Lachelle Duque   :   1970  MRN:  [id number]   REFERRING PHYSICIAN:   MD R[facility]D [facility]  Date of Initial Evaluation:  2022  Visits:  Rxs Used: 17  Reason for Referral:     Chronic pain of right knee  Aftercare following surgery of the musculoskeletal system    PROGRESS  REPORT  Progress reporting period is from 2022 to 2022.       SUBJECTIVE  Subjective changes noted by patient:   Pt traveled to [place] this past weekend; drives where long and knee stiffened up (along with the entire body); tolerated moving boxes from truck to storage shed.  Currently having no issues with R knee currently    Current pain level is : 0/10.     Previous pain level was  : 3/10.   Changes in function:  Yes (See Goal flowsheet attached for changes in current functional level)  Adverse reaction to treatment or activity: None    OBJECTIVE  Changes noted in objective findings:    Gait: normal,  R knee strength: good,  R knee AROM: 115-5     ASSESSMENT/PLAN  Updated problem list and treatment plan: Diagnosis 1:  S/p R knee arthrscopy  Impaired muscle performance - neuro re-education  Decreased function - therapeutic activities  STG/LTGs have been met or progress has been made towards goals:  Yes (See Goal flow sheet completed today.)  Assessment of Progress: The patient's condition is improving.  The patient's condition has potential to improve.  Self Management Plans:  Patient has been instructed in a home treatment program.  I have re-evaluated this patient and find that the nature, scope, duration and intensity of the therapy is appropriate for the medical condition of the patient.  Lachelle continues to require the following intervention to meet STG and LTG's:  PT    Recommendations:  Patient  to continue with HEP and return in 1 month if needed. If patient does not return this will be considered the discharge note.  Please refer to the daily flowsheet for treatment today, total treatment time and time spent performing 1:1 timed codes.  Re: Lachelle Duque   :   1970      Thank you for your referral.    INQUIRIES  Therapist: Fani Clifton  [address] [address]  Phone: [phone]  Fax: [phone]

## 2022-09-01 ENCOUNTER — ANCILLARY PROCEDURE (OUTPATIENT)
Dept: MAMMOGRAPHY | Facility: CLINIC | Age: 52
End: 2022-09-01
Attending: INTERNAL MEDICINE
Payer: COMMERCIAL

## 2022-09-01 ENCOUNTER — APPOINTMENT (OUTPATIENT)
Dept: LAB | Facility: CLINIC | Age: 52
End: 2022-09-01
Attending: INTERNAL MEDICINE
Payer: COMMERCIAL

## 2022-09-01 ENCOUNTER — ONCOLOGY VISIT (OUTPATIENT)
Dept: ONCOLOGY | Facility: CLINIC | Age: 52
End: 2022-09-01
Attending: INTERNAL MEDICINE
Payer: COMMERCIAL

## 2022-09-01 VITALS
WEIGHT: 256.3 LBS | HEART RATE: 100 BPM | DIASTOLIC BLOOD PRESSURE: 89 MMHG | RESPIRATION RATE: 16 BRPM | SYSTOLIC BLOOD PRESSURE: 131 MMHG | OXYGEN SATURATION: 98 % | BODY MASS INDEX: 41.37 KG/M2 | TEMPERATURE: 98.6 F

## 2022-09-01 DIAGNOSIS — R53.83 OTHER FATIGUE: ICD-10-CM

## 2022-09-01 DIAGNOSIS — E83.52 HYPERCALCEMIA: ICD-10-CM

## 2022-09-01 DIAGNOSIS — Z17.0 MALIGNANT NEOPLASM OF UPPER-OUTER QUADRANT OF LEFT BREAST IN FEMALE, ESTROGEN RECEPTOR POSITIVE (H): ICD-10-CM

## 2022-09-01 DIAGNOSIS — Z79.811 LONG TERM (CURRENT) USE OF AROMATASE INHIBITORS: Primary | ICD-10-CM

## 2022-09-01 DIAGNOSIS — C50.412 MALIGNANT NEOPLASM OF UPPER-OUTER QUADRANT OF LEFT BREAST IN FEMALE, ESTROGEN RECEPTOR POSITIVE (H): ICD-10-CM

## 2022-09-01 DIAGNOSIS — C50.912 INVASIVE DUCTAL CARCINOMA OF BREAST, FEMALE, LEFT (H): ICD-10-CM

## 2022-09-01 LAB
ALBUMIN SERPL BCG-MCNC: 4.4 G/DL (ref 3.5–5.2)
ALP SERPL-CCNC: 116 U/L (ref 35–104)
ALT SERPL W P-5'-P-CCNC: 27 U/L (ref 10–35)
ANION GAP SERPL CALCULATED.3IONS-SCNC: 15 MMOL/L (ref 7–15)
AST SERPL W P-5'-P-CCNC: 27 U/L (ref 10–35)
BASOPHILS # BLD AUTO: 0 10E3/UL (ref 0–0.2)
BASOPHILS NFR BLD AUTO: 1 %
BILIRUB SERPL-MCNC: 0.2 MG/DL
BUN SERPL-MCNC: 18.9 MG/DL (ref 6–20)
CA-I BLD-MCNC: 5 MG/DL (ref 4.4–5.2)
CALCIUM SERPL-MCNC: 10.4 MG/DL (ref 8.6–10)
CHLORIDE SERPL-SCNC: 102 MMOL/L (ref 98–107)
CREAT SERPL-MCNC: 0.61 MG/DL (ref 0.51–0.95)
DEPRECATED CALCIDIOL+CALCIFEROL SERPL-MC: 82 UG/L (ref 20–75)
DEPRECATED HCO3 PLAS-SCNC: 22 MMOL/L (ref 22–29)
EOSINOPHIL # BLD AUTO: 0.2 10E3/UL (ref 0–0.7)
EOSINOPHIL NFR BLD AUTO: 3 %
ERYTHROCYTE [DISTWIDTH] IN BLOOD BY AUTOMATED COUNT: 13.5 % (ref 10–15)
GFR SERPL CREATININE-BSD FRML MDRD: >90 ML/MIN/1.73M2
GLUCOSE SERPL-MCNC: 113 MG/DL (ref 70–99)
HCT VFR BLD AUTO: 40.9 % (ref 35–47)
HGB BLD-MCNC: 13.3 G/DL (ref 11.7–15.7)
IMM GRANULOCYTES # BLD: 0 10E3/UL
IMM GRANULOCYTES NFR BLD: 1 %
IRON BINDING CAPACITY (ROCHE): 331 UG/DL (ref 240–430)
IRON SATN MFR SERPL: 20 % (ref 15–46)
IRON SERPL-MCNC: 66 UG/DL (ref 37–145)
LYMPHOCYTES # BLD AUTO: 1.9 10E3/UL (ref 0.8–5.3)
LYMPHOCYTES NFR BLD AUTO: 29 %
MCH RBC QN AUTO: 28.1 PG (ref 26.5–33)
MCHC RBC AUTO-ENTMCNC: 32.5 G/DL (ref 31.5–36.5)
MCV RBC AUTO: 86 FL (ref 78–100)
MONOCYTES # BLD AUTO: 0.4 10E3/UL (ref 0–1.3)
MONOCYTES NFR BLD AUTO: 7 %
NEUTROPHILS # BLD AUTO: 4 10E3/UL (ref 1.6–8.3)
NEUTROPHILS NFR BLD AUTO: 59 %
NRBC # BLD AUTO: 0 10E3/UL
NRBC BLD AUTO-RTO: 0 /100
PLATELET # BLD AUTO: 263 10E3/UL (ref 150–450)
POTASSIUM SERPL-SCNC: 4.5 MMOL/L (ref 3.4–5.3)
PROT SERPL-MCNC: 7.8 G/DL (ref 6.4–8.3)
RBC # BLD AUTO: 4.74 10E6/UL (ref 3.8–5.2)
SODIUM SERPL-SCNC: 139 MMOL/L (ref 136–145)
VIT B12 SERPL-MCNC: 956 PG/ML (ref 232–1245)
WBC # BLD AUTO: 6.7 10E3/UL (ref 4–11)

## 2022-09-01 PROCEDURE — 82306 VITAMIN D 25 HYDROXY: CPT | Performed by: INTERNAL MEDICINE

## 2022-09-01 PROCEDURE — 36415 COLL VENOUS BLD VENIPUNCTURE: CPT | Performed by: INTERNAL MEDICINE

## 2022-09-01 PROCEDURE — 99214 OFFICE O/P EST MOD 30 MIN: CPT | Performed by: INTERNAL MEDICINE

## 2022-09-01 PROCEDURE — 77066 DX MAMMO INCL CAD BI: CPT | Performed by: RADIOLOGY

## 2022-09-01 PROCEDURE — 80053 COMPREHEN METABOLIC PANEL: CPT | Performed by: INTERNAL MEDICINE

## 2022-09-01 PROCEDURE — 83550 IRON BINDING TEST: CPT | Performed by: INTERNAL MEDICINE

## 2022-09-01 PROCEDURE — G0279 TOMOSYNTHESIS, MAMMO: HCPCS | Performed by: RADIOLOGY

## 2022-09-01 PROCEDURE — 82607 VITAMIN B-12: CPT | Performed by: INTERNAL MEDICINE

## 2022-09-01 PROCEDURE — 83970 ASSAY OF PARATHORMONE: CPT | Performed by: INTERNAL MEDICINE

## 2022-09-01 PROCEDURE — 82330 ASSAY OF CALCIUM: CPT | Performed by: INTERNAL MEDICINE

## 2022-09-01 PROCEDURE — G0463 HOSPITAL OUTPT CLINIC VISIT: HCPCS

## 2022-09-01 PROCEDURE — 85025 COMPLETE CBC W/AUTO DIFF WBC: CPT | Performed by: INTERNAL MEDICINE

## 2022-09-01 RX ORDER — ANASTROZOLE 1 MG/1
1 TABLET ORAL DAILY
Qty: 90 TABLET | Refills: 3 | Status: SHIPPED | OUTPATIENT
Start: 2022-09-01 | End: 2022-09-19

## 2022-09-01 RX ORDER — LANOLIN ALCOHOL/MO/W.PET/CERES
1000 CREAM (GRAM) TOPICAL DAILY
COMMUNITY

## 2022-09-01 RX ORDER — DULOXETIN HYDROCHLORIDE 20 MG/1
20 CAPSULE, DELAYED RELEASE ORAL 2 TIMES DAILY
Qty: 60 CAPSULE | Refills: 1 | Status: SHIPPED | OUTPATIENT
Start: 2022-09-01 | End: 2022-09-21

## 2022-09-01 RX ORDER — PHENOL 1.4 %
AEROSOL, SPRAY (ML) MUCOUS MEMBRANE 2 TIMES DAILY WITH MEALS
COMMUNITY
End: 2022-10-13

## 2022-09-01 RX ORDER — CHOLECALCIFEROL (VITAMIN D3) 50 MCG
1 TABLET ORAL DAILY
COMMUNITY
End: 2022-10-13

## 2022-09-01 ASSESSMENT — PAIN SCALES - GENERAL: PAINLEVEL: NO PAIN (0)

## 2022-09-01 NOTE — NURSING NOTE
"Oncology Rooming Note    September 1, 2022 9:20 AM   Lachelle Duque is a 52 year old female who presents for:    Chief Complaint   Patient presents with     Blood Draw     Labs drawn via  by RN. VS taken.     Oncology Clinic Visit     Breast Ca     Initial Vitals: /89   Pulse 100   Temp 98.6  F (37  C) (Oral)   Resp 16   Wt 116.3 kg (256 lb 4.8 oz)   LMP 10/31/2021   SpO2 98%   BMI 41.37 kg/m   Estimated body mass index is 41.37 kg/m  as calculated from the following:    Height as of 4/19/22: 1.676 m (5' 6\").    Weight as of this encounter: 116.3 kg (256 lb 4.8 oz). Body surface area is 2.33 meters squared.  No Pain (0) Comment: Data Unavailable   Patient's last menstrual period was 10/31/2021.  Allergies reviewed: Yes  Medications reviewed: Yes    Medications: Medication refills not needed today.  Pharmacy name entered into "[employer]:    [facility]  [facility] - [place], [place] - [address]  [facility] - [place], [place] - [address]  [facility]    Clinical concerns: Pt has list of concerns.       Soumya Kate CMA              "

## 2022-09-01 NOTE — LETTER
9/1/2022         RE: Lachelle Duque  [address]  [place] [address]        Dear Colleague,    Thank you for referring your patient, Lachelle Duque, to the [facility]. Please see a copy of my visit note below.    September 1, 2022    Oncology Visit:    I am seeing Ms. Shanta Duque for follow up of a left breast cancer, T1cN1, ER + AR + her2 negative, oncotype Dx 19.      She underwent a screening mammogram on 09/29/2021, which demonstrated possible asymmetry in her left breast at 4-o'clock position.  On 10/08/2021, she underwent a mammogram and ultrasound.  On mammography, there was a spiculated mass, at the 4-o'clock position, measuring 1.3 cm in size.  On ultrasound, the mass measured 1.0 cm in size.  Her lymph nodes were negative by ultrasonography. On 10/15/2021, she underwent a contrast enhanced mammography which demonstrated the lesion to be 1.1 cm.  There are no other suspicious lesions in the rest of her breast.  Also, on 10/15, she underwent a needle biopsy, which demonstrated invasive ductal cancer, grade 1, ER positive, AR positive, HER2 negative. She has not palpated a mass in her breast.  She has not had a prior history of any breast problems.    She met with Dr Ge De La Vega.  She underwent a lumpectomy and sentinel lymph node biopsy.  Pathology revealed a 1.3 cm invasive ductal carcinoma.  1/1 lymph node was positive and measured 2 cm.    Oncotype low at 19.  Labs confirmed postmenopausal.  Completed radiation on 2/2/2022  Started letrozole 2/3/2022    Interval history:  In discussion with Shanta today, she reports significant joint pain and arthragias.  She is wondering what can be done for this.    She has been seeing orthopedics regarding her knees.  She notices painful morning stiffness.  She wants to do everything she can to prevent recurrence but she is worried about how she is feeling.    She had a terrible reaction to zometa with  fevers/chills/arthralgias.    She is coping reasonably well.    ROS: 10 point ROS neg other than the symptoms noted above in the HPI.     PAST MEDICAL HISTORY:       Current Outpatient Medications   Medication     acetaminophen (TYLENOL) 325 MG tablet     cetirizine HCl 10 MG CAPS     ibuprofen (ADVIL/MOTRIN) 600 MG tablet     letrozole (FEMARA) 2.5 MG tablet     omeprazole 20 MG tablet     ondansetron (ZOFRAN-ODT) 4 MG ODT tab     No current facility-administered medications for this visit.     Allergies:  Sulfa drugs     FAMILY HISTORY:  Significant for a maternal aunt with breast cancer.  No history of ovarian cancer.     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  No tobacco use.  .  .     She has never had a breast biopsy previously.     PHYSICAL EXAMINATION:  LMP 10/31/2021   She is a well-appearing woman in no apparent distress.   HEENT: no icterus  NECK: supple  CV: rrr  Lungs: clear  Abd; soft, nt nd + bs  Ext: no edema  Bilateral breast examination: no nodules or masses in either breast, no axillary adenopathy   Reviewed her pathology and imaging personally at our multidisciplinary conference.    Pathology reviewed personally by me.    Breast, LEFT, wire localized lumpectomy:  -INVASIVE BREAST CARCINOMA OF NO SPECIAL TYPE (INVASIVE DUCTAL CARCINOMA), FREDI GRADE 1, size 1.1 cm  -Focal ductal carcinoma in situ (DCIS), nuclear grade 2, solid and cribriform types, comprises < 1% of the tumor volume   -No lymphovascular invasion identified  -Margins are uninvolved by invasive carcinoma  -Invasive carcinoma is > 5 mm from all margins  -Margins are uninvolved by DCIS  -DCIS is 1.5 mm from the closest (posterior) margin  -Focal atypical ductal hyperplasia  -Other findings: Columnar cell change and fibrocystic change (including microcysts with apocrine metaplasia)  -Calcifications associated with invasive carcinoma and benign epithelium  -Prior core  biopsy site changes  -Invasive carcinoma is estrogen receptor positive (98%, strong intensity) and progesterone receptor positive (90%, moderate intensity) by immunohistochemistry and is HER2 non-amplified by FISH (HER2:CEN17 ratio 1.1, see prior core biopsy report [id number])  -See below for tumor synoptic     B(2).  Lymph node, left axillary, sentinel #1, excision:  -Macrometastatic carcinoma in one of one lymph node (1/1)  -Metastatic carcinoma is 1.1 cm in greatest dimension  -Focal extranodal extension is present (extent = 1 mm)  -HER2 FISH results will be reported separately by cytogenetics      Estradiol - 6  FSH - 35    Oncotype Dx 19    We personally reviewed her liver MRI and dexa scan.    A/P:  53 y/o female with a left breast cancer pT1cN1 ER + VT + her2 negative now s/p lumpectomy and SNLB.      1. Breast cancer- Shanta is on active surveillance and remains on letrozole.  She had a mammogram as a new baseline today.   This is unremarkable.    We discussed the pros and cons of continuing on letrozole.  I advised she continue.  We discussed a trial of cymbalta based on the results from Racheal Shea and colleagues with the SWOG clinical trial.  Script sent out.  Side effects discussed.         2. Bone health - Her DEXA scan had lowest T-score of -0.9.   She is on prophylactic zometa IV every six months while on AI.      3. Liver lesions - consistent with adenomas.      4. Fatigue - check B12, ironr     5. Hypercalcemia - recheck calcium and vitamin D    6. Encouraged exercise of at least 150 min per week, healthy diet and limiting alcohol use.     Giovanna Alegria MD     > 30 min were spent in reviewing imaging, pathology, counseling and coordinating care.

## 2022-09-01 NOTE — PROGRESS NOTES
September 1, 2022    Oncology Visit:    I am seeing Ms. Shanta Duque for follow up of a left breast cancer, T1cN1, ER + IN + her2 negative, oncotype Dx 19.      She underwent a screening mammogram on 09/29/2021, which demonstrated possible asymmetry in her left breast at 4-o'clock position.  On 10/08/2021, she underwent a mammogram and ultrasound.  On mammography, there was a spiculated mass, at the 4-o'clock position, measuring 1.3 cm in size.  On ultrasound, the mass measured 1.0 cm in size.  Her lymph nodes were negative by ultrasonography. On 10/15/2021, she underwent a contrast enhanced mammography which demonstrated the lesion to be 1.1 cm.  There are no other suspicious lesions in the rest of her breast.  Also, on 10/15, she underwent a needle biopsy, which demonstrated invasive ductal cancer, grade 1, ER positive, IN positive, HER2 negative. She has not palpated a mass in her breast.  She has not had a prior history of any breast problems.    She met with Dr Ge De La Vega.  She underwent a lumpectomy and sentinel lymph node biopsy.  Pathology revealed a 1.3 cm invasive ductal carcinoma.  1/1 lymph node was positive and measured 2 cm.    Oncotype low at 19.  Labs confirmed postmenopausal.  Completed radiation on 2/2/2022  Started letrozole 2/3/2022    Interval history:  In discussion with Shanta today, she reports significant joint pain and arthragias.  She is wondering what can be done for this.    She has been seeing orthopedics regarding her knees.  She notices painful morning stiffness.  She wants to do everything she can to prevent recurrence but she is worried about how she is feeling.    She had a terrible reaction to zometa with fevers/chills/arthralgias.    She is coping reasonably well.    ROS: 10 point ROS neg other than the symptoms noted above in the HPI.     PAST MEDICAL HISTORY:       Current Outpatient Medications   Medication     acetaminophen (TYLENOL) 325 MG tablet     cetirizine HCl 10 MG CAPS      ibuprofen (ADVIL/MOTRIN) 600 MG tablet     letrozole (FEMARA) 2.5 MG tablet     omeprazole 20 MG tablet     ondansetron (ZOFRAN-ODT) 4 MG ODT tab     No current facility-administered medications for this visit.     Allergies:  Sulfa drugs     FAMILY HISTORY:  Significant for a maternal aunt with breast cancer.  No history of ovarian cancer.     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  .  .     She has never had a breast biopsy previously.     PHYSICAL EXAMINATION:  LMP 10/31/2021   She is a well-appearing woman in no apparent distress.   HEENT: no icterus  NECK: supple  CV: rrr  Lungs: clear  Abd; soft, nt nd + bs  Ext: no edema  Bilateral breast examination: no nodules or masses in either breast, no axillary adenopathy   Reviewed her pathology and imaging personally at our multidisciplinary conference.    Pathology reviewed personally by me.    Breast, LEFT, wire localized lumpectomy:  -INVASIVE BREAST CARCINOMA OF NO SPECIAL TYPE (INVASIVE DUCTAL CARCINOMA), FREDI GRADE 1, size 1.1 cm  -Focal ductal carcinoma in situ (DCIS), nuclear grade 2, solid and cribriform types, comprises < 1% of the tumor volume   -No lymphovascular invasion identified  -Margins are uninvolved by invasive carcinoma  -Invasive carcinoma is > 5 mm from all margins  -Margins are uninvolved by DCIS  -DCIS is 1.5 mm from the closest (posterior) margin  -Focal atypical ductal hyperplasia  -Other findings: Columnar cell change and fibrocystic change (including microcysts with apocrine metaplasia)  -Calcifications associated with invasive carcinoma and benign epithelium  -Prior core biopsy site changes  -Invasive carcinoma is estrogen receptor positive (98%, strong intensity) and progesterone receptor positive (90%, moderate intensity) by immunohistochemistry and is HER2 non-amplified by FISH (HER2:CEN17 ratio 1.1, see prior core biopsy report [id number])  -See  below for tumor synoptic     B(2).  Lymph node, left axillary, sentinel #1, excision:  -Macrometastatic carcinoma in one of one lymph node (1/1)  -Metastatic carcinoma is 1.1 cm in greatest dimension  -Focal extranodal extension is present (extent = 1 mm)  -HER2 FISH results will be reported separately by cytogenetics      Estradiol - 6  FSH - 35    Oncotype Dx 19    We personally reviewed her liver MRI and dexa scan.    A/P:  51 y/o female with a left breast cancer pT1cN1 ER + PA + her2 negative now s/p lumpectomy and SNLB.      1. Breast cancer- Shanta is on active surveillance and remains on letrozole.  She had a mammogram as a new baseline today.   This is unremarkable.    We discussed the pros and cons of continuing on letrozole.  I advised she continue.  We discussed a trial of cymbalta based on the results from Racheal Shea and colleagues with the SWOG clinical trial.  Script sent out.  Side effects discussed.         2. Bone health - Her DEXA scan had lowest T-score of -0.9.   She is on prophylactic zometa IV every six months while on AI.      3. Liver lesions - consistent with adenomas.      4. Fatigue - check B12, ironr     5. Hypercalcemia - recheck calcium and vitamin D    6. Encouraged exercise of at least 150 min per week, healthy diet and limiting alcohol use.     Giovanna Alegria MD     > 30 min were spent in reviewing imaging, pathology, counseling and coordinating care.

## 2022-09-01 NOTE — NURSING NOTE
Chief Complaint   Patient presents with     Blood Draw     Labs drawn via  by RN. VS taken.     Labs drawn with  by rn.  Pt tolerated well.  VS taken.  Pt checked in for next appt.    Kwaku Gusman RN

## 2022-09-02 LAB — PTH-INTACT SERPL-MCNC: 22 PG/ML (ref 15–65)

## 2022-09-06 DIAGNOSIS — Z17.0 MALIGNANT NEOPLASM OF UPPER-OUTER QUADRANT OF LEFT BREAST IN FEMALE, ESTROGEN RECEPTOR POSITIVE (H): Primary | ICD-10-CM

## 2022-09-06 DIAGNOSIS — C50.412 MALIGNANT NEOPLASM OF UPPER-OUTER QUADRANT OF LEFT BREAST IN FEMALE, ESTROGEN RECEPTOR POSITIVE (H): Primary | ICD-10-CM

## 2022-09-06 DIAGNOSIS — Z79.811 LONG TERM (CURRENT) USE OF AROMATASE INHIBITORS: ICD-10-CM

## 2022-09-19 ENCOUNTER — OFFICE VISIT (OUTPATIENT)
Dept: FAMILY MEDICINE | Facility: CLINIC | Age: 52
End: 2022-09-19
Payer: COMMERCIAL

## 2022-09-19 VITALS
RESPIRATION RATE: 19 BRPM | OXYGEN SATURATION: 98 % | HEIGHT: 66 IN | WEIGHT: 258 LBS | DIASTOLIC BLOOD PRESSURE: 64 MMHG | HEART RATE: 80 BPM | BODY MASS INDEX: 41.46 KG/M2 | SYSTOLIC BLOOD PRESSURE: 92 MMHG | TEMPERATURE: 98.3 F

## 2022-09-19 DIAGNOSIS — Z23 HIGH PRIORITY FOR 2019-NCOV VACCINE: ICD-10-CM

## 2022-09-19 DIAGNOSIS — Z71.89 ADVANCED DIRECTIVES, COUNSELING/DISCUSSION: ICD-10-CM

## 2022-09-19 DIAGNOSIS — Z12.11 SCREEN FOR COLON CANCER: ICD-10-CM

## 2022-09-19 DIAGNOSIS — Z13.1 SCREENING FOR DIABETES MELLITUS (DM): ICD-10-CM

## 2022-09-19 DIAGNOSIS — Z82.49 FAMILY HISTORY OF ISCHEMIC HEART DISEASE: ICD-10-CM

## 2022-09-19 DIAGNOSIS — Z13.6 ENCOUNTER FOR SCREENING FOR CORONARY ARTERY DISEASE: ICD-10-CM

## 2022-09-19 DIAGNOSIS — Z11.59 NEED FOR HEPATITIS C SCREENING TEST: ICD-10-CM

## 2022-09-19 DIAGNOSIS — E66.01 CLASS 3 SEVERE OBESITY DUE TO EXCESS CALORIES WITH SERIOUS COMORBIDITY AND BODY MASS INDEX (BMI) OF 40.0 TO 44.9 IN ADULT (H): ICD-10-CM

## 2022-09-19 DIAGNOSIS — Z13.6 CARDIOVASCULAR SCREENING; LDL GOAL LESS THAN 160: ICD-10-CM

## 2022-09-19 DIAGNOSIS — R93.1 ELEVATED CORONARY ARTERY CALCIUM SCORE: ICD-10-CM

## 2022-09-19 DIAGNOSIS — Z13.29 SCREENING FOR THYROID DISORDER: ICD-10-CM

## 2022-09-19 DIAGNOSIS — E66.813 CLASS 3 SEVERE OBESITY DUE TO EXCESS CALORIES WITH SERIOUS COMORBIDITY AND BODY MASS INDEX (BMI) OF 40.0 TO 44.9 IN ADULT (H): ICD-10-CM

## 2022-09-19 DIAGNOSIS — Z00.00 ROUTINE GENERAL MEDICAL EXAMINATION AT A HEALTH CARE FACILITY: Primary | ICD-10-CM

## 2022-09-19 DIAGNOSIS — E78.5 HYPERLIPIDEMIA LDL GOAL <100: ICD-10-CM

## 2022-09-19 PROCEDURE — 0124A COVID-19,PF,PFIZER BOOSTER BIVALENT: CPT | Performed by: NURSE PRACTITIONER

## 2022-09-19 PROCEDURE — 99396 PREV VISIT EST AGE 40-64: CPT | Performed by: NURSE PRACTITIONER

## 2022-09-19 PROCEDURE — 91312 COVID-19,PF,PFIZER BOOSTER BIVALENT: CPT | Performed by: NURSE PRACTITIONER

## 2022-09-19 ASSESSMENT — ENCOUNTER SYMPTOMS
CONSTIPATION: 0
CHILLS: 0
FREQUENCY: 0
HEADACHES: 0
DIZZINESS: 0
NAUSEA: 0
ABDOMINAL PAIN: 0
PALPITATIONS: 0
NERVOUS/ANXIOUS: 0
WEAKNESS: 0
SHORTNESS OF BREATH: 0
BREAST MASS: 0
ARTHRALGIAS: 1
MYALGIAS: 0
PARESTHESIAS: 0
FEVER: 0
DIARRHEA: 0
HEMATOCHEZIA: 0
HEARTBURN: 0
SORE THROAT: 0
JOINT SWELLING: 0
EYE PAIN: 0
COUGH: 0
HEMATURIA: 0
DYSURIA: 0

## 2022-09-19 ASSESSMENT — PAIN SCALES - GENERAL: PAINLEVEL: NO PAIN (0)

## 2022-09-19 NOTE — PATIENT INSTRUCTIONS
PLAN:   1.   Symptomatic therapy suggested: Continue current medications as prescribed.   2.  Orders Placed This Encounter   Procedures    COVID-19,PF,PFIZER BOOSTER BIVALENT 12+Yrs    Hepatitis C Screen Reflex to HCV RNA Quant and Genotype    Lipid panel reflex to direct LDL Fasting    Comprehensive metabolic panel    TSH with free T4 reflex    Colonoscopy Screening  Referral       3. Patient needs to follow up in if no improvement,or sooner if worsening of symptoms or other symptoms develop.  CONSULTATION/REFERRAL to colonoscopy   FUTURE LABS:       - Schedule a fasting blood draw   Schedule coronary calcium scan   I will place order. Please call [phone] to schedule.  Work on weight loss  Regular exercise  Will follow up and/or notify patient of  results via My Chart to determine further need for followup  Follow up office visit in one year for annual health maintenance exam, sooner PRN.

## 2022-09-19 NOTE — PROGRESS NOTES
SUBJECTIVE:   CC: Shanta is an 52 year old who presents for preventive health visit.       Patient has been advised of split billing requirements and indicates understanding: Yes  Healthy Habits:     Getting at least 3 servings of Calcium per day:  Yes    Bi-annual eye exam:  Yes    Dental care twice a year:  Yes    Sleep apnea or symptoms of sleep apnea:  None    Diet:  Regular (no restrictions)    Frequency of exercise:  1 day/week    Duration of exercise:  30-45 minutes    Taking medications regularly:  Yes    Medication side effects:  Other    PHQ-2 Total Score: 0    Additional concerns today:  Yes      Today's PHQ-2 Score:   PHQ-2 ( 1999 Pfizer) 9/19/2022   Q1: Little interest or pleasure in doing things 0   Q2: Feeling down, depressed or hopeless 0   PHQ-2 Score 0   PHQ-2 Total Score (12-17 Years)- Positive if 3 or more points; Administer PHQ-A if positive -   Q1: Little interest or pleasure in doing things Not at all   Q2: Feeling down, depressed or hopeless Not at all   PHQ-2 Score 0       Abuse: Current or Past (Physical, Sexual or Emotional) - No  Do you feel safe in your environment? Yes    Have you ever done Advance Care Planning? (For example, a Health Directive, POLST, or a discussion with a medical provider or your loved ones about your wishes): No, advance care planning information given to patient to review.  Patient plans to discuss their wishes with loved ones or provider.      Social History     Tobacco Use     Smoking status: Never Smoker     Smokeless tobacco: Never Used   Substance Use Topics     Alcohol use: Yes     Alcohol/week: 0.8 standard drinks     Types: 1 Standard drinks or equivalent per week     Comment: occasionally     If you drink alcohol do you typically have >3 drinks per day or >7 drinks per week? No    Alcohol Use 9/19/2022   Prescreen: >3 drinks/day or >7 drinks/week? No   Prescreen: >3 drinks/day or >7 drinks/week? -       Reviewed orders with patient.  Reviewed health  maintenance and updated orders accordingly - Yes  Lab work is in process  Labs reviewed in EPIC  BP Readings from Last 3 Encounters:   09/19/22 92/64   09/01/22 131/89   06/02/22 (!) 146/86    Wt Readings from Last 3 Encounters:   09/19/22 117 kg (258 lb)   09/01/22 116.3 kg (256 lb 4.8 oz)   06/02/22 115.6 kg (254 lb 12.8 oz)                  Patient Active Problem List   Diagnosis     Encounter for other general counseling or advice on contraception     Herpes zoster     Family history of colon cancer     Hyperlipidemia LDL goal <130     Obesity     Vitamin D deficiency     Hiatal hernia     GERD (gastroesophageal reflux disease)     Cholelithiasis     Biliary colic     Environmental allergies     Obesity (BMI 35.0-39.9) with comorbidity (H)     Chronic pain of left knee     Invasive ductal carcinoma of breast, female, left (H)     Right knee pain, unspecified chronicity     Long term (current) use of aromatase inhibitors     Chronic pain of right knee     Aftercare following surgery of the musculoskeletal system     Past Surgical History:   Procedure Laterality Date     ADENOIDECTOMY  1977     ARTHROSCOPY KNEE WITH MENISCAL REPAIR Left 6/16/2020    Procedure: Examination under anesthesia Left knee, Left knee arthroscopy, Left meniscus root repair;  Surgeon: Armando Sunshine MD;  [facility]     ARTHROSCOPY KNEE WITH MENISCAL REPAIR Right 4/19/2022    Procedure: right knee examination under anesthesia, knee arthroscopy, meniscus root repair;  Surgeon: Armando Sunshine MD;  [facility]     COLONOSCOPY N/A 10/27/2015    Procedure: COLONOSCOPY;  Surgeon: Duane, William Charles, MD;  [facility]     COLONOSCOPY WITH CO2 INSUFFLATION N/A 10/27/2015    Procedure: COLONOSCOPY WITH CO2 INSUFFLATION;  Surgeon: Duane, William Charles, MD;  [facility]     HC TOOTH EXTRACTION W/FORCEP      18 yo     LAPAROSCOPIC CHOLECYSTECTOMY  1/30/2013    Procedure: LAPAROSCOPIC CHOLECYSTECTOMY;   Laparoscopic Cholecystectomy;  Surgeon: Cindy Soni MD;  [facility]     LUMPECTOMY BREAST WITH SENTINEL NODE, COMBINED Left 11/10/2021    Procedure: Left wire localized breast lumpectomy with sentinel lymph node biopsy;  Surgeon: Ge De La Vega MD;  [facility]     SINUS SURGERY  1988    Deviated septum     wisdom teeth removed[         Social History     Tobacco Use     Smoking status: Never Smoker     Smokeless tobacco: Never Used   Substance Use Topics     Alcohol use: Yes     Alcohol/week: 0.8 standard drinks     Types: 1 Standard drinks or equivalent per week     Comment: occasionally     Family History   Problem Relation Age of Onset     C.A.D. Father         bypass     Diabetes Father      Cancer - colorectal Father 42     Coronary Artery Disease Father      Gastrointestinal Disease Mother         diverticulosis     Arthritis Mother         fibromyalgia, lupus, rheumatoid     Anesthesia Reaction Mother         nausea     Asthma Mother      Lipids Mother      Diabetes Mother      Macular Degeneration Other      Cancer Other 29        CML     Breast Cancer Other      Colon Polyps Brother      C.A.D. Maternal Grandfather      Cerebrovascular Disease Maternal Grandfather      Coronary Artery Disease Maternal Grandfather      Hypertension Maternal Grandmother      Glaucoma Maternal Grandmother      Gastrointestinal Disease Maternal Grandmother         diverticulosis     Depression Brother      Arthritis Brother         fibromyalgia     Anesthesia Reaction Brother         nausea     Cancer Maternal Aunt      Breast Cancer Maternal Aunt      Coronary Artery Disease Maternal Uncle      Thyroid Disease No family hx of      Prostate Cancer No family hx of          Current Outpatient Medications   Medication Sig Dispense Refill     acetaminophen (TYLENOL) 325 MG tablet Take 2 tablets (650 mg) by mouth every 4 hours as needed for mild pain 50 tablet 1     Boswellia-Glucosamine-Vit D (OSTEO BI-FLEX  ONE PER DAY PO) Take 2 tablets by mouth daily       cetirizine HCl 10 MG CAPS Take one tablet daily.       cyanocobalamin (VITAMIN B-12) 1000 MCG tablet Take 1,000 mcg by mouth daily       DULoxetine (CYMBALTA) 20 MG capsule Take 1 capsule (20 mg) by mouth 2 times daily 60 capsule 1     ibuprofen (ADVIL/MOTRIN) 600 MG tablet Take 1 tablet (600 mg) by mouth every 6 hours as needed for moderate pain 30 tablet 1     letrozole (FEMARA) 2.5 MG tablet Take 1 tablet (2.5 mg) by mouth daily 90 tablet 3     omeprazole 20 MG tablet Take 1 tablet (20 mg) by mouth daily Take 30-60 minutes before a meal. 30 tablet 1     calcium carbonate (OS-DULCE) 600 MG tablet Take by mouth 2 times daily (with meals) (Patient not taking: Reported on 9/19/2022)       vitamin D3 (CHOLECALCIFEROL) 50 mcg (2000 units) tablet Take 1 tablet by mouth daily (Patient not taking: Reported on 9/19/2022)       Allergies   Allergen Reactions     Sulfa Drugs Anaphylaxis       Breast Cancer Screening:    FHS-7:   Breast CA Risk Assessment (FHS-7) 9/29/2021 9/1/2022   Did any of your first-degree relatives have breast or ovarian cancer? No No   Did any of your relatives have bilateral breast cancer? No Unknown   Did any man in your family have breast cancer? No No   Did any woman in your family have breast and ovarian cancer? No No   Did any woman in your family have breast cancer before age 50 y? No No   Do you have 2 or more relatives with breast and/or ovarian cancer? No Yes   Do you have 2 or more relatives with breast and/or bowel cancer? No No       Mammogram Screening: Recommended annual mammography  Pertinent mammograms are reviewed under the imaging tab.    History of abnormal Pap smear: NO - age 30-65 PAP every 5 years with negative HPV co-testing recommended  PAP / HPV Latest Ref Rng & Units 9/20/2019 9/6/2016 8/19/2013   PAP (Historical) - OTHER-NIL, See Result NIL NIL   HPV16 NEG:Negative Negative Negative -   HPV18 NEG:Negative Negative Negative -    HRHPV NEG:Negative Negative Negative -     Reviewed and updated as needed this visit by clinical staff                    Reviewed and updated as needed this visit by Provider                   Past Medical History:   Diagnosis Date     Chronic rhinitis      Environmental allergies 1/18/2013     Gastro-oesophageal reflux disease      GERD (gastroesophageal reflux disease) 1/3/2013     Herpes zoster without mention of complication     L eye      Hiatal hernia      PONV (postoperative nausea and vomiting)     PONV      Past Surgical History:   Procedure Laterality Date     ADENOIDECTOMY  1977     ARTHROSCOPY KNEE WITH MENISCAL REPAIR Left 6/16/2020    Procedure: Examination under anesthesia Left knee, Left knee arthroscopy, Left meniscus root repair;  Surgeon: Armando Sunshine MD;  [facility]     ARTHROSCOPY KNEE WITH MENISCAL REPAIR Right 4/19/2022    Procedure: right knee examination under anesthesia, knee arthroscopy, meniscus root repair;  Surgeon: Armando Sunshine MD;  [facility]     COLONOSCOPY N/A 10/27/2015    Procedure: COLONOSCOPY;  Surgeon: Duane, William Charles, MD;  [facility]     COLONOSCOPY WITH CO2 INSUFFLATION N/A 10/27/2015    Procedure: COLONOSCOPY WITH CO2 INSUFFLATION;  Surgeon: Duane, William Charles, MD;  [facility]     HC TOOTH EXTRACTION W/FORCEP      18 yo     LAPAROSCOPIC CHOLECYSTECTOMY  1/30/2013    Procedure: LAPAROSCOPIC CHOLECYSTECTOMY;  Laparoscopic Cholecystectomy;  Surgeon: Cindy Soni MD;  [facility]     LUMPECTOMY BREAST WITH SENTINEL NODE, COMBINED Left 11/10/2021    Procedure: Left wire localized breast lumpectomy with sentinel lymph node biopsy;  Surgeon: Ge De La Vega MD;  [facility]     SINUS SURGERY  1988    Deviated septum     wisdom teeth removed[         Review of Systems   Constitutional: Negative for chills and fever.   HENT: Negative for congestion, ear pain, hearing loss and sore throat.    Eyes:  "Negative for pain and visual disturbance.   Respiratory: Negative for cough and shortness of breath.    Cardiovascular: Negative for chest pain, palpitations and peripheral edema.   Gastrointestinal: Negative for abdominal pain, constipation, diarrhea, heartburn, hematochezia and nausea.   Breasts:  Negative for tenderness, breast mass and discharge.   Genitourinary: Negative for dysuria, frequency, genital sores, hematuria, pelvic pain, urgency, vaginal bleeding and vaginal discharge.        States is related to letrizol    Musculoskeletal: Positive for arthralgias. Negative for joint swelling and myalgias.        Has some beginnings of trigger finger left thumb   Does not catch completely yet will try voltaren gel and if worsening will refer to orthopedics    Skin: Negative for rash.   Neurological: Negative for dizziness, weakness, headaches and paresthesias.   Psychiatric/Behavioral: Negative for mood changes. The patient is not nervous/anxious.      OBJECTIVE:   BP 92/64   Pulse 80   Temp 98.3  F (36.8  C) (Tympanic)   Resp 19   Ht 1.664 m (5' 5.5\")   Wt 117 kg (258 lb)   LMP 10/31/2021   SpO2 98%   BMI 42.28 kg/m    Physical Exam  GENERAL: healthy, alert, no distress and obese  EYES: Eyes grossly normal to inspection and conjunctivae and sclerae normal  HENT: ear canals and TM's normal, nose and mouth without ulcers or lesions  NECK: no adenopathy, no asymmetry, masses, or scars and thyroid normal to palpation  RESP: lungs clear to auscultation - no rales, rhonchi or wheezes  BREAST: normal without masses, tenderness or nipple discharge and no palpable axillary masses or adenopathy  CV: regular rates and rhythm, no murmur, click or rub, peripheral pulses strong and no peripheral edema  ABDOMEN: soft, nontender, no hepatosplenomegaly, no masses and bowel sounds normal   (female): normal female external genitalia, normal urethral meatus, vaginal mucosa pink, moist, well rugated, and normal " cervix/adnexa/uterus without masses or discharge  MS: no gross musculoskeletal defects noted, no edema  SKIN: no suspicious lesions or rashes  NEURO: Normal strength and tone, mentation intact and speech normal  PSYCH: mentation appears normal, affect normal/bright  LYMPH: no cervical, supraclavicular, axillary, or inguinal adenopathy    Diagnostic Test Results:  Labs reviewed in Epic  Pending orders and results       ASSESSMENT/PLAN:   Lachelle was seen today for physical and imm/inj.    Diagnoses and all orders for this visit:    Routine general medical examination at a health care facility    Need for hepatitis C screening test  -     Hepatitis C Screen Reflex to HCV RNA Quant and Genotype; Future    Screen for colon cancer  -     Colonoscopy Screening  Referral; Future    CARDIOVASCULAR SCREENING; LDL GOAL LESS THAN 160  -     Lipid panel reflex to direct LDL Fasting; Future    Screening for thyroid disorder  -     TSH with free T4 reflex; Future    Screening for diabetes mellitus (DM)  -     Comprehensive metabolic panel; Future    High priority for 2019-nCoV vaccine  -     COVID-19,PF,PFIZER BOOSTER BIVALENT 12+Yrs    Advanced directives, counseling/discussion  Advance care planning was reviewed with patient; patient declined at this time.    Family history of ischemic heart disease  -     -     CT Coronary Calcium Scan; Future    Encounter for screening for coronary artery disease  -     -     CT Coronary Calcium Scan; Future    Class 3 severe obesity due to excess calories with serious comorbidity and body mass index (BMI) of 40.0 to 44.9 in adult (H)  -     CT Coronary Calcium Scan; Future    PLAN:   Patient needs to follow up in if no improvement,or sooner if worsening of symptoms or other symptoms develop.  CONSULTATION/REFERRAL to colonoscopy   FUTURE LABS:       - Schedule a fasting blood draw   Schedule coronary calcium scan   I will place order. Please call [phone] to schedule.  Work on  "weight loss  Regular exercise  Will follow up and/or notify patient of  results via My Chart to determine further need for followup  Follow up office visit in one year for annual health maintenance exam, sooner PRN.      Patient has been advised of split billing requirements and indicates understanding: Yes    COUNSELING:  Reviewed preventive health counseling, as reflected in patient instructions  Special attention given to:        Regular exercise       Healthy diet/nutrition       Immunizations    Vaccinated for: Covid              Osteoporosis prevention/bone health       Colorectal Cancer Screening       Consider Hep C screening for all patients one time for ages 18-79 years       The 10-year ASCVD risk score (Molly HALL Jr., et al., 2013) is: 0.8%    Values used to calculate the score:      Age: 52 years      Sex: Female      Is Non- : No      Diabetic: No      Tobacco smoker: No      Systolic Blood Pressure: 92 mmHg      Is BP treated: No      HDL Cholesterol: 50 mg/dL      Total Cholesterol: 217 mg/dL       Advance Care Planning    Estimated body mass index is 42.28 kg/m  as calculated from the following:    Height as of this encounter: 1.664 m (5' 5.5\").    Weight as of this encounter: 117 kg (258 lb).    Weight management plan: Discussed healthy diet and exercise guidelines    She reports that she has never smoked. She has never used smokeless tobacco.      Counseling Resources:  ATP IV Guidelines  Pooled Cohorts Equation Calculator  Breast Cancer Risk Calculator  BRCA-Related Cancer Risk Assessment: FHS-7 Tool  FRAX Risk Assessment  ICSI Preventive Guidelines  Dietary Guidelines for Americans, 2010  USDA's MyPlate  ASA Prophylaxis  Lung CA Screening    ROSALIND Nazario [facility]  "

## 2022-09-20 ENCOUNTER — TELEPHONE (OUTPATIENT)
Dept: GASTROENTEROLOGY | Facility: CLINIC | Age: 52
End: 2022-09-20

## 2022-09-20 NOTE — TELEPHONE ENCOUNTER
Screening Questions  BLUE  KIND OF PREP RED  LOCATION [review exclusion criteria] GREEN  SEDATION TYPE        Y Are you active on mychart?       Suha Beavers, ROSALIND CNP in [place] FM/IM/PEDS Ordering/Referring Provider?        Pref One What type of coverage do you have?      N Have you had a positive covid test in the last 90 days?     1. 41.6 BMI  [BMI 40+ - review exclusion criteria]    2. Y  Are you able to give consent for your medical care? [IF NO,RN REVIEW]        3. N  Are you taking any prescription pain medications on a routine schedule?        3a.  EXTENDED PREP What kind of prescription?   4. N Do you have any chemical dependencies such as alcohol, street drugs, or methadone?    5. N Do you have any history of post-traumatic stress syndrome, severe anxiety or history of psychosis?      **If yes 3- 5 , please schedule with MAC sedation.**    BMI OVER 40 NEED PAC EVALUATION FOR [facility]          IF YES TO ANY 6 - 10 - HOSPITAL SETTING ONLY.     6.   N Do you need assistance transferring?     7.   N Have you had a heart or lung transplant?    8.   N Are you currently on dialysis?   9.   N Do you use daily home oxygen?   10. N Do you take nitroglycerin?   10a.  If yes, how often?     11. [FEMALES]   Are you currently pregnant?    11a.  If yes, how many weeks? [ Greater than 12 weeks, OR NEEDED]    12. N Do you have Pulmonary Hypertension? *NEED PAC APPT AT [facility]*     13. N [review exclusion criteria]  Do you have any implantable devices in your body (pacemaker, defib, LVAD)?    14. N In the past 6 months, have you had any heart related issues including cardiomyopathy or heart attack?     14a.  If yes, did it require cardiac stenting if so when?     15. N Have you had a stroke or Transient ischemic attack (TIA - aka  mini stroke ) within 6 months?      16. N Do you have mod to severe Obstructive Sleep Apnea?  [Hospital only - Ok at [place]]    17. N Do you have SEVERE AND UNCONTROLLED asthma? *NEED PAC  "APPT AT [facility]*     18. N Are you currently taking any blood thinners?     18a. If yes, inform patient to \"follow up w/ ordering provider for bridging instructions.\"    19. N Do you take the medication Phentermine?    19a. If yes, \"Hold for 7 days before procedure.  Please consult your prescribing provider if you have questions about holding this medication.\"     20. N  Do you have chronic kidney disease?      21. N  Do you have a diagnosis of diabetes?     22. N  On a regular basis do you go 3-5 days between bowel movements?     23.  Preferred LOCAL Pharmacy for Pre Prescription    [ LIST ONLY ONE PHARMACY]        [facility] - [place], [place] - [address], [id number]      - CLOSING REMINDERS -    Informed patient they will need an adult    Cannot take any type of public or medical transportation alone    Conscious Sedation- Needs  for 6 hours after the procedure       MAC/General-Needs  for 24 hours after procedure    Pre-Procedure Covid test to be completed [[facility] PCR Testing Required]    Confirmed Nurse will call to complete assessment       - SCHEDULING DETAILS -    Stephanie  Surgeon    11/1/22  Date of Procedure  Lower Endoscopy [Colonoscopy]  Type of Procedure Scheduled   [facility] Location  EXTENDED PREP - If you answer yes to questions #1, #3, #22 ([place] and CF pts)Which Colonoscopy Prep was Sent?     CS Sedation Type     N PAC / Pre-op Required         Additional comments:            "

## 2022-09-21 ENCOUNTER — MYC MEDICAL ADVICE (OUTPATIENT)
Dept: ONCOLOGY | Facility: CLINIC | Age: 52
End: 2022-09-21

## 2022-09-21 DIAGNOSIS — C50.912 INVASIVE DUCTAL CARCINOMA OF BREAST, FEMALE, LEFT (H): ICD-10-CM

## 2022-09-21 DIAGNOSIS — Z79.811 LONG TERM (CURRENT) USE OF AROMATASE INHIBITORS: ICD-10-CM

## 2022-09-21 RX ORDER — DULOXETIN HYDROCHLORIDE 20 MG/1
20 CAPSULE, DELAYED RELEASE ORAL 2 TIMES DAILY
Qty: 180 CAPSULE | Refills: 3 | Status: SHIPPED | OUTPATIENT
Start: 2022-09-21 | End: 2023-07-26

## 2022-09-21 NOTE — TELEPHONE ENCOUNTER
Cymbalta Refill   Last prescribing provider: Dr Alegria     Last clinic visit date: 9/1/22 Dr Alegria     Any missed appointments or no-shows since last clinic visit?: no     Recommendations for requested medication (if none, N/A): Copied from chart note 9/1/22 Dr Alegria   We discussed the pros and cons of continuing on letrozole. I advised she continue. We discussed a trial of cymbalta based on the results from Racheal Shea and colleagues [place] [facility] [place]. Script sent out. Side effects discussed.       Next clinic visit date: Nelli Almeida 12/2/22    Any other pertinent information (if none, N/A): N/A

## 2022-09-27 ENCOUNTER — ANCILLARY PROCEDURE (OUTPATIENT)
Dept: CT IMAGING | Facility: CLINIC | Age: 52
End: 2022-09-27
Attending: NURSE PRACTITIONER

## 2022-09-27 DIAGNOSIS — Z13.6 ENCOUNTER FOR SCREENING FOR CORONARY ARTERY DISEASE: ICD-10-CM

## 2022-09-27 DIAGNOSIS — E66.813 CLASS 3 SEVERE OBESITY DUE TO EXCESS CALORIES WITH SERIOUS COMORBIDITY AND BODY MASS INDEX (BMI) OF 40.0 TO 44.9 IN ADULT (H): ICD-10-CM

## 2022-09-27 DIAGNOSIS — E66.01 CLASS 3 SEVERE OBESITY DUE TO EXCESS CALORIES WITH SERIOUS COMORBIDITY AND BODY MASS INDEX (BMI) OF 40.0 TO 44.9 IN ADULT (H): ICD-10-CM

## 2022-09-27 DIAGNOSIS — Z82.49 FAMILY HISTORY OF ISCHEMIC HEART DISEASE: ICD-10-CM

## 2022-09-27 PROCEDURE — 75571 CT HRT W/O DYE W/CA TEST: CPT | Performed by: STUDENT IN AN ORGANIZED HEALTH CARE EDUCATION/TRAINING PROGRAM

## 2022-09-28 NOTE — RESULT ENCOUNTER NOTE
Yanelis Duque,    Attached are your test results.  Your coronary calcium scan note elevated score   The Society of Cardiovascular CT (SCCT) CAC guideline recommends the  following:  CAC score 0: statin generally not recommended  CAC score 1-99: moderate intensity statin generally recommended  CAC score 100-299: moderate to high intensity statin + Aspirin 81mg  CAC score >300: high intensity statin + Aspirin 81mg  Would recommend starting you on cholesterol medication and aspirin a day   Want to wait for you fasting labs before I start you on this so I know where you are now. Are you having any shortness of breath as would also consider a stress test     Please contact us if you have any questions.    Suha Beavers, CNP

## 2022-09-29 ENCOUNTER — LAB (OUTPATIENT)
Dept: LAB | Facility: CLINIC | Age: 52
End: 2022-09-29
Payer: COMMERCIAL

## 2022-09-29 DIAGNOSIS — Z13.6 CARDIOVASCULAR SCREENING; LDL GOAL LESS THAN 160: ICD-10-CM

## 2022-09-29 DIAGNOSIS — Z79.811 LONG TERM (CURRENT) USE OF AROMATASE INHIBITORS: ICD-10-CM

## 2022-09-29 DIAGNOSIS — Z11.59 NEED FOR HEPATITIS C SCREENING TEST: ICD-10-CM

## 2022-09-29 DIAGNOSIS — C50.412 MALIGNANT NEOPLASM OF UPPER-OUTER QUADRANT OF LEFT BREAST IN FEMALE, ESTROGEN RECEPTOR POSITIVE (H): ICD-10-CM

## 2022-09-29 DIAGNOSIS — Z13.29 SCREENING FOR THYROID DISORDER: ICD-10-CM

## 2022-09-29 DIAGNOSIS — Z17.0 MALIGNANT NEOPLASM OF UPPER-OUTER QUADRANT OF LEFT BREAST IN FEMALE, ESTROGEN RECEPTOR POSITIVE (H): ICD-10-CM

## 2022-09-29 DIAGNOSIS — Z13.1 SCREENING FOR DIABETES MELLITUS (DM): ICD-10-CM

## 2022-09-29 LAB
ALBUMIN SERPL-MCNC: 3.8 G/DL (ref 3.4–5)
ALP SERPL-CCNC: 129 U/L (ref 40–150)
ALT SERPL W P-5'-P-CCNC: 36 U/L (ref 0–50)
ANION GAP SERPL CALCULATED.3IONS-SCNC: 4 MMOL/L (ref 3–14)
AST SERPL W P-5'-P-CCNC: 18 U/L (ref 0–45)
BILIRUB SERPL-MCNC: 0.3 MG/DL (ref 0.2–1.3)
BUN SERPL-MCNC: 18 MG/DL (ref 7–30)
CA-I BLD-MCNC: 5.1 MG/DL (ref 4.4–5.2)
CALCIUM SERPL-MCNC: 9.9 MG/DL (ref 8.5–10.1)
CHLORIDE BLD-SCNC: 107 MMOL/L (ref 94–109)
CHOLEST SERPL-MCNC: 177 MG/DL
CO2 SERPL-SCNC: 28 MMOL/L (ref 20–32)
CREAT SERPL-MCNC: 0.58 MG/DL (ref 0.52–1.04)
FASTING STATUS PATIENT QL REPORTED: YES
GFR SERPL CREATININE-BSD FRML MDRD: >90 ML/MIN/1.73M2
GLUCOSE BLD-MCNC: 115 MG/DL (ref 70–99)
HDLC SERPL-MCNC: 41 MG/DL
LDLC SERPL CALC-MCNC: 112 MG/DL
NONHDLC SERPL-MCNC: 136 MG/DL
POTASSIUM BLD-SCNC: 4 MMOL/L (ref 3.4–5.3)
PROT SERPL-MCNC: 8 G/DL (ref 6.8–8.8)
SODIUM SERPL-SCNC: 139 MMOL/L (ref 133–144)
TRIGL SERPL-MCNC: 122 MG/DL
TSH SERPL DL<=0.005 MIU/L-ACNC: 1.28 MU/L (ref 0.4–4)

## 2022-09-29 PROCEDURE — 80061 LIPID PANEL: CPT

## 2022-09-29 PROCEDURE — 80053 COMPREHEN METABOLIC PANEL: CPT

## 2022-09-29 PROCEDURE — 84443 ASSAY THYROID STIM HORMONE: CPT

## 2022-09-29 PROCEDURE — 82330 ASSAY OF CALCIUM: CPT

## 2022-09-29 PROCEDURE — 82306 VITAMIN D 25 HYDROXY: CPT

## 2022-09-29 PROCEDURE — 36415 COLL VENOUS BLD VENIPUNCTURE: CPT

## 2022-09-29 PROCEDURE — 86803 HEPATITIS C AB TEST: CPT

## 2022-09-29 RX ORDER — ATORVASTATIN CALCIUM 10 MG/1
10 TABLET, FILM COATED ORAL DAILY
Qty: 90 TABLET | Refills: 3 | Status: SHIPPED | OUTPATIENT
Start: 2022-09-29 | End: 2022-10-13

## 2022-09-29 NOTE — RESULT ENCOUNTER NOTE
Yanelis Duque,    Attached are your test results.  -LDL(bad) cholesterol level is elevated, HDL(good) cholesterol level is low which can increase your heart disease risk.  A diet high in fat and simple carbohydrates, genetics and being overweight can contribute to this. ADVISE: exercising 150 minutes of aerobic exercise per week (30 minutes for 5 days per week or 50 minutes for 3 days per week are options) and eating a low saturated fat/low carbohydrate diet are helpful to improve this. Current guidelines from the American Heart Association support starting a cholesterol lowering medication to lower your heart and stroke disease risk.  I am sending a prescription to your pharmacy: atorvastatin(Lipitor) 10 mg each evening.    Will recheck fasting labs on medication in 8 weeks   -Liver and gallbladder tests (ALT,AST, Alk phos,bilirubin) are normal.  -Kidney function (GFR) is normal.  -Sodium is normal.  -Potassium is normal.  -Calcium is normal.  -Glucose is slight elevated and may be a sign of early diabetes (prediabetes). ADVISE:: eating a low carbohydrate diet, exercising, trying to lose weight (if necessary) and rechecking your glucose level in 12 months.  -TSH (thyroid stimulating hormone) level is normal which indicates normal thyroid function.   Please contact us if you have any questions.    Suha Beavers, CNP

## 2022-09-30 LAB
DEPRECATED CALCIDIOL+CALCIFEROL SERPL-MC: 67 UG/L (ref 20–75)
HCV AB SERPL QL IA: NONREACTIVE

## 2022-09-30 NOTE — RESULT ENCOUNTER NOTE
Yanelis Duque,    Attached are your test results.  CT of chest showed calcification in lung which is not uncommon and not needing further follow up    Please contact us if you have any questions.    Suha Beavers, CNP

## 2022-10-04 NOTE — RESULT ENCOUNTER NOTE
Yanelis Duque,    Attached are your test results.  -Hepatitis C antibody screen test shows no signs of a previous hepatitis C infection.   Please contact us if you have any questions.    Suha Beavers, CNP

## 2022-10-13 ENCOUNTER — OFFICE VISIT (OUTPATIENT)
Dept: CARDIOLOGY | Facility: CLINIC | Age: 52
End: 2022-10-13
Attending: NURSE PRACTITIONER
Payer: COMMERCIAL

## 2022-10-13 VITALS
DIASTOLIC BLOOD PRESSURE: 85 MMHG | SYSTOLIC BLOOD PRESSURE: 126 MMHG | OXYGEN SATURATION: 98 % | WEIGHT: 254.8 LBS | HEART RATE: 92 BPM | HEIGHT: 66 IN | BODY MASS INDEX: 40.95 KG/M2

## 2022-10-13 DIAGNOSIS — R93.1 ELEVATED CORONARY ARTERY CALCIUM SCORE: ICD-10-CM

## 2022-10-13 DIAGNOSIS — E78.5 HYPERLIPIDEMIA LDL GOAL <130: ICD-10-CM

## 2022-10-13 DIAGNOSIS — E78.5 HYPERLIPIDEMIA LDL GOAL <100: ICD-10-CM

## 2022-10-13 DIAGNOSIS — Z82.49 FAMILY HISTORY OF ISCHEMIC HEART DISEASE: ICD-10-CM

## 2022-10-13 DIAGNOSIS — I25.10 CORONARY ARTERY DISEASE INVOLVING NATIVE CORONARY ARTERY OF NATIVE HEART WITHOUT ANGINA PECTORIS: ICD-10-CM

## 2022-10-13 DIAGNOSIS — C50.912 INVASIVE DUCTAL CARCINOMA OF BREAST, FEMALE, LEFT (H): ICD-10-CM

## 2022-10-13 DIAGNOSIS — E66.01 MORBID OBESITY (H): Primary | ICD-10-CM

## 2022-10-13 PROCEDURE — 99204 OFFICE O/P NEW MOD 45 MIN: CPT | Performed by: INTERNAL MEDICINE

## 2022-10-13 RX ORDER — ATORVASTATIN CALCIUM 40 MG/1
40 TABLET, FILM COATED ORAL DAILY
Qty: 90 TABLET | Refills: 3 | Status: SHIPPED | OUTPATIENT
Start: 2022-10-13 | End: 2023-07-27

## 2022-10-13 ASSESSMENT — PAIN SCALES - GENERAL: PAINLEVEL: NO PAIN (0)

## 2022-10-13 NOTE — NURSING NOTE
Chief Complaint   Patient presents with     New Patient     Referred for fam hx of ischemic cardiomyopathy, elevated calcium score. Pt has hx of HLD,obese- BMI 42          Vitals were taken and medications reconciled.     [place], EMT   8:11 AM

## 2022-10-13 NOTE — PROGRESS NOTES
I am delighted to see Lachelle Duque in consultation.The primary encounter diagnosis was Obesity (BMI 35.0-39.9) with comorbidity (H). Diagnoses of Family history of ischemic heart disease, Elevated coronary artery calcium score, Hyperlipidemia LDL goal <130, Invasive ductal carcinoma of breast, female, left (H), Hyperlipidemia LDL goal <100, and Coronary artery disease involving native coronary artery of native heart without angina pectoris were also pertinent to this visit.   As you know, the patient is a 52 year old  female. She   has a past medical history of Chronic rhinitis, Environmental allergies (01/18/2013), Gastro-oesophageal reflux disease, GERD (gastroesophageal reflux disease) (01/03/2013), Herpes zoster without mention of complication, Hiatal hernia, Hyperlipidemia, Obese, and PONV (postoperative nausea and vomiting)..    On this visit, the patient states that she has asymptomatic CAD identified on CT scan.  The patient denies chest pressure/discomfort, dyspnea, palpitations, near-syncope, syncope, orthopnea, paroxysmal nocturnal dyspnea and lower extermity edema.    The patient's cardiovascular risk factors include high cholesterol and positive family history.    The following portions of the patient's history were reviewed and updated as appropriate: allergies, current medications, past family history, past medical history, past social history, past surgical history, and the problem list.    PMH: The patient's past medical history includes:    Past Medical History:   Diagnosis Date     Chronic rhinitis      Environmental allergies 01/18/2013     Gastro-oesophageal reflux disease      GERD (gastroesophageal reflux disease) 01/03/2013     Herpes zoster without mention of complication     L eye      Hiatal hernia      Hyperlipidemia      Obese      PONV (postoperative nausea and vomiting)     PONV      Past Surgical History:   Procedure Laterality Date     ADENOIDECTOMY  1977     ARTHROSCOPY  KNEE WITH MENISCAL REPAIR Left 6/16/2020    Procedure: Examination under anesthesia Left knee, Left knee arthroscopy, Left meniscus root repair;  Surgeon: Armando Sunshine MD;  Location: [facility]     ARTHROSCOPY KNEE WITH MENISCAL REPAIR Right 4/19/2022    Procedure: right knee examination under anesthesia, knee arthroscopy, meniscus root repair;  Surgeon: Armando Sunshine MD;  Location: [facility] OR     COLONOSCOPY N/A 10/27/2015    Procedure: COLONOSCOPY;  Surgeon: Duane, William Charles, MD;  Location: [facility] OR     COLONOSCOPY WITH CO2 INSUFFLATION N/A 10/27/2015    Procedure: COLONOSCOPY WITH CO2 INSUFFLATION;  Surgeon: Duane, William Charles, MD;  Location: [facility] OR     HC TOOTH EXTRACTION W/FORCEP      18 yo     LAPAROSCOPIC CHOLECYSTECTOMY  1/30/2013    Procedure: LAPAROSCOPIC CHOLECYSTECTOMY;  Laparoscopic Cholecystectomy;  Surgeon: Cindy Soni MD;  Location: [facility] OR     LUMPECTOMY BREAST WITH SENTINEL NODE, COMBINED Left 11/10/2021    Procedure: Left wire localized breast lumpectomy with sentinel lymph node biopsy;  Surgeon: Ge De La Vega MD;  Location: [facility] OR     SINUS SURGERY  1988    Deviated septum     wisdom teeth removed[         The patient's medications as of the current encounter are:     Current Outpatient Medications   Medication Sig Dispense Refill     acetaminophen (TYLENOL) 325 MG tablet Take 2 tablets (650 mg) by mouth every 4 hours as needed for mild pain 50 tablet 1     aspirin (ASA) 81 MG EC tablet Take 1 tablet (81 mg) by mouth daily 90 tablet 3     atorvastatin (LIPITOR) 40 MG tablet Take 1 tablet (40 mg) by mouth daily 90 tablet 3     Boswellia-Glucosamine-Vit D (OSTEO BI-FLEX ONE PER DAY PO) Take 2 tablets by mouth daily       cetirizine HCl 10 MG CAPS Take one tablet daily.       cyanocobalamin (VITAMIN B-12) 1000 MCG tablet Take 1,000 mcg by mouth daily       DULoxetine (CYMBALTA) 20 MG capsule Take 1 capsule (20 mg) by mouth 2 times daily 180 capsule 3      ibuprofen (ADVIL/MOTRIN) 600 MG tablet Take 1 tablet (600 mg) by mouth every 6 hours as needed for moderate pain 30 tablet 1     letrozole (FEMARA) 2.5 MG tablet Take 1 tablet (2.5 mg) by mouth daily 90 tablet 3     omeprazole 20 MG tablet Take 1 tablet (20 mg) by mouth daily Take 30-60 minutes before a meal. 30 tablet 1       Labs:     Lab on 09/29/2022   Component Date Value Ref Range Status     TSH 09/29/2022 1.28  0.40 - 4.00 mU/L Final     Sodium 09/29/2022 139  133 - 144 mmol/L Final     Potassium 09/29/2022 4.0  3.4 - 5.3 mmol/L Final     Chloride 09/29/2022 107  94 - 109 mmol/L Final     Carbon Dioxide (CO2) 09/29/2022 28  20 - 32 mmol/L Final     Anion Gap 09/29/2022 4  3 - 14 mmol/L Final     Urea Nitrogen 09/29/2022 18  7 - 30 mg/dL Final     Creatinine 09/29/2022 0.58  0.52 - 1.04 mg/dL Final     Calcium 09/29/2022 9.9  8.5 - 10.1 mg/dL Final     Glucose 09/29/2022 115 (H)  70 - 99 mg/dL Final     Alkaline Phosphatase 09/29/2022 129  40 - 150 U/L Final     AST 09/29/2022 18  0 - 45 U/L Final     ALT 09/29/2022 36  0 - 50 U/L Final     Protein Total 09/29/2022 8.0  6.8 - 8.8 g/dL Final     Albumin 09/29/2022 3.8  3.4 - 5.0 g/dL Final     Bilirubin Total 09/29/2022 0.3  0.2 - 1.3 mg/dL Final     GFR Estimate 09/29/2022 >90  >60 mL/min/1.73m2 Final    Effective December 21, 2021 eGFRcr in adults is calculated using the 2021 CKD-EPI creatinine equation which includes age and gender (Beatriz et al., NEJ, DOI: 10.1056/FEAQxo3771568)     Cholesterol 09/29/2022 177  <200 mg/dL Final     Triglycerides 09/29/2022 122  <150 mg/dL Final     Direct Measure HDL 09/29/2022 41 (L)  >=50 mg/dL Final     LDL Cholesterol Calculated 09/29/2022 112 (H)  <=100 mg/dL Final     Non HDL Cholesterol 09/29/2022 136 (H)  <130 mg/dL Final     Patient Fasting > 8hrs? 09/29/2022 Yes   Final     Hepatitis C Antibody 09/29/2022 Nonreactive  Nonreactive Final     Vitamin D, Total (25-Hydroxy) 09/29/2022 67  20 - 75 ug/L Final     Calcium  Ionized 09/29/2022 5.1  4.4 - 5.2 mg/dL Final       Allergies:    Allergies   Allergen Reactions     Sulfa Drugs Anaphylaxis       Family History:   Family History   Problem Relation Age of Onset     Coronary Artery Disease Mother      Gastrointestinal Disease Mother         diverticulosis     Arthritis Mother         fibromyalgia, lupus, rheumatoid     Anesthesia Reaction Mother         nausea     Asthma Mother      Lipids Mother      Diabetes Mother      C.A.D. Father         bypass     Diabetes Father      Cancer - colorectal Father 42     Coronary Artery Disease Father      Hypertension Maternal Grandmother      Glaucoma Maternal Grandmother      Gastrointestinal Disease Maternal Grandmother         diverticulosis     C.A.D. Maternal Grandfather      Cerebrovascular Disease Maternal Grandfather      Coronary Artery Disease Maternal Grandfather      Coronary Artery Disease Brother      Colon Polyps Brother      Depression Brother      Cancer Maternal Aunt      Breast Cancer Maternal Aunt      Coronary Artery Disease Maternal Uncle      Macular Degeneration Other      Cancer Other 29        CML     Breast Cancer Other      Thyroid Disease No family hx of      Prostate Cancer No family hx of        Psychosocial history:  reports that she has never smoked. She has never used smokeless tobacco. She reports current alcohol use of about 0.8 standard drinks per week. She reports that she does not use drugs.    Review of systems: negative for, palpitations, exertional chest pain or pressure, paroxysmal nocturnal dyspnea, dyspnea on exertion, orthopnea, lower extremity edema, syncope or near-syncope, claudication and exercise intolerance    In addition,   General: No change in weight, sleep or appetite.  Normal energy.  No fever or chills  Eyes: Negative for vision changes or eye problems  ENT: No problems with ears, nose or throat.  No difficulty swallowing.  Resp: No coughing, wheezing or shortness of breath  GI: No  "nausea, vomiting,  heartburn, abdominal pain, diarrhea, constipation or change in bowel habits  : No urinary frequency or dysuria, bladder or kidney problems  Musculoskeletal: No significant muscle or joint pains  Neurologic: No headaches, numbness, tingling, weakness, problems with balance or coordination  Psychiatric: No problems with anxiety, depression or mental health  Heme/immune/allergy: No history of bleeding or clotting problems or anemia.  Endocrine: No history of thyroid disease, diabetes or other endocrine disorders  Skin: No rashes,worrisome lesions or skin problems  Vascular:  No claudication, lifestyle limiting or otherwise; no ischemic rest pain; no non-healing ulcers. No weakness, No loss of sensation    Breast CA receiving radiation      Physical examination  Vitals: /85 (BP Location: Left arm, Patient Position: Chair, Cuff Size: Adult Large)   Pulse 92   Ht 1.669 m (5' 5.71\")   Wt 115.6 kg (254 lb 12.8 oz)   LMP 10/31/2021   SpO2 98%   BMI 41.49 kg/m    BMI= Body mass index is 41.49 kg/m .    In general, the patient is a pleasant female in no apparent distress.    HEENT: Normiocephalic and atraumatic.  PERRLA.  EOMI.  Sclerae white, not injected.  Nares clear.  Pharynx without erythema or exudate.  Dentition intact.    Neck: No adenopathy.  No thyromegaly. Carotids +2/2 bilaterally without bruits.  No jugular venous distension.   Heart:  The PMI is in the 5th ICS in the midclavicular line. There is no heave. Regular rate and rhythm. Normal S1, S2 splits physiologically. No murmur, rub, click, or gallop.    Lungs: Clear to asculation.  No ronchi, wheezes, rales.  No dullness to percussion.   Abdomen: Soft, nontender, nondistended. No organomegaly. No AAA.  No bruits.   Extremities: No clubbing, cyanosis, or edema. The pulses were intact bilaterally.   Neurological: The neurological examination reveal a patient who was oriented to person, place, and time.  The remainder of the " examination was nonfocal.    Cardiac tests include:    Calcium score - 133    Assessment and Plan    1. CAD - start atorvastatin 40 mg once a day   -titrate to 80 mg   - start ECASA 81 mg  2. HL - as above  3. Obesity - weight loss      The patient is to return prn. The patient understood the treatment plan as outlined above.  There were no barriers to learning.      Steve Luevano MD

## 2022-10-13 NOTE — PATIENT INSTRUCTIONS
Thank you for coming to the [facility] at [place]; please note the following instructions:    1. Increase atorvastatin to 40 mg once a day    2. Start aspirin (ASA) 81 MG EC tablet    3. Follow up as needed in cardiology            If you have any questions regarding your visit, please contact your care team:     CARDIOLOGY  TELEPHONE NUMBER   Juanita BARRIGA, Registered Nurse  Soumya MCNAIR, Registered Nurse  Maine ZUÑIGA, Registered Medical Assistant  Halle CARTER, Visit Facilitator [phone] (select option 1)    *After hours: [phone]   For Scheduling Appts:     [phone] (select option 1)    *After hours: [phone]   For the Device Clinic (Pacemakers and ICD's)  Mouna ZAMORA, Registered Nurse   During business hours: [phone]    *After business hours:  [phone] (select option 4)      Normal test result notifications will be released via [employer] or mailed within 7 business days.  All other test results, will be communicated via telephone once reviewed by your cardiologist.    If you need a medication refill, please contact your pharmacy.  Please allow 3 business days for your refill to be completed.    As always, thank you for trusting us with your health care needs!

## 2022-10-24 RX ORDER — BISACODYL 5 MG
TABLET, DELAYED RELEASE (ENTERIC COATED) ORAL
Qty: 4 TABLET | Refills: 0 | Status: SHIPPED | OUTPATIENT
Start: 2022-10-24 | End: 2022-12-09

## 2022-10-25 RX ORDER — SODIUM, POTASSIUM,MAG SULFATES 17.5-3.13G
SOLUTION, RECONSTITUTED, ORAL ORAL
Qty: 354 ML | Refills: 0 | Status: SHIPPED | OUTPATIENT
Start: 2022-10-25 | End: 2022-12-09

## 2022-10-31 ENCOUNTER — ANESTHESIA EVENT (OUTPATIENT)
Dept: SURGERY | Facility: AMBULATORY SURGERY CENTER | Age: 52
End: 2022-10-31
Payer: COMMERCIAL

## 2022-10-31 RX ORDER — HYDRALAZINE HYDROCHLORIDE 20 MG/ML
2.5-5 INJECTION INTRAMUSCULAR; INTRAVENOUS EVERY 10 MIN PRN
Status: CANCELLED | OUTPATIENT
Start: 2022-10-31

## 2022-10-31 RX ORDER — METOPROLOL TARTRATE 1 MG/ML
1-2 INJECTION, SOLUTION INTRAVENOUS EVERY 5 MIN PRN
Status: CANCELLED | OUTPATIENT
Start: 2022-10-31

## 2022-10-31 RX ORDER — ONDANSETRON 2 MG/ML
4 INJECTION INTRAMUSCULAR; INTRAVENOUS EVERY 30 MIN PRN
Status: CANCELLED | OUTPATIENT
Start: 2022-10-31

## 2022-10-31 RX ORDER — ONDANSETRON 4 MG/1
4 TABLET, ORALLY DISINTEGRATING ORAL EVERY 30 MIN PRN
Status: CANCELLED | OUTPATIENT
Start: 2022-10-31

## 2022-10-31 RX ORDER — SODIUM CHLORIDE, SODIUM LACTATE, POTASSIUM CHLORIDE, CALCIUM CHLORIDE 600; 310; 30; 20 MG/100ML; MG/100ML; MG/100ML; MG/100ML
INJECTION, SOLUTION INTRAVENOUS CONTINUOUS
Status: CANCELLED | OUTPATIENT
Start: 2022-10-31

## 2022-11-01 ENCOUNTER — ANESTHESIA (OUTPATIENT)
Dept: SURGERY | Facility: AMBULATORY SURGERY CENTER | Age: 52
End: 2022-11-01
Payer: COMMERCIAL

## 2022-11-01 ENCOUNTER — HOSPITAL ENCOUNTER (OUTPATIENT)
Facility: AMBULATORY SURGERY CENTER | Age: 52
Discharge: HOME | End: 2022-11-01
Attending: SURGERY
Payer: COMMERCIAL

## 2022-11-01 VITALS
HEART RATE: 91 BPM | DIASTOLIC BLOOD PRESSURE: 78 MMHG | OXYGEN SATURATION: 99 % | RESPIRATION RATE: 16 BRPM | TEMPERATURE: 96.4 F | SYSTOLIC BLOOD PRESSURE: 120 MMHG

## 2022-11-01 VITALS — HEART RATE: 79 BPM

## 2022-11-01 DIAGNOSIS — Z12.11 COLON CANCER SCREENING: ICD-10-CM

## 2022-11-01 LAB — COLONOSCOPY: NORMAL

## 2022-11-01 PROCEDURE — G8907 PT DOC NO EVENTS ON DISCHARG: HCPCS

## 2022-11-01 PROCEDURE — 45378 DIAGNOSTIC COLONOSCOPY: CPT

## 2022-11-01 PROCEDURE — G0105 COLORECTAL SCRN; HI RISK IND: HCPCS | Performed by: SURGERY

## 2022-11-01 PROCEDURE — G8918 PT W/O PREOP ORDER IV AB PRO: HCPCS

## 2022-11-01 RX ORDER — LIDOCAINE 40 MG/G
CREAM TOPICAL
Status: DISCONTINUED | OUTPATIENT
Start: 2022-11-01 | End: 2022-11-02 | Stop reason: HOSPADM

## 2022-11-01 RX ORDER — PROCHLORPERAZINE MALEATE 10 MG
10 TABLET ORAL EVERY 6 HOURS PRN
Status: CANCELLED | OUTPATIENT
Start: 2022-11-01

## 2022-11-01 RX ORDER — FLUMAZENIL 0.1 MG/ML
0.2 INJECTION, SOLUTION INTRAVENOUS
Status: CANCELLED | OUTPATIENT
Start: 2022-11-01 | End: 2022-11-02

## 2022-11-01 RX ORDER — NALOXONE HYDROCHLORIDE 0.4 MG/ML
0.4 INJECTION, SOLUTION INTRAMUSCULAR; INTRAVENOUS; SUBCUTANEOUS
Status: CANCELLED | OUTPATIENT
Start: 2022-11-01

## 2022-11-01 RX ORDER — ONDANSETRON 2 MG/ML
4 INJECTION INTRAMUSCULAR; INTRAVENOUS EVERY 6 HOURS PRN
Status: CANCELLED | OUTPATIENT
Start: 2022-11-01

## 2022-11-01 RX ORDER — ONDANSETRON 2 MG/ML
4 INJECTION INTRAMUSCULAR; INTRAVENOUS ONCE
Status: COMPLETED | OUTPATIENT
Start: 2022-11-01 | End: 2022-11-01

## 2022-11-01 RX ORDER — ONDANSETRON 2 MG/ML
4 INJECTION INTRAMUSCULAR; INTRAVENOUS
Status: DISCONTINUED | OUTPATIENT
Start: 2022-11-01 | End: 2022-11-02 | Stop reason: HOSPADM

## 2022-11-01 RX ORDER — PROPOFOL 10 MG/ML
INJECTION, EMULSION INTRAVENOUS PRN
Status: DISCONTINUED | OUTPATIENT
Start: 2022-11-01 | End: 2022-11-01

## 2022-11-01 RX ORDER — SODIUM CHLORIDE, SODIUM LACTATE, POTASSIUM CHLORIDE, CALCIUM CHLORIDE 600; 310; 30; 20 MG/100ML; MG/100ML; MG/100ML; MG/100ML
INJECTION, SOLUTION INTRAVENOUS CONTINUOUS
Status: DISCONTINUED | OUTPATIENT
Start: 2022-11-01 | End: 2022-11-02 | Stop reason: HOSPADM

## 2022-11-01 RX ORDER — NALOXONE HYDROCHLORIDE 0.4 MG/ML
0.2 INJECTION, SOLUTION INTRAMUSCULAR; INTRAVENOUS; SUBCUTANEOUS
Status: CANCELLED | OUTPATIENT
Start: 2022-11-01

## 2022-11-01 RX ORDER — LIDOCAINE HYDROCHLORIDE 20 MG/ML
INJECTION, SOLUTION INFILTRATION; PERINEURAL PRN
Status: DISCONTINUED | OUTPATIENT
Start: 2022-11-01 | End: 2022-11-01

## 2022-11-01 RX ORDER — ONDANSETRON 4 MG/1
4 TABLET, ORALLY DISINTEGRATING ORAL EVERY 6 HOURS PRN
Status: CANCELLED | OUTPATIENT
Start: 2022-11-01

## 2022-11-01 RX ADMIN — ONDANSETRON 4 MG: 2 INJECTION INTRAMUSCULAR; INTRAVENOUS at 12:32

## 2022-11-01 RX ADMIN — PROPOFOL 30 MG: 10 INJECTION, EMULSION INTRAVENOUS at 12:58

## 2022-11-01 RX ADMIN — LIDOCAINE HYDROCHLORIDE 40 MG: 20 INJECTION, SOLUTION INFILTRATION; PERINEURAL at 12:56

## 2022-11-01 RX ADMIN — PROPOFOL 150 MCG/KG/MIN: 10 INJECTION, EMULSION INTRAVENOUS at 12:56

## 2022-11-01 RX ADMIN — SODIUM CHLORIDE, SODIUM LACTATE, POTASSIUM CHLORIDE, CALCIUM CHLORIDE: 600; 310; 30; 20 INJECTION, SOLUTION INTRAVENOUS at 12:52

## 2022-11-01 ASSESSMENT — ENCOUNTER SYMPTOMS: SEIZURES: 0

## 2022-11-01 NOTE — ANESTHESIA CARE TRANSFER NOTE
Patient: Lachelle Duque    Procedure: Procedure(s):  COLONOSCOPY, WITH CO2 INSUFFLATION       Diagnosis: Screen for colon cancer [Z12.11]  Diagnosis Additional Information: No value filed.    Anesthesia Type:   MAC     Note:      Level of Consciousness: awake  Oxygen Supplementation: room air    Independent Airway: airway patency satisfactory and stable    Vital Signs Stable: post-procedure vital signs reviewed and stable  Report to RN Given: handoff report given  Patient transferred to: PACU  Comments: Anesthesia Care Transfer Note    Patient: Lachelle Duque    Transferred to: Phase II    Patient vital signs: stable    Airway: none    Monitors on, breathing spontaneously, report to RN    Ama Nath CRNA  11/1/2022 1:14 PM      Handoff Report: Identifed the Patient, Identified the Reponsible Provider, Reviewed the pertinent medical history, Discussed the surgical course, Reviewed Intra-OP anesthesia mangement and issues during anesthesia, Set expectations for post-procedure period and Allowed opportunity for questions and acknowledgement of understanding      Vitals:  Vitals Value Taken Time   BP     Temp     Pulse     Resp     SpO2         Electronically Signed By: ROSALIND Pierce CRNA  November 1, 2022  1:14 PM

## 2022-11-01 NOTE — ANESTHESIA POSTPROCEDURE EVALUATION
Patient: Lachelle Duque    Procedure: Procedure(s):  COLONOSCOPY, WITH CO2 INSUFFLATION       Anesthesia Type:  MAC    Note:  Disposition: Outpatient   Postop Pain Control: Uneventful            Sign Out: Well controlled pain   PONV: No   Neuro/Psych: Uneventful            Sign Out: Acceptable/Baseline neuro status   Airway/Respiratory: Uneventful            Sign Out: Acceptable/Baseline resp. status   CV/Hemodynamics: Uneventful            Sign Out: Acceptable CV status; No obvious hypovolemia; No obvious fluid overload   Other NRE: NONE   DID A NON-ROUTINE EVENT OCCUR? No           Last vitals:  Vitals Value Taken Time   /78 11/01/22 1333   Temp     Pulse 91 11/01/22 1333   Resp 16 11/01/22 1333   SpO2 99 % 11/01/22 1333       Electronically Signed By: Louie Genao MD  November 1, 2022  2:17 PM

## 2022-11-01 NOTE — ANESTHESIA PREPROCEDURE EVALUATION
Anesthesia Pre-Procedure Evaluation    Patient: Lachelle Duque   MRN: [id number] : 1970        Procedure : Procedure(s):  COLONOSCOPY, WITH CO2 INSUFFLATION          Past Medical History:   Diagnosis Date     Chronic rhinitis      Environmental allergies 2013     Gastro-oesophageal reflux disease      GERD (gastroesophageal reflux disease) 2013     Herpes zoster without mention of complication     L eye      Hiatal hernia      Hyperlipidemia      Obese      PONV (postoperative nausea and vomiting)     PONV      Past Surgical History:   Procedure Laterality Date     ADENOIDECTOMY       ARTHROSCOPY KNEE WITH MENISCAL REPAIR Left 2020    Procedure: Examination under anesthesia Left knee, Left knee arthroscopy, Left meniscus root repair;  Surgeon: Armando Sunshine MD;  [facility]     ARTHROSCOPY KNEE WITH MENISCAL REPAIR Right 2022    Procedure: right knee examination under anesthesia, knee arthroscopy, meniscus root repair;  Surgeon: Armando Sunshine MD;  Location: [facility] OR     COLONOSCOPY N/A 10/27/2015    Procedure: COLONOSCOPY;  Surgeon: Duane, William Charles, MD;  Location: [facility] OR     COLONOSCOPY WITH CO2 INSUFFLATION N/A 10/27/2015    Procedure: COLONOSCOPY WITH CO2 INSUFFLATION;  Surgeon: Duane, William Charles, MD;  Location: [facility] OR     GALLBLADDER SURGERY       HC TOOTH EXTRACTION W/FORCEP      18 yo     LAPAROSCOPIC CHOLECYSTECTOMY  2013    Procedure: LAPAROSCOPIC CHOLECYSTECTOMY;  Laparoscopic Cholecystectomy;  Surgeon: Cindy Soni MD;  Location: [facility] OR     LUMPECTOMY BREAST WITH SENTINEL NODE, COMBINED Left 11/10/2021    Procedure: Left wire localized breast lumpectomy with sentinel lymph node biopsy;  Surgeon: Ge De La Vega MD;  Location: [facility] OR     SINUS SURGERY      Deviated septum     wisdom teeth removed[        Allergies   Allergen Reactions     Sulfa Drugs Anaphylaxis      Social History     Tobacco Use     Smoking  status: Never     Smokeless tobacco: Never   Substance Use Topics     Alcohol use: Yes     Alcohol/week: 0.8 standard drinks     Types: 1 Standard drinks or equivalent per week     Comment: occasionally      Wt Readings from Last 1 Encounters:   10/13/22 115.6 kg (254 lb 12.8 oz)        Anesthesia Evaluation   Pt has had prior anesthetic.     History of anesthetic complications  - PONV.      ROS/MED HX  ENT/Pulmonary:    (-) asthma   Neurologic:    (-) no seizures and no CVA   Cardiovascular:     (+) --CAD (elevated calcium score) --- (-) murmur   METS/Exercise Tolerance: >4 METS    Hematologic:    (-) history of blood clots   Musculoskeletal:       GI/Hepatic:     (+) GERD, Asymptomatic on medication, hiatal hernia, bowel prep,     Renal/Genitourinary:       Endo:     (+) Obesity,  (-) Type II DM   Psychiatric/Substance Use:       Infectious Disease:       Malignancy:       Other:            Physical Exam    Airway        Mallampati: II   TM distance: > 3 FB   Neck ROM: full   Mouth opening: > 3 cm    Respiratory Devices and Support         Dental  no notable dental history         Cardiovascular          Rhythm and rate: regular and normal (-) no murmur    Pulmonary   pulmonary exam normal        breath sounds clear to auscultation           OUTSIDE LABS:  CBC:   Lab Results   Component Value Date    WBC 6.7 09/01/2022    WBC 5.5 04/18/2022    HGB 13.3 09/01/2022    HGB 12.7 04/18/2022    HCT 40.9 09/01/2022    HCT 39.9 04/18/2022     09/01/2022     04/18/2022     BMP:   Lab Results   Component Value Date     09/29/2022     09/01/2022    POTASSIUM 4.0 09/29/2022    POTASSIUM 4.5 09/01/2022    CHLORIDE 107 09/29/2022    CHLORIDE 102 09/01/2022    CO2 28 09/29/2022    CO2 22 09/01/2022    BUN 18 09/29/2022    BUN 18.9 09/01/2022    CR 0.58 09/29/2022    CR 0.61 09/01/2022     (H) 09/29/2022     (H) 09/01/2022     COAGS: No results found for: PTT, INR, FIBR  POC:   Lab Results    Component Value Date    HCG Negative 04/19/2022     HEPATIC:   Lab Results   Component Value Date    ALBUMIN 3.8 09/29/2022    PROTTOTAL 8.0 09/29/2022    ALT 36 09/29/2022    AST 18 09/29/2022    ALKPHOS 129 09/29/2022    BILITOTAL 0.3 09/29/2022     OTHER:   Lab Results   Component Value Date    A1C 5.4 09/10/2018    DULCE 9.9 09/29/2022    LIPASE 52 01/03/2013    TSH 1.28 09/29/2022    T4 1.15 09/09/2015       Anesthesia Plan    ASA Status:  2   NPO Status:  NPO Appropriate    Anesthesia Type: MAC.     - Reason for MAC: immobility needed   Induction: Intravenous, Propofol.   Maintenance: TIVA.        Consents    Anesthesia Plan(s) and associated risks, benefits, and realistic alternatives discussed. Questions answered and patient/representative(s) expressed understanding.    - Discussed:     - Discussed with:  Patient      - Extended Intubation/Ventilatory Support Discussed: No.      - Patient is DNR/DNI Status: No    Use of blood products discussed: No .     Postoperative Care    Pain management: IV analgesics.   PONV prophylaxis: Ondansetron (or other 5HT-3), Background Propofol Infusion     Comments:    Other Comments: Discussed plan for IV anesthetic with native airway. Discussed potential need for conversion to general anesthetic with airway management and potential for recall.              Louie Genao MD

## 2022-11-01 NOTE — H&P
Patient seen for Endoscopy    HPI:  Patient is a 52 year old female with family history of colon cancer. Not taking blood thinning medications. No MI or CVA history. No issues with previous sedation. No recent acute illness.    Review Of Systems    Skin: negative  Ears/Nose/Throat: negative  Respiratory: No shortness of breath, dyspnea on exertion, cough, or hemoptysis  Cardiovascular: negative  Gastrointestinal: negative  Genitourinary: negative  Musculoskeletal: negative  Neurologic: negative  Hematologic/Lymphatic/Immunologic: negative  Endocrine: negative      Past Medical History:   Diagnosis Date     Chronic rhinitis      Environmental allergies 01/18/2013     Gastro-oesophageal reflux disease      GERD (gastroesophageal reflux disease) 01/03/2013     Herpes zoster without mention of complication     L eye      Hiatal hernia      Hyperlipidemia      Obese      PONV (postoperative nausea and vomiting)     PONV       Past Surgical History:   Procedure Laterality Date     ADENOIDECTOMY  1977     ARTHROSCOPY KNEE WITH MENISCAL REPAIR Left 06/16/2020    Procedure: Examination under anesthesia Left knee, Left knee arthroscopy, Left meniscus root repair;  Surgeon: Armando Sunshine MD;  [facility]     ARTHROSCOPY KNEE WITH MENISCAL REPAIR Right 04/19/2022    Procedure: right knee examination under anesthesia, knee arthroscopy, meniscus root repair;  Surgeon: Armando Sunshine MD;  [facility]: [facility]     COLONOSCOPY N/A 10/27/2015    Procedure: COLONOSCOPY;  Surgeon: Duane, William Charles, MD;  Location: [facility]     COLONOSCOPY WITH CO2 INSUFFLATION N/A 10/27/2015    Procedure: COLONOSCOPY WITH CO2 INSUFFLATION;  Surgeon: Duane, William Charles, MD;  Location: [facility]     GALLBLADDER SURGERY       HC TOOTH EXTRACTION W/FORCEP      18 yo     LAPAROSCOPIC CHOLECYSTECTOMY  01/30/2013    Procedure: LAPAROSCOPIC CHOLECYSTECTOMY;  Laparoscopic Cholecystectomy;  Surgeon: Cindy Soni MD;   Location: [facility]     LUMPECTOMY BREAST WITH SENTINEL NODE, COMBINED Left 11/10/2021    Procedure: Left wire localized breast lumpectomy with sentinel lymph node biopsy;  Surgeon: Ge De La Vega MD;  Location: [facility] OR     SINUS SURGERY  1988    Deviated septum     wisdom teeth removed[         Family History   Problem Relation Age of Onset     Coronary Artery Disease Mother      Gastrointestinal Disease Mother         diverticulosis     Arthritis Mother         fibromyalgia, lupus, rheumatoid     Anesthesia Reaction Mother         nausea     Asthma Mother      Lipids Mother      Diabetes Mother      C.A.D. Father         bypass     Diabetes Father      Cancer - colorectal Father 42     Coronary Artery Disease Father      Hypertension Maternal Grandmother      Glaucoma Maternal Grandmother      Gastrointestinal Disease Maternal Grandmother         diverticulosis     C.A.D. Maternal Grandfather      Cerebrovascular Disease Maternal Grandfather      Coronary Artery Disease Maternal Grandfather      Coronary Artery Disease Brother      Colon Polyps Brother      Depression Brother      Cancer Maternal Aunt      Breast Cancer Maternal Aunt      Coronary Artery Disease Maternal Uncle      Macular Degeneration Other      Cancer Other 29        CML     Breast Cancer Other      Thyroid Disease No family hx of      Prostate Cancer No family hx of        Social History     Socioeconomic History     Marital status:      Spouse name: Not on file     Number of children: Not on file     Years of education: Not on file     Highest education level: Not on file   Occupational History     Occupation: RN     Employer: [employer]     Comment: oncology   Tobacco Use     Smoking status: Never     Smokeless tobacco: Never   Vaping Use     Vaping Use: Never used   Substance and Sexual Activity     Alcohol use: Yes     Alcohol/week: 0.8 standard drinks     Types: 1 Standard drinks or equivalent per week     Comment:  occasionally     Drug use: No     Sexual activity: Not Currently     Partners: Male     Birth control/protection: Pill   Other Topics Concern      Service No     Blood Transfusions No     Caffeine Concern No     Occupational Exposure Yes     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 Yes     Comment: dad   Social History Narrative     Not on file     Social Determinants of Health     Financial Resource Strain: Not on file   Food Insecurity: Not on file   Transportation Needs: Not on file   Physical Activity: Not on file   Stress: Not on file   Social Connections: Not on file   Intimate Partner Violence: Not At Risk     Fear of Current or Ex-Partner: No     Emotionally Abused: No     Physically Abused: No     Sexually Abused: No   Housing Stability: Not on file       Current Outpatient Medications   Medication Sig Dispense Refill     bisacodyl (DULCOLAX) 5 MG EC tablet 2 days prior to procedure, take 2 tablets at 4 pm. 1 day prior to procedure, take 2 tablets at 4 pm. For additional instructions refer to your colonoscopy prep instructions. 4 tablet 0     Na Sulfate-K Sulfate-Mg Sulf (SUPREP BOWEL PREP KIT) solution For additional instructions refer to your colonoscopy prep instructions. 354 mL 0     polyethylene glycol (GOLYTELY) 236 g suspension 2 days prior at 5pm, mix and drink half of a jug of Golytely. Drink an 8 oz. glass of Golytely every 15 minutes until half of the jug is gone. Place remainder of Golytely in the refrigerator. 1 day prior at 5 pm, drink the 2nd half of a jug of Golytely bowel prep. 6 hours before your check-in time, drink an 8 oz. glass of Golytely  every 15 minutes until half of the 2nd jug of Golytely is gone. Discard remainder of second jug. 8000 mL 0     acetaminophen (TYLENOL) 325 MG tablet Take 2 tablets (650 mg) by mouth every 4 hours as needed for mild pain 50 tablet 1     aspirin (ASA) 81 MG EC tablet Take 1 tablet (81 mg) by mouth daily 90 tablet 3     atorvastatin (LIPITOR) 40 MG tablet Take 1 tablet (40 mg) by mouth daily 90 tablet 3     Boswellia-Glucosamine-Vit D (OSTEO BI-FLEX ONE PER DAY PO) Take 2 tablets by mouth daily       cetirizine HCl 10 MG CAPS Take one tablet daily.       cyanocobalamin (VITAMIN B-12) 1000 MCG tablet Take 1,000 mcg by mouth daily       DULoxetine (CYMBALTA) 20 MG capsule Take 1 capsule (20 mg) by mouth 2 times daily 180 capsule 3     ibuprofen (ADVIL/MOTRIN) 600 MG tablet Take 1 tablet (600 mg) by mouth every 6 hours as needed for moderate pain 30 tablet 1     letrozole (FEMARA) 2.5 MG tablet Take 1 tablet (2.5 mg) by mouth daily 90 tablet 3     omeprazole 20 MG tablet Take 1 tablet (20 mg) by mouth daily Take 30-60 minutes before a meal. 30 tablet 1       Medications and history reviewed    Physical exam:  Vitals: BP (!) 110/97   Pulse 108   Temp (!) 96.4  F (35.8  C) (Temporal)   LMP 10/31/2021   SpO2 97%   BMI= There is no height or weight on file to calculate BMI.    Constitutional: Healthy, alert, non-distressed   Head: Normo-cephalic, atraumatic, no lesions, masses or tenderness   Cardiovascular: RRR, no new murmurs, +S1, +S2 heart sounds, no clicks, rubs or gallops   Respiratory: CTAB, no rales, rhonchi or wheezing, equal chest rise, good respiratory effort   Gastrointestinal: Soft, non-tender, non distended, no rebound rigidity or guarding, no masses or hernias palpated   : Deferred  Musculoskeletal: Moves all extremities, normal  strength, no deformities noted   Skin: No suspicious lesions or rashes   Psychiatric: Mentation appears normal, affect appropriate   Hematologic/Lymphatic/Immunologic: Normal  cervical and supraclavicular lymph nodes   Patient able to get up on table without difficulty.    Labs show:  No results found for this or any previous visit (from the past 24 hour(s)).    Assessment: Endoscopy  Plan: Pt cleared for anesthesia for proposed procedure.    Yvan Brown, DO

## 2022-11-21 ENCOUNTER — HEALTH MAINTENANCE LETTER (OUTPATIENT)
Age: 52
End: 2022-11-21

## 2022-11-29 PROBLEM — Z47.89 AFTERCARE FOLLOWING SURGERY OF THE MUSCULOSKELETAL SYSTEM: Status: RESOLVED | Noted: 2022-04-26 | Resolved: 2022-11-29

## 2022-11-29 PROBLEM — G89.29 CHRONIC PAIN OF RIGHT KNEE: Status: RESOLVED | Noted: 2022-04-26 | Resolved: 2022-11-29

## 2022-11-29 PROBLEM — M25.561 CHRONIC PAIN OF RIGHT KNEE: Status: RESOLVED | Noted: 2022-04-26 | Resolved: 2022-11-29

## 2022-11-29 NOTE — PROGRESS NOTES
Patient did not return for further treatment and no additional progress was noted.  Please refer to the progress note and goal flowsheet completed on 08/09/22 for discharge information.

## 2022-12-02 ENCOUNTER — ONCOLOGY VISIT (OUTPATIENT)
Dept: ONCOLOGY | Facility: CLINIC | Age: 52
End: 2022-12-02
Attending: PHYSICIAN ASSISTANT
Payer: COMMERCIAL

## 2022-12-02 ENCOUNTER — APPOINTMENT (OUTPATIENT)
Dept: LAB | Facility: CLINIC | Age: 52
End: 2022-12-02
Attending: PHYSICIAN ASSISTANT
Payer: COMMERCIAL

## 2022-12-02 VITALS
TEMPERATURE: 98.3 F | WEIGHT: 249.7 LBS | OXYGEN SATURATION: 97 % | RESPIRATION RATE: 16 BRPM | SYSTOLIC BLOOD PRESSURE: 120 MMHG | DIASTOLIC BLOOD PRESSURE: 86 MMHG | HEART RATE: 96 BPM | BODY MASS INDEX: 40.66 KG/M2

## 2022-12-02 DIAGNOSIS — Z79.811 LONG TERM (CURRENT) USE OF AROMATASE INHIBITORS: ICD-10-CM

## 2022-12-02 DIAGNOSIS — Z79.811 LONG TERM (CURRENT) USE OF AROMATASE INHIBITORS: Primary | ICD-10-CM

## 2022-12-02 DIAGNOSIS — E83.52 SERUM CALCIUM ELEVATED: ICD-10-CM

## 2022-12-02 DIAGNOSIS — C50.912 INVASIVE DUCTAL CARCINOMA OF BREAST, FEMALE, LEFT (H): ICD-10-CM

## 2022-12-02 DIAGNOSIS — E83.52 SERUM CALCIUM ELEVATED: Primary | ICD-10-CM

## 2022-12-02 LAB
ALBUMIN SERPL BCG-MCNC: 4.4 G/DL (ref 3.5–5.2)
CALCIUM SERPL-MCNC: 10.3 MG/DL (ref 8.6–10)
CREAT SERPL-MCNC: 0.63 MG/DL (ref 0.51–0.95)
DEPRECATED CALCIDIOL+CALCIFEROL SERPL-MC: 44 UG/L (ref 20–75)
GFR SERPL CREATININE-BSD FRML MDRD: >90 ML/MIN/1.73M2
HOLD SPECIMEN: NORMAL

## 2022-12-02 PROCEDURE — 82310 ASSAY OF CALCIUM: CPT | Performed by: PHYSICIAN ASSISTANT

## 2022-12-02 PROCEDURE — 96365 THER/PROPH/DIAG IV INF INIT: CPT

## 2022-12-02 PROCEDURE — 82306 VITAMIN D 25 HYDROXY: CPT

## 2022-12-02 PROCEDURE — 82565 ASSAY OF CREATININE: CPT | Performed by: PHYSICIAN ASSISTANT

## 2022-12-02 PROCEDURE — G0463 HOSPITAL OUTPT CLINIC VISIT: HCPCS

## 2022-12-02 PROCEDURE — 250N000011 HC RX IP 250 OP 636: Performed by: PHYSICIAN ASSISTANT

## 2022-12-02 PROCEDURE — 36415 COLL VENOUS BLD VENIPUNCTURE: CPT | Performed by: PHYSICIAN ASSISTANT

## 2022-12-02 PROCEDURE — 99214 OFFICE O/P EST MOD 30 MIN: CPT | Performed by: PHYSICIAN ASSISTANT

## 2022-12-02 PROCEDURE — 258N000003 HC RX IP 258 OP 636: Performed by: PHYSICIAN ASSISTANT

## 2022-12-02 PROCEDURE — 82040 ASSAY OF SERUM ALBUMIN: CPT | Performed by: PHYSICIAN ASSISTANT

## 2022-12-02 PROCEDURE — 82652 VIT D 1 25-DIHYDROXY: CPT

## 2022-12-02 RX ORDER — HEPARIN SODIUM (PORCINE) LOCK FLUSH IV SOLN 100 UNIT/ML 100 UNIT/ML
5 SOLUTION INTRAVENOUS
Status: CANCELLED | OUTPATIENT
Start: 2022-12-04

## 2022-12-02 RX ORDER — ZOLEDRONIC ACID 0.04 MG/ML
4 INJECTION, SOLUTION INTRAVENOUS ONCE
Status: COMPLETED | OUTPATIENT
Start: 2022-12-02 | End: 2022-12-02

## 2022-12-02 RX ORDER — ZOLEDRONIC ACID 0.04 MG/ML
4 INJECTION, SOLUTION INTRAVENOUS ONCE
Status: CANCELLED | OUTPATIENT
Start: 2022-12-04 | End: 2022-12-04

## 2022-12-02 RX ORDER — HEPARIN SODIUM,PORCINE 10 UNIT/ML
5 VIAL (ML) INTRAVENOUS
Status: CANCELLED | OUTPATIENT
Start: 2022-12-04

## 2022-12-02 RX ADMIN — SODIUM CHLORIDE 500 ML: 9 INJECTION, SOLUTION INTRAVENOUS at 10:32

## 2022-12-02 RX ADMIN — ZOLEDRONIC ACID 4 MG: 0.04 INJECTION, SOLUTION INTRAVENOUS at 10:45

## 2022-12-02 NOTE — NURSING NOTE
"Oncology Rooming Note    December 2, 2022 8:52 AM   Lachelle Duque is a 52 year old female who presents for:    Chief Complaint   Patient presents with     Blood Draw     Vitals taken, PIV started, labs drawn, saline locked, checked into next appt     Initial Vitals: /86 (BP Location: Left arm, Patient Position: Sitting, Cuff Size: Adult Large)   Pulse 96   Temp 98.3  F (36.8  C) (Oral)   Resp 16   Wt 113.3 kg (249 lb 11.2 oz)   LMP 10/31/2021   SpO2 97%   BMI 40.66 kg/m   Estimated body mass index is 40.66 kg/m  as calculated from the following:    Height as of 10/13/22: 1.669 m (5' 5.71\").    Weight as of this encounter: 113.3 kg (249 lb 11.2 oz). Body surface area is 2.29 meters squared.  Data Unavailable Comment: Data Unavailable   Patient's last menstrual period was 10/31/2021.  Allergies reviewed: Yes  Medications reviewed: Yes    Medications: Medication refills not needed today.  Pharmacy name entered into iNEW[employer]T:    [facility] - [place], [place] - [address]  [facility] - [place], [place] - [address]  [facility]  [facility] - [place], [place] - [address], [id number]    Clinical concerns: No new clinical concerns other than reason for visit today.      Halle Gutierrez [facility]            "

## 2022-12-02 NOTE — LETTER
12/2/2022      RE: Lachelle Duque  [address]  [place] [address]       Dec 2, 2022        REASON FOR VISIT: h/o left breast cancer, T1cN1, ER + [place] + her2 negative, oncotype Dx 19.      She underwent a screening mammogram on 09/29/2021, which demonstrated possible asymmetry in her left breast at 4-o'clock position.  On 10/08/2021, she underwent a mammogram and ultrasound.  On mammography, there was a spiculated mass, at the 4-o'clock position, measuring 1.3 cm in size.  On ultrasound, the mass measured 1.0 cm in size.  Her lymph nodes were negative by ultrasonography. On 10/15/2021, she underwent a contrast enhanced mammography which demonstrated the lesion to be 1.1 cm.  There are no other suspicious lesions in the rest of her breast.  Also, on 10/15, she underwent a needle biopsy, which demonstrated invasive ductal cancer, grade 1, ER positive, SC positive, HER2 negative. She has not palpated a mass in her breast.  She has not had a prior history of any breast problems.    She met with Dr Ge De La Vega.  She underwent a lumpectomy and sentinel lymph node biopsy.  Pathology revealed a 1.3 cm invasive ductal carcinoma.  1/1 lymph node was positive and measured 2 cm.    Oncotype low at 19.  Labs confirmed postmenopausal.  Completed radiation on 2/2/2022  Started letrozole 2/3/2022    INTERVAL HISTORY:  In discussion with Shanta today, she reports that her prior significant joint pain and arthralgias have improved greatly on Cymbalta.  She is feeling much better.      She does experience hot flashes.  Her mood is good.  She also does report some dry skin, which is not a new issue for her, especially in winter.     She had a reaction to her first Zometa infusion with fevers/chills/ arthralgias.  She did take Tylenol this morning in anticipation of this with her infusion today.     She otherwise feeling well today with no new issues.      She does report ongoing concern that her axillary lymph node that was  removed in surgery was larger than her primary tumor, and wonders if she can/should continue yearly CT screenings.      Her recent colonoscopy and cardiology visits reviewed today.  She has a family history of colon cancer in her father (diagnosed at age 42), so she is screened every 5 years.  She was seen by cardiology and has been started on a daily aspirin as well as atorvastatin.     ROS: 10 point ROS neg other than the symptoms noted above in the HPI.     PAST MEDICAL HISTORY:       Current Outpatient Medications   Medication     acetaminophen (TYLENOL) 325 MG tablet     aspirin (ASA) 81 MG EC tablet     atorvastatin (LIPITOR) 40 MG tablet     bisacodyl (DULCOLAX) 5 MG EC tablet     Boswellia-Glucosamine-Vit D (OSTEO BI-FLEX ONE PER DAY PO)     cetirizine HCl 10 MG CAPS     cyanocobalamin (VITAMIN B-12) 1000 MCG tablet     DULoxetine (CYMBALTA) 20 MG capsule     ibuprofen (ADVIL/MOTRIN) 600 MG tablet     letrozole (FEMARA) 2.5 MG tablet     Na Sulfate-K Sulfate-Mg Sulf (SUPREP BOWEL PREP KIT) solution     omeprazole 20 MG tablet     polyethylene glycol (GOLYTELY) 236 g suspension     No current facility-administered medications for this visit.     Allergies:  Sulfa drugs     FAMILY HISTORY:  Significant for a maternal aunt with breast cancer.  No history of ovarian cancer.     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  No tobacco use.  .  .     She has never had a breast biopsy previously.     PHYSICAL EXAMINATION:  /86 (BP Location: Left arm, Patient Position: Sitting, Cuff Size: Adult Large)   Pulse 96   Temp 98.3  F (36.8  C) (Oral)   Resp 16   Wt 113.3 kg (249 lb 11.2 oz)   LMP 10/31/2021   SpO2 97%   BMI 40.66 kg/m    She is a well-appearing woman in no apparent distress.   HEENT: no icterus  NECK: supple  CV: rrr  Lungs: clear  Abd; soft, nt nd + bs  Ext: no edema  Bilateral breast examination: no nodules or masses in either breast,  "no axillary adenopathy.  Left breast with radiation hyperpigmentation.   12/02/22 08:44   Creatinine 0.63   GFR Estimate >90   Calcium 10.3 (H)   Albumin 4.4         Estradiol - 6  FSH - 35    Oncotype Dx 19    We personally reviewed her liver MRI and dexa scan.    A/P:  53 y/o female with a left breast cancer pT1cN1 ER + [place] + her2 negative now s/p lumpectomy and SNLB.      1. Breast cancer- hSanta is on active surveillance and remains on letrozole.  September Mammo 2022-  This is unremarkable.    She is 60-70% better in regards to her arthralgias on the Cymbalta and is willing to continue on this.  We discussed routine CT scans are not needed for surveillance, but we can get a US of the axillae to ensure no changes.     2. Bone health - Her DEXA scan had lowest T-score of -0.9.   She is on prophylactic zometa IV every six months while on AI.      3. Liver lesions - consistent with adenomas.      4. Hypercalcemia - Calcium is 10. today.  She is not currently taking calcium or vitamin D, and also stopped an \"osteobiflex\" supplement that she was taking that contained calcium.  She does not feel that she consumes a lot of dairy products.  Labs ordered: ionized calcium, PTH related peptide, Vit D screening.  She will be getting Zometa today as previously scheduled.    6. Encouraged exercise of at least 150 min per week, healthy diet and limiting alcohol use.     Nelli Almeida PA-C    > 35 min were spent in reviewing imaging, pathology, counseling and coordinating care.      Bailey Almeida PA-C    "

## 2022-12-02 NOTE — NURSING NOTE
Chief Complaint   Patient presents with     Blood Draw     Vitals taken, PIV started, labs drawn, saline locked, checked into next appt     /86 (BP Location: Left arm, Patient Position: Sitting, Cuff Size: Adult Large)   Pulse 96   Temp 98.3  F (36.8  C) (Oral)   Resp 16   Wt 113.3 kg (249 lb 11.2 oz)   LMP 10/31/2021   SpO2 97%   BMI 40.66 kg/m     Some labs not drawn, patient not fasting. Patient reports she will get them done at  clinic.  Nathaniel Cotton RN on 12/2/2022 at 8:50 AM

## 2022-12-02 NOTE — PROGRESS NOTES
Dec 2, 2022        REASON FOR VISIT: h/o left breast cancer, T1cN1, ER + [place] + her2 negative, oncotype Dx 19.      She underwent a screening mammogram on 09/29/2021, which demonstrated possible asymmetry in her left breast at 4-o'clock position.  On 10/08/2021, she underwent a mammogram and ultrasound.  On mammography, there was a spiculated mass, at the 4-o'clock position, measuring 1.3 cm in size.  On ultrasound, the mass measured 1.0 cm in size.  Her lymph nodes were negative by ultrasonography. On 10/15/2021, she underwent a contrast enhanced mammography which demonstrated the lesion to be 1.1 cm.  There are no other suspicious lesions in the rest of her breast.  Also, on 10/15, she underwent a needle biopsy, which demonstrated invasive ductal cancer, grade 1, ER positive, [place] positive, HER2 negative. She has not palpated a mass in her breast.  She has not had a prior history of any breast problems.    She met with Dr Ge De La Vega.  She underwent a lumpectomy and sentinel lymph node biopsy.  Pathology revealed a 1.3 cm invasive ductal carcinoma.  1/1 lymph node was positive and measured 2 cm.    Oncotype low at 19.  Labs confirmed postmenopausal.  Completed radiation on 2/2/2022  Started letrozole 2/3/2022    INTERVAL HISTORY:  In discussion with Shanta today, she reports that her prior significant joint pain and arthralgias have improved greatly on Cymbalta.  She is feeling much better.      She does experience hot flashes.  Her mood is good.  She also does report some dry skin, which is not a new issue for her, especially in winter.     She had a reaction to her first Zometa infusion with fevers/chills/ arthralgias.  She did take Tylenol this morning in anticipation of this with her infusion today.     She otherwise feeling well today with no new issues.      She does report ongoing concern that her axillary lymph node that was removed in surgery was larger than her primary tumor, and wonders if she can/should  continue yearly CT screenings.      Her recent colonoscopy and cardiology visits reviewed today.  She has a family history of colon cancer in her father (diagnosed at age 42), so she is screened every 5 years.  She was seen by cardiology and has been started on a daily aspirin as well as atorvastatin.     ROS: 10 point ROS neg other than the symptoms noted above in the HPI.     PAST MEDICAL HISTORY:       Current Outpatient Medications   Medication     acetaminophen (TYLENOL) 325 MG tablet     aspirin (ASA) 81 MG EC tablet     atorvastatin (LIPITOR) 40 MG tablet     bisacodyl (DULCOLAX) 5 MG EC tablet     Boswellia-Glucosamine-Vit D (OSTEO BI-FLEX ONE PER DAY PO)     cetirizine HCl 10 MG CAPS     cyanocobalamin (VITAMIN B-12) 1000 MCG tablet     DULoxetine (CYMBALTA) 20 MG capsule     ibuprofen (ADVIL/MOTRIN) 600 MG tablet     letrozole (FEMARA) 2.5 MG tablet     Na Sulfate-K Sulfate-Mg Sulf (SUPREP BOWEL PREP KIT) solution     omeprazole 20 MG tablet     polyethylene glycol (GOLYTELY) 236 g suspension     No current facility-administered medications for this visit.     Allergies:  Sulfa drugs     FAMILY HISTORY:  Significant for a maternal aunt with breast cancer.  No history of ovarian cancer.     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  .  .     She has never had a breast biopsy previously.     PHYSICAL EXAMINATION:  /86 (BP Location: Left arm, Patient Position: Sitting, Cuff Size: Adult Large)   Pulse 96   Temp 98.3  F (36.8  C) (Oral)   Resp 16   Wt 113.3 kg (249 lb 11.2 oz)   LMP 10/31/2021   SpO2 97%   BMI 40.66 kg/m    She is a well-appearing woman in no apparent distress.   HEENT: no icterus  NECK: supple  CV: rrr  Lungs: clear  Abd; soft, nt nd + bs  Ext: no edema  Bilateral breast examination: no nodules or masses in either breast, no axillary adenopathy.  Left breast with radiation hyperpigmentation.   22  "08:44   Creatinine 0.63   GFR Estimate >90   Calcium 10.3 (H)   Albumin 4.4         Estradiol - 6  FSH - 35    Oncotype Dx 19    We personally reviewed her liver MRI and dexa scan.    A/P:  51 y/o female with a left breast cancer pT1cN1 ER + [place] + her2 negative now s/p lumpectomy and SNLB.      1. Breast cancer- Shanta is on active surveillance and remains on letrozole.  September Mammo 2022-  This is unremarkable.    She is 60-70% better in regards to her arthralgias on the Cymbalta and is willing to continue on this.  We discussed routine CT scans are not needed for surveillance, but we can get a US of the axillae to ensure no changes.     2. Bone health - Her DEXA scan had lowest T-score of -0.9.   She is on prophylactic zometa IV every six months while on AI.      3. Liver lesions - consistent with adenomas.      4. Hypercalcemia - Calcium is 10. today.  She is not currently taking calcium or vitamin D, and also stopped an \"osteobiflex\" supplement that she was taking that contained calcium.  She does not feel that she consumes a lot of dairy products.  Labs ordered: ionized calcium, PTH related peptide, Vit D screening.  She will be getting Zometa today as previously scheduled.    6. Encouraged exercise of at least 150 min per week, healthy diet and limiting alcohol use.     Nelli Almeida PA-C    > 35 min were spent in reviewing imaging, pathology, counseling and coordinating care.  "

## 2022-12-02 NOTE — PROGRESS NOTES
Infusion Nursing Note:  Lachelle Duque presents today for Zometa (over 30 minutes)-500ccNS.    Patient seen by provider today: Yes: ELIS Trinidad   present during visit today: Not Applicable.    Note: Pt saw provider prior to infusion, ok for treatment.      Intravenous Access:  Peripheral IV placed in lab.    Treatment Conditions:   Latest Reference Range & Units 12/02/22 08:44   Creatinine 0.51 - 0.95 mg/dL 0.63   GFR Estimate >60 mL/min/1.73m2 >90   Calcium 8.6 - 10.0 mg/dL 10.3 (H)   Albumin 3.5 - 5.2 g/dL 4.4   (H): Data is abnormally high.      Post Infusion Assessment:  Patient tolerated infusion without incident.  Blood return noted pre and post infusion.  Site patent and intact, free from redness, edema or discomfort.  No evidence of extravasations.  Access discontinued per protocol.       Discharge Plan:   Patient declined prescription refills.  Discharge instructions reviewed with: Patient.  Patient and/or family verbalized understanding of discharge instructions and all questions answered.  AVS to patient via [employer].  Patient will return 3/6 to see Dr Alegria.   Patient discharged in stable condition accompanied by: self.  Departure Mode: Ambulatory.    Nadia Barrera RN

## 2022-12-07 LAB — 1,25(OH)2D SERPL-MCNC: 51.4 PG/ML (ref 19.9–79.3)

## 2022-12-09 ENCOUNTER — OFFICE VISIT (OUTPATIENT)
Dept: OPTOMETRY | Facility: CLINIC | Age: 52
End: 2022-12-09
Payer: COMMERCIAL

## 2022-12-09 DIAGNOSIS — Z01.00 EXAMINATION OF EYES AND VISION: Primary | ICD-10-CM

## 2022-12-09 DIAGNOSIS — H52.4 PRESBYOPIA: ICD-10-CM

## 2022-12-09 DIAGNOSIS — H52.03 HYPEROPIA, BILATERAL: ICD-10-CM

## 2022-12-09 DIAGNOSIS — H52.223 REGULAR ASTIGMATISM OF BOTH EYES: ICD-10-CM

## 2022-12-09 PROCEDURE — 92310 CONTACT LENS FITTING OU: CPT | Mod: GA | Performed by: OPTOMETRIST

## 2022-12-09 PROCEDURE — 92015 DETERMINE REFRACTIVE STATE: CPT | Performed by: OPTOMETRIST

## 2022-12-09 PROCEDURE — 92014 COMPRE OPH EXAM EST PT 1/>: CPT | Performed by: OPTOMETRIST

## 2022-12-09 ASSESSMENT — REFRACTION_WEARINGRX
OD_ADD: +2.00
OS_ADD: +2.00
OD_CYLINDER: +1.25
OD_AXIS: 020
OD_SPHERE: +0.75
OS_CYLINDER: +1.25
OS_AXIS: 167
OS_SPHERE: +0.50

## 2022-12-09 ASSESSMENT — VISUAL ACUITY
OD_CC: 20/20
CORRECTION_TYPE: GLASSES
OD_SC: 20/25
OS_CC: 20/25
OS_SC: 20/20
OS_CC+: -1
OS_CC: 20/20-1
OD_CC+: -2
METHOD: SNELLEN - LINEAR
OD_CC: 20/20-1
OD_SC+: -2

## 2022-12-09 ASSESSMENT — SLIT LAMP EXAM - LIDS
COMMENTS: NORMAL
COMMENTS: NORMAL

## 2022-12-09 ASSESSMENT — REFRACTION_MANIFEST
OS_SPHERE: +0.50
OD_SPHERE: +0.75
OS_CYLINDER: +1.00
OD_AXIS: 020
OD_CYLINDER: +1.25
OS_AXIS: 167
OD_ADD: +2.00
OS_ADD: +2.00

## 2022-12-09 ASSESSMENT — REFRACTION_CURRENTRX
OD_ADDL_SPECS: NEAR
OS_BRAND: J&J ACUVUE OASYS FOR ASTIGMATISM BC 8.6, D 14.5
OD_SPHERE: +3.50
OD_SPHERE: +3.00
OS_ADDL_SPECS: DIST
OS_SPHERE: +1.25
OS_CYLINDER: -0.75
OS_BRAND: J&J ACUVUE OASYS FOR ASTIGMATISM BC 8.6, D 14.5
OS_ADDL_SPECS: DIST
OD_AXIS: 110
OS_AXIS: 070
OS_CYLINDER: -0.75
OS_SPHERE: +1.50
OS_AXIS: 070
OD_BRAND: J&J ACUVUE OASYS FOR ASTIGMATISM BC 8.6, D 14.5
OD_CYLINDER: -1.25
OD_AXIS: 110
OD_CYLINDER: -1.25
OD_BRAND: J&J ACUVUE OASYS FOR ASTIGMATISM BC 8.6, D 14.5
OD_ADDL_SPECS: NEAR

## 2022-12-09 ASSESSMENT — KERATOMETRY
OS_AXISANGLE2_DEGREES: 077
OS_K2POWER_DIOPTERS: 45.25
OD_AXISANGLE2_DEGREES: 099
OS_K1POWER_DIOPTERS: 44.50
OD_AXISANGLE_DEGREES: 109
OD_K2POWER_DIOPTERS: 45.50
OS_AXISANGLE_DEGREES: 167
OD_K1POWER_DIOPTERS: 44.25

## 2022-12-09 ASSESSMENT — CONF VISUAL FIELD
OS_SUPERIOR_NASAL_RESTRICTION: 0
OD_NORMAL: 1
OD_INFERIOR_TEMPORAL_RESTRICTION: 0
OD_SUPERIOR_NASAL_RESTRICTION: 0
OD_INFERIOR_NASAL_RESTRICTION: 0
OS_NORMAL: 1
OS_INFERIOR_NASAL_RESTRICTION: 0
OS_SUPERIOR_TEMPORAL_RESTRICTION: 0
OS_INFERIOR_TEMPORAL_RESTRICTION: 0
OD_SUPERIOR_TEMPORAL_RESTRICTION: 0

## 2022-12-09 ASSESSMENT — TONOMETRY
OS_IOP_MMHG: 20
IOP_METHOD: TONOPEN
OD_IOP_MMHG: 21

## 2022-12-09 ASSESSMENT — CUP TO DISC RATIO
OD_RATIO: 0.1
OS_RATIO: 0.1

## 2022-12-09 ASSESSMENT — EXTERNAL EXAM - RIGHT EYE: OD_EXAM: NORMAL

## 2022-12-09 ASSESSMENT — EXTERNAL EXAM - LEFT EYE: OS_EXAM: NORMAL

## 2022-12-09 NOTE — PATIENT INSTRUCTIONS
Eyeglass prescription given.    Contact lens prescription given and form signed.  Trials will be ordered for  and ok to order if satisfied.    Maceal vitamins daily if interested.    Return in 1 year for a complete eye exam or sooner if needed.    Juan Carlos Spann, BREN    The affects of the dilating drops last for 4- 6 hours.  You will be more sensitive to light and vision will be blurry up close.  Do not drive if you do not feel comfortable.  Mydriatic sunglasses were given if needed.      Optometry Providers       Clinic Locations                                 Telephone Number   Dr. Faith Ray   [place] [phone]     [facility] Hours:                [facility] Hours:       [facility] Hours:   [address]   [address] [facility]     [address] [facility]  [place] [address]   [place] [address]    [place] [address]  Phone: [phone]                    Phone: [phone]     Phone: [phone]                      Monday 8:00-6:00                          Monday 8:00-6:00                          Monday 8:00-6:00              Tuesday 8:00-6:00                          Tuesday 8:00-6:00                          Tuesday 8:00-6:00              Wednesday 8:00-6:00                  Wednesday 8:00-6:00                   Wednesday 8:00-6:00      Thursday 8:00-6:00                        Thursday 8:00-6:00                         Thursday 8:00-6:00            Friday 8:00-5:00                              Friday 8:00-5:00                              Friday 8:00-5:00    [facility] Hours:   [address] [facility] [address] [place] [id number] [phone]    Monday 9:00-6:00  Tuesday 9:00-6:00  Wednesday 9:00-6:00  Thursday 9:00-6:00  Friday 9:00-5:00  Please log on to [email or web address] to order your contact lenses.  The link is found on the Eye Care and Vision  Services page.  As always, Thank you for trusting us with your health care needs!                .

## 2022-12-09 NOTE — LETTER
12/9/2022         RE: Lachelle Duque  [address]  [facility] [address]        Dear Colleague,    Thank you for referring your patient, Lachelle Duque, to the [facility]. Please see a copy of my visit note below.    Chief Complaint   Patient presents with     Annual Eye Exam     More contacts fit fee $75 ok         Previous contact lens wearer? Yes: oasys   Comfort of contact lenses :good  Satisfied with current lenses: Yes        Last Eye Exam: 11-  Dilated Previously: Yes    What are you currently using to see?  glasses and contacts    Distance Vision Acuity: Satisfied with vision    Near Vision Acuity: Not satisfied     Eye Comfort: good  Do you use eye drops? 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  Grandma had macular degeneration.    Going to [place] to visit friend who recently moved there.    Beatrice Michaels Optometric Assistant, A.B.O.C.     Medical, surgical and family histories reviewed and updated 12/9/2022.       OBJECTIVE: See Ophthalmology exam    ASSESSMENT:    ICD-10-CM    1. Examination of eyes and vision  Z01.00 EYE EXAM (SIMPLE-NONBILLABLE)      2. Hyperopia, bilateral  H52.03 REFRACTION     CONTACT LENS FITTING,BILAT w/ signed waiver      3. Regular astigmatism of both eyes  H52.223 REFRACTION     CONTACT LENS FITTING,BILAT w/ signed waiver      4. Presbyopia  H52.4 REFRACTION     CONTACT LENS FITTING,BILAT w/ signed waiver         PLAN:     Patient Instructions   Eyeglass prescription given.    Contact lens prescription given and form signed.  Trials will be ordered for  and ok to order if satisfied.    [employer] vitamins daily if interested.    Return in 1 year for a complete eye exam or sooner if needed.    Juan Carlos Spann, OD              .                         Again, thank you for allowing me to participate in the care of your  patient.        Sincerely,        Juan Carlos Spann, OD

## 2022-12-09 NOTE — PROGRESS NOTES
Chief Complaint   Patient presents with     Annual Eye Exam     More contacts fit fee $75 ok         Previous contact lens wearer? Yes: oasys   Comfort of contact lenses :good  Satisfied with current lenses: Yes        Last Eye Exam: 11-  Dilated Previously: Yes    What are you currently using to see?  glasses and contacts    Distance Vision Acuity: Satisfied with vision    Near Vision Acuity: Not satisfied     Eye Comfort: good  Do you use eye drops? 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  Grandma had macular degeneration.    Going to [place] to visit friend who recently moved there.    Beatrice Michaels Optometric Assistant, A.B.O.C.     Medical, surgical and family histories reviewed and updated 12/9/2022.       OBJECTIVE: See Ophthalmology exam    ASSESSMENT:    ICD-10-CM    1. Examination of eyes and vision  Z01.00 EYE EXAM (SIMPLE-NONBILLABLE)      2. Hyperopia, bilateral  H52.03 REFRACTION     CONTACT LENS FITTING,BILAT w/ signed waiver      3. Regular astigmatism of both eyes  H52.223 REFRACTION     CONTACT LENS FITTING,BILAT w/ signed waiver      4. Presbyopia  H52.4 REFRACTION     CONTACT LENS FITTING,BILAT w/ signed waiver         PLAN:     Patient Instructions   Eyeglass prescription given.    Contact lens prescription given and form signed.  Trials will be ordered for  and ok to order if satisfied.    [employer] vitamins daily if interested.    Return in 1 year for a complete eye exam or sooner if needed.    Juan Carlos Spann, OD              .

## 2022-12-12 ENCOUNTER — LAB (OUTPATIENT)
Dept: LAB | Facility: CLINIC | Age: 52
End: 2022-12-12
Payer: COMMERCIAL

## 2022-12-12 ENCOUNTER — ANCILLARY PROCEDURE (OUTPATIENT)
Dept: ULTRASOUND IMAGING | Facility: CLINIC | Age: 52
End: 2022-12-12
Attending: PHYSICIAN ASSISTANT
Payer: COMMERCIAL

## 2022-12-12 DIAGNOSIS — C50.912 INVASIVE DUCTAL CARCINOMA OF BREAST, FEMALE, LEFT (H): ICD-10-CM

## 2022-12-12 DIAGNOSIS — E83.52 SERUM CALCIUM ELEVATED: ICD-10-CM

## 2022-12-12 DIAGNOSIS — E78.5 HYPERLIPIDEMIA LDL GOAL <100: ICD-10-CM

## 2022-12-12 DIAGNOSIS — R93.1 ELEVATED CORONARY ARTERY CALCIUM SCORE: ICD-10-CM

## 2022-12-12 LAB
ALBUMIN SERPL-MCNC: 3.8 G/DL (ref 3.4–5)
ALP SERPL-CCNC: 147 U/L (ref 40–150)
ALT SERPL W P-5'-P-CCNC: 26 U/L (ref 0–50)
AST SERPL W P-5'-P-CCNC: 15 U/L (ref 0–45)
BILIRUB DIRECT SERPL-MCNC: <0.1 MG/DL (ref 0–0.2)
BILIRUB SERPL-MCNC: 0.2 MG/DL (ref 0.2–1.3)
CA-I BLD-MCNC: 4.8 MG/DL (ref 4.4–5.2)
CHOLEST SERPL-MCNC: 135 MG/DL
FASTING STATUS PATIENT QL REPORTED: YES
HDLC SERPL-MCNC: 45 MG/DL
LDLC SERPL CALC-MCNC: 73 MG/DL
NONHDLC SERPL-MCNC: 90 MG/DL
PROT SERPL-MCNC: 7.9 G/DL (ref 6.8–8.8)
TRIGL SERPL-MCNC: 86 MG/DL

## 2022-12-12 PROCEDURE — 80076 HEPATIC FUNCTION PANEL: CPT

## 2022-12-12 PROCEDURE — 80061 LIPID PANEL: CPT

## 2022-12-12 PROCEDURE — 82330 ASSAY OF CALCIUM: CPT

## 2022-12-12 PROCEDURE — 82542 COL CHROMOTOGRAPHY QUAL/QUAN: CPT | Mod: 90

## 2022-12-12 PROCEDURE — 76882 US LMTD JT/FCL EVL NVASC XTR: CPT | Mod: LT | Performed by: RADIOLOGY

## 2022-12-12 PROCEDURE — 99000 SPECIMEN HANDLING OFFICE-LAB: CPT

## 2022-12-12 PROCEDURE — 36415 COLL VENOUS BLD VENIPUNCTURE: CPT

## 2022-12-12 NOTE — RESULT ENCOUNTER NOTE
Yanelis Duque,    Attached are your test results.  -Cholesterol levels are at your goal levels.  ADVISE: continuing your medication, a regular exercise program with at least 150 minutes of aerobic exercise per week, and eating a low saturated fat/low carbohydrate diet.  Also, you should recheck this fasting cholesterol panel in 12 months.  -Liver and gallbladder tests (ALT,AST, Alk phos,bilirubin) are normal.   Please contact us if you have any questions.    Suha Beavers, CNP

## 2022-12-31 LAB — PTH RELATED PROT SERPL-SCNC: <2 PMOL/L

## 2023-01-03 DIAGNOSIS — C50.412 MALIGNANT NEOPLASM OF UPPER-OUTER QUADRANT OF LEFT BREAST IN FEMALE, ESTROGEN RECEPTOR POSITIVE (H): ICD-10-CM

## 2023-01-03 DIAGNOSIS — Z79.811 LONG TERM (CURRENT) USE OF AROMATASE INHIBITORS: ICD-10-CM

## 2023-01-03 DIAGNOSIS — Z17.0 MALIGNANT NEOPLASM OF UPPER-OUTER QUADRANT OF LEFT BREAST IN FEMALE, ESTROGEN RECEPTOR POSITIVE (H): ICD-10-CM

## 2023-01-03 RX ORDER — LETROZOLE 2.5 MG/1
2.5 TABLET, FILM COATED ORAL DAILY
Qty: 90 TABLET | Refills: 3 | Status: SHIPPED | OUTPATIENT
Start: 2023-01-03 | End: 2023-11-16

## 2023-01-04 ENCOUNTER — VIRTUAL VISIT (OUTPATIENT)
Dept: URGENT CARE | Facility: CLINIC | Age: 53
End: 2023-01-04
Payer: COMMERCIAL

## 2023-01-04 ENCOUNTER — MYC MEDICAL ADVICE (OUTPATIENT)
Dept: ONCOLOGY | Facility: CLINIC | Age: 53
End: 2023-01-04

## 2023-01-04 DIAGNOSIS — U07.1 INFECTION DUE TO 2019 NOVEL CORONAVIRUS: Primary | ICD-10-CM

## 2023-01-04 PROCEDURE — 99213 OFFICE O/P EST LOW 20 MIN: CPT | Mod: CS

## 2023-01-04 NOTE — TELEPHONE ENCOUNTER
See myc message:  Pt's sx are: sore throat, nasal congestion, intermittent chills, mild headache, fatigue.   I reached out to my Primary NP, but since I'm on Letrozole, I was told to check with you to see if  I should be on Paxlovid or Molnupiravir.    Spoke with pt:  Tested pos last night  Sx started yesterday, felt like sinus infection, took pseudafed and got on the plane.  Gets sinus infection once per year. Every sx is consistent with sinus infections in years past.    Pt transferred to [facility] to set up virtual visit and to be evaluated for consideration of antiviral therapy.    Routed to Dr. Alegria and Anna Browning, RNCC

## 2023-01-04 NOTE — PROGRESS NOTES
Lachelle Duque is being evaluated via a billable video visit.      Assessment & Plan:     Pt eligible for COVID treatment due to: obesity, CAD.   Symptoms mild, pt is stable.   Rx Paxlovid.  Hold atorvastatin.     See patient instructions below.  At the end of the encounter, I discussed results, diagnosis, medications. Discussed red flags for being seen in person at clinic/ER, as well as indications for follow up if no improvement. Patient understood and agreed to plan.       ICD-10-CM    1. Infection due to 2019 novel coronavirus  U07.1 nirmatrelvir and ritonavir (PAXLOVID) therapy pack            No follow-ups on file.    Video-Visit Details    Type of service:  Video Visit    Video Start Time: 12:24pm  Video End Time: 12:28pm    Originating Location (pt. Location): Home    Distant Location (provider location):  [facility]     Mode of Communication:  Video Conference via KIRK Miranda PA-C  [facility]    Subjective:   Lachelle Duque is a 52 year old female who is contacted via telephone thru scheduled urgent care virtual visit to discuss: No chief complaint on file.    Sore throat, nasal congestion, sinus pressure, intermittent chills, mild headache, & fatigue onset yesterday. Pt tested positive for COVID last night. Patient reports no fever/chills, headache, chest pain, shortness of breath, abdominal pain, nausea, vomiting, diarrhea, rash, or any other symptoms.     Past Medical History:   Diagnosis Date     Cancer (H)      Chronic rhinitis      Environmental allergies 01/18/2013     Gastro-oesophageal reflux disease      GERD (gastroesophageal reflux disease) 01/03/2013     Herpes zoster without mention of complication     L eye      Hiatal hernia      Hyperlipidemia      Obese      PONV (postoperative nausea and vomiting)     PONV       Objective:   Gen:  NAD  Pulm: non-labored work of breathing    No results found for any visits on  01/04/23.    There are no Patient Instructions on file for this visit.

## 2023-03-06 ENCOUNTER — ONCOLOGY VISIT (OUTPATIENT)
Dept: ONCOLOGY | Facility: CLINIC | Age: 53
End: 2023-03-06
Attending: INTERNAL MEDICINE
Payer: COMMERCIAL

## 2023-03-06 VITALS
WEIGHT: 251 LBS | HEART RATE: 95 BPM | SYSTOLIC BLOOD PRESSURE: 116 MMHG | TEMPERATURE: 98.1 F | BODY MASS INDEX: 40.87 KG/M2 | DIASTOLIC BLOOD PRESSURE: 77 MMHG | OXYGEN SATURATION: 99 % | RESPIRATION RATE: 16 BRPM

## 2023-03-06 DIAGNOSIS — Z17.0 MALIGNANT NEOPLASM OF UPPER-OUTER QUADRANT OF LEFT BREAST IN FEMALE, ESTROGEN RECEPTOR POSITIVE (H): Primary | ICD-10-CM

## 2023-03-06 DIAGNOSIS — Z79.811 LONG TERM (CURRENT) USE OF AROMATASE INHIBITORS: ICD-10-CM

## 2023-03-06 DIAGNOSIS — C50.412 MALIGNANT NEOPLASM OF UPPER-OUTER QUADRANT OF LEFT BREAST IN FEMALE, ESTROGEN RECEPTOR POSITIVE (H): Primary | ICD-10-CM

## 2023-03-06 PROCEDURE — G0463 HOSPITAL OUTPT CLINIC VISIT: HCPCS | Performed by: INTERNAL MEDICINE

## 2023-03-06 PROCEDURE — 99214 OFFICE O/P EST MOD 30 MIN: CPT | Performed by: INTERNAL MEDICINE

## 2023-03-06 ASSESSMENT — PAIN SCALES - GENERAL: PAINLEVEL: NO PAIN (1)

## 2023-03-06 NOTE — LETTER
3/6/2023         RE: Lachelle Duque  [address]  [place] [address]        Dear Colleague,    Thank you for referring your patient, Lachelle Duque, to the [facility]. Please see a copy of my visit note below.    March 6, 2023    Oncology Visit:    I am seeing Ms. Shanta Duque for follow up of a left breast cancer, T1cN1, ER + KS + her2 negative, oncotype Dx 19.      She underwent a screening mammogram on 09/29/2021, which demonstrated possible asymmetry in her left breast at 4-o'clock position.  On 10/08/2021, she underwent a mammogram and ultrasound.  On mammography, there was a spiculated mass, at the 4-o'clock position, measuring 1.3 cm in size.  On ultrasound, the mass measured 1.0 cm in size.  Her lymph nodes were negative by ultrasonography. On 10/15/2021, she underwent a contrast enhanced mammography which demonstrated the lesion to be 1.1 cm.  There are no other suspicious lesions in the rest of her breast.  Also, on 10/15, she underwent a needle biopsy, which demonstrated invasive ductal cancer, grade 1, ER positive, KS positive, HER2 negative. She has not palpated a mass in her breast.  She has not had a prior history of any breast problems.    She met with Dr Ge De La Vega.  She underwent a lumpectomy and sentinel lymph node biopsy.  Pathology revealed a 1.3 cm invasive ductal carcinoma.  1/1 lymph node was positive and measured 2 cm.    Oncotype low at 19.  Labs confirmed postmenopausal.  Completed radiation on 2/2/2022  Started letrozole 2/3/2022    Interval history:  In my last discussion with Shanta, she reported significant joint pain and arthragias.  Cymbalta was added based on SWOG study and data analysis.  This helped significantly. She still however notices significant discomfort in her hands.      She had a terrible reaction to zometa with fevers/chills/arthralgias.  Her second infusion was better.    She has complaints of intermittent left clavicle  pain.  This comes intermittently and then resolves spontaneously.  She does not need medication for it.    She has not noticed any nodules or masses involving her breasts.    She is coping reasonably well.    ROS: 10 point ROS neg other than the symptoms noted above in the HPI.     PAST MEDICAL HISTORY:       Current Outpatient Medications   Medication     acetaminophen (TYLENOL) 325 MG tablet     aspirin (ASA) 81 MG EC tablet     atorvastatin (LIPITOR) 40 MG tablet     cetirizine HCl 10 MG CAPS     cyanocobalamin (VITAMIN B-12) 1000 MCG tablet     DULoxetine (CYMBALTA) 20 MG capsule     ibuprofen (ADVIL/MOTRIN) 600 MG tablet     letrozole (FEMARA) 2.5 MG tablet     omeprazole 20 MG tablet     No current facility-administered medications for this visit.     Allergies:  Sulfa drugs     FAMILY HISTORY:  Significant for a maternal aunt with breast cancer.  No history of ovarian cancer.     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  No tobacco use.  .  .     She has never had a breast biopsy previously.     PHYSICAL EXAMINATION:  LMP 10/31/2021   She is a well-appearing woman in no apparent distress.   HEENT: no icterus  NECK: supple  CV: rrr  Lungs: clear  Abd; soft, nt nd + bs  Ext: no edema  Bilateral breast examination: no nodules or masses in either breast, no axillary adenopathy   Reviewed her pathology and imaging personally at our multidisciplinary conference.              Oncotype Dx 19    We personally reviewed her liver MRI and dexa scan previously, and discussed this again today.    CAC score elevated 135.        A/P:  51 y/o female with a left breast cancer pT1cN1 ER + CA + her2 negative now s/p lumpectomy and SNLB.      1. Breast cancer- Shanta is on active surveillance and remains on letrozole.  She had a mammogram 2022.  This was unremarkable.    She remains on letrozole with cymbalta.      Due for mammogram in Sept.    Given her  arthralgias, I will refer her for evaluation for our CanAroma trial.    2. Bone health - Her DEXA scan had lowest T-score of -0.9.   She is on prophylactic zometa IV every six months while on AI.      3. Liver lesions - consistent with adenomas.       4. Elevated CAC - She is having conversations with PCP regarding statin use.      5. Encouraged exercise of at least 150 min per week, healthy diet and limiting alcohol use.     Giovanna Alegria MD     > 30 min were spent in reviewing imaging, pathology, counseling and coordinating care.      Again, thank you for allowing me to participate in the care of your patient.        Sincerely,        Giovanna Alegria MD

## 2023-03-06 NOTE — PROGRESS NOTES
March 6, 2023    Oncology Visit:    I am seeing Ms. Shanta Duque for follow up of a left breast cancer, T1cN1, ER + AK + her2 negative, oncotype Dx 19.      She underwent a screening mammogram on 09/29/2021, which demonstrated possible asymmetry in her left breast at 4-o'clock position.  On 10/08/2021, she underwent a mammogram and ultrasound.  On mammography, there was a spiculated mass, at the 4-o'clock position, measuring 1.3 cm in size.  On ultrasound, the mass measured 1.0 cm in size.  Her lymph nodes were negative by ultrasonography. On 10/15/2021, she underwent a contrast enhanced mammography which demonstrated the lesion to be 1.1 cm.  There are no other suspicious lesions in the rest of her breast.  Also, on 10/15, she underwent a needle biopsy, which demonstrated invasive ductal cancer, grade 1, ER positive, AK positive, HER2 negative. She has not palpated a mass in her breast.  She has not had a prior history of any breast problems.    She met with Dr Ge De La Vega.  She underwent a lumpectomy and sentinel lymph node biopsy.  Pathology revealed a 1.3 cm invasive ductal carcinoma.  1/1 lymph node was positive and measured 2 cm.    Oncotype low at 19.  Labs confirmed postmenopausal.  Completed radiation on 2/2/2022  Started letrozole 2/3/2022    Interval history:  In my last discussion with Shanta, she reported significant joint pain and arthragias.  Cymbalta was added based on SWOG study and data analysis.  This helped significantly. She still however notices significant discomfort in her hands.      She had a terrible reaction to zometa with fevers/chills/arthralgias.  Her second infusion was better.    She has complaints of intermittent left clavicle pain.  This comes intermittently and then resolves spontaneously.  She does not need medication for it.    She has not noticed any nodules or masses involving her breasts.    She is coping reasonably well.    ROS: 10 point ROS neg other than the symptoms noted  above in the HPI.     PAST MEDICAL HISTORY:       Current Outpatient Medications   Medication     acetaminophen (TYLENOL) 325 MG tablet     aspirin (ASA) 81 MG EC tablet     atorvastatin (LIPITOR) 40 MG tablet     cetirizine HCl 10 MG CAPS     cyanocobalamin (VITAMIN B-12) 1000 MCG tablet     DULoxetine (CYMBALTA) 20 MG capsule     ibuprofen (ADVIL/MOTRIN) 600 MG tablet     letrozole (FEMARA) 2.5 MG tablet     omeprazole 20 MG tablet     No current facility-administered medications for this visit.     Allergies:  Sulfa drugs     FAMILY HISTORY:  Significant for a maternal aunt with breast cancer.  No history of ovarian cancer.     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  .  .     She has never had a breast biopsy previously.     PHYSICAL EXAMINATION:  LMP 10/31/2021   She is a well-appearing woman in no apparent distress.   HEENT: no icterus  NECK: supple  CV: rrr  Lungs: clear  Abd; soft, nt nd + bs  Ext: no edema  Bilateral breast examination: no nodules or masses in either breast, no axillary adenopathy   Reviewed her pathology and imaging personally at our multidisciplinary conference.              Oncotype Dx 19    We personally reviewed her liver MRI and dexa scan previously, and discussed this again today.    CAC score elevated 135.        A/P:  53 y/o female with a left breast cancer pT1cN1 ER + [place] + her2 negative now s/p lumpectomy and SNLB.      1. Breast cancer- Shanta is on active surveillance and remains on letrozole.  She had a mammogram 2022.  This was unremarkable.    She remains on letrozole with cymbalta.      Due for mammogram in Sept.    Given her arthralgias, I will refer her for evaluation for our CanAroma trial.    2. Bone health - Her DEXA scan had lowest T-score of -0.9.   She is on prophylactic zometa IV every six months while on AI.      3. Liver lesions - consistent with adenomas.       4. Elevated CAC -  She is having conversations with PCP regarding statin use.      5. Encouraged exercise of at least 150 min per week, healthy diet and limiting alcohol use.     Giovanna Alegria MD     > 30 min were spent in reviewing imaging, pathology, counseling and coordinating care.

## 2023-03-06 NOTE — NURSING NOTE
"Oncology Rooming Note    March 6, 2023 9:15 AM   Lachelle Duque is a 52 year old female who presents for:    Chief Complaint   Patient presents with     Oncology Clinic Visit     Invasive ductal carcinoma of breast,      Initial Vitals: /77 (BP Location: Right arm, Patient Position: Sitting, Cuff Size: Adult Large)   Pulse 95   Temp 98.1  F (36.7  C) (Oral)   Resp 16   Wt 113.9 kg (251 lb)   LMP 10/31/2021   SpO2 99%   BMI 40.87 kg/m   Estimated body mass index is 40.87 kg/m  as calculated from the following:    Height as of 10/13/22: 1.669 m (5' 5.71\").    Weight as of this encounter: 113.9 kg (251 lb). Body surface area is 2.3 meters squared.  No Pain (1) Comment: Data Unavailable   Patient's last menstrual period was 10/31/2021.  Allergies reviewed: Yes  Medications reviewed: Yes    Medications: Medication refills not needed today.  Pharmacy name entered into [employer]:    [facility] - [place] - [address]  [facility] [place] - [address]  [facility] - [place], [place] - [address]  [id number] - [place], [place] - [address]    Clinical concerns: Patient reports left clavicle pain that comes and goes, describes it as a dull pain and rates it 1-3/10 in intensity.        Serena Caldwell), LPN March 6, 2023 9:16 AM              "

## 2023-05-31 ENCOUNTER — APPOINTMENT (OUTPATIENT)
Dept: LAB | Facility: CLINIC | Age: 53
End: 2023-05-31
Attending: PHYSICIAN ASSISTANT
Payer: COMMERCIAL

## 2023-05-31 ENCOUNTER — ONCOLOGY VISIT (OUTPATIENT)
Dept: ONCOLOGY | Facility: CLINIC | Age: 53
End: 2023-05-31
Attending: PHYSICIAN ASSISTANT
Payer: COMMERCIAL

## 2023-05-31 VITALS
DIASTOLIC BLOOD PRESSURE: 81 MMHG | TEMPERATURE: 99.2 F | BODY MASS INDEX: 41.21 KG/M2 | WEIGHT: 253.1 LBS | RESPIRATION RATE: 16 BRPM | SYSTOLIC BLOOD PRESSURE: 120 MMHG | HEART RATE: 94 BPM | OXYGEN SATURATION: 98 %

## 2023-05-31 DIAGNOSIS — C50.912 INVASIVE DUCTAL CARCINOMA OF BREAST, FEMALE, LEFT (H): ICD-10-CM

## 2023-05-31 DIAGNOSIS — E83.52 SERUM CALCIUM ELEVATED: Primary | ICD-10-CM

## 2023-05-31 DIAGNOSIS — Z79.811 LONG TERM (CURRENT) USE OF AROMATASE INHIBITORS: ICD-10-CM

## 2023-05-31 DIAGNOSIS — Z79.811 LONG TERM (CURRENT) USE OF AROMATASE INHIBITORS: Primary | ICD-10-CM

## 2023-05-31 LAB
ALBUMIN SERPL BCG-MCNC: 4.4 G/DL (ref 3.5–5.2)
CA-I BLD-MCNC: 5.2 MG/DL (ref 4.4–5.2)
CALCIUM SERPL-MCNC: 10.5 MG/DL (ref 8.6–10)
CREAT SERPL-MCNC: 0.67 MG/DL (ref 0.51–0.95)
GFR SERPL CREATININE-BSD FRML MDRD: >90 ML/MIN/1.73M2

## 2023-05-31 PROCEDURE — 82310 ASSAY OF CALCIUM: CPT | Performed by: PHYSICIAN ASSISTANT

## 2023-05-31 PROCEDURE — 96374 THER/PROPH/DIAG INJ IV PUSH: CPT

## 2023-05-31 PROCEDURE — 36415 COLL VENOUS BLD VENIPUNCTURE: CPT | Performed by: PHYSICIAN ASSISTANT

## 2023-05-31 PROCEDURE — 82330 ASSAY OF CALCIUM: CPT | Performed by: PHYSICIAN ASSISTANT

## 2023-05-31 PROCEDURE — 99214 OFFICE O/P EST MOD 30 MIN: CPT | Performed by: PHYSICIAN ASSISTANT

## 2023-05-31 PROCEDURE — G0463 HOSPITAL OUTPT CLINIC VISIT: HCPCS | Mod: 25 | Performed by: PHYSICIAN ASSISTANT

## 2023-05-31 PROCEDURE — 258N000003 HC RX IP 258 OP 636: Performed by: PHYSICIAN ASSISTANT

## 2023-05-31 PROCEDURE — 82040 ASSAY OF SERUM ALBUMIN: CPT | Performed by: PHYSICIAN ASSISTANT

## 2023-05-31 PROCEDURE — 82565 ASSAY OF CREATININE: CPT | Performed by: PHYSICIAN ASSISTANT

## 2023-05-31 PROCEDURE — 250N000011 HC RX IP 250 OP 636: Performed by: PHYSICIAN ASSISTANT

## 2023-05-31 RX ORDER — HEPARIN SODIUM,PORCINE 10 UNIT/ML
5 VIAL (ML) INTRAVENOUS
Status: CANCELLED | OUTPATIENT
Start: 2023-12-01

## 2023-05-31 RX ORDER — HEPARIN SODIUM (PORCINE) LOCK FLUSH IV SOLN 100 UNIT/ML 100 UNIT/ML
5 SOLUTION INTRAVENOUS
Status: CANCELLED | OUTPATIENT
Start: 2023-12-01

## 2023-05-31 RX ORDER — ZOLEDRONIC ACID 0.04 MG/ML
4 INJECTION, SOLUTION INTRAVENOUS ONCE
Status: CANCELLED | OUTPATIENT
Start: 2023-12-01 | End: 2023-12-01

## 2023-05-31 RX ORDER — HEPARIN SODIUM (PORCINE) LOCK FLUSH IV SOLN 100 UNIT/ML 100 UNIT/ML
5 SOLUTION INTRAVENOUS
Status: CANCELLED | OUTPATIENT
Start: 2023-06-02

## 2023-05-31 RX ORDER — HEPARIN SODIUM,PORCINE 10 UNIT/ML
5 VIAL (ML) INTRAVENOUS
Status: CANCELLED | OUTPATIENT
Start: 2023-06-02

## 2023-05-31 RX ORDER — ZOLEDRONIC ACID 0.04 MG/ML
4 INJECTION, SOLUTION INTRAVENOUS ONCE
Status: COMPLETED | OUTPATIENT
Start: 2023-05-31 | End: 2023-05-31

## 2023-05-31 RX ORDER — ZOLEDRONIC ACID 0.04 MG/ML
4 INJECTION, SOLUTION INTRAVENOUS ONCE
Status: CANCELLED | OUTPATIENT
Start: 2023-06-02 | End: 2023-06-02

## 2023-05-31 RX ADMIN — SODIUM CHLORIDE 500 ML: 9 INJECTION, SOLUTION INTRAVENOUS at 08:36

## 2023-05-31 RX ADMIN — ZOLEDRONIC ACID 4 MG: 0.04 INJECTION, SOLUTION INTRAVENOUS at 08:36

## 2023-05-31 ASSESSMENT — PAIN SCALES - GENERAL: PAINLEVEL: NO PAIN (0)

## 2023-05-31 NOTE — PATIENT INSTRUCTIONS
[facility] Triage and after hours / weekends / holidays:  [phone]    Please call the triage or after hours line if you experience a temperature greater than or equal to 100.4, shaking chills, have uncontrolled nausea, vomiting and/or diarrhea, dizziness, shortness of breath, chest pain, bleeding, unexplained bruising, or if you have any other new/concerning symptoms, questions or concerns.      If you are having any concerning symptoms or wish to speak to a provider before your next infusion visit, please call triage to notify your care team so we can adequately serve you.     If you need a refill on a narcotic prescription or other medication, please call before your infusion appointment.

## 2023-05-31 NOTE — PROGRESS NOTES
Infusion Nursing Note:  Lachelle Duque presents today for Zometa.    Patient seen by provider today: Yes: Nelli Almeida PA-C prior to infusion today   present during visit today: Not Applicable.    Note: Shanta was seen by the provider prior to infusion today. No concerns upon arrival to infusion room.    Ionized calcium drawn and sent to lab.    Received 500 ml bolus with zometa infusion as ordered.      Intravenous Access:  Peripheral IV placed.    Treatment Conditions:   Latest Reference Range & Units 05/31/23 07:00   Creatinine 0.51 - 0.95 mg/dL 0.67   GFR Estimate >60 mL/min/1.73m2 >90   Calcium 8.6 - 10.0 mg/dL 10.5 (H)   Albumin 3.5 - 5.2 g/dL 4.4     Results reviewed, labs MET treatment parameters, ok to proceed with treatment.      Post Infusion Assessment:  Patient tolerated infusion without incident.  Blood return noted pre and post infusion.  Site patent and intact, free from redness, edema or discomfort.  No evidence of extravasations.  Access discontinued per protocol.       Discharge Plan:   Discharge instructions reviewed with: Patient.  Patient and/or family verbalized understanding of discharge instructions and all questions answered.  AVS to patient via [employer].    Patient discharged in stable condition accompanied by: self.      Eloina Luna RN

## 2023-05-31 NOTE — Clinical Note
5/31/2023         RE: Lachelle Duque  [address]  [place] [address]        Dear Colleague,    Thank you for referring your patient, Lachelle Duque, to the [facility]. Please see a copy of my visit note below.    May 31, 2023        REASON FOR VISIT: h/o left breast cancer, T1cN1, ER + UT + her2 negative, oncotype Dx 19.      She underwent a screening mammogram on 09/29/2021, which demonstrated possible asymmetry in her left breast at 4-o'clock position.  On 10/08/2021, she underwent a mammogram and ultrasound.  On mammography, there was a spiculated mass, at the 4-o'clock position, measuring 1.3 cm in size.  On ultrasound, the mass measured 1.0 cm in size.  Her lymph nodes were negative by ultrasonography. On 10/15/2021, she underwent a contrast enhanced mammography which demonstrated the lesion to be 1.1 cm.  There are no other suspicious lesions in the rest of her breast.  Also, on 10/15, she underwent a needle biopsy, which demonstrated invasive ductal cancer, grade 1, ER positive, UT positive, HER2 negative. She has not palpated a mass in her breast.  She has not had a prior history of any breast problems.    She met with Dr Ge De La Vega.  She underwent a lumpectomy and sentinel lymph node biopsy.  Pathology revealed a 1.3 cm invasive ductal carcinoma.  1/1 lymph node was positive and measured 2 cm.    Oncotype low at 19.  Labs confirmed postmenopausal.  Completed radiation on 2/2/2022  Started letrozole 2/3/2022    INTERVAL HISTORY:  In discussion with Shanta today, she reports that her prior significant joint pain and arthralgias have improved greatly on Cymbalta.  She is feeling much better.      She does experience hot flashes.  Her mood is good.  She also does report some dry skin, which is not a new issue for her, especially in winter.     She had a reaction to her first Zometa infusion with fevers/chills/ arthralgias.  She did take Tylenol this morning in  anticipation of this with her infusion today.     She otherwise feeling well today with no new issues.      She does report ongoing concern that her axillary lymph node that was removed in surgery was larger than her primary tumor, and wonders if she can/should continue yearly CT screenings.      Her recent colonoscopy and cardiology visits reviewed today.  She has a family history of colon cancer in her father (diagnosed at age 42), so she is screened every 5 years.  She was seen by cardiology and has been started on a daily aspirin as well as atorvastatin.     ROS: 10 point ROS neg other than the symptoms noted above in the HPI.     PAST MEDICAL HISTORY:       Current Outpatient Medications   Medication     acetaminophen (TYLENOL) 325 MG tablet     aspirin (ASA) 81 MG EC tablet     atorvastatin (LIPITOR) 40 MG tablet     cetirizine HCl 10 MG CAPS     cyanocobalamin (VITAMIN B-12) 1000 MCG tablet     DULoxetine (CYMBALTA) 20 MG capsule     ibuprofen (ADVIL/MOTRIN) 600 MG tablet     letrozole (FEMARA) 2.5 MG tablet     omeprazole 20 MG tablet     No current facility-administered medications for this visit.     Allergies:  Sulfa drugs     FAMILY HISTORY:  Significant for a maternal aunt with breast cancer.  No history of ovarian cancer.     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  .  .     She has never had a breast biopsy previously.     PHYSICAL EXAMINATION:  /81   Pulse 94   Temp 99.2  F (37.3  C) (Oral)   Resp 16   Wt 114.8 kg (253 lb 1.6 oz)   LMP 10/31/2021   SpO2 98%   BMI 41.21 kg/m    She is a well-appearing woman in no apparent distress.   HEENT: no icterus  NECK: supple  CV: rrr  Lungs: clear  Abd; soft, nt nd + bs  Ext: no edema  Bilateral breast examination: no nodules or masses in either breast, no axillary adenopathy.  Left breast with radiation hyperpigmentation.    Estradiol - 6  FSH - 35    Oncotype Dx  19    We personally reviewed her liver MRI and dexa scan.    A/P:  51 y/o female with a left breast cancer pT1cN1 ER + UT + her2 negative now s/p lumpectomy and SNLB.      1. Breast cancer- Shanta is on active surveillance and remains on letrozole.  September Mammo 2022-  This is unremarkable.    She is 60-70% better in regards to her arthralgias on the Cymbalta and is willing to continue on this.  We discussed routine CT scans are not needed for surveillance, but we can get a US of the axillae to ensure no changes.     2. Bone health - Her DEXA scan had lowest T-score of -0.9.   She is on prophylactic zometa IV every six months while on AI.      3. Liver lesions - consistent with adenomas.      4. Hypercalcemia - history of elevated calcium.  PTHi and peptide was WNL.  Ionized calcium WNL. Vit D levels normal range.     6. Encouraged exercise of at least 150 min per week, healthy diet and limiting alcohol use.     Nelli Almeida PA-C    > 35 min were spent in reviewing imaging, pathology, counseling and coordinating care.    May 31, 2023        REASON FOR VISIT: h/o left breast cancer, T1cN1, ER + UT + her2 negative, oncotype Dx 19.      She underwent a screening mammogram on 09/29/2021, which demonstrated possible asymmetry in her left breast at 4-o'clock position.  On 10/08/2021, she underwent a mammogram and ultrasound.  On mammography, there was a spiculated mass, at the 4-o'clock position, measuring 1.3 cm in size.  On ultrasound, the mass measured 1.0 cm in size.  Her lymph nodes were negative by ultrasonography. On 10/15/2021, she underwent a contrast enhanced mammography which demonstrated the lesion to be 1.1 cm.  There are no other suspicious lesions in the rest of her breast.  Also, on 10/15, she underwent a needle biopsy, which demonstrated invasive ductal cancer, grade 1, ER positive, UT positive, HER2 negative. She has not palpated a mass in her breast.  She has not had a prior history of any breast  problems.    She met with Dr Ge De La Vega.  She underwent a lumpectomy and sentinel lymph node biopsy.  Pathology revealed a 1.3 cm invasive ductal carcinoma.  1/1 lymph node was positive and measured 2 cm.    Oncotype low at 19.  Labs confirmed postmenopausal.  Completed radiation on 2/2/2022  Started letrozole 2/3/2022    INTERVAL HISTORY:  In discussion with Shanta today, she reports that her prior significant joint pain and arthralgias have improved greatly on Cymbalta.  She is feeling much better.  She still has joint pain, but its tolerable.  Staying busy at work helps.     She does experience hot flashes.  Her mood is good.  She also does report some dry skin, which is better in the summer.     She had a reaction to her first Zometa infusion with fevers/chills/ arthralgias. Infusion #2 was better.     She otherwise feeling well today with no new issues.      She felt reassured after her ax US and understands we don't do further imaging.       Her recent colonoscopy and cardiology visits reviewed today.  She has a family history of colon cancer in her father (diagnosed at age 42), so she is screened every 5 years.  She was seen by cardiology and has been started on a daily aspirin as well as atorvastatin in fall of 2022.     We worked up her elevated calcium and it was negative.  Her ionized calcium is normal.     We also discussed her weight and what programs work the best for weight loss.     ROS: 10 point ROS neg other than the symptoms noted above in the HPI.     PAST MEDICAL HISTORY:       Current Outpatient Medications   Medication     acetaminophen (TYLENOL) 325 MG tablet     aspirin (ASA) 81 MG EC tablet     atorvastatin (LIPITOR) 40 MG tablet     cetirizine HCl 10 MG CAPS     cyanocobalamin (VITAMIN B-12) 1000 MCG tablet     DULoxetine (CYMBALTA) 20 MG capsule     ibuprofen (ADVIL/MOTRIN) 600 MG tablet     letrozole (FEMARA) 2.5 MG tablet     omeprazole 20 MG tablet     No current facility-administered  medications for this visit.     Allergies:  Sulfa drugs     FAMILY HISTORY:  Significant for a maternal aunt with breast cancer.  No history of ovarian cancer.     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  .  .     She has never had a breast biopsy previously.     PHYSICAL EXAMINATION:  /81   Pulse 94   Temp 99.2  F (37.3  C) (Oral)   Resp 16   Wt 114.8 kg (253 lb 1.6 oz)   LMP 10/31/2021   SpO2 98%   BMI 41.21 kg/m      Wt Readings from Last 4 Encounters:   23 114.8 kg (253 lb 1.6 oz)   23 113.9 kg (251 lb)   22 113.3 kg (249 lb 11.2 oz)   10/13/22 115.6 kg (254 lb 12.8 oz)       She is a well-appearing woman in no apparent distress.   HEENT: no icterus  NECK: supple  CV: rrr  Lungs: clear  Abd; soft, nt nd + bs  Ext: no edema  Bilateral breast examination: no nodules or masses in either breast, no axillary adenopathy.  Left breast with radiation hyperpigmentation.    Estradiol - 6  FSH - 35    Oncotype Dx 19      A/P:  52 y/o female with a left breast cancer pT1cN1 ER + OH + her2 negative now s/p lumpectomy and SNLB.      1. Breast cancer- Shanta is on active surveillance and remains on letrozole.  September Mammo -  This is unremarkable.    She is 60-70% better in regards to her arthralgias on the Cymbalta and is willing to continue on this.  We discussed routine CT scans are not needed for surveillance, we'll continue with annual mammography.     2. Bone health - Her DEXA scan had lowest T-score of -0.9.   She is on prophylactic zometa IV every six months while on AI.    -next DEXA in 2024    3. Liver lesions - consistent with adenomas.      4. Hypercalcemia - history of elevated calcium.  PTHi and peptide was WNL.  Ionized calcium WNL. Vit D levels dropping now that she is off Vit D.  OK to resume 1000 units daily (was at 5000 units daily)    6. Encouraged exercise of at least 150 min per week,  healthy diet and limiting alcohol use. Discussed DYAN, Judie and our weight loss management clinic.  She will think about this further if she would like a referral.      Nelli Almeida PA-C    > 35 min were spent in reviewing imaging, pathology, counseling and coordinating care.      Again, thank you for allowing me to participate in the care of your patient.        Sincerely,        Bailey Almeida PA-C

## 2023-05-31 NOTE — NURSING NOTE
Chief Complaint   Patient presents with     Blood Draw     Labs drawn from PIV placed in lab by RN. VS taken.     Labs drawn from PIV placed by RN. Line flushed with saline. Vitals taken. Pt checked in for appointment(s).  Agustín Gonzalez RN

## 2023-05-31 NOTE — PROGRESS NOTES
May 31, 2023        REASON FOR VISIT: h/o left breast cancer, T1cN1, ER + VT + her2 negative, oncotype Dx 19.      She underwent a screening mammogram on 09/29/2021, which demonstrated possible asymmetry in her left breast at 4-o'clock position.  On 10/08/2021, she underwent a mammogram and ultrasound.  On mammography, there was a spiculated mass, at the 4-o'clock position, measuring 1.3 cm in size.  On ultrasound, the mass measured 1.0 cm in size.  Her lymph nodes were negative by ultrasonography. On 10/15/2021, she underwent a contrast enhanced mammography which demonstrated the lesion to be 1.1 cm.  There are no other suspicious lesions in the rest of her breast.  Also, on 10/15, she underwent a needle biopsy, which demonstrated invasive ductal cancer, grade 1, ER positive, VT positive, HER2 negative. She has not palpated a mass in her breast.  She has not had a prior history of any breast problems.    She met with Dr Ge De La Vega.  She underwent a lumpectomy and sentinel lymph node biopsy.  Pathology revealed a 1.3 cm invasive ductal carcinoma.  1/1 lymph node was positive and measured 2 cm.    Oncotype low at 19.  Labs confirmed postmenopausal.  Completed radiation on 2/2/2022  Started letrozole 2/3/2022    INTERVAL HISTORY:  In discussion with Shanta today, she reports that her prior significant joint pain and arthralgias have improved greatly on Cymbalta.  She is feeling much better.  She still has joint pain, but its tolerable.  Staying busy at work helps.     She does experience hot flashes.  Her mood is good.  She also does report some dry skin, which is better in the summer.     She had a reaction to her first Zometa infusion with fevers/chills/ arthralgias. Infusion #2 was better.     She otherwise feeling well today with no new issues.      She felt reassured after her ax US and understands we don't do further imaging.       Her recent colonoscopy and cardiology visits reviewed today.  She has a family  history of colon cancer in her father (diagnosed at age 42), so she is screened every 5 years.  She was seen by cardiology and has been started on a daily aspirin as well as atorvastatin in fall of .     We worked up her elevated calcium and it was negative.  Her ionized calcium is normal.     We also discussed her weight and what programs work the best for weight loss.     ROS: 10 point ROS neg other than the symptoms noted above in the HPI.     PAST MEDICAL HISTORY:       Current Outpatient Medications   Medication     acetaminophen (TYLENOL) 325 MG tablet     aspirin (ASA) 81 MG EC tablet     atorvastatin (LIPITOR) 40 MG tablet     cetirizine HCl 10 MG CAPS     cyanocobalamin (VITAMIN B-12) 1000 MCG tablet     DULoxetine (CYMBALTA) 20 MG capsule     ibuprofen (ADVIL/MOTRIN) 600 MG tablet     letrozole (FEMARA) 2.5 MG tablet     omeprazole 20 MG tablet     No current facility-administered medications for this visit.     Allergies:  Sulfa drugs     FAMILY HISTORY:  Significant for a maternal aunt with breast cancer.  No history of ovarian cancer.     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  .  .     She has never had a breast biopsy previously.     PHYSICAL EXAMINATION:  /81   Pulse 94   Temp 99.2  F (37.3  C) (Oral)   Resp 16   Wt 114.8 kg (253 lb 1.6 oz)   LMP 10/31/2021   SpO2 98%   BMI 41.21 kg/m      Wt Readings from Last 4 Encounters:   23 114.8 kg (253 lb 1.6 oz)   23 113.9 kg (251 lb)   22 113.3 kg (249 lb 11.2 oz)   10/13/22 115.6 kg (254 lb 12.8 oz)       She is a well-appearing woman in no apparent distress.   HEENT: no icterus  NECK: supple  CV: rrr  Lungs: clear  Abd; soft, nt nd + bs  Ext: no edema  Bilateral breast examination: no nodules or masses in either breast, no axillary adenopathy.  Left breast with radiation hyperpigmentation.    Estradiol - 6  FSH - 35    Oncotype Dx  19      A/P:  52 y/o female with a left breast cancer pT1cN1 ER + DC + her2 negative now s/p lumpectomy and SNLB.      1. Breast cancer- Shanta is on active surveillance and remains on letrozole.  September Mammo 2022-  This is unremarkable.    She is 60-70% better in regards to her arthralgias on the Cymbalta and is willing to continue on this.  We discussed routine CT scans are not needed for surveillance, we'll continue with annual mammography.     2. Bone health - Her DEXA scan had lowest T-score of -0.9.   She is on prophylactic zometa IV every six months while on AI.    -next DEXA in 1/2024    3. Liver lesions - consistent with adenomas.      4. Hypercalcemia - history of elevated calcium.  PTHi and peptide was WNL.  Ionized calcium WNL. Vit D levels dropping now that she is off Vit D.  OK to resume 1000 units daily (was at 5000 units daily)    6. Encouraged exercise of at least 150 min per week, healthy diet and limiting alcohol use. Discussed Judie ZAIDI and our weight loss management clinic.  She will think about this further if she would like a referral.      Nelli Almeida PA-C    > 35 min were spent in reviewing imaging, pathology, counseling and coordinating care.

## 2023-05-31 NOTE — NURSING NOTE
"Oncology Rooming Note    May 31, 2023 7:13 AM   Lachelle Duque is a 53 year old female who presents for:    Chief Complaint   Patient presents with     Blood Draw     Labs drawn from PIV placed in lab by RN. VS taken.     Oncology Clinic Visit     Invasive Ductal Carcinoma of the Left Breast     Initial Vitals: /81   Pulse 94   Temp 99.2  F (37.3  C) (Oral)   Resp 16   Wt 114.8 kg (253 lb 1.6 oz)   LMP 10/31/2021   SpO2 98%   BMI 41.21 kg/m   Estimated body mass index is 41.21 kg/m  as calculated from the following:    Height as of 10/13/22: 1.669 m (5' 5.71\").    Weight as of this encounter: 114.8 kg (253 lb 1.6 oz). Body surface area is 2.31 meters squared.  No Pain (0) Comment: Data Unavailable   Patient's last menstrual period was 10/31/2021.  Allergies reviewed: Yes  Medications reviewed: Yes    Medications: Medication refills not needed today.  Pharmacy name entered into [employer]:    [facility]  [facility] - [place], [place] - [address]  [facility] - [place], [place] - [address]  [facility]  [facility] - [place], [place] - [address]  [id number] - [place], [place] - [address]    Clinical concerns: Pt presents today for f/u.       Siomara Ryan LPN  5/31/2023              "

## 2023-07-25 DIAGNOSIS — C50.912 INVASIVE DUCTAL CARCINOMA OF BREAST, FEMALE, LEFT (H): ICD-10-CM

## 2023-07-25 DIAGNOSIS — E78.5 HYPERLIPIDEMIA LDL GOAL <100: ICD-10-CM

## 2023-07-25 DIAGNOSIS — R93.1 ELEVATED CORONARY ARTERY CALCIUM SCORE: ICD-10-CM

## 2023-07-25 DIAGNOSIS — Z79.811 LONG TERM (CURRENT) USE OF AROMATASE INHIBITORS: ICD-10-CM

## 2023-07-26 DIAGNOSIS — R93.1 ELEVATED CORONARY ARTERY CALCIUM SCORE: ICD-10-CM

## 2023-07-26 DIAGNOSIS — E78.5 HYPERLIPIDEMIA LDL GOAL <100: ICD-10-CM

## 2023-07-26 RX ORDER — DULOXETIN HYDROCHLORIDE 20 MG/1
CAPSULE, DELAYED RELEASE ORAL
Qty: 180 CAPSULE | Refills: 3 | Status: SHIPPED | OUTPATIENT
Start: 2023-07-26 | End: 2023-09-25 | Stop reason: DRUGHIGH

## 2023-07-26 NOTE — TELEPHONE ENCOUNTER
Medication: Cymbalta 20 mg    Last prescribing provider:  09/21/22    Last clinic visit date: 05/31/23 w/Bailey Almeida    Any missed appointments or no-shows since last clinic visit?: No    Recommendations for requested medication (if none, N/A): Copied from last OV: She is 60-70% better in regards to her arthralgias on the Cymbalta and is willing to continue on this.     Next clinic visit date: 09/11/23 w/    Any other pertinent information (if none, N/A): N/A    Pended and Routed to Provider.

## 2023-07-27 RX ORDER — ATORVASTATIN CALCIUM 40 MG/1
40 TABLET, FILM COATED ORAL DAILY
Qty: 90 TABLET | Refills: 0 | Status: SHIPPED | OUTPATIENT
Start: 2023-07-27 | End: 2023-10-02

## 2023-08-01 RX ORDER — ATORVASTATIN CALCIUM 40 MG/1
40 TABLET, FILM COATED ORAL DAILY
Qty: 90 TABLET | Refills: 3 | Status: SHIPPED | OUTPATIENT
Start: 2023-08-01 | End: 2023-10-02

## 2023-09-25 ENCOUNTER — ANCILLARY ORDERS (OUTPATIENT)
Dept: ONCOLOGY | Facility: CLINIC | Age: 53
End: 2023-09-25

## 2023-09-25 ENCOUNTER — ANCILLARY PROCEDURE (OUTPATIENT)
Dept: MAMMOGRAPHY | Facility: CLINIC | Age: 53
End: 2023-09-25
Attending: INTERNAL MEDICINE
Payer: COMMERCIAL

## 2023-09-25 ENCOUNTER — ONCOLOGY VISIT (OUTPATIENT)
Dept: ONCOLOGY | Facility: CLINIC | Age: 53
End: 2023-09-25
Attending: PHYSICIAN ASSISTANT
Payer: COMMERCIAL

## 2023-09-25 VITALS
DIASTOLIC BLOOD PRESSURE: 82 MMHG | HEART RATE: 86 BPM | SYSTOLIC BLOOD PRESSURE: 120 MMHG | RESPIRATION RATE: 16 BRPM | BODY MASS INDEX: 42.22 KG/M2 | WEIGHT: 259.3 LBS | OXYGEN SATURATION: 98 % | TEMPERATURE: 98.8 F

## 2023-09-25 DIAGNOSIS — C50.412 MALIGNANT NEOPLASM OF UPPER-OUTER QUADRANT OF LEFT BREAST IN FEMALE, ESTROGEN RECEPTOR POSITIVE (H): Primary | ICD-10-CM

## 2023-09-25 DIAGNOSIS — Z17.0 MALIGNANT NEOPLASM OF UPPER-OUTER QUADRANT OF LEFT BREAST IN FEMALE, ESTROGEN RECEPTOR POSITIVE (H): Primary | ICD-10-CM

## 2023-09-25 DIAGNOSIS — C50.412 MALIGNANT NEOPLASM OF UPPER-OUTER QUADRANT OF LEFT BREAST IN FEMALE, ESTROGEN RECEPTOR POSITIVE (H): ICD-10-CM

## 2023-09-25 DIAGNOSIS — Z79.811 LONG TERM (CURRENT) USE OF AROMATASE INHIBITORS: ICD-10-CM

## 2023-09-25 DIAGNOSIS — M25.50 AROMATASE INHIBITOR-ASSOCIATED ARTHRALGIA: ICD-10-CM

## 2023-09-25 DIAGNOSIS — Z17.0 MALIGNANT NEOPLASM OF UPPER-OUTER QUADRANT OF LEFT BREAST IN FEMALE, ESTROGEN RECEPTOR POSITIVE (H): ICD-10-CM

## 2023-09-25 DIAGNOSIS — M25.562 ARTHRALGIA OF BOTH KNEES: Primary | ICD-10-CM

## 2023-09-25 DIAGNOSIS — T45.1X5A AROMATASE INHIBITOR-ASSOCIATED ARTHRALGIA: ICD-10-CM

## 2023-09-25 DIAGNOSIS — M25.561 ARTHRALGIA OF BOTH KNEES: Primary | ICD-10-CM

## 2023-09-25 DIAGNOSIS — C50.912 INVASIVE DUCTAL CARCINOMA OF BREAST, FEMALE, LEFT (H): ICD-10-CM

## 2023-09-25 PROCEDURE — G0463 HOSPITAL OUTPT CLINIC VISIT: HCPCS | Performed by: PHYSICIAN ASSISTANT

## 2023-09-25 PROCEDURE — 76642 ULTRASOUND BREAST LIMITED: CPT | Mod: LT | Performed by: RADIOLOGY

## 2023-09-25 PROCEDURE — 77066 DX MAMMO INCL CAD BI: CPT | Performed by: RADIOLOGY

## 2023-09-25 PROCEDURE — 99214 OFFICE O/P EST MOD 30 MIN: CPT | Performed by: PHYSICIAN ASSISTANT

## 2023-09-25 PROCEDURE — G0279 TOMOSYNTHESIS, MAMMO: HCPCS | Performed by: RADIOLOGY

## 2023-09-25 RX ORDER — DULOXETIN HYDROCHLORIDE 30 MG/1
30 CAPSULE, DELAYED RELEASE ORAL 2 TIMES DAILY
Qty: 180 CAPSULE | Refills: 3 | Status: SHIPPED | OUTPATIENT
Start: 2023-09-25 | End: 2024-09-17

## 2023-09-25 ASSESSMENT — PAIN SCALES - GENERAL: PAINLEVEL: NO PAIN (0)

## 2023-09-25 NOTE — NURSING NOTE
"Oncology Rooming Note    September 25, 2023 10:12 AM   Lachelle Duque is a 53 year old female who presents for:    Chief Complaint   Patient presents with    Oncology Clinic Visit     Malignant neoplasm of upper-outer quadrant of left breast in female, estrogen receptor positive      Initial Vitals: /82 (BP Location: Right arm, Patient Position: Sitting, Cuff Size: Adult Large)   Pulse 86   Temp 98.8  F (37.1  C) (Oral)   Resp 16   Wt 117.6 kg (259 lb 4.8 oz)   LMP 10/31/2021   SpO2 98%   BMI 42.22 kg/m   Estimated body mass index is 42.22 kg/m  as calculated from the following:    Height as of 10/13/22: 1.669 m (5' 5.71\").    Weight as of this encounter: 117.6 kg (259 lb 4.8 oz). Body surface area is 2.33 meters squared.  No Pain (0) Comment: Data Unavailable   Patient's last menstrual period was 10/31/2021.  Allergies reviewed: Yes  Medications reviewed: Yes    Medications: Medication refills not needed today.  Pharmacy name entered into [employer]:    [facility]  [facility] - [place], [place] - [address]  [facility] - [place], [place] - [address]  [facility]  [facility] - [place], [place] - [address]  [id number] - [place], [place] - [address]    Clinical concerns: none       Rolanda Selby              "

## 2023-09-25 NOTE — Clinical Note
"    9/25/2023         RE: Lachelle Duque  [address]  [place] [address]        Dear Colleague,    Thank you for referring your patient, Lachelle Duque, to the [facility]. Please see a copy of my visit note below.    Sep 25, 2023        REASON FOR VISIT: h/o left breast cancer, T1cN1, ER + AK + her2 negative, oncotype Dx 19.      She underwent a screening mammogram on 09/29/2021, which demonstrated possible asymmetry in her left breast at 4-o'clock position.  On 10/08/2021, she underwent a mammogram and ultrasound.  On mammography, there was a spiculated mass, at the 4-o'clock position, measuring 1.3 cm in size.  On ultrasound, the mass measured 1.0 cm in size.  Her lymph nodes were negative by ultrasonography. On 10/15/2021, she underwent a contrast enhanced mammography which demonstrated the lesion to be 1.1 cm.  There are no other suspicious lesions in the rest of her breast.  Also, on 10/15, she underwent a needle biopsy, which demonstrated invasive ductal cancer, grade 1, ER positive, AK positive, HER2 negative. She has not palpated a mass in her breast.  She has not had a prior history of any breast problems.    She met with Dr Ge De La Vega.  She underwent a lumpectomy and sentinel lymph node biopsy.  Pathology revealed a 1.3 cm invasive ductal carcinoma.  1/1 lymph node was positive and measured 2 cm.    Oncotype low at 19.  Labs confirmed postmenopausal.  Completed radiation on 2/2/2022  Started letrozole 2/3/2022    INTERVAL HISTORY:  In discussion with Shanta today, she reports that her prior significant joint pain and arthralgias have improved greatly on Cymbalta.  She still has some hand/feet pain and her knees ache enough to avoid doing stairs.  She \"pushes\" through this, but admits its still frustrating.     Has some left chest wall pain, feels it may be from radiation.  No lymphedema.     She does experience hot flashes.  Her mood is good.      She had a " reaction to her first Zometa infusion with fevers/chills/ arthralgias. Infusion #2 was better.     She otherwise feeling well today with no new issues.         Her recent colonoscopy and cardiology visits reviewed today.  She has a family history of colon cancer in her father (diagnosed at age 42), so she is screened every 5 years.  She was seen by cardiology and has been started on a daily aspirin as well as atorvastatin in  of .     We worked up her elevated calcium and it was negative.  Her ionized calcium is normal.     She continues to work on [employer] (now 5[employer]) and its been stressful.     ROS: 10 point ROS neg other than the symptoms noted above in the HPI.     PAST MEDICAL HISTORY:       Current Outpatient Medications   Medication    atorvastatin (LIPITOR) 40 MG tablet    acetaminophen (TYLENOL) 325 MG tablet    aspirin (ASA) 81 MG EC tablet    atorvastatin (LIPITOR) 40 MG tablet    cetirizine HCl 10 MG CAPS    cyanocobalamin (VITAMIN B-12) 1000 MCG tablet    DULoxetine (CYMBALTA) 20 MG capsule    ibuprofen (ADVIL/MOTRIN) 600 MG tablet    letrozole (FEMARA) 2.5 MG tablet    omeprazole 20 MG tablet     No current facility-administered medications for this visit.     Allergies:  Sulfa drugs     FAMILY HISTORY:  Significant for a maternal aunt with breast cancer.  No history of ovarian cancer.     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  .  .     She has never had a breast biopsy previously.     PHYSICAL EXAMINATION:  /82 (BP Location: Right arm, Patient Position: Sitting, Cuff Size: Adult Large)   Pulse 86   Temp 98.8  F (37.1  C) (Oral)   Resp 16   Wt 117.6 kg (259 lb 4.8 oz)   LMP 10/31/2021   SpO2 98%   BMI 42.22 kg/m      Wt Readings from Last 4 Encounters:   23 117.6 kg (259 lb 4.8 oz)   23 114.8 kg (253 lb 1.6 oz)   23 113.9 kg (251 lb)   22 113.3 kg (249 lb 11.2 oz)       She is a  well-appearing woman in no apparent distress.   HEENT: no icterus  NECK: supple  CV: rrr  Lungs: clear  Abd; soft, nt nd + bs  Ext: no edema  Bilateral breast examination: no nodules or masses in either breast, no axillary adenopathy.  Left breast with a thickened scar.  Tenderness with palpation of the chest wall, near the axillae, no palpable mass or edema noted.     Estradiol - 6  FSH - 35    Oncotype Dx 19      A/P:  52 y/o female with a left breast cancer pT1cN1 ER + NE + her2 negative now s/p lumpectomy and SNLB.      1. Breast cancer- Shanta is on active surveillance and remains on letrozole.  September Mammo 2022-  This is unremarkable.    She is 60-70% better in regards to her arthralgias on the Cymbalta and is willing to continue on this.    -She is open to being screened for CanAroma  -is interested in try Fish Oil and will start this  -discussed increasing her Cymbalta to 30 mg BID as well.     2. Bone health - Her DEXA scan had lowest T-score of -0.9.   She is on prophylactic zometa IV every six months while on AI.    -next DEXA in 1/2024    3. Liver lesions - consistent with adenomas.      4. Hypercalcemia - history of elevated calcium.  PTHi and peptide was WNL.  Ionized calcium WNL. Vit D levels dropping now that she is off Vit D.  Taking Vit D 1000 units daily.    5. Chest wall tenderness, US was done today with no concerning findings, this is likely radiation side effect     6. Encouraged exercise of at least 150 min per week, healthy diet and limiting alcohol use.      Nelli Almeida PA-C    > 35 min were spent in reviewing imaging, pathology, counseling and coordinating care.      Again, thank you for allowing me to participate in the care of your patient.        Sincerely,        Bailey Almeida PA-C

## 2023-09-25 NOTE — PROGRESS NOTES
"Sep 25, 2023        REASON FOR VISIT: h/o left breast cancer, T1cN1, ER + VT + her2 negative, oncotype Dx 19.      She underwent a screening mammogram on 09/29/2021, which demonstrated possible asymmetry in her left breast at 4-o'clock position.  On 10/08/2021, she underwent a mammogram and ultrasound.  On mammography, there was a spiculated mass, at the 4-o'clock position, measuring 1.3 cm in size.  On ultrasound, the mass measured 1.0 cm in size.  Her lymph nodes were negative by ultrasonography. On 10/15/2021, she underwent a contrast enhanced mammography which demonstrated the lesion to be 1.1 cm.  There are no other suspicious lesions in the rest of her breast.  Also, on 10/15, she underwent a needle biopsy, which demonstrated invasive ductal cancer, grade 1, ER positive, VT positive, HER2 negative. She has not palpated a mass in her breast.  She has not had a prior history of any breast problems.    She met with Dr Ge De La Vega.  She underwent a lumpectomy and sentinel lymph node biopsy.  Pathology revealed a 1.3 cm invasive ductal carcinoma.  1/1 lymph node was positive and measured 2 cm.    Oncotype low at 19.  Labs confirmed postmenopausal.  Completed radiation on 2/2/2022  Started letrozole 2/3/2022    INTERVAL HISTORY:  In discussion with Shanta today, she reports that her prior significant joint pain and arthralgias have improved greatly on Cymbalta.  She still has some hand/feet pain and her knees ache enough to avoid doing stairs.  She \"pushes\" through this, but admits its still frustrating.     Has some left chest wall pain, feels it may be from radiation.  No lymphedema.     She does experience hot flashes.  Her mood is good.      She had a reaction to her first Zometa infusion with fevers/chills/ arthralgias. Infusion #2 was better.     She otherwise feeling well today with no new issues.         Her recent colonoscopy and cardiology visits reviewed today.  She has a family history of colon cancer in " her father (diagnosed at age 42), so she is screened every 5 years.  She was seen by cardiology and has been started on a daily aspirin as well as atorvastatin in fall of .     We worked up her elevated calcium and it was negative.  Her ionized calcium is normal.     She continues to work on [employer] (now 5[employer]) and its been stressful.     ROS: 10 point ROS neg other than the symptoms noted above in the HPI.     PAST MEDICAL HISTORY:       Current Outpatient Medications   Medication    atorvastatin (LIPITOR) 40 MG tablet    acetaminophen (TYLENOL) 325 MG tablet    aspirin (ASA) 81 MG EC tablet    atorvastatin (LIPITOR) 40 MG tablet    cetirizine HCl 10 MG CAPS    cyanocobalamin (VITAMIN B-12) 1000 MCG tablet    DULoxetine (CYMBALTA) 20 MG capsule    ibuprofen (ADVIL/MOTRIN) 600 MG tablet    letrozole (FEMARA) 2.5 MG tablet    omeprazole 20 MG tablet     No current facility-administered medications for this visit.     Allergies:  Sulfa drugs     FAMILY HISTORY:  Significant for a maternal aunt with breast cancer.  No history of ovarian cancer.     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  No tobacco use.  .  .     She has never had a breast biopsy previously.     PHYSICAL EXAMINATION:  /82 (BP Location: Right arm, Patient Position: Sitting, Cuff Size: Adult Large)   Pulse 86   Temp 98.8  F (37.1  C) (Oral)   Resp 16   Wt 117.6 kg (259 lb 4.8 oz)   LMP 10/31/2021   SpO2 98%   BMI 42.22 kg/m      Wt Readings from Last 4 Encounters:   23 117.6 kg (259 lb 4.8 oz)   23 114.8 kg (253 lb 1.6 oz)   23 113.9 kg (251 lb)   22 113.3 kg (249 lb 11.2 oz)       She is a well-appearing woman in no apparent distress.   HEENT: no icterus  NECK: supple  CV: rrr  Lungs: clear  Abd; soft, nt nd + bs  Ext: no edema  Bilateral breast examination: no nodules or masses in either breast, no axillary adenopathy.  Left breast with a thickened scar.   Tenderness with palpation of the chest wall, near the axillae, no palpable mass or edema noted.     Estradiol - 6  FSH - 35    Oncotype Dx 19      A/P:  52 y/o female with a left breast cancer pT1cN1 ER + AL + her2 negative now s/p lumpectomy and SNLB.      1. Breast cancer- Shanta is on active surveillance and remains on letrozole.  September Mammo 2022-  This is unremarkable.    She is 60-70% better in regards to her arthralgias on the Cymbalta and is willing to continue on this.    -She is open to being screened for CanAroma  -is interested in try Fish Oil and will start this  -discussed increasing her Cymbalta to 30 mg BID as well.     2. Bone health - Her DEXA scan had lowest T-score of -0.9.   She is on prophylactic zometa IV every six months while on AI.    -next DEXA in 1/2024    3. Liver lesions - consistent with adenomas.      4. Hypercalcemia - history of elevated calcium.  PTHi and peptide was WNL.  Ionized calcium WNL. Vit D levels dropping now that she is off Vit D.  Taking Vit D 1000 units daily.    5. Chest wall tenderness, US was done today with no concerning findings, this is likely radiation side effect     6. Encouraged exercise of at least 150 min per week, healthy diet and limiting alcohol use.      Nelli Almeida PA-C    > 35 min were spent in reviewing imaging, pathology, counseling and coordinating care.

## 2023-10-02 ENCOUNTER — OFFICE VISIT (OUTPATIENT)
Dept: FAMILY MEDICINE | Facility: CLINIC | Age: 53
End: 2023-10-02
Payer: COMMERCIAL

## 2023-10-02 VITALS
SYSTOLIC BLOOD PRESSURE: 106 MMHG | OXYGEN SATURATION: 98 % | DIASTOLIC BLOOD PRESSURE: 74 MMHG | BODY MASS INDEX: 41.46 KG/M2 | WEIGHT: 258 LBS | TEMPERATURE: 99 F | HEIGHT: 66 IN | HEART RATE: 93 BPM | RESPIRATION RATE: 16 BRPM

## 2023-10-02 DIAGNOSIS — Z13.1 SCREENING FOR DIABETES MELLITUS (DM): ICD-10-CM

## 2023-10-02 DIAGNOSIS — B02.9 HERPES ZOSTER WITHOUT COMPLICATION: ICD-10-CM

## 2023-10-02 DIAGNOSIS — Z13.29 SCREENING FOR THYROID DISORDER: ICD-10-CM

## 2023-10-02 DIAGNOSIS — E66.01 MORBID OBESITY (H): ICD-10-CM

## 2023-10-02 DIAGNOSIS — C50.912 INVASIVE DUCTAL CARCINOMA OF BREAST, FEMALE, LEFT (H): ICD-10-CM

## 2023-10-02 DIAGNOSIS — Z13.0 SCREENING FOR DISORDER OF BLOOD AND BLOOD-FORMING ORGANS: ICD-10-CM

## 2023-10-02 DIAGNOSIS — Z00.00 ROUTINE GENERAL MEDICAL EXAMINATION AT A HEALTH CARE FACILITY: Primary | ICD-10-CM

## 2023-10-02 DIAGNOSIS — E78.5 HYPERLIPIDEMIA LDL GOAL <100: ICD-10-CM

## 2023-10-02 DIAGNOSIS — K21.9 GASTROESOPHAGEAL REFLUX DISEASE, UNSPECIFIED WHETHER ESOPHAGITIS PRESENT: ICD-10-CM

## 2023-10-02 DIAGNOSIS — R93.1 ELEVATED CORONARY ARTERY CALCIUM SCORE: ICD-10-CM

## 2023-10-02 PROCEDURE — 99396 PREV VISIT EST AGE 40-64: CPT | Performed by: NURSE PRACTITIONER

## 2023-10-02 RX ORDER — NICOTINE POLACRILEX 4 MG/1
20 GUM, CHEWING ORAL DAILY
Qty: 90 TABLET | Refills: 3 | COMMUNITY
Start: 2023-10-02

## 2023-10-02 RX ORDER — ATORVASTATIN CALCIUM 40 MG/1
40 TABLET, FILM COATED ORAL DAILY
Qty: 90 TABLET | Refills: 3 | Status: SHIPPED | OUTPATIENT
Start: 2023-10-02 | End: 2023-10-02

## 2023-10-02 RX ORDER — ACYCLOVIR 400 MG/1
TABLET ORAL
Qty: 180 TABLET | Refills: 3 | Status: SHIPPED | OUTPATIENT
Start: 2023-10-02

## 2023-10-02 RX ORDER — ATORVASTATIN CALCIUM 40 MG/1
40 TABLET, FILM COATED ORAL DAILY
Qty: 90 TABLET | Refills: 3 | Status: SHIPPED | OUTPATIENT
Start: 2023-10-02 | End: 2024-10-03

## 2023-10-02 ASSESSMENT — ENCOUNTER SYMPTOMS
ABDOMINAL PAIN: 0
FEVER: 0
CONSTIPATION: 0
COUGH: 0
EYE PAIN: 0
BREAST MASS: 0
DIARRHEA: 0
HEMATOCHEZIA: 0
FREQUENCY: 0
PARESTHESIAS: 0
JOINT SWELLING: 1
DIZZINESS: 0
ARTHRALGIAS: 1
HEARTBURN: 1
NAUSEA: 0
HEADACHES: 0
SHORTNESS OF BREATH: 0
DYSURIA: 0
WEAKNESS: 0
MYALGIAS: 1
HEMATURIA: 0
PALPITATIONS: 0
NERVOUS/ANXIOUS: 0
SORE THROAT: 0
CHILLS: 0

## 2023-10-02 ASSESSMENT — PAIN SCALES - GENERAL: PAINLEVEL: NO PAIN (0)

## 2023-10-02 NOTE — PROGRESS NOTES
SUBJECTIVE:   CC: Shanta is an 53 year old who presents for preventive health visit.       10/2/2023     7:04 AM   Additional Questions   Roomed by Yulisa DOMÍNGUEZ   Accompanied by Self         10/2/2023     7:04 AM   Patient Reported Additional Medications   Patient reports taking the following new medications None       Healthy Habits:     Getting at least 3 servings of Calcium per day:  Yes    Bi-annual eye exam:  Yes    Dental care twice a year:  Yes    Sleep apnea or symptoms of sleep apnea:  None    Diet:  Regular (no restrictions)    Frequency of exercise:  2-3 days/week    Duration of exercise:  15-30 minutes    Taking medications regularly:  Yes    Medication side effects:  Muscle aches    Additional concerns today:  No      Today's PHQ-2 Score:       10/2/2023     6:59 AM   PHQ-2 ( 1999 [employer])   Q1: Little interest or pleasure in doing things 0   Q2: Feeling down, depressed or hopeless 0   PHQ-2 Score 0   Q1: Little interest or pleasure in doing things Not at all   Q2: Feeling down, depressed or hopeless Not at all   PHQ-2 Score 0       Social History     Tobacco Use    Smoking status: Never     Passive exposure: Never    Smokeless tobacco: Never   Substance Use Topics    Alcohol use: Yes     Alcohol/week: 0.8 standard drinks of alcohol     Types: 1 Standard drinks or equivalent per week     Comment: occasionally             10/2/2023     6:58 AM   Alcohol Use   Prescreen: >3 drinks/day or >7 drinks/week? No     Reviewed orders with patient.  Reviewed health maintenance and updated orders accordingly - Yes  Labs reviewed in EPIC  BP Readings from Last 3 Encounters:   10/02/23 106/74   09/25/23 120/82   05/31/23 120/81    Wt Readings from Last 3 Encounters:   10/02/23 117 kg (258 lb)   09/25/23 117.6 kg (259 lb 4.8 oz)   05/31/23 114.8 kg (253 lb 1.6 oz)                  Patient Active Problem List   Diagnosis    Encounter for other general counseling or advice on contraception    Herpes zoster    Family  history of colon cancer    Hyperlipidemia LDL goal <130    Obesity    Vitamin D deficiency    Hiatal hernia    GERD (gastroesophageal reflux disease)    Cholelithiasis    Biliary colic    Environmental allergies    Obesity (BMI 35.0-39.9) with comorbidity (H)    Chronic pain of left knee    Invasive ductal carcinoma of breast, female, left (H)    Right knee pain, unspecified chronicity    Long term (current) use of aromatase inhibitors    Family history of ischemic heart disease    Elevated coronary artery calcium score    Coronary artery disease involving native coronary artery of native heart without angina pectoris     Past Surgical History:   Procedure Laterality Date    ADENOIDECTOMY  1977    ARTHROSCOPY KNEE WITH MENISCAL REPAIR Left 06/16/2020    Procedure: Examination under anesthesia Left knee, Left knee arthroscopy, Left meniscus root repair;  Surgeon: Armando Sunshine MD;  [facility]    ARTHROSCOPY KNEE WITH MENISCAL REPAIR Right 04/19/2022    Procedure: right knee examination under anesthesia, knee arthroscopy, meniscus root repair;  Surgeon: Armando Sunshine MD;  [facility]    COLONOSCOPY N/A 10/27/2015    Procedure: COLONOSCOPY;  Surgeon: Duane, William Charles, MD;  [facility]    COLONOSCOPY WITH CO2 INSUFFLATION N/A 10/27/2015    Procedure: COLONOSCOPY WITH CO2 INSUFFLATION;  Surgeon: Duane, William Charles, MD;  [facility]    COLONOSCOPY WITH CO2 INSUFFLATION N/A 11/1/2022    Procedure: COLONOSCOPY, WITH CO2 INSUFFLATION;  Surgeon: Yvan Brown DO;  [facility]    GALLBLADDER SURGERY      HC TOOTH EXTRACTION W/FORCEP      18 yo    LAPAROSCOPIC CHOLECYSTECTOMY  01/30/2013    Procedure: LAPAROSCOPIC CHOLECYSTECTOMY;  Laparoscopic Cholecystectomy;  Surgeon: Cindy Soni MD;  [facility]    LUMPECTOMY BREAST WITH SENTINEL NODE, COMBINED Left 11/10/2021    Procedure: Left wire localized breast lumpectomy with sentinel lymph node  biopsy;  Surgeon: Ge De La Vega MD;  Location: [facility]    SINUS SURGERY  1988    Deviated septum    wisdom teeth removed[         Social History     Tobacco Use    Smoking status: Never     Passive exposure: Never    Smokeless tobacco: Never   Substance Use Topics    Alcohol use: Yes     Alcohol/week: 0.8 standard drinks of alcohol     Types: 1 Standard drinks or equivalent per week     Comment: occasionally     Family History   Problem Relation Age of Onset    Coronary Artery Disease Mother     Gastrointestinal Disease Mother         diverticulosis    Arthritis Mother         fibromyalgia, lupus, rheumatoid    Anesthesia Reaction Mother         nausea    Asthma Mother     Lipids Mother     Diabetes Mother     C.A.D. Father         bypass    Diabetes Father     Cancer - colorectal Father 42    Coronary Artery Disease Father     Hypertension Maternal Grandmother     Glaucoma Maternal Grandmother     Gastrointestinal Disease Maternal Grandmother         diverticulosis    C.A.D. Maternal Grandfather     Cerebrovascular Disease Maternal Grandfather     Coronary Artery Disease Maternal Grandfather     Coronary Artery Disease Brother     Colon Polyps Brother     Depression Brother     Cancer Maternal Aunt     Breast Cancer Maternal Aunt     Coronary Artery Disease Maternal Uncle     Macular Degeneration Other     Cancer Other 29        CML    Breast Cancer Other     Thyroid Disease No family hx of     Prostate Cancer No family hx of          Current Outpatient Medications   Medication Sig Dispense Refill    acetaminophen (TYLENOL) 325 MG tablet Take 2 tablets (650 mg) by mouth every 4 hours as needed for mild pain 50 tablet 1    acyclovir (ZOVIRAX) 400 MG tablet one tid po for 7 days prn for flare ups and one daily for maintenance. 180 tablet 3    aspirin (ASA) 81 MG EC tablet Take 1 tablet (81 mg) by mouth daily 90 tablet 3    atorvastatin (LIPITOR) 40 MG tablet Take 1 tablet (40 mg) by mouth daily 90 tablet 3     cetirizine HCl 10 MG CAPS Take one tablet daily.      cyanocobalamin (VITAMIN B-12) 1000 MCG tablet Take 1,000 mcg by mouth daily      DULoxetine (CYMBALTA) 30 MG capsule Take 1 capsule (30 mg) by mouth 2 times daily 180 capsule 3    ibuprofen (ADVIL/MOTRIN) 600 MG tablet Take 1 tablet (600 mg) by mouth every 6 hours as needed for moderate pain 30 tablet 1    letrozole (FEMARA) 2.5 MG tablet TAKE 1 TABLET (2.5 MG) BY MOUTH DAILY 90 tablet 3    omeprazole 20 MG tablet Take 1 tablet (20 mg) by mouth daily Take 30-60 minutes before a meal. 90 tablet 3     Allergies   Allergen Reactions    Sulfa Antibiotics Anaphylaxis       Breast Cancer Screening:    FHS-7:       9/29/2021     8:07 AM 9/1/2022     8:22 AM 9/25/2023     9:28 AM   Breast CA Risk Assessment (FHS-7)   Did any of your first-degree relatives have breast or ovarian cancer? No No No   Did any of your relatives have bilateral breast cancer? No Unknown No   Did any man in your family have breast cancer? No No No   Did any woman in your family have breast and ovarian cancer? No No No   Did any woman in your family have breast cancer before age 50 y? No No No   Do you have 2 or more relatives with breast and/or ovarian cancer? No Yes Yes   Do you have 2 or more relatives with breast and/or bowel cancer? No No Yes       Mammogram Screening - Alternate mammogram schedule due to breast cancer history  Pertinent mammograms are reviewed under the imaging tab.    History of abnormal Pap smear: NO - age 30-65 PAP every 5 years with negative HPV co-testing recommended      Latest Ref Rng & Units 9/20/2019     8:45 AM 9/20/2019     8:36 AM 9/6/2016     9:35 AM   PAP / HPV   PAP (Historical)   OTHER-NIL, See Result     HPV 16 DNA NEG^Negative Negative   Negative    HPV 18 DNA NEG^Negative Negative   Negative    Other HR HPV NEG^Negative Negative   Negative      Reviewed and updated as needed this visit by clinical staff   Tobacco  Allergies  Meds              Reviewed  and updated as needed this visit by Provider                 Past Medical History:   Diagnosis Date    Cancer (H)     Chronic rhinitis     Environmental allergies 01/18/2013    Gastro-oesophageal reflux disease     GERD (gastroesophageal reflux disease) 01/03/2013    Herpes zoster without mention of complication     L eye     Hiatal hernia     Hyperlipidemia     Obese     PONV (postoperative nausea and vomiting)     PONV      Past Surgical History:   Procedure Laterality Date    ADENOIDECTOMY  1977    ARTHROSCOPY KNEE WITH MENISCAL REPAIR Left 06/16/2020    Procedure: Examination under anesthesia Left knee, Left knee arthroscopy, Left meniscus root repair;  Surgeon: Armando Sunshine MD;  [facility]    ARTHROSCOPY KNEE WITH MENISCAL REPAIR Right 04/19/2022    Procedure: right knee examination under anesthesia, knee arthroscopy, meniscus root repair;  Surgeon: Armando Sunshine MD;  [facility]    COLONOSCOPY N/A 10/27/2015    Procedure: COLONOSCOPY;  Surgeon: Duane, William Charles, MD;  [facility]    COLONOSCOPY WITH CO2 INSUFFLATION N/A 10/27/2015    Procedure: COLONOSCOPY WITH CO2 INSUFFLATION;  Surgeon: Duane, William Charles, MD;  [facility]    COLONOSCOPY WITH CO2 INSUFFLATION N/A 11/1/2022    Procedure: COLONOSCOPY, WITH CO2 INSUFFLATION;  Surgeon: Yvan Brown DO;  [facility]    GALLBLADDER SURGERY      HC TOOTH EXTRACTION W/FORCEP      18 yo    LAPAROSCOPIC CHOLECYSTECTOMY  01/30/2013    Procedure: LAPAROSCOPIC CHOLECYSTECTOMY;  Laparoscopic Cholecystectomy;  Surgeon: Cindy Soni MD;  [facility]    LUMPECTOMY BREAST WITH SENTINEL NODE, COMBINED Left 11/10/2021    Procedure: Left wire localized breast lumpectomy with sentinel lymph node biopsy;  Surgeon: Ge De La Vega MD;  Location: [facility]    SINUS SURGERY  1988    Deviated septum    wisdom teeth removed[         Review of Systems   Constitutional:  Negative for chills and fever.  "  HENT:  Negative for congestion, ear pain, hearing loss and sore throat.    Eyes:  Negative for pain and visual disturbance.   Respiratory:  Negative for cough and shortness of breath.    Cardiovascular:  Negative for chest pain, palpitations and peripheral edema.   Gastrointestinal:  Positive for heartburn. Negative for abdominal pain, constipation, diarrhea, hematochezia and nausea.   Breasts:  Negative for tenderness, breast mass and discharge.   Genitourinary:  Negative for dysuria, frequency, genital sores, hematuria, pelvic pain, urgency, vaginal bleeding and vaginal discharge.   Musculoskeletal:  Positive for arthralgias, joint swelling and myalgias.   Skin:  Negative for rash.   Neurological:  Negative for dizziness, weakness, headaches and paresthesias.   Psychiatric/Behavioral:  Negative for mood changes. The patient is not nervous/anxious.      OBJECTIVE:   /74 (BP Location: Right arm, Patient Position: Right side, Cuff Size: Adult Large)   Pulse 93   Temp 99  F (37.2  C) (Oral)   Resp 16   Ht 1.664 m (5' 5.5\")   Wt 117 kg (258 lb)   LMP 10/31/2021   SpO2 98%   BMI 42.28 kg/m    Wt Readings from Last 4 Encounters:   10/02/23 117 kg (258 lb)   09/25/23 117.6 kg (259 lb 4.8 oz)   05/31/23 114.8 kg (253 lb 1.6 oz)   03/06/23 113.9 kg (251 lb)       Physical Exam  GENERAL: healthy, alert, no distress, and obese  EYES: Eyes grossly normal to inspection and conjunctivae and sclerae normal  HENT: ear canals and TM's normal, nose and mouth without ulcers or lesions  NECK: no adenopathy, no asymmetry, masses, or scars and thyroid normal to palpation  RESP: lungs clear to auscultation - no rales, rhonchi or wheezes  BREAST: normal without masses, tenderness or nipple discharge and no palpable axillary masses or adenopathy  CV: regular rates and rhythm, no murmur, click or rub, peripheral pulses strong, and no peripheral edema  ABDOMEN: soft, nontender, no hepatosplenomegaly, no masses and bowel " sounds normal   (female): normal female external genitalia, normal urethral meatus, vaginal mucosa pink, moist, well rugated, and normal cervix/adnexa/uterus without masses or discharge  MS: no gross musculoskeletal defects noted, no edema  SKIN: no suspicious lesions or rashes  NEURO: Normal strength and tone, mentation intact and speech normal  PSYCH: mentation appears normal, affect normal/bright  LYMPH: no cervical, supraclavicular, axillary, or inguinal adenopathy    Diagnostic Test Results:  Labs reviewed in Epic  Pending orders and results       ASSESSMENT/PLAN:   Lachelle was seen today for physical.    Diagnoses and all orders for this visit:    Routine general medical examination at a health care facility  -     REVIEW OF HEALTH MAINTENANCE PROTOCOL ORDERS  -     PRIMARY CARE FOLLOW-UP SCHEDULING; Future    Invasive ductal carcinoma of breast, female, left (H)  FOLLOW UP WITH SPECIALIST :Oncology    Hyperlipidemia LDL goal <100  -     Lipid panel reflex to direct LDL Fasting; Future  -     Comprehensive metabolic panel; Future  -   -     atorvastatin (LIPITOR) 40 MG tablet; Take 1 tablet (40 mg) by mouth daily  The current medical regimen is effective.  Continue current medication regimen unchanged.    Screening for diabetes mellitus (DM)  -     Comprehensive metabolic panel; Future    Screening for disorder of blood and blood-forming organs  -     CBC with platelets; Future    Screening for thyroid disorder  -     TSH with free T4 reflex; Future    Morbid obesity (H)  -     TSH with free T4 reflex; Future    Elevated coronary artery calcium score  -   -     atorvastatin (LIPITOR) 40 MG tablet; Take 1 tablet (40 mg) by mouth daily    Herpes zoster without complication  -     acyclovir (ZOVIRAX) 400 MG tablet; one tid po for 7 days prn for flare ups and one daily for maintenance.    Gastroesophageal reflux disease, unspecified whether esophagitis present  -     omeprazole 20 MG tablet; Take 1 tablet (20  "mg) by mouth daily Take 30-60 minutes before a meal.    PLAN:   The current medical regimen is effective.  Continue current medication regimen unchanged.  FUTURE LABS:       - Schedule a fasting blood draw   Work on weight loss  Regular exercise  Will follow up and/or notify patient of  results via My Chart to determine further need for follow up  Follow up office visit in one year for annual health maintenance exam, sooner PRN.   Patient needs to follow up in if no improvement,or sooner if worsening of symptoms or other symptoms develop.      Patient has been advised of split billing requirements and indicates understanding: Yes      COUNSELING:  Reviewed preventive health counseling, as reflected in patient instructions  Special attention given to:        Regular exercise       Healthy diet/nutrition       Vision screening       Osteoporosis prevention/bone health       Colorectal Cancer Screening       Consider Hep C screening for all patients one time for ages 18-79 years       The 10-year ASCVD risk score (Roman MILNER, et al., 2019) is: 0.7%    Values used to calculate the score:      Age: 53 years      Sex: Female      Is Non- : No      Diabetic: No      Tobacco smoker: No      Systolic Blood Pressure: 106 mmHg      Is BP treated: No      HDL Cholesterol: 45 mg/dL      Total Cholesterol: 135 mg/dL       Advance Care Planning      BMI:   Estimated body mass index is 42.28 kg/m  as calculated from the following:    Height as of this encounter: 1.664 m (5' 5.5\").    Weight as of this encounter: 117 kg (258 lb).   Weight management plan: Discussed healthy diet and exercise guidelines      She reports that she has never smoked. She has never been exposed to tobacco smoke. She has never used smokeless tobacco.          ROSALIND Nazario [facility]"

## 2023-10-02 NOTE — PATIENT INSTRUCTIONS
PLAN:   1.   Symptomatic therapy suggested: Continue current medications as prescribed.   2.  Orders Placed This Encounter   Medications    DISCONTD: atorvastatin (LIPITOR) 40 MG tablet     Sig: Take 1 tablet (40 mg) by mouth daily     Dispense:  90 tablet     Refill:  3    atorvastatin (LIPITOR) 40 MG tablet     Sig: Take 1 tablet (40 mg) by mouth daily     Dispense:  90 tablet     Refill:  3    acyclovir (ZOVIRAX) 400 MG tablet     Sig: one tid po for 7 days prn for flare ups and one daily for maintenance.     Dispense:  180 tablet     Refill:  3     Orders Placed This Encounter   Procedures    REVIEW OF HEALTH MAINTENANCE PROTOCOL ORDERS    Lipid panel reflex to direct LDL Fasting    CBC with platelets    Comprehensive metabolic panel    TSH with free T4 reflex       3.  FUTURE LABS:       - Schedule a fasting blood draw   Work on weight loss  Regular exercise  Will follow up and/or notify patient of  results via My Chart to determine further need for follow up  Follow up office visit in one year for annual health maintenance exam, sooner PRN.   Patient needs to follow up in if no improvement,or sooner if worsening of symptoms or other symptoms develop.        Preventive Health Recommendations  Female Ages 50 - 64    Yearly exam: See your health care provider every year in order to  Review health changes.   Discuss preventive care.    Review your medicines if your doctor has prescribed any.    Get a Pap test every three years (unless you have an abnormal result and your provider advises testing more often).  If you get Pap tests with HPV test, you only need to test every 5 years, unless you have an abnormal result.   You do not need a Pap test if your uterus was removed (hysterectomy) and you have not had cancer.  You should be tested each year for STDs (sexually transmitted diseases) if you're at risk.   Have a mammogram every 1 to 2 years.  Have a colonoscopy at age 50, or have a yearly FIT test (stool test).  These exams screen for colon cancer.    Have a cholesterol test every 5 years, or more often if advised.  Have a diabetes test (fasting glucose) every three years. If you are at risk for diabetes, you should have this test more often.   If you are at risk for osteoporosis (brittle bone disease), think about having a bone density scan (DEXA).    Shots: Get a flu shot each year. Get a tetanus shot every 10 years.    Nutrition:   Eat at least 5 servings of fruits and vegetables each day.  Eat whole-grain bread, whole-wheat pasta and brown rice instead of white grains and rice.  Get adequate Calcium and Vitamin D.     Lifestyle  Exercise at least 150 minutes a week (30 minutes a day, 5 days a week). This will help you control your weight and prevent disease.  Limit alcohol to one drink per day.  No smoking.   Wear sunscreen to prevent skin cancer.   See your dentist every six months for an exam and cleaning.  See your eye doctor every 1 to 2 years.

## 2023-11-16 DIAGNOSIS — Z79.811 LONG TERM (CURRENT) USE OF AROMATASE INHIBITORS: ICD-10-CM

## 2023-11-16 DIAGNOSIS — C50.412 MALIGNANT NEOPLASM OF UPPER-OUTER QUADRANT OF LEFT BREAST IN FEMALE, ESTROGEN RECEPTOR POSITIVE (H): ICD-10-CM

## 2023-11-16 DIAGNOSIS — Z17.0 MALIGNANT NEOPLASM OF UPPER-OUTER QUADRANT OF LEFT BREAST IN FEMALE, ESTROGEN RECEPTOR POSITIVE (H): ICD-10-CM

## 2023-11-16 RX ORDER — LETROZOLE 2.5 MG/1
1 TABLET, FILM COATED ORAL DAILY
Qty: 90 TABLET | Refills: 3 | Status: SHIPPED | OUTPATIENT
Start: 2023-11-16

## 2023-11-16 NOTE — TELEPHONE ENCOUNTER
letrozole (FEMARA) Refill   Last prescribing provider: Bailey Almeida     Last clinic visit date: 9/25/23 Bailey Almeida     Recommendations for requested medication (if none, N/A): Copied from chart note   9/25/23 Bailey Almeida     1. Breast cancer- Shanta is on active surveillance and remains on letrozole. September Mammo 2022- This is unremarkable.    She is 60-70% better in regards to her arthralgias on the Cymbalta and is willing to continue on this.     Any other pertinent information (if none, N/A): N/A    Refilled: Y/N, if NO, why?

## 2023-12-04 ENCOUNTER — INFUSION THERAPY VISIT (OUTPATIENT)
Dept: ONCOLOGY | Facility: CLINIC | Age: 53
End: 2023-12-04
Attending: INTERNAL MEDICINE
Payer: COMMERCIAL

## 2023-12-04 ENCOUNTER — APPOINTMENT (OUTPATIENT)
Dept: LAB | Facility: CLINIC | Age: 53
End: 2023-12-04
Attending: INTERNAL MEDICINE
Payer: COMMERCIAL

## 2023-12-04 VITALS
TEMPERATURE: 98.2 F | SYSTOLIC BLOOD PRESSURE: 127 MMHG | BODY MASS INDEX: 42 KG/M2 | DIASTOLIC BLOOD PRESSURE: 84 MMHG | OXYGEN SATURATION: 93 % | RESPIRATION RATE: 18 BRPM | WEIGHT: 256.3 LBS | HEART RATE: 95 BPM

## 2023-12-04 DIAGNOSIS — Z79.811 LONG TERM (CURRENT) USE OF AROMATASE INHIBITORS: ICD-10-CM

## 2023-12-04 DIAGNOSIS — E78.5 HYPERLIPIDEMIA LDL GOAL <100: ICD-10-CM

## 2023-12-04 DIAGNOSIS — E66.01 MORBID OBESITY (H): ICD-10-CM

## 2023-12-04 DIAGNOSIS — E83.52 SERUM CALCIUM ELEVATED: ICD-10-CM

## 2023-12-04 DIAGNOSIS — Z13.29 SCREENING FOR THYROID DISORDER: ICD-10-CM

## 2023-12-04 DIAGNOSIS — Z13.1 SCREENING FOR DIABETES MELLITUS (DM): ICD-10-CM

## 2023-12-04 DIAGNOSIS — C50.912 INVASIVE DUCTAL CARCINOMA OF BREAST, FEMALE, LEFT (H): Primary | ICD-10-CM

## 2023-12-04 DIAGNOSIS — Z13.0 SCREENING FOR DISORDER OF BLOOD AND BLOOD-FORMING ORGANS: ICD-10-CM

## 2023-12-04 LAB
ALBUMIN SERPL BCG-MCNC: 4.4 G/DL (ref 3.5–5.2)
ALBUMIN SERPL BCG-MCNC: 4.4 G/DL (ref 3.5–5.2)
ALP SERPL-CCNC: 148 U/L (ref 40–150)
ALT SERPL W P-5'-P-CCNC: 24 U/L (ref 0–50)
ANION GAP SERPL CALCULATED.3IONS-SCNC: 11 MMOL/L (ref 7–15)
AST SERPL W P-5'-P-CCNC: 23 U/L (ref 0–45)
BILIRUB SERPL-MCNC: 0.3 MG/DL
BUN SERPL-MCNC: 18 MG/DL (ref 6–20)
CA-I BLD-MCNC: 5 MG/DL (ref 4.4–5.2)
CALCIUM SERPL-MCNC: 10.1 MG/DL (ref 8.6–10)
CALCIUM SERPL-MCNC: 10.2 MG/DL (ref 8.6–10)
CHLORIDE SERPL-SCNC: 102 MMOL/L (ref 98–107)
CHOLEST SERPL-MCNC: 143 MG/DL
CREAT SERPL-MCNC: 0.6 MG/DL (ref 0.51–0.95)
CREAT SERPL-MCNC: 0.62 MG/DL (ref 0.51–0.95)
DEPRECATED HCO3 PLAS-SCNC: 24 MMOL/L (ref 22–29)
EGFRCR SERPLBLD CKD-EPI 2021: >90 ML/MIN/1.73M2
EGFRCR SERPLBLD CKD-EPI 2021: >90 ML/MIN/1.73M2
ERYTHROCYTE [DISTWIDTH] IN BLOOD BY AUTOMATED COUNT: 13.7 % (ref 10–15)
GLUCOSE SERPL-MCNC: 114 MG/DL (ref 70–99)
HCT VFR BLD AUTO: 39.4 % (ref 35–47)
HDLC SERPL-MCNC: 46 MG/DL
HGB BLD-MCNC: 13.4 G/DL (ref 11.7–15.7)
LDLC SERPL CALC-MCNC: 76 MG/DL
MCH RBC QN AUTO: 28.7 PG (ref 26.5–33)
MCHC RBC AUTO-ENTMCNC: 34 G/DL (ref 31.5–36.5)
MCV RBC AUTO: 84 FL (ref 78–100)
NONHDLC SERPL-MCNC: 97 MG/DL
PLATELET # BLD AUTO: 270 10E3/UL (ref 150–450)
POTASSIUM SERPL-SCNC: 4.4 MMOL/L (ref 3.4–5.3)
PROT SERPL-MCNC: 8.1 G/DL (ref 6.4–8.3)
RBC # BLD AUTO: 4.67 10E6/UL (ref 3.8–5.2)
SODIUM SERPL-SCNC: 137 MMOL/L (ref 135–145)
TRIGL SERPL-MCNC: 105 MG/DL
TSH SERPL DL<=0.005 MIU/L-ACNC: 2.03 UIU/ML (ref 0.3–4.2)
WBC # BLD AUTO: 7.1 10E3/UL (ref 4–11)

## 2023-12-04 PROCEDURE — 82330 ASSAY OF CALCIUM: CPT | Performed by: INTERNAL MEDICINE

## 2023-12-04 PROCEDURE — 36415 COLL VENOUS BLD VENIPUNCTURE: CPT | Performed by: INTERNAL MEDICINE

## 2023-12-04 PROCEDURE — 84443 ASSAY THYROID STIM HORMONE: CPT | Performed by: INTERNAL MEDICINE

## 2023-12-04 PROCEDURE — 82565 ASSAY OF CREATININE: CPT | Mod: 91 | Performed by: PHYSICIAN ASSISTANT

## 2023-12-04 PROCEDURE — 80061 LIPID PANEL: CPT | Performed by: INTERNAL MEDICINE

## 2023-12-04 PROCEDURE — 96365 THER/PROPH/DIAG IV INF INIT: CPT

## 2023-12-04 PROCEDURE — 99214 OFFICE O/P EST MOD 30 MIN: CPT | Performed by: INTERNAL MEDICINE

## 2023-12-04 PROCEDURE — 82374 ASSAY BLOOD CARBON DIOXIDE: CPT | Performed by: INTERNAL MEDICINE

## 2023-12-04 PROCEDURE — 258N000003 HC RX IP 258 OP 636: Performed by: PHYSICIAN ASSISTANT

## 2023-12-04 PROCEDURE — 85027 COMPLETE CBC AUTOMATED: CPT | Performed by: INTERNAL MEDICINE

## 2023-12-04 PROCEDURE — 82310 ASSAY OF CALCIUM: CPT | Performed by: PHYSICIAN ASSISTANT

## 2023-12-04 PROCEDURE — 250N000011 HC RX IP 250 OP 636: Mod: JZ | Performed by: PHYSICIAN ASSISTANT

## 2023-12-04 PROCEDURE — 99213 OFFICE O/P EST LOW 20 MIN: CPT | Performed by: INTERNAL MEDICINE

## 2023-12-04 PROCEDURE — 82040 ASSAY OF SERUM ALBUMIN: CPT | Performed by: PHYSICIAN ASSISTANT

## 2023-12-04 RX ORDER — ZOLEDRONIC ACID 0.04 MG/ML
4 INJECTION, SOLUTION INTRAVENOUS ONCE
Status: COMPLETED | OUTPATIENT
Start: 2023-12-04 | End: 2023-12-04

## 2023-12-04 RX ADMIN — SODIUM CHLORIDE 500 ML: 9 INJECTION, SOLUTION INTRAVENOUS at 13:21

## 2023-12-04 RX ADMIN — ZOLEDRONIC ACID 4 MG: 0.04 INJECTION, SOLUTION INTRAVENOUS at 13:20

## 2023-12-04 ASSESSMENT — PAIN SCALES - GENERAL: PAINLEVEL: NO PAIN (0)

## 2023-12-04 NOTE — NURSING NOTE
"Oncology Rooming Note    December 4, 2023 11:58 AM   Lachelle Duque is a 53 year old female who presents for:    Chief Complaint   Patient presents with    Oncology Clinic Visit     Breast CA    Blood Draw     Labs drawn from PIV placed by RN. Line flushed with saline. Vitals taken. Pt checked in for appointment(s).      Initial Vitals: /84 (BP Location: Right arm, Patient Position: Sitting, Cuff Size: Adult Large)   Pulse 95   Temp 98.2  F (36.8  C) (Oral)   Resp 18   Wt 116.3 kg (256 lb 4.8 oz)   LMP 10/31/2021   SpO2 93%   BMI 42.00 kg/m   Estimated body mass index is 42 kg/m  as calculated from the following:    Height as of 10/2/23: 1.664 m (5' 5.5\").    Weight as of this encounter: 116.3 kg (256 lb 4.8 oz). Body surface area is 2.32 meters squared.  No Pain (0) Comment: Data Unavailable   Patient's last menstrual period was 10/31/2021.  Allergies reviewed: Yes  Medications reviewed: Yes    Medications: Medication refills not needed today.  Pharmacy name entered into "[employer]":    [facility] - [place], [place] - [address]  [facility] - [place], [place] - [address]  [facility]  [facility] - [place], [place] - [address], [id number]  [id number] - [place], [place] - [address] [facility] [address] [facility] [address]    Clinical concerns: Patient states there are no new concerns to discuss with provider.       Kannan Garduno              "
Chief Complaint   Patient presents with    Oncology Clinic Visit     Breast CA    Blood Draw     Labs drawn from PIV placed by RN. Line flushed with saline. Vitals taken. Pt checked in for appointment(s).      Carmen Beard RN    
 used

## 2023-12-04 NOTE — PROGRESS NOTES
"Dec 4, 2023    REASON FOR VISIT: h/o left breast cancer, T1cN1, ER + ID + her2 negative, oncotype Dx 19.      She underwent a screening mammogram on 09/29/2021, which demonstrated possible asymmetry in her left breast at 4-o'clock position.  On 10/08/2021, she underwent a mammogram and ultrasound.  On mammography, there was a spiculated mass, at the 4-o'clock position, measuring 1.3 cm in size.  On ultrasound, the mass measured 1.0 cm in size.  Her lymph nodes were negative by ultrasonography. On 10/15/2021, she underwent a contrast enhanced mammography which demonstrated the lesion to be 1.1 cm.  There are no other suspicious lesions in the rest of her breast.  Also, on 10/15, she underwent a needle biopsy, which demonstrated invasive ductal cancer, grade 1, ER positive, ID positive, HER2 negative. She has not palpated a mass in her breast.  She has not had a prior history of any breast problems.    She met with Dr Ge De La Vega.  She underwent a lumpectomy and sentinel lymph node biopsy.  Pathology revealed a 1.3 cm invasive ductal carcinoma.  1/1 lymph node was positive and measured 2 cm.    Oncotype low at 19.  Labs confirmed postmenopausal.  Completed radiation on 2/2/2022  Started letrozole 2/3/2022    INTERVAL HISTORY:  In discussion with Shanta today, she reports that her prior significant joint pain and arthralgias have improved greatly on Cymbalta.  She still has some hand/feet pain and her knees ache enough to avoid doing stairs.  She \"pushes\" through this, but admits its still frustrating. She tried a higher dose of cymbalta and doesn't think this has helped a lot.        No lymphedema.     She does experience hot flashes.  Her mood is good.      Her biggest complaint today is fatigue.  She notices being more worn out at the end of the day.  She has a lot of stress at work.    She is unsure if the way she is feeling is from her meds or from stress or something else.    She should receive zometa today " .    She otherwise feeling well today with no new issues.      ROS: 10 point ROS neg other than the symptoms noted above in the HPI.     PAST MEDICAL HISTORY:       Current Outpatient Medications   Medication    acetaminophen (TYLENOL) 325 MG tablet    acyclovir (ZOVIRAX) 400 MG tablet    aspirin (ASA) 81 MG EC tablet    atorvastatin (LIPITOR) 40 MG tablet    cetirizine HCl 10 MG CAPS    cyanocobalamin (VITAMIN B-12) 1000 MCG tablet    DULoxetine (CYMBALTA) 30 MG capsule    ibuprofen (ADVIL/MOTRIN) 600 MG tablet    letrozole (FEMARA) 2.5 MG tablet    omeprazole 20 MG tablet     No current facility-administered medications for this visit.     Allergies:  Sulfa drugs     FAMILY HISTORY:  Significant for a maternal aunt with breast cancer.  No history of ovarian cancer.     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  .  .     She has never had a breast biopsy previously.     PHYSICAL EXAMINATION:  /84 (BP Location: Right arm, Patient Position: Sitting, Cuff Size: Adult Large)   Pulse 95   Temp 98.2  F (36.8  C) (Oral)   Resp 18   Wt 116.3 kg (256 lb 4.8 oz)   LMP 10/31/2021   SpO2 93%   BMI 42.00 kg/m      Wt Readings from Last 4 Encounters:   23 116.3 kg (256 lb 4.8 oz)   10/02/23 117 kg (258 lb)   23 117.6 kg (259 lb 4.8 oz)   23 114.8 kg (253 lb 1.6 oz)       She is a well-appearing woman in no apparent distress.   HEENT: no icterus  NECK: supple  CV: rrr  Lungs: clear  Abd; soft, nt nd + bs  Ext: no edema  Bilateral breast examination: no nodules or masses in either breast, no axillary adenopathy.  Left breast with a thickened scar.  Tenderness with palpation of the chest wall, near the axillae, no palpable mass or edema noted.     Estradiol - 6  FSH - 35    Oncotype Dx 19      A/P:  52 y/o female with a left breast cancer pT1cN1 ER + [place] + her2 negative now s/p lumpectomy and SNLB.      1. Breast  cancer- Shanta is on active surveillance and remains on letrozole.  September Mammo 2023-  This is unremarkable.    She is 60-70% better in regards to her arthralgias on the Cymbalta and is willing to continue on this.    -She is open to being screened for CanAroma --> will refer  - she has tried fish oil.  -discussed continuing her Cymbalta to 30 mg BID as well.     2. Bone health - Her DEXA scan had lowest T-score of -0.9.   She is on prophylactic zometa IV every six months while on AI.    -next DEXA in 1/2024  -continue zometa.    3. Liver lesions - consistent with adenomas.      4. Hypercalcemia - history of elevated calcium.  PTHi and peptide was WNL.  Ionized calcium WNL. Vit D levels dropping now that she is off Vit D.  Taking Vit D 1000 units daily.    5. Encouraged exercise of at least 150 min per week, healthy diet and limiting alcohol use.       Giovanna Alegria MD

## 2023-12-04 NOTE — PROGRESS NOTES
Infusion Nursing Note:  Lachelle Duque presents today for Zometa.    Patient seen by provider today: Yes: Dr. Alegria prior to infusion   present during visit today: Not Applicable.    Note: Shanta denied any questions or concerns following her visit with the provider prior to infusion.      Intravenous Access:  Peripheral IV placed.    Treatment Conditions:   Latest Reference Range & Units 12/04/23 11:46   Creatinine 0.51 - 0.95 mg/dL 0.62   GFR Estimate >60 mL/min/1.73m2 >90   Calcium 8.6 - 10.0 mg/dL 10.1 (H)   Albumin 3.5 - 5.2 g/dL 4.4     Results reviewed, labs MET treatment parameters, ok to proceed with treatment.      Post Infusion Assessment:  Patient tolerated infusion without incident.  Blood return noted pre and post infusion.  Site patent and intact, free from redness, edema or discomfort.  No evidence of extravasations.  Access discontinued per protocol.       Discharge Plan:   Discharge instructions reviewed with: Patient.  Patient and/or family verbalized understanding of discharge instructions and all questions answered.  AVS to patient via [employer]T.  Patient will return in 6 months for next appointment.   Patient discharged in stable condition accompanied by: self.  Departure Mode: Ambulatory.      Eloina Luna RN

## 2023-12-04 NOTE — PATIENT INSTRUCTIONS
[facility] Triage and after hours / weekends / holidays:  [phone] option 5, option 2    Please call the triage or after hours line if you experience a temperature greater than or equal to 100.4, shaking chills, have uncontrolled nausea, vomiting and/or diarrhea, dizziness, shortness of breath, chest pain, bleeding, unexplained bruising, or if you have any other new/concerning symptoms, questions or concerns.      If you are having any concerning symptoms or wish to speak to a provider before your next infusion visit, please call triage to notify your care team so we can adequately serve you.     If you need a refill on a narcotic prescription or other medication, please call before your infusion appointment.

## 2023-12-04 NOTE — LETTER
"    12/4/2023         RE: Lachelle Duque  [address]  [place] [address]        Dear Colleague,    Thank you for referring your patient, Lachelle Duque, to the [facility]. Please see a copy of my visit note below.    Dec 4, 2023    REASON FOR VISIT: h/o left breast cancer, T1cN1, ER + DC + her2 negative, oncotype Dx 19.      She underwent a screening mammogram on 09/29/2021, which demonstrated possible asymmetry in her left breast at 4-o'clock position.  On 10/08/2021, she underwent a mammogram and ultrasound.  On mammography, there was a spiculated mass, at the 4-o'clock position, measuring 1.3 cm in size.  On ultrasound, the mass measured 1.0 cm in size.  Her lymph nodes were negative by ultrasonography. On 10/15/2021, she underwent a contrast enhanced mammography which demonstrated the lesion to be 1.1 cm.  There are no other suspicious lesions in the rest of her breast.  Also, on 10/15, she underwent a needle biopsy, which demonstrated invasive ductal cancer, grade 1, ER positive, DC positive, HER2 negative. She has not palpated a mass in her breast.  She has not had a prior history of any breast problems.    She met with Dr Ge De La Vega.  She underwent a lumpectomy and sentinel lymph node biopsy.  Pathology revealed a 1.3 cm invasive ductal carcinoma.  1/1 lymph node was positive and measured 2 cm.    Oncotype low at 19.  Labs confirmed postmenopausal.  Completed radiation on 2/2/2022  Started letrozole 2/3/2022    INTERVAL HISTORY:  In discussion with Shanta today, she reports that her prior significant joint pain and arthralgias have improved greatly on Cymbalta.  She still has some hand/feet pain and her knees ache enough to avoid doing stairs.  She \"pushes\" through this, but admits its still frustrating. She tried a higher dose of cymbalta and doesn't think this has helped a lot.        No lymphedema.     She does experience hot flashes.  Her mood is good.  "     Her biggest complaint today is fatigue.  She notices being more worn out at the end of the day.  She has a lot of stress at work.    She is unsure if the way she is feeling is from her meds or from stress or something else.    She should receive zometa today .    She otherwise feeling well today with no new issues.      ROS: 10 point ROS neg other than the symptoms noted above in the HPI.     PAST MEDICAL HISTORY:       Current Outpatient Medications   Medication    acetaminophen (TYLENOL) 325 MG tablet    acyclovir (ZOVIRAX) 400 MG tablet    aspirin (ASA) 81 MG EC tablet    atorvastatin (LIPITOR) 40 MG tablet    cetirizine HCl 10 MG CAPS    cyanocobalamin (VITAMIN B-12) 1000 MCG tablet    DULoxetine (CYMBALTA) 30 MG capsule    ibuprofen (ADVIL/MOTRIN) 600 MG tablet    letrozole (FEMARA) 2.5 MG tablet    omeprazole 20 MG tablet     No current facility-administered medications for this visit.     Allergies:  Sulfa drugs     FAMILY HISTORY:  Significant for a maternal aunt with breast cancer.  No history of ovarian cancer.     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  .  .     She has never had a breast biopsy previously.     PHYSICAL EXAMINATION:  /84 (BP Location: Right arm, Patient Position: Sitting, Cuff Size: Adult Large)   Pulse 95   Temp 98.2  F (36.8  C) (Oral)   Resp 18   Wt 116.3 kg (256 lb 4.8 oz)   LMP 10/31/2021   SpO2 93%   BMI 42.00 kg/m      Wt Readings from Last 4 Encounters:   23 116.3 kg (256 lb 4.8 oz)   10/02/23 117 kg (258 lb)   23 117.6 kg (259 lb 4.8 oz)   23 114.8 kg (253 lb 1.6 oz)       She is a well-appearing woman in no apparent distress.   HEENT: no icterus  NECK: supple  CV: rrr  Lungs: clear  Abd; soft, nt nd + bs  Ext: no edema  Bilateral breast examination: no nodules or masses in either breast, no axillary adenopathy.  Left breast with a thickened scar.  Tenderness  with palpation of the chest wall, near the axillae, no palpable mass or edema noted.     Estradiol - 6  FSH - 35    Oncotype Dx 19      A/P:  54 y/o female with a left breast cancer pT1cN1 ER + [place] + her2 negative now s/p lumpectomy and SNLB.      1. Breast cancer- Shanta is on active surveillance and remains on letrozole.  September Mammo 2023-  This is unremarkable.    She is 60-70% better in regards to her arthralgias on the Cymbalta and is willing to continue on this.    -She is open to being screened for CanAroma --> will refer  - she has tried fish oil.  -discussed continuing her Cymbalta to 30 mg BID as well.     2. Bone health - Her DEXA scan had lowest T-score of -0.9.   She is on prophylactic zometa IV every six months while on AI.    -next DEXA in 1/2024  -continue zometa.    3. Liver lesions - consistent with adenomas.      4. Hypercalcemia - history of elevated calcium.  PTHi and peptide was WNL.  Ionized calcium WNL. Vit D levels dropping now that she is off Vit D.  Taking Vit D 1000 units daily.    5. Encouraged exercise of at least 150 min per week, healthy diet and limiting alcohol use.       Giovanna Alegria MD

## 2023-12-11 DIAGNOSIS — E83.52 SERUM CALCIUM ELEVATED: Primary | ICD-10-CM

## 2023-12-12 NOTE — RESULT ENCOUNTER NOTE
Yanelis Duque,    Attached are your test results.  -Normal red blood cell (hgb) levels, normal white blood cell count and normal platelet levels.  -Cholesterol levels are at your goal levels.  ADVISE: continuing your medication, a regular exercise program with at least 150 minutes of aerobic exercise per week, and eating a low saturated fat/low carbohydrate diet.  Also, you should recheck this fasting cholesterol panel in 12 months.  -Liver and gallbladder tests (ALT,AST, Alk phos,bilirubin) are normal.  -Kidney function (GFR) is normal.  -Sodium is normal.  -Potassium is normal.  -Calcium is elevated.  ADVISE: rechecking this in 1 month.  -Glucose is slight elevated and may be a sign of early diabetes (prediabetes). ADVISE:: eating a low carbohydrate diet, exercising, trying to lose weight (if necessary) and rechecking your glucose level in 12 months.  -TSH (thyroid stimulating hormone) level is normal which indicates normal thyroid function.   Please contact us if you have any questions.    Suha Beavers, CNP

## 2023-12-14 ENCOUNTER — ONCOLOGY VISIT (OUTPATIENT)
Dept: ONCOLOGY | Facility: CLINIC | Age: 53
End: 2023-12-14
Attending: NURSE PRACTITIONER
Payer: COMMERCIAL

## 2023-12-14 ENCOUNTER — ANCILLARY PROCEDURE (OUTPATIENT)
Dept: MAMMOGRAPHY | Facility: CLINIC | Age: 53
End: 2023-12-14
Attending: NURSE PRACTITIONER
Payer: COMMERCIAL

## 2023-12-14 VITALS
WEIGHT: 257 LBS | HEIGHT: 66 IN | TEMPERATURE: 98.5 F | BODY MASS INDEX: 41.3 KG/M2 | SYSTOLIC BLOOD PRESSURE: 115 MMHG | HEART RATE: 95 BPM | DIASTOLIC BLOOD PRESSURE: 82 MMHG | RESPIRATION RATE: 20 BRPM | OXYGEN SATURATION: 98 %

## 2023-12-14 DIAGNOSIS — C50.912 INVASIVE DUCTAL CARCINOMA OF BREAST, FEMALE, LEFT (H): ICD-10-CM

## 2023-12-14 DIAGNOSIS — C50.912 INVASIVE DUCTAL CARCINOMA OF BREAST, FEMALE, LEFT (H): Primary | ICD-10-CM

## 2023-12-14 PROCEDURE — 76642 ULTRASOUND BREAST LIMITED: CPT | Mod: LT | Performed by: RADIOLOGY

## 2023-12-14 PROCEDURE — 99215 OFFICE O/P EST HI 40 MIN: CPT | Performed by: NURSE PRACTITIONER

## 2023-12-14 PROCEDURE — 99213 OFFICE O/P EST LOW 20 MIN: CPT | Performed by: NURSE PRACTITIONER

## 2023-12-14 ASSESSMENT — PAIN SCALES - GENERAL: PAINLEVEL: MODERATE PAIN (4)

## 2023-12-14 NOTE — PROGRESS NOTES
"Dec 14, 2023    REASON FOR VISIT: h/o left breast cancer, T1cN1, ER + [place] + her2 negative, oncotype Dx 19.      She underwent a screening mammogram on 09/29/2021, which demonstrated possible asymmetry in her left breast at 4-o'clock position.  On 10/08/2021, she underwent a mammogram and ultrasound.  On mammography, there was a spiculated mass, at the 4-o'clock position, measuring 1.3 cm in size.  On ultrasound, the mass measured 1.0 cm in size.  Her lymph nodes were negative by ultrasonography. On 10/15/2021, she underwent a contrast enhanced mammography which demonstrated the lesion to be 1.1 cm. There are no other suspicious lesions in the rest of her breast.  Also, on 10/15, she underwent a needle biopsy, which demonstrated invasive ductal cancer, grade 1, ER positive, PA positive, HER2 negative.  She has not palpated a mass in her breast.  She has not had a prior history of any breast problems.    She underwent a lumpectomy and sentinel lymph node biopsy.  Pathology revealed a 1.3 cm invasive ductal carcinoma.  1/1 lymph node was positive and measured 2 cm.    Oncotype low at 19.  Labs confirmed postmenopausal.  Completed radiation on 2/2/2022  Started letrozole 2/3/2022    INTERVAL HISTORY:    She is here today unaccompanied for an acute visit.   -She saw Dr Alegria for a routine visit on 12/4/2023.  -She messaged on 12/11: \"I woke up early Saturday morning with pain in my left axilla & it goes up into my arm. I don't feel a lump, but I feel a hard vein or something. You can palpate it & it feels sore. My upper left arm & armpit don't look swollen but feels swollen. I was not experiencing this when I saw Dr. Aelgria last Monday.\"  -Pain extends down into left lateral breast.  -No recent vaccinations  -No discomfort there before that.   -Feels a sense of axillary and upper arm fullness, but does not see any.  -Never with lymphedema on that side.  -No fevers, chills, or signs of systemic illness.  -No trauma to the " "area.    ROS: 10 point ROS neg other than the symptoms noted above in the HPI.     PAST MEDICAL HISTORY:       Current Outpatient Medications   Medication    acetaminophen (TYLENOL) 325 MG tablet    acyclovir (ZOVIRAX) 400 MG tablet    aspirin (ASA) 81 MG EC tablet    atorvastatin (LIPITOR) 40 MG tablet    cetirizine HCl 10 MG CAPS    cyanocobalamin (VITAMIN B-12) 1000 MCG tablet    DULoxetine (CYMBALTA) 30 MG capsule    ibuprofen (ADVIL/MOTRIN) 600 MG tablet    letrozole (FEMARA) 2.5 MG tablet    omeprazole 20 MG tablet     No current facility-administered medications for this visit.     Allergies:  Sulfa drugs     FAMILY HISTORY:  Significant for a maternal aunt with breast cancer.  No history of ovarian cancer.     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  .  .     She has never had a breast biopsy previously.     PHYSICAL EXAMINATION:  /82 (BP Location: Right arm, Patient Position: Sitting, Cuff Size: Adult Large)   Pulse 95   Temp 98.5  F (36.9  C) (Oral)   Resp 20   Ht 1.67 m (5' 5.75\")   Wt 116.6 kg (257 lb)   LMP 10/31/2021   SpO2 98%   BMI 41.80 kg/m      Wt Readings from Last 4 Encounters:   23 116.6 kg (257 lb)   23 116.3 kg (256 lb 4.8 oz)   10/02/23 117 kg (258 lb)   23 117.6 kg (259 lb 4.8 oz)       She is a well-appearing woman in no apparent distress.   Left breast examination: no nodules or masses. But she does have a firm area that is is about 3 cm low and a few mm wide.  Left breast with a thickened lumpectomy scar.  Tenderness with palpation of the chest wall, near the axillae, no edema noted.   Estradiol - 6  FSH - 35  Oncotype Dx 19    A/P:  52 y/o female with a left breast cancer pT1cN1 ER + [place] + her2 negative now s/p lumpectomy and SNLB.      1. Breast cancer: Shanta is on active surveillance and remains on letrozole.    -Mammogram and ultrasound in 2023 BI-RADS 2 - " benign.  -Ultrasound ordered and she is able to get this done today.   -Continue Letrozole.  -Arthralgias overall better with Cymbalta; increased dose did not help.   -Consideration for lymphedema referral for eval and treatment if needed.    ADDENDUM: Per message from breast center RN, no evidence of disease, radiologist thinks she would benefit from lymphedema referral.  MyChart to patient who is in agreement. Referral placed.     2. Bone health:  Her DEXA scan had lowest T-score of -0.9.   She is on prophylactic zometa IV every six months while on AI.    -Next DEXA in 1/2024 which is scheduled.    3. Liver lesions:  -Consistent with adenomas.      4. Hypercalcemia - history of elevated calcium.  PTHi and peptide was WNL.  Ionized calcium WNL. Vit D levels dropping now that she is off Vit D.  Taking Vit D 1000 units daily.  -This was not discussed today at this acute-focused visit.     46 minutes spent on the date of the encounter doing chart review, review of test results, interpretation of tests, patient visit, documentation, and discussion with other provider(s).     Tiana Cerda, APRN, CNP  [facility]  [address] [place] [address] [phone]

## 2023-12-14 NOTE — LETTER
"    12/14/2023         RE: Lachelle Duque  [address]  [place] [address]        Dear Colleague,    Thank you for referring your patient, Lachelle Duque, to the [facility]. Please see a copy of my visit note below.    Dec 14, 2023    REASON FOR VISIT: h/o left breast cancer, T1cN1, ER + [place] + her2 negative, oncotype Dx 19.      She underwent a screening mammogram on 09/29/2021, which demonstrated possible asymmetry in her left breast at 4-o'clock position.  On 10/08/2021, she underwent a mammogram and ultrasound.  On mammography, there was a spiculated mass, at the 4-o'clock position, measuring 1.3 cm in size.  On ultrasound, the mass measured 1.0 cm in size.  Her lymph nodes were negative by ultrasonography. On 10/15/2021, she underwent a contrast enhanced mammography which demonstrated the lesion to be 1.1 cm. There are no other suspicious lesions in the rest of her breast.  Also, on 10/15, she underwent a needle biopsy, which demonstrated invasive ductal cancer, grade 1, ER positive, OH positive, HER2 negative.  She has not palpated a mass in her breast.  She has not had a prior history of any breast problems.    She underwent a lumpectomy and sentinel lymph node biopsy.  Pathology revealed a 1.3 cm invasive ductal carcinoma.  1/1 lymph node was positive and measured 2 cm.    Oncotype low at 19.  Labs confirmed postmenopausal.  Completed radiation on 2/2/2022  Started letrozole 2/3/2022    INTERVAL HISTORY:    She is here today unaccompanied for an acute visit.   -She saw Dr Alegria for a routine visit on 12/4/2023.  -She messaged on 12/11: \"I woke up early Saturday morning with pain in my left axilla & it goes up into my arm. I don't feel a lump, but I feel a hard vein or something. You can palpate it & it feels sore. My upper left arm & armpit don't look swollen but feels swollen. I was not experiencing this when I saw Dr. Alegria last Monday.\"  -Pain extends down into " "left lateral breast.  -No recent vaccinations  -No discomfort there before that.   -Feels a sense of axillary and upper arm fullness, but does not see any.  -Never with lymphedema on that side.  -No fevers, chills, or signs of systemic illness.  -No trauma to the area.    ROS: 10 point ROS neg other than the symptoms noted above in the HPI.     PAST MEDICAL HISTORY:       Current Outpatient Medications   Medication    acetaminophen (TYLENOL) 325 MG tablet    acyclovir (ZOVIRAX) 400 MG tablet    aspirin (ASA) 81 MG EC tablet    atorvastatin (LIPITOR) 40 MG tablet    cetirizine HCl 10 MG CAPS    cyanocobalamin (VITAMIN B-12) 1000 MCG tablet    DULoxetine (CYMBALTA) 30 MG capsule    ibuprofen (ADVIL/MOTRIN) 600 MG tablet    letrozole (FEMARA) 2.5 MG tablet    omeprazole 20 MG tablet     No current facility-administered medications for this visit.     Allergies:  Sulfa drugs     FAMILY HISTORY:  Significant for a maternal aunt with breast cancer.  No history of ovarian cancer.     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  .  .     She has never had a breast biopsy previously.     PHYSICAL EXAMINATION:  /82 (BP Location: Right arm, Patient Position: Sitting, Cuff Size: Adult Large)   Pulse 95   Temp 98.5  F (36.9  C) (Oral)   Resp 20   Ht 1.67 m (5' 5.75\")   Wt 116.6 kg (257 lb)   LMP 10/31/2021   SpO2 98%   BMI 41.80 kg/m      Wt Readings from Last 4 Encounters:   23 116.6 kg (257 lb)   23 116.3 kg (256 lb 4.8 oz)   10/02/23 117 kg (258 lb)   23 117.6 kg (259 lb 4.8 oz)       She is a well-appearing woman in no apparent distress.   Left breast examination: no nodules or masses. But she does have a firm area that is is about 3 cm low and a few mm wide.  Left breast with a thickened lumpectomy scar.  Tenderness with palpation of the chest wall, near the axillae, no edema noted.   Estradiol - 6  FSH - 35  Oncotype " Dx 19    A/P:  54 y/o female with a left breast cancer pT1cN1 ER + [place] + her2 negative now s/p lumpectomy and SNLB.      1. Breast cancer: Shanta is on active surveillance and remains on letrozole.    -Mammogram and ultrasound in September 2023 BI-RADS 2 - benign.  -Ultrasound ordered and she is able to get this done today.   -Continue Letrozole.  -Arthralgias overall better with Cymbalta; increased dose did not help.   -Consideration for lymphedema referral for eval and treatment if needed.    ADDENDUM: Per message from breast center RN, no evidence of disease, radiologist thinks she would benefit from lymphedema referral.  MyChart to patient who is in agreement. Referral placed.     2. Bone health:  Her DEXA scan had lowest T-score of -0.9.   She is on prophylactic zometa IV every six months while on AI.    -Next DEXA in 1/2024 which is scheduled.    3. Liver lesions:  -Consistent with adenomas.      4. Hypercalcemia - history of elevated calcium.  PTHi and peptide was WNL.  Ionized calcium WNL. Vit D levels dropping now that she is off Vit D.  Taking Vit D 1000 units daily.  -This was not discussed today at this acute-focused visit.     46 minutes spent on the date of the encounter doing chart review, review of test results, interpretation of tests, patient visit, documentation, and discussion with other provider(s).     Tiana Cerda, ROSALIND, CNP  [facility]  [address] [place] [id number] [phone]

## 2023-12-14 NOTE — NURSING NOTE
"Oncology Rooming Note    December 14, 2023 12:34 PM   Lachelle Duque is a 53 year old female who presents for:    Chief Complaint   Patient presents with    Oncology Clinic Visit     Invasive ductal carcinoma of breast, female, left     Initial Vitals: /82 (BP Location: Right arm, Patient Position: Sitting, Cuff Size: Adult Large)   Pulse 95   Temp 98.5  F (36.9  C) (Oral)   Resp 20   Ht 1.67 m (5' 5.75\")   Wt 116.6 kg (257 lb)   LMP 10/31/2021   SpO2 98%   BMI 41.80 kg/m   Estimated body mass index is 41.8 kg/m  as calculated from the following:    Height as of this encounter: 1.67 m (5' 5.75\").    Weight as of this encounter: 116.6 kg (257 lb). Body surface area is 2.33 meters squared.  Moderate Pain (4) Comment: constant   Patient's last menstrual period was 10/31/2021.  Allergies reviewed: Yes  Medications reviewed: Yes    Medications: Medication refills not needed today.  Pharmacy name entered into edelight:    [facility] - [place], [place] - [address]  [facility] - [place], [place] - [address]  [facility]  [facility] - [place], [place] - [address]  [id number] - [place], [place] - [address]    Clinical concerns: Pt is experiencing moderate pain in the left side of their armpit that radiates towards the end of the bra on the left. Pain feels dull as if it is swollen, but does not appear to be swollen or bruised. Pain is constant and doesn't affect range of motion. Pt felt sharp pain and hardness on Saturday - it is still present today.       Rolanda Selby"

## 2023-12-28 ENCOUNTER — LAB REQUISITION (OUTPATIENT)
Dept: LAB | Facility: CLINIC | Age: 53
End: 2023-12-28

## 2023-12-28 ENCOUNTER — LAB (OUTPATIENT)
Dept: LAB | Facility: CLINIC | Age: 53
End: 2023-12-28
Attending: INTERNAL MEDICINE
Payer: COMMERCIAL

## 2023-12-28 ENCOUNTER — APPOINTMENT (OUTPATIENT)
Dept: ONCOLOGY | Facility: CLINIC | Age: 53
End: 2023-12-28
Attending: INTERNAL MEDICINE
Payer: COMMERCIAL

## 2023-12-28 LAB — ESTRADIOL SERPL-MCNC: <5 PG/ML

## 2023-12-28 PROCEDURE — 82670 ASSAY OF TOTAL ESTRADIOL: CPT | Performed by: INTERNAL MEDICINE

## 2023-12-28 PROCEDURE — 36415 COLL VENOUS BLD VENIPUNCTURE: CPT

## 2023-12-28 NOTE — NURSING NOTE
Chief Complaint   Patient presents with    Blood Draw     Research tubes drawn via  by RN. Patient tolerated well.      Serena William RN

## 2024-01-04 ENCOUNTER — OFFICE VISIT (OUTPATIENT)
Dept: OPTOMETRY | Facility: CLINIC | Age: 54
End: 2024-01-04
Payer: COMMERCIAL

## 2024-01-04 DIAGNOSIS — Z01.00 EXAMINATION OF EYES AND VISION: Primary | ICD-10-CM

## 2024-01-04 DIAGNOSIS — H52.03 HYPEROPIA, BILATERAL: ICD-10-CM

## 2024-01-04 DIAGNOSIS — H52.4 PRESBYOPIA: ICD-10-CM

## 2024-01-04 DIAGNOSIS — H52.223 REGULAR ASTIGMATISM OF BOTH EYES: ICD-10-CM

## 2024-01-04 PROCEDURE — 92015 DETERMINE REFRACTIVE STATE: CPT | Performed by: OPTOMETRIST

## 2024-01-04 PROCEDURE — 92014 COMPRE OPH EXAM EST PT 1/>: CPT | Performed by: OPTOMETRIST

## 2024-01-04 ASSESSMENT — REFRACTION_WEARINGRX
OD_CYLINDER: +1.25
OD_SPHERE: +0.75
OS_CYLINDER: +1.25
OS_CYLINDER: +1.00
SPECS_TYPE: PAL
OD_AXIS: 020
OS_ADD: +2.00
OD_ADD: +2.00
OD_SPHERE: +0.75
OS_AXIS: 167
OS_AXIS: 167
OS_SPHERE: +0.50
OD_AXIS: 020
OD_ADD: +2.00
OS_SPHERE: +0.50
OS_ADD: +2.00
OD_CYLINDER: +1.25

## 2024-01-04 ASSESSMENT — REFRACTION_MANIFEST
OD_AXIS: 020
OS_SPHERE: +0.50
OS_CYLINDER: +1.00
OD_ADD: +2.25
OD_SPHERE: +0.75
OS_ADD: +2.25
OD_CYLINDER: +1.25
OS_AXIS: 167

## 2024-01-04 ASSESSMENT — KERATOMETRY
OD_AXISANGLE_DEGREES: 007
OD_AXISANGLE2_DEGREES: 097
OS_AXISANGLE_DEGREES: 179
OS_K2POWER_DIOPTERS: 45.25
OD_K2POWER_DIOPTERS: 45.50
OD_K1POWER_DIOPTERS: 44.25
OS_K1POWER_DIOPTERS: 44.50
OS_AXISANGLE2_DEGREES: 089

## 2024-01-04 ASSESSMENT — VISUAL ACUITY
OS_CC: 20/20-1
OD_CC+: -2
OD_CC: 20/20-1
OS_SC+: -2
OD_CC: 20/20
OS_CC+: +2
METHOD: SNELLEN - LINEAR
OS_SC: 20/20
OD_SC: 20/50
CORRECTION_TYPE: GLASSES
OS_CC: 20/25

## 2024-01-04 ASSESSMENT — CONF VISUAL FIELD
OS_NORMAL: 1
OS_SUPERIOR_TEMPORAL_RESTRICTION: 0
OD_INFERIOR_NASAL_RESTRICTION: 0
OD_SUPERIOR_TEMPORAL_RESTRICTION: 0
OD_INFERIOR_TEMPORAL_RESTRICTION: 0
OS_SUPERIOR_NASAL_RESTRICTION: 0
OD_SUPERIOR_NASAL_RESTRICTION: 0
OD_NORMAL: 1
OS_INFERIOR_NASAL_RESTRICTION: 0
OS_INFERIOR_TEMPORAL_RESTRICTION: 0

## 2024-01-04 ASSESSMENT — TONOMETRY
OS_IOP_MMHG: 21
OD_IOP_MMHG: 21
IOP_METHOD: TONOPEN

## 2024-01-04 ASSESSMENT — EXTERNAL EXAM - RIGHT EYE: OD_EXAM: NORMAL

## 2024-01-04 ASSESSMENT — CUP TO DISC RATIO
OD_RATIO: 0.1
OS_RATIO: 0.1

## 2024-01-04 ASSESSMENT — EXTERNAL EXAM - LEFT EYE: OS_EXAM: NORMAL

## 2024-01-04 ASSESSMENT — SLIT LAMP EXAM - LIDS
COMMENTS: NORMAL
COMMENTS: NORMAL

## 2024-01-04 NOTE — PATIENT INSTRUCTIONS
Eyeglass prescription given.    No change in cl prescription.    Return in 1 year for a complete eye exam or sooner if needed.    Juan Carlos Spann, BREN    The affects of the dilating drops last for 4- 6 hours.  You will be more sensitive to light and vision will be blurry up close.  Do not drive if you do not feel comfortable.  Mydriatic sunglasses were given if needed.      Optometry Providers       Clinic Locations                                 Telephone Number   Dr. Faith Whitman    [place]   [facility]  [place] [phone]     [place] [facility] Hours:                [place] [facility] Hours:       [place] [facility] Hours:   [address]   [address]      [address] [place] [address]   [place] [address]    [place] [address]  Phone: [phone]                    Phone: [phone]     Phone: [phone]                      Monday 8:00-6:00                          Monday 8:00-6:00                          Monday 8:00-6:00              Tuesday 8:00-6:00                          Tuesday 8:00-6:00                          Tuesday 8:00-6:00              Wednesday 8:00-6:00                  Wednesday 8:00-6:00                   Wednesday 8:00-6:00      Thursday 8:00-6:00                        Thursday 8:00-6:00                         Thursday 8:00-6:00            Friday 8:00-5:00                              Friday 8:00-5:00                              Friday 8:00-5:00    [facility] Hours:   [address] [place], [place] [id number] [phone]    Monday 9:00-6:00  Tuesday 9:00-6:00  Wednesday 9:00-6:00  Thursday 9:00-6:00  Friday 9:00-5:00  As always, Thank you for trusting us with your health care needs!

## 2024-01-04 NOTE — PROGRESS NOTES
Chief Complaint   Patient presents with    Annual Eye Exam     More contacts fit fee ok $75 if needed         Last Eye Exam: 12-9-2022  Dilated Previously: Yes    What are you currently using to see?  glasses and contacts       Distance Vision Acuity: Satisfied with vision    Near Vision Acuity: Satisfied with vision while reading  with glasses or contacts    Eye Comfort: good  Do you use eye drops? 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      Beatrice Michaels Optometric Assistant, A.B.O.C.          Medical, surgical and family histories reviewed and updated 1/4/2024.       OBJECTIVE: See Ophthalmology exam    ASSESSMENT:    ICD-10-CM    1. Examination of eyes and vision  Z01.00       2. Hyperopia, bilateral  H52.03       3. Regular astigmatism of both eyes  H52.223       4. Presbyopia  H52.4           PLAN:     Patient Instructions   Eyeglass prescription given.    No change in cl prescription.    Return in 1 year for a complete eye exam or sooner if needed.    Juan Carlos Spann, OD

## 2024-01-04 NOTE — LETTER
1/4/2024         RE: Lachelle Duque  [address]  [facility] [address]        Dear Colleague,    Thank you for referring your patient, Lachelle Duque, to the [facility]. Please see a copy of my visit note below.    Chief Complaint   Patient presents with     Annual Eye Exam     More contacts fit fee ok $75 if needed         Last Eye Exam: 12-9-2022  Dilated Previously: Yes    What are you currently using to see?  glasses and contacts       Distance Vision Acuity: Satisfied with vision    Near Vision Acuity: Satisfied with vision while reading  with glasses or contacts    Eye Comfort: good  Do you use eye drops? 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      Beatrice Michaels Optometric Assistant, A.B.O.C.          Medical, surgical and family histories reviewed and updated 1/4/2024.       OBJECTIVE: See Ophthalmology exam    ASSESSMENT:    ICD-10-CM    1. Examination of eyes and vision  Z01.00       2. Hyperopia, bilateral  H52.03       3. Regular astigmatism of both eyes  H52.223       4. Presbyopia  H52.4           PLAN:     Patient Instructions   Eyeglass prescription given.    No change in cl prescription.    Return in 1 year for a complete eye exam or sooner if needed.    Juan Carlos Spann OD         Again, thank you for allowing me to participate in the care of your patient.        Sincerely,        Juan Carlos Spann OD

## 2024-01-29 ENCOUNTER — LAB (OUTPATIENT)
Dept: LAB | Facility: CLINIC | Age: 54
End: 2024-01-29
Payer: COMMERCIAL

## 2024-01-29 ENCOUNTER — LAB REQUISITION (OUTPATIENT)
Dept: LAB | Facility: CLINIC | Age: 54
End: 2024-01-29

## 2024-01-29 ENCOUNTER — APPOINTMENT (OUTPATIENT)
Dept: ONCOLOGY | Facility: CLINIC | Age: 54
End: 2024-01-29
Payer: COMMERCIAL

## 2024-01-29 DIAGNOSIS — E83.52 SERUM CALCIUM ELEVATED: ICD-10-CM

## 2024-01-29 LAB
ANION GAP SERPL CALCULATED.3IONS-SCNC: 13 MMOL/L (ref 7–15)
BUN SERPL-MCNC: 20.9 MG/DL (ref 6–20)
CALCIUM SERPL-MCNC: 10.2 MG/DL (ref 8.6–10)
CHLORIDE SERPL-SCNC: 101 MMOL/L (ref 98–107)
CREAT SERPL-MCNC: 0.62 MG/DL (ref 0.51–0.95)
DEPRECATED HCO3 PLAS-SCNC: 23 MMOL/L (ref 22–29)
EGFRCR SERPLBLD CKD-EPI 2021: >90 ML/MIN/1.73M2
GLUCOSE SERPL-MCNC: 101 MG/DL (ref 70–99)
POTASSIUM SERPL-SCNC: 4.4 MMOL/L (ref 3.4–5.3)
SODIUM SERPL-SCNC: 137 MMOL/L (ref 135–145)

## 2024-01-29 PROCEDURE — 82670 ASSAY OF TOTAL ESTRADIOL: CPT | Performed by: INTERNAL MEDICINE

## 2024-01-29 PROCEDURE — 36415 COLL VENOUS BLD VENIPUNCTURE: CPT

## 2024-01-29 PROCEDURE — 80048 BASIC METABOLIC PNL TOTAL CA: CPT

## 2024-01-29 NOTE — NURSING NOTE
Chief Complaint   Patient presents with    Blood Draw     Labs drawn via  by RN in lab      Labs collected from venipuncture by RN. Vitals not taken. Checked in for appointment(s).   Perla Savage RN

## 2024-01-30 LAB — ESTRADIOL SERPL-MCNC: <5 PG/ML

## 2024-01-31 DIAGNOSIS — E83.52 SERUM CALCIUM ELEVATED: Primary | ICD-10-CM

## 2024-02-26 ENCOUNTER — ONCOLOGY VISIT (OUTPATIENT)
Dept: ONCOLOGY | Facility: CLINIC | Age: 54
End: 2024-02-26
Attending: PHYSICIAN ASSISTANT
Payer: COMMERCIAL

## 2024-02-26 ENCOUNTER — ANCILLARY PROCEDURE (OUTPATIENT)
Dept: BONE DENSITY | Facility: CLINIC | Age: 54
End: 2024-02-26
Attending: PHYSICIAN ASSISTANT
Payer: COMMERCIAL

## 2024-02-26 VITALS
BODY MASS INDEX: 41.9 KG/M2 | OXYGEN SATURATION: 96 % | RESPIRATION RATE: 12 BRPM | DIASTOLIC BLOOD PRESSURE: 82 MMHG | HEART RATE: 94 BPM | SYSTOLIC BLOOD PRESSURE: 116 MMHG | WEIGHT: 257.6 LBS | TEMPERATURE: 98.3 F

## 2024-02-26 DIAGNOSIS — M25.562 ARTHRALGIA OF BOTH KNEES: ICD-10-CM

## 2024-02-26 DIAGNOSIS — C50.912 INVASIVE DUCTAL CARCINOMA OF BREAST, FEMALE, LEFT (H): Primary | ICD-10-CM

## 2024-02-26 DIAGNOSIS — C50.912 INVASIVE DUCTAL CARCINOMA OF BREAST, FEMALE, LEFT (H): ICD-10-CM

## 2024-02-26 DIAGNOSIS — M25.50 AROMATASE INHIBITOR-ASSOCIATED ARTHRALGIA: ICD-10-CM

## 2024-02-26 DIAGNOSIS — Z79.811 LONG TERM (CURRENT) USE OF AROMATASE INHIBITORS: ICD-10-CM

## 2024-02-26 DIAGNOSIS — T45.1X5A AROMATASE INHIBITOR-ASSOCIATED ARTHRALGIA: ICD-10-CM

## 2024-02-26 DIAGNOSIS — M25.561 ARTHRALGIA OF BOTH KNEES: ICD-10-CM

## 2024-02-26 PROCEDURE — 99214 OFFICE O/P EST MOD 30 MIN: CPT | Performed by: PHYSICIAN ASSISTANT

## 2024-02-26 PROCEDURE — 77080 DXA BONE DENSITY AXIAL: CPT | Performed by: INTERNAL MEDICINE

## 2024-02-26 PROCEDURE — 99213 OFFICE O/P EST LOW 20 MIN: CPT | Performed by: PHYSICIAN ASSISTANT

## 2024-02-26 RX ORDER — HEPARIN SODIUM,PORCINE 10 UNIT/ML
5 VIAL (ML) INTRAVENOUS
OUTPATIENT
Start: 2024-05-29

## 2024-02-26 RX ORDER — HEPARIN SODIUM (PORCINE) LOCK FLUSH IV SOLN 100 UNIT/ML 100 UNIT/ML
5 SOLUTION INTRAVENOUS
OUTPATIENT
Start: 2024-05-29

## 2024-02-26 RX ORDER — ZOLEDRONIC ACID 0.04 MG/ML
4 INJECTION, SOLUTION INTRAVENOUS ONCE
Status: CANCELLED | OUTPATIENT
Start: 2024-05-29 | End: 2024-05-29

## 2024-02-26 ASSESSMENT — PAIN SCALES - GENERAL: PAINLEVEL: NO PAIN (0)

## 2024-02-26 NOTE — PROGRESS NOTES
"Feb 26, 2024        REASON FOR VISIT: h/o left breast cancer, T1cN1, ER + MI + her2 negative, oncotype Dx 19.      She underwent a screening mammogram on 09/29/2021, which demonstrated possible asymmetry in her left breast at 4-o'clock position.  On 10/08/2021, she underwent a mammogram and ultrasound.  On mammography, there was a spiculated mass, at the 4-o'clock position, measuring 1.3 cm in size.  On ultrasound, the mass measured 1.0 cm in size.  Her lymph nodes were negative by ultrasonography. On 10/15/2021, she underwent a contrast enhanced mammography which demonstrated the lesion to be 1.1 cm.  There are no other suspicious lesions in the rest of her breast.  Also, on 10/15, she underwent a needle biopsy, which demonstrated invasive ductal cancer, grade 1, ER positive, MI positive, HER2 negative. She has not palpated a mass in her breast.  She has not had a prior history of any breast problems.    She met with Dr Ge De La Vega.  She underwent a lumpectomy and sentinel lymph node biopsy.  Pathology revealed a 1.3 cm invasive ductal carcinoma.  1/1 lymph node was positive and measured 2 cm.    Oncotype low at 19.  Labs confirmed postmenopausal.  Completed radiation on 2/2/2022  Started letrozole 2/3/2022    INTERVAL HISTORY:  In discussion with Shanta today, she reports that her prior significant joint pain and arthralgias initially improved  on Cymbalta, but continues to be burdensome.  She still has some hand/feet pain and her knees ache enough to avoid doing stairs.  She \"pushes\" through this, but admits its still frustrating. Today we discussed this further, how significant it really still continues to play a role in her activity level. She was tearful discussing it at times.     Had some cording in the left axillae that resolved on its own, but still some discomfort in the deep axillae.  Has PT coming up.     She does experience hot flashes.  Her mood is good, but ongoing frustration with her joints.    "     Zometa infusions have been going better.     She otherwise feeling well today with no new issues.         Her recent colonoscopy and cardiology visits reviewed today.  She has a family history of colon cancer in her father (diagnosed at age 42), so she is screened every 5 years.  She was seen by cardiology and has been started on a daily aspirin as well as atorvastatin in .     We worked up her elevated calcium and it was negative.  Her ionized calcium is normal.     She continues to work on 7[employer] (now 5[employer]) and its been stressful.     ROS: 10 point ROS neg other than the symptoms noted above in the HPI.     PAST MEDICAL HISTORY:       Current Outpatient Medications   Medication    acetaminophen (TYLENOL) 325 MG tablet    acyclovir (ZOVIRAX) 400 MG tablet    aspirin (ASA) 81 MG EC tablet    atorvastatin (LIPITOR) 40 MG tablet    cetirizine HCl 10 MG CAPS    cyanocobalamin (VITAMIN B-12) 1000 MCG tablet    doxylamine (UNISOM) 25 MG TABS tablet    DULoxetine (CYMBALTA) 30 MG capsule    ibuprofen (ADVIL/MOTRIN) 600 MG tablet    letrozole (FEMARA) 2.5 MG tablet    omeprazole 20 MG tablet     No current facility-administered medications for this visit.     Allergies:  Sulfa drugs     FAMILY HISTORY:  Significant for a maternal aunt with breast cancer.  No history of ovarian cancer.     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  .  .     She has never had a breast biopsy previously.     PHYSICAL EXAMINATION:  /82 (BP Location: Right arm, Patient Position: Sitting, Cuff Size: Adult Large)   Pulse 94   Temp 98.3  F (36.8  C) (Oral)   Resp 12   Wt 116.8 kg (257 lb 9.6 oz)   LMP 10/31/2021   SpO2 96%   BMI 41.90 kg/m      Wt Readings from Last 4 Encounters:   24 116.8 kg (257 lb 9.6 oz)   23 116.6 kg (257 lb)   23 116.3 kg (256 lb 4.8 oz)   10/02/23 117 kg (258 lb)       She is a well-appearing woman in no  apparent distress.   HEENT: no icterus  NECK: supple  CV: rrr  Lungs: clear  Abd; soft, nt nd + bs  Ext: no edema  Bilateral breast examination: no nodules or masses in either breast, no axillary adenopathy.  Left breast with a thickened scar.  Tenderness with palpation of the chest wall, near the axillae, no palpable mass or edema or cording noted.     Estradiol - 6  FSH - 35    Oncotype Dx 19  Mammo 9/25/23: WNL  DEXA: slight worsening, but still normal range      A/P:  54 y/o female with a left breast cancer pT1cN1 ER + WI + her2 negative now s/p lumpectomy and SNLB.      1. Breast cancer- Shanta is on active surveillance and remains on letrozole.  September Mammo 2022-  This is unremarkable.    Initially had severe arthralgias with the AI. The Cymbalta helped but seems less effective over the last year.  Discussed trying 60 mg once a day, to see if benefit was improved with a larger, but one time dose.  Also discussed other options including a break, different AIs and tamoxifen.  At this time, she is going to try the cymbalta dosing first.    -continue 3 month follow up    2. Bone health - Her DEXA scan had lowest T-score of -0.9.   She is on prophylactic zometa IV every six months while on AI.    -reviewed DEXA today, discussed she was on OCPs prior to diagnosis and labs showed post menopausal, thus those patients that go from OCP or HRT to post menopausal + AIs have the most impact to their bone health.  We are supporting her with the zometa and she remains in the normal bone density range, despite the changes.    -continue zometa     3. Liver lesions - consistent with adenomas.      4. Hypercalcemia - history of elevated calcium.  PTHi and peptide was WNL.  Ionized calcium WNL. Vit D levels dropping now that she is off Vit D.  Taking Vit D 1000 units daily.    5. Axillary cording, improved, but will see PT    6. Encouraged exercise of at least 150 min per week, healthy diet and limiting alcohol use.      Nelli  Elle BEASLEY    > 35 min were spent in reviewing imaging, pathology, counseling and coordinating care.

## 2024-02-26 NOTE — NURSING NOTE
"Oncology Rooming Note    February 26, 2024 9:52 AM   Lachelle Duque is a 53 year old female who presents for:    Chief Complaint   Patient presents with    Oncology Clinic Visit     Invasive ductal carcinoma of breast      Initial Vitals: /82 (BP Location: Right arm, Patient Position: Sitting, Cuff Size: Adult Large)   Pulse 94   Temp 98.3  F (36.8  C) (Oral)   Resp 12   Wt 116.8 kg (257 lb 9.6 oz)   LMP 10/31/2021   SpO2 96%   BMI 41.90 kg/m   Estimated body mass index is 41.9 kg/m  as calculated from the following:    Height as of 12/14/23: 1.67 m (5' 5.75\").    Weight as of this encounter: 116.8 kg (257 lb 9.6 oz). Body surface area is 2.33 meters squared.  No Pain (0) Comment: Data Unavailable   Patient's last menstrual period was 10/31/2021.  Allergies reviewed: Yes  Medications reviewed: Yes    Medications: Medication refills not needed today.  Pharmacy name entered into Rollbase (acquired by [employer]):    [facility]  [facility] - [place], [place] - [address]  [facility] - [place], [place] - [address]  [facility]  [facility] - [place], [place] - [address], [address]  [id number] - [place], [place] - [address]    Frailty Screening:   Is the patient here for a new oncology consult visit in cancer care? 2. No      Clinical concerns:  joint pain      Brooklynn Prieto"

## 2024-02-26 NOTE — LETTER
"    2/26/2024         RE: Lachelle Duque  [address]  [place] [address]        Dear Colleague,    Thank you for referring your patient, Lachelle Duque, to the [facility]. Please see a copy of my visit note below.    Feb 26, 2024        REASON FOR VISIT: h/o left breast cancer, T1cN1, ER + NV + her2 negative, oncotype Dx 19.      She underwent a screening mammogram on 09/29/2021, which demonstrated possible asymmetry in her left breast at 4-o'clock position.  On 10/08/2021, she underwent a mammogram and ultrasound.  On mammography, there was a spiculated mass, at the 4-o'clock position, measuring 1.3 cm in size.  On ultrasound, the mass measured 1.0 cm in size.  Her lymph nodes were negative by ultrasonography. On 10/15/2021, she underwent a contrast enhanced mammography which demonstrated the lesion to be 1.1 cm.  There are no other suspicious lesions in the rest of her breast.  Also, on 10/15, she underwent a needle biopsy, which demonstrated invasive ductal cancer, grade 1, ER positive, NV positive, HER2 negative. She has not palpated a mass in her breast.  She has not had a prior history of any breast problems.    She met with Dr Ge De La Vega.  She underwent a lumpectomy and sentinel lymph node biopsy.  Pathology revealed a 1.3 cm invasive ductal carcinoma.  1/1 lymph node was positive and measured 2 cm.    Oncotype low at 19.  Labs confirmed postmenopausal.  Completed radiation on 2/2/2022  Started letrozole 2/3/2022    INTERVAL HISTORY:  In discussion with Shanta today, she reports that her prior significant joint pain and arthralgias initially improved  on Cymbalta, but continues to be burdensome.  She still has some hand/feet pain and her knees ache enough to avoid doing stairs.  She \"pushes\" through this, but admits its still frustrating. Today we discussed this further, how significant it really still continues to play a role in her activity level. She was " tearful discussing it at times.     Had some cording in the left axillae that resolved on its own, but still some discomfort in the deep axillae.  Has PT coming up.     She does experience hot flashes.  Her mood is good, but ongoing frustration with her joints.        Zometa infusions have been going better.     She otherwise feeling well today with no new issues.         Her recent colonoscopy and cardiology visits reviewed today.  She has a family history of colon cancer in her father (diagnosed at age 42), so she is screened every 5 years.  She was seen by cardiology and has been started on a daily aspirin as well as atorvastatin in fall of .     We worked up her elevated calcium and it was negative.  Her ionized calcium is normal.     She continues to work on 7[employer] (now 5B) and its been stressful.     ROS: 10 point ROS neg other than the symptoms noted above in the HPI.     PAST MEDICAL HISTORY:       Current Outpatient Medications   Medication    acetaminophen (TYLENOL) 325 MG tablet    acyclovir (ZOVIRAX) 400 MG tablet    aspirin (ASA) 81 MG EC tablet    atorvastatin (LIPITOR) 40 MG tablet    cetirizine HCl 10 MG CAPS    cyanocobalamin (VITAMIN B-12) 1000 MCG tablet    doxylamine (UNISOM) 25 MG TABS tablet    DULoxetine (CYMBALTA) 30 MG capsule    ibuprofen (ADVIL/MOTRIN) 600 MG tablet    letrozole (FEMARA) 2.5 MG tablet    omeprazole 20 MG tablet     No current facility-administered medications for this visit.     Allergies:  Sulfa drugs     FAMILY HISTORY:  Significant for a maternal aunt with breast cancer.  No history of ovarian cancer.     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  No tobacco use.  .  .     She has never had a breast biopsy previously.     PHYSICAL EXAMINATION:  /82 (BP Location: Right arm, Patient Position: Sitting, Cuff Size: Adult Large)   Pulse 94   Temp 98.3  F (36.8  C) (Oral)   Resp 12   Wt 116.8 kg (257 lb 9.6  oz)   LMP 10/31/2021   SpO2 96%   BMI 41.90 kg/m      Wt Readings from Last 4 Encounters:   02/26/24 116.8 kg (257 lb 9.6 oz)   12/14/23 116.6 kg (257 lb)   12/04/23 116.3 kg (256 lb 4.8 oz)   10/02/23 117 kg (258 lb)       She is a well-appearing woman in no apparent distress.   HEENT: no icterus  NECK: supple  CV: rrr  Lungs: clear  Abd; soft, nt nd + bs  Ext: no edema  Bilateral breast examination: no nodules or masses in either breast, no axillary adenopathy.  Left breast with a thickened scar.  Tenderness with palpation of the chest wall, near the axillae, no palpable mass or edema or cording noted.     Estradiol - 6  FSH - 35    Oncotype Dx 19  Mammo 9/25/23: WNL  DEXA: slight worsening, but still normal range      A/P:  52 y/o female with a left breast cancer pT1cN1 ER + OR + her2 negative now s/p lumpectomy and SNLB.      1. Breast cancer- Shanta is on active surveillance and remains on letrozole.  September Mammo 2022-  This is unremarkable.    Initially had severe arthralgias with the AI. The Cymbalta helped but seems less effective over the last year.  Discussed trying 60 mg once a day, to see if benefit was improved with a larger, but one time dose.  Also discussed other options including a break, different AIs and tamoxifen.  At this time, she is going to try the cymbalta dosing first.    -continue 3 month follow up    2. Bone health - Her DEXA scan had lowest T-score of -0.9.   She is on prophylactic zometa IV every six months while on AI.    -reviewed DEXA today, discussed she was on OCPs prior to diagnosis and labs showed post menopausal, thus those patients that go from OCP or HRT to post menopausal + AIs have the most impact to their bone health.  We are supporting her with the zometa and she remains in the normal bone density range, despite the changes.    -continue zometa     3. Liver lesions - consistent with adenomas.      4. Hypercalcemia - history of elevated calcium.  PTHi and peptide was  WNL.  Ionized calcium WNL. Vit D levels dropping now that she is off Vit D.  Taking Vit D 1000 units daily.    5. Axillary cording, improved, but will see PT    6. Encouraged exercise of at least 150 min per week, healthy diet and limiting alcohol use.      Nelli Almeida PA-C    > 35 min were spent in reviewing imaging, pathology, counseling and coordinating care.

## 2024-02-28 ENCOUNTER — THERAPY VISIT (OUTPATIENT)
Dept: OCCUPATIONAL THERAPY | Facility: CLINIC | Age: 54
End: 2024-02-28
Attending: NURSE PRACTITIONER
Payer: COMMERCIAL

## 2024-02-28 DIAGNOSIS — C50.912 INVASIVE DUCTAL CARCINOMA OF BREAST, FEMALE, LEFT (H): Primary | Chronic | ICD-10-CM

## 2024-02-28 PROCEDURE — 97140 MANUAL THERAPY 1/> REGIONS: CPT | Mod: GO

## 2024-02-28 PROCEDURE — 97110 THERAPEUTIC EXERCISES: CPT | Mod: GO

## 2024-02-28 PROCEDURE — 97165 OT EVAL LOW COMPLEX 30 MIN: CPT | Mod: GO

## 2024-02-28 NOTE — PROGRESS NOTES
OCCUPATIONAL THERAPY EVALUATION  Type of Visit: Evaluation    See electronic medical record for Abuse and Falls Screening details.    Subjective      Presenting condition or subjective complaint:  L axillary cording/tightness  Date of onset:      Relevant medical history:   completed radiation Feb 2022, continues to be on oral hormone treatment that causes joint pain and fatigue.   Dates & types of surgery:  left lumpectomy with SLNB 11/10/2021    Prior diagnostic imaging/testing results:     per chart review 12/14/23 Palpable cord in the left axilla by ultrasound evaluation shows a linear structure without vascularity superficially beneath the skin. Possible superficial thrombophlebitis vs web/cording.  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  Hobbies/Interests:      Patient goals for therapy:  evaluation for cording management     Pain assessment:  only with deep palpation in L axilla     Objective       EDEMA EVALUATION  Additional history:  Body part affected by edema:  L arm and breast  If cancer related, treatment:  lumpectomy, radiation, letrozole   If not cancer related, problems with veins or cause of swelling:    Distance able to walk:  10,000 steps  Time able to stand:  a few hours  Sensation problems in hands/feet:    no  Edema etiology: Cancer with lymph node dissection, Radiation    Cognitive Status Examination  Orientation: Oriented to person, place and time   Level of Consciousness: Alert  Follows Commands and Answers Questions: 100% of the time  Personal Safety and  Judgement: Intact  Memory: Intact    EDEMA  Skin Condition: Non-pitting  Scar: Yes  Stemmer Sign: -  Ulceration: No    GIRTH MEASUREMENTS: Refer to separate girth measurement flowsheet.     VOLUME UE  Right UE (mL) 2614   Left UE (mL) 2795   UE Volume Comparison LUE volume greater than RUE volume   % Difference 6.7     RANGE OF MOTION: UE ROM WNL  STRENGTH: UE Strength WNL  POSTURE: WNL  PALPATION: one palpable cord in the middle of the L axilla  ACTIVITIES OF DAILY LIVING: indep  BED MOBILITY: Independent  TRANSFERS: Independent  GAIT/LOCOMOTION: indep with no device  BALANCE: WNL, denies falls  SENSATION: UE Sensation WNL    Assessment & Plan   CLINICAL IMPRESSIONS  Medical Diagnosis:      Treatment Diagnosis:      Impression/Assessment: Pt is a 53 year old female presenting to Occupational Therapy due to L axilla cording/tightness following treatment for breast cancer.  The following significant findings have been identified: Impaired ROM, Pain, and risk of symptom progression/development of lymphedema .  These identified deficits interfere with their ability to perform self care tasks and work tasks as compared to previous level of function.     Clinical Decision Making (Complexity):  Assessment of Occupational Performance: 1-3 Performance Deficits  Occupational Performance Limitations: risk of symptom progression and development of lymphedema affecting ROM, skin integrity and fit of clothing.   Clinical Decision Making (Complexity): Low complexity    PLAN OF CARE  Treatment Interventions:  Interventions: Therapeutic Exercise, Manual Therapy    Long Term Goals          Frequency of Treatment:    Duration of Treatment:       Recommended Referrals to Other Professionals: none  Education Assessment:       Risks and benefits of evaluation/treatment have been explained.   Patient/Family/caregiver agrees with Plan of Care.     Evaluation Time:       Signing Clinician: Eloina Russell OT

## 2024-06-10 ENCOUNTER — ONCOLOGY VISIT (OUTPATIENT)
Dept: ONCOLOGY | Facility: CLINIC | Age: 54
End: 2024-06-10
Attending: INTERNAL MEDICINE
Payer: COMMERCIAL

## 2024-06-10 ENCOUNTER — APPOINTMENT (OUTPATIENT)
Dept: LAB | Facility: CLINIC | Age: 54
End: 2024-06-10
Attending: INTERNAL MEDICINE
Payer: COMMERCIAL

## 2024-06-10 VITALS
WEIGHT: 254.9 LBS | OXYGEN SATURATION: 98 % | SYSTOLIC BLOOD PRESSURE: 137 MMHG | TEMPERATURE: 99.2 F | RESPIRATION RATE: 16 BRPM | BODY MASS INDEX: 41.46 KG/M2 | DIASTOLIC BLOOD PRESSURE: 84 MMHG | HEART RATE: 90 BPM

## 2024-06-10 DIAGNOSIS — C50.912 INVASIVE DUCTAL CARCINOMA OF BREAST, FEMALE, LEFT (H): Primary | ICD-10-CM

## 2024-06-10 DIAGNOSIS — Z79.811 LONG TERM (CURRENT) USE OF AROMATASE INHIBITORS: ICD-10-CM

## 2024-06-10 LAB
ALBUMIN SERPL BCG-MCNC: 4.4 G/DL (ref 3.5–5.2)
CALCIUM SERPL-MCNC: 10 MG/DL (ref 8.6–10)
CREAT SERPL-MCNC: 0.57 MG/DL (ref 0.51–0.95)
EGFRCR SERPLBLD CKD-EPI 2021: >90 ML/MIN/1.73M2

## 2024-06-10 PROCEDURE — 99214 OFFICE O/P EST MOD 30 MIN: CPT | Mod: GC | Performed by: INTERNAL MEDICINE

## 2024-06-10 PROCEDURE — 96365 THER/PROPH/DIAG IV INF INIT: CPT

## 2024-06-10 PROCEDURE — 96361 HYDRATE IV INFUSION ADD-ON: CPT

## 2024-06-10 PROCEDURE — 250N000011 HC RX IP 250 OP 636: Mod: JZ | Performed by: PHYSICIAN ASSISTANT

## 2024-06-10 PROCEDURE — 36415 COLL VENOUS BLD VENIPUNCTURE: CPT | Performed by: PHYSICIAN ASSISTANT

## 2024-06-10 PROCEDURE — 99213 OFFICE O/P EST LOW 20 MIN: CPT | Performed by: INTERNAL MEDICINE

## 2024-06-10 PROCEDURE — 82565 ASSAY OF CREATININE: CPT | Performed by: PHYSICIAN ASSISTANT

## 2024-06-10 PROCEDURE — 82040 ASSAY OF SERUM ALBUMIN: CPT | Performed by: PHYSICIAN ASSISTANT

## 2024-06-10 PROCEDURE — 82310 ASSAY OF CALCIUM: CPT | Performed by: PHYSICIAN ASSISTANT

## 2024-06-10 PROCEDURE — 258N000003 HC RX IP 258 OP 636: Mod: JZ | Performed by: PHYSICIAN ASSISTANT

## 2024-06-10 RX ORDER — ZOLEDRONIC ACID 0.04 MG/ML
4 INJECTION, SOLUTION INTRAVENOUS ONCE
Status: COMPLETED | OUTPATIENT
Start: 2024-06-10 | End: 2024-06-10

## 2024-06-10 RX ADMIN — SODIUM CHLORIDE 500 ML: 9 INJECTION, SOLUTION INTRAVENOUS at 09:41

## 2024-06-10 RX ADMIN — ZOLEDRONIC ACID 4 MG: 0.04 INJECTION, SOLUTION INTRAVENOUS at 09:58

## 2024-06-10 ASSESSMENT — PAIN SCALES - GENERAL: PAINLEVEL: NO PAIN (0)

## 2024-06-10 NOTE — PROGRESS NOTES
Infusion Nursing Note:  Lachelle Duque presents today for Zometa and IV fluids.    Patient seen by provider today: Yes: Dr. Alegria   present during visit today: Not Applicable.    Note: Patient arrives to infusion feeling well today.  She offers no new concerns after her visit with Dr. Alegria today.    Due to previous side effects, Zometa infused over 30 minutes per patient preference.      Intravenous Access:  Peripheral IV placed in lab today.    Treatment Conditions:  Lab Results   Component Value Date     01/29/2024    POTASSIUM 4.4 01/29/2024    CR 0.57 06/10/2024    DULCE 10.0 06/10/2024    BILITOTAL 0.3 12/04/2023    ALBUMIN 4.4 06/10/2024    ALT 24 12/04/2023    AST 23 12/04/2023       Results reviewed, labs MET treatment parameters, ok to proceed with treatment.      Post Infusion Assessment:  Patient tolerated infusion without incident.  Blood return noted pre and post infusion.  Site patent and intact, free from redness, edema or discomfort.  No evidence of extravasations.  Access discontinued per protocol.     Discharge Plan:   Patient declined prescription refills.  Discharge instructions reviewed with: Patient.  Patient and/or family verbalized understanding of discharge instructions and all questions answered.  AVS to patient via [facility].  Patient will return in 6th months for next appointment.   Patient discharged in stable condition accompanied by: self.  Departure Mode: Ambulatory.      NA CHICAS RN

## 2024-06-10 NOTE — LETTER
6/10/2024      Lachelle Duque  [address]  [place] [address]      Dear Colleague,    Thank you for referring your patient, Lachelle Duque, to the [facility]. Please see a copy of my visit note below.    Garry 10, 2024        REASON FOR VISIT: h/o left breast cancer, T1cN1, ER + UT + her2 negative, oncotype Dx 19.      She underwent a screening mammogram on 09/29/2021, which demonstrated possible asymmetry in her left breast at 4-o'clock position.  On 10/08/2021, she underwent a mammogram and ultrasound.  On mammography, there was a spiculated mass, at the 4-o'clock position, measuring 1.3 cm in size.  On ultrasound, the mass measured 1.0 cm in size.  Her lymph nodes were negative by ultrasonography. On 10/15/2021, she underwent a contrast enhanced mammography which demonstrated the lesion to be 1.1 cm.  There are no other suspicious lesions in the rest of her breast.  Also, on 10/15, she underwent a needle biopsy, which demonstrated invasive ductal cancer, grade 1, ER positive, UT positive, HER2 negative. She has not palpated a mass in her breast.  She has not had a prior history of any breast problems.    She met with Dr Ge De La Vega.  She underwent a lumpectomy and sentinel lymph node biopsy.  Pathology revealed a 1.3 cm invasive ductal carcinoma.  1/1 lymph node was positive and measured 2 cm.    Oncotype low at 19.  Labs confirmed postmenopausal.  Completed radiation on 2/2/2022  Started letrozole 2/3/2022    INTERVAL HISTORY:    Shanta presents for follow up today. Her arthralgias have improved in the last 3 months since increased dose of Cymbalta now at 60mg daily. Sometimes she has them early in the morning when she wakes up but after a few steps those get better. She very rarely takes a tylenol or Ibuprofen for arthralgia after a long day at work but her arthralgias are now tolerable.    No longer having discomfort in the left axillae since cording resolved on its  own. No longer doing PT.    She does experience hot flashes but these are stable and manageable, does not wake her up at night.  Her mood is OK.    Zometa infusions have been going better.     She otherwise feeling well today with no new issues.       She continues to work on [employer] (now 5[employer]) and its been stressful.     ROS: 10 point ROS neg other than the symptoms noted above in the HPI.     PAST MEDICAL HISTORY:       Current Outpatient Medications   Medication Sig Dispense Refill     acetaminophen (TYLENOL) 325 MG tablet Take 2 tablets (650 mg) by mouth every 4 hours as needed for mild pain 50 tablet 1     acyclovir (ZOVIRAX) 400 MG tablet one tid po for 7 days prn for flare ups and one daily for maintenance. 180 tablet 3     aspirin (ASA) 81 MG EC tablet Take 1 tablet (81 mg) by mouth daily 90 tablet 3     atorvastatin (LIPITOR) 40 MG tablet Take 1 tablet (40 mg) by mouth daily 90 tablet 3     cetirizine HCl 10 MG CAPS Take one tablet daily.       cyanocobalamin (VITAMIN B-12) 1000 MCG tablet Take 1,000 mcg by mouth daily       doxylamine (UNISOM) 25 MG TABS tablet Take 25 mg by mouth at bedtime       DULoxetine (CYMBALTA) 30 MG capsule Take 1 capsule (30 mg) by mouth 2 times daily 180 capsule 3     ibuprofen (ADVIL/MOTRIN) 600 MG tablet Take 1 tablet (600 mg) by mouth every 6 hours as needed for moderate pain 30 tablet 1     letrozole (FEMARA) 2.5 MG tablet TAKE ONE TABLET BY MOUTH ONCE DAILY 90 tablet 3     omeprazole 20 MG tablet Take 1 tablet (20 mg) by mouth daily Take 30-60 minutes before a meal. 90 tablet 3     No current facility-administered medications for this visit.     Allergies:  Sulfa drugs     FAMILY HISTORY:  Significant for a maternal aunt with breast cancer.  No history of ovarian cancer.     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  .  .     She has never had a breast biopsy previously.     PHYSICAL EXAMINATION:  BP  137/84 (BP Location: Right arm, Patient Position: Sitting, Cuff Size: Adult Regular)   Pulse 90   Temp 99.2  F (37.3  C) (Oral)   Resp 16   Wt 115.6 kg (254 lb 14.4 oz)   LMP 10/31/2021   SpO2 98%   BMI 41.46 kg/m      Wt Readings from Last 4 Encounters:   02/26/24 116.8 kg (257 lb 9.6 oz)   12/14/23 116.6 kg (257 lb)   12/04/23 116.3 kg (256 lb 4.8 oz)   10/02/23 117 kg (258 lb)       She is a well-appearing woman in no apparent distress.   HEENT: no icterus  NECK: supple  CV: rrr  Lungs: clear  Abd; soft, nt nd + bs  Ext: no edema  Bilateral breast examination: no nodules or masses in either breast, no axillary adenopathy.  Left breast with a thickened scar which is unchanged from prior exam.  No palpable mass or edema or cording noted.     Estradiol - 6  FSH - 35    Oncotype Dx 19  Mammo 9/25/23: WNL  DEXA: slight worsening, but still normal range      A/P:  52 y/o female with a left breast cancer pT1cN1 ER + NC + her2 negative now s/p lumpectomy and SNLB.      1. Breast cancer- Shanta is on active surveillance and remains on letrozole. September Mammo 2023-  This is unremarkable.    Initially had severe arthralgias with the AI. The Cymbalta helped but seems less effective over the last year.  Dose increased to 60 mg once a day 3 months ago with improvement. In arthralgias were to recur, consider other options including a break, different AIs and tamoxifen.   -continue 3 month follow up, next Mammo due 09/24, ordered.    2. Bone health - Her DEXA scan had lowest T-score of -0.9.   She is on prophylactic zometa IV every six months while on AI.    -reviewed DEXA today, discussed she was on OCPs prior to diagnosis and labs showed post menopausal, thus those patients that go from OCP or HRT to post menopausal + AIs have the most impact to their bone health.  We are supporting her with the zometa and she remains in the normal bone density range, despite the changes.    -continue zometa     3. Liver lesions -  consistent with adenomas.      4. Hypercalcemia - history of elevated calcium.  PTHi and peptide was WNL.  Ionized calcium WNL. Vit D levels dropping now that she is off Vit D.  Taking Vit D 1000 units daily.    5. Axillary cording, resolved with PT    6. Encouraged exercise of at least 150 min per week, healthy diet and limiting alcohol use.     Shanta was seen together with staff Dr. Alegria.     Leyla Ribeiro MD  Hematology/Oncology Fellow     Addendum:  Pt was seen and evaluated by me with Dr Ribeiro.  I met with Shanta.  On clinical exam and by history, she has no evidence of cancer.  We discussed the importance of active surveillance.    She is due for new mammogram in Sept.    We will plan to see her in 6 months with labs and zometa.      Giovanna Alegria MD MS

## 2024-06-10 NOTE — NURSING NOTE
"Oncology Rooming Note    Tabatha 10, 2024 8:47 AM   Lachelle Duque is a 54 year old female who presents for:    Chief Complaint   Patient presents with    Blood Draw     Labs drawn via PIV placed by Vascular Access Team in lab    Oncology Clinic Visit     Breast CA     Initial Vitals: /84 (BP Location: Right arm, Patient Position: Sitting, Cuff Size: Adult Regular)   Pulse 90   Temp 99.2  F (37.3  C) (Oral)   Resp 16   Wt 115.6 kg (254 lb 14.4 oz)   LMP 10/31/2021   SpO2 98%   BMI 41.46 kg/m   Estimated body mass index is 41.46 kg/m  as calculated from the following:    Height as of 12/14/23: 1.67 m (5' 5.75\").    Weight as of this encounter: 115.6 kg (254 lb 14.4 oz). Body surface area is 2.32 meters squared.  No Pain (0) Comment: Data Unavailable   Patient's last menstrual period was 10/31/2021.  Allergies reviewed: Yes  Medications reviewed: Yes    Medications: MEDICATION REFILLS NEEDED TODAY. Provider was notified.  Pharmacy name entered into [facility]:    [facility]  [facility] - [place], [place] - [address]  [facility] - [place], [place] - [address]  [facility]  [facility] - [place], [place] - [address], [id number]  [id number] - [place], [place] - [address]    Frailty Screening:   Is the patient here for a new oncology consult visit in cancer care? 2. No      Clinical concerns: Refill Letrozole   Dr. Alegria was notified.      Serena Hernandez              "

## 2024-06-10 NOTE — PROGRESS NOTES
Garry 10, 2024        REASON FOR VISIT: h/o left breast cancer, T1cN1, ER + AK + her2 negative, oncotype Dx 19.      She underwent a screening mammogram on 09/29/2021, which demonstrated possible asymmetry in her left breast at 4-o'clock position.  On 10/08/2021, she underwent a mammogram and ultrasound.  On mammography, there was a spiculated mass, at the 4-o'clock position, measuring 1.3 cm in size.  On ultrasound, the mass measured 1.0 cm in size.  Her lymph nodes were negative by ultrasonography. On 10/15/2021, she underwent a contrast enhanced mammography which demonstrated the lesion to be 1.1 cm.  There are no other suspicious lesions in the rest of her breast.  Also, on 10/15, she underwent a needle biopsy, which demonstrated invasive ductal cancer, grade 1, ER positive, AK positive, HER2 negative. She has not palpated a mass in her breast.  She has not had a prior history of any breast problems.    She met with Dr Ge De La Vega.  She underwent a lumpectomy and sentinel lymph node biopsy.  Pathology revealed a 1.3 cm invasive ductal carcinoma.  1/1 lymph node was positive and measured 2 cm.    Oncotype low at 19.  Labs confirmed postmenopausal.  Completed radiation on 2/2/2022  Started letrozole 2/3/2022    INTERVAL HISTORY:    Shanta presents for follow up today. Her arthralgias have improved in the last 3 months since increased dose of Cymbalta now at 60mg daily. Sometimes she has them early in the morning when she wakes up but after a few steps those get better. She very rarely takes a tylenol or Ibuprofen for arthralgia after a long day at work but her arthralgias are now tolerable.    No longer having discomfort in the left axillae since cording resolved on its own. No longer doing PT.    She does experience hot flashes but these are stable and manageable, does not wake her up at night.  Her mood is OK.    Zometa infusions have been going better.     She otherwise feeling well today with no new issues.        She continues to work on [employer] (now 5[employer]) and its been stressful.     ROS: 10 point ROS neg other than the symptoms noted above in the HPI.     PAST MEDICAL HISTORY:       Current Outpatient Medications   Medication Sig Dispense Refill    acetaminophen (TYLENOL) 325 MG tablet Take 2 tablets (650 mg) by mouth every 4 hours as needed for mild pain 50 tablet 1    acyclovir (ZOVIRAX) 400 MG tablet one tid po for 7 days prn for flare ups and one daily for maintenance. 180 tablet 3    aspirin (ASA) 81 MG EC tablet Take 1 tablet (81 mg) by mouth daily 90 tablet 3    atorvastatin (LIPITOR) 40 MG tablet Take 1 tablet (40 mg) by mouth daily 90 tablet 3    cetirizine HCl 10 MG CAPS Take one tablet daily.      cyanocobalamin (VITAMIN B-12) 1000 MCG tablet Take 1,000 mcg by mouth daily      doxylamine (UNISOM) 25 MG TABS tablet Take 25 mg by mouth at bedtime      DULoxetine (CYMBALTA) 30 MG capsule Take 1 capsule (30 mg) by mouth 2 times daily 180 capsule 3    ibuprofen (ADVIL/MOTRIN) 600 MG tablet Take 1 tablet (600 mg) by mouth every 6 hours as needed for moderate pain 30 tablet 1    letrozole (FEMARA) 2.5 MG tablet TAKE ONE TABLET BY MOUTH ONCE DAILY 90 tablet 3    omeprazole 20 MG tablet Take 1 tablet (20 mg) by mouth daily Take 30-60 minutes before a meal. 90 tablet 3     No current facility-administered medications for this visit.     Allergies:  Sulfa drugs     FAMILY HISTORY:  Significant for a maternal aunt with breast cancer.  No history of ovarian cancer.     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  No tobacco use.  .  .     She has never had a breast biopsy previously.     PHYSICAL EXAMINATION:  /84 (BP Location: Right arm, Patient Position: Sitting, Cuff Size: Adult Regular)   Pulse 90   Temp 99.2  F (37.3  C) (Oral)   Resp 16   Wt 115.6 kg (254 lb 14.4 oz)   LMP 10/31/2021   SpO2 98%   BMI 41.46 kg/m      Wt Readings from Last 4 Encounters:    02/26/24 116.8 kg (257 lb 9.6 oz)   12/14/23 116.6 kg (257 lb)   12/04/23 116.3 kg (256 lb 4.8 oz)   10/02/23 117 kg (258 lb)       She is a well-appearing woman in no apparent distress.   HEENT: no icterus  NECK: supple  CV: rrr  Lungs: clear  Abd; soft, nt nd + bs  Ext: no edema  Bilateral breast examination: no nodules or masses in either breast, no axillary adenopathy.  Left breast with a thickened scar which is unchanged from prior exam.  No palpable mass or edema or cording noted.     Estradiol - 6  FSH - 35    Oncotype Dx 19  Mammo 9/25/23: WNL  DEXA: slight worsening, but still normal range      A/P:  54 y/o female with a left breast cancer pT1cN1 ER + UT + her2 negative now s/p lumpectomy and SNLB.      1. Breast cancer- Shanta is on active surveillance and remains on letrozole. September Mammo 2023-  This is unremarkable.    Initially had severe arthralgias with the AI. The Cymbalta helped but seems less effective over the last year.  Dose increased to 60 mg once a day 3 months ago with improvement. In arthralgias were to recur, consider other options including a break, different AIs and tamoxifen.   -continue 3 month follow up, next Mammo due 09/24, ordered.    2. Bone health - Her DEXA scan had lowest T-score of -0.9.   She is on prophylactic zometa IV every six months while on AI.    -reviewed DEXA today, discussed she was on OCPs prior to diagnosis and labs showed post menopausal, thus those patients that go from OCP or HRT to post menopausal + AIs have the most impact to their bone health.  We are supporting her with the zometa and she remains in the normal bone density range, despite the changes.    -continue zometa     3. Liver lesions - consistent with adenomas.      4. Hypercalcemia - history of elevated calcium.  PTHi and peptide was WNL.  Ionized calcium WNL. Vit D levels dropping now that she is off Vit D.  Taking Vit D 1000 units daily.    5. Axillary cording, resolved with PT    6.  Encouraged exercise of at least 150 min per week, healthy diet and limiting alcohol use.     Shanta was seen together with staff Dr. Alegria.     Leyla Ribeiro MD  Hematology/Oncology Fellow     Addendum:  Pt was seen and evaluated by me with Dr Ribeiro.  I met with Shanta.  On clinical exam and by history, she has no evidence of cancer.  We discussed the importance of active surveillance.    She is due for new mammogram in Sept.    We will plan to see her in 6 months with labs and zometa.      Giovanna Alegria MD MS

## 2024-06-10 NOTE — NURSING NOTE
Chief Complaint   Patient presents with    Blood Draw     Labs drawn via PIV placed by Vascular Access Team in lab     Labs drawn from PIV placed by Vascular Access Team. Line flushed with saline. Vitals taken. Pt checked in for appointment(s).     Adelaida Brown RN

## 2024-07-08 ENCOUNTER — VIRTUAL VISIT (OUTPATIENT)
Dept: FAMILY MEDICINE | Facility: CLINIC | Age: 54
End: 2024-07-08
Payer: COMMERCIAL

## 2024-07-08 ENCOUNTER — MYC MEDICAL ADVICE (OUTPATIENT)
Dept: ONCOLOGY | Facility: CLINIC | Age: 54
End: 2024-07-08
Payer: COMMERCIAL

## 2024-07-08 DIAGNOSIS — B02.9 HERPES ZOSTER WITHOUT COMPLICATION: ICD-10-CM

## 2024-07-08 DIAGNOSIS — Z79.811 LONG TERM (CURRENT) USE OF AROMATASE INHIBITORS: ICD-10-CM

## 2024-07-08 DIAGNOSIS — Z17.0 MALIGNANT NEOPLASM OF UPPER-OUTER QUADRANT OF LEFT BREAST IN FEMALE, ESTROGEN RECEPTOR POSITIVE (H): ICD-10-CM

## 2024-07-08 DIAGNOSIS — U07.1 CLINICAL DIAGNOSIS OF COVID-19: Primary | ICD-10-CM

## 2024-07-08 DIAGNOSIS — C50.412 MALIGNANT NEOPLASM OF UPPER-OUTER QUADRANT OF LEFT BREAST IN FEMALE, ESTROGEN RECEPTOR POSITIVE (H): ICD-10-CM

## 2024-07-08 PROCEDURE — 99442 PR PHYSICIAN TELEPHONE EVALUATION 11-20 MIN: CPT | Performed by: NURSE PRACTITIONER

## 2024-07-08 NOTE — PROGRESS NOTES
"Shanta is a 54 year old who is being evaluated via a billable telephone visit.    What phone number would you like to be contacted at? [phone]  How would you like to obtain your AVS? Ryan  Originating Location (pt. Location): Home    Distant Location (provider location):  On-site    Assessment & Plan     Clinical diagnosis of COVID-19  Positive for COVID today and symptoms started today. Discussed treatment options with patient. Paxlovid prescribed. Side effects, risks and benefits of medication were discussed with patient. Discussed how and when to take medication. Discussed medications to hold for 8 days: atorvastatin. Discussed possible robound COVID after taking antiviral. Follow-up with any questions or concerns.     - nirmatrelvir and ritonavir (PAXLOVID) 300 mg/100 mg therapy pack; Take 3 tablets by mouth 2 times daily for 5 days (Take 2 Nirmatrelvir tablets and 1 Ritonavir tablet twice daily for 5 days)    Malignant neoplasm of upper-outer quadrant of left breast in female, estrogen receptor positive (H)  Okay to continue Femara while on paxlovid.     Long term (current) use of aromatase inhibitors  See above.     Herpes zoster without complication  She is not currently taking acyclovir.           BMI  Estimated body mass index is 41.46 kg/m  as calculated from the following:    Height as of 12/14/23: 1.67 m (5' 5.75\").    Weight as of 6/10/24: 115.6 kg (254 lb 14.4 oz).         Patient Instructions   Paxlovid was ordered for COVID treatment. Your script was sent to the pharmacy. Please call them to check on status of medication prior to picking it up.     Possible side effects of Paxlovid: impaired sense of taste, diarrhea, high blood pressure, and muscle aches. Paxlovid can increase risk of liver inflammation and jaundice. Please contact the clinic if you develop any concern signs/symptoms after starting medication.    Hold tonight while taking Paxlovid starting today for 8 days.     Monitor blood " pressure closely while on Paxlovid if you have hypertension or blood pressure issues.    There is a risk of rebound COVID after taking COVID antivirals. There would be no further treatment necessary if you do develop rebound COVID, but you would be contagious and would need to quarantine again if you retest positive.    Please contact us with any questions or concerns.       Coronavirus (COVID-19): Care Instructions  What is COVID-19?  COVID-19 is a disease caused by a type of coronavirus. This illness was first found in 2019 and has since spread worldwide (pandemic). Symptoms can range from mild, such as fever and body aches, to severe, including trouble breathing. COVID-19 can be deadly.  Coronaviruses are a large group of viruses. Some types cause the common cold. Others cause more serious illnesses like [place] respiratory syndrome (MERS) and severe acute respiratory syndrome (SARS).  Follow-up care is a key part of your treatment and safety. Be sure to make and go to all appointments, and call your doctor if you are having problems. It's also a good idea to know your test results and keep a list of the medicines you take.  How can you self-isolate when you have COVID-19?  If you have COVID-19, there are things you can do to help avoid spreading the virus to others.  Stay home, and avoid contact with other people.  Limit contact with people in your home. If possible, stay in a separate bedroom and use a separate bathroom.  Wear a high-quality mask when you are around other people.  Improve airflow. If you have to spend time indoors with others, open windows and doors. Or you can use a fan to blow air away from people and out a window.  Avoid contact with pets and other animals.  Cover your mouth and nose with a tissue when you cough or sneeze. Then throw it in the trash right away.  Wash your hands often, especially after you cough or sneeze. Use soap and water, and scrub for at least 20 seconds. If soap and  water aren't available, use an alcohol-based hand .  Don't share personal household items. These include bedding, towels, cups and glasses, and eating utensils.  Wash laundry in the warmest water allowed for the fabric type, and dry it completely. It's okay to wash other people's laundry with yours.  Clean and disinfect your home. Use household  and disinfectant wipes or sprays.  Go to the CDC website at cdc.gov if you have questions.  When can you end self-isolation for COVID-19?  If you know or think that you have the virus, you will need to self-isolate. When you can be around other people you live with and leave home depends on whether you have symptoms. Important: Day 0 is the day your symptoms started or the day you tested positive. Day 1 is the day after your symptoms first started or your test was positive.  If you tested positive but had no symptoms, it's safe to end isolation at the end of Day 5. But if you start to have symptoms, follow the recommendations below, and count your first day of symptoms as Day 0.  If you have symptoms, when you can end isolation depends on how sick you were and your overall health. No matter what, you need to wait until your symptoms are getting better and you haven't had a fever for 24 hours while not taking medicines to lower the fever. Here's how long to isolate, based on your symptoms:  If you were only a little sick: (This means you might have felt really bad but had no shortness of breath and never needed to be in the hospital.) You can end isolation at the end of Day 5.  If you were more sick: (You had some shortness of breath or some trouble breathing but never needed to be in the hospital.) You can end isolation at the end of Day 10.  If you were very sick and needed to be in the hospital, or if you have a weakened immune system: You can end isolation at the end of Day 10 or later. Talk to your doctor to find out when it's safe to end isolation. You  "may need a viral test.  After you end isolation, if your symptoms come back or get worse: Restart your isolation at Day 0. Do this even if it happens after you took medicine for COVID.  Avoid travel and stay away from people at high risk for serious disease for at least 10 days.  Those who can't wear a mask because they are under 2 years old or have certain disabilities should isolate for at least 10 full days.  Call your doctor or seek care if you have questions about your symptoms or when to end isolation.  Go to the CDC website at cdc.gov if you have questions.  Where can you learn more?  Go to https://www.[employer].net/patiented  Enter C007 in the search box to learn more about \"Coronavirus (COVID-19): Care Instructions.\"  Current as of: February 28, 2024               Content Version: 14.0    2104-2808 [employer].   Care instructions adapted under license by your healthcare professional. If you have questions about a medical condition or this instruction, always ask your healthcare professional. [employer] disclaims any warranty or liability for your use of this information.      Keagan Womack is a 54 year old, presenting for the following health issues:  Covid Concern        7/8/2024     1:05 PM   Additional Questions   Roomed by Mely ESTRADA   Accompanied by self         7/8/2024     1:05 PM   Patient Reported Additional Medications   Patient reports taking the following new medications none     History of Present Illness       Reason for visit:  Positive for covid - talk about treatment    She eats 2-3 servings of fruits and vegetables daily.She consumes 0 sweetened beverage(s) daily.She exercises with enough effort to increase her heart rate 20 to 29 minutes per day.  She exercises with enough effort to increase her heart rate 3 or less days per week.   She is taking medications regularly.       COVID-19 Symptom Review  How many days ago did these symptoms start? This AM around " 3am- tested positive today    Are any of the following symptoms significant for you?  New or worsening difficulty breathing? No  Worsening cough? Yes, it's a dry cough.   Fever or chills? Yes, the highest temperature was 100.1   Headache: YES  Sore throat: No  Chest pain: YES- only with coughing  Diarrhea: No  Body aches? YES    What treatments has patient tried? Acetaminophen   Does patient live in a nursing home, group home, or shelter? No  Does patient have a way to get food/medications during quarantined? Yes, I have a friend or family member who can help me.          Additional provider notes: Patient presents virtually via telephone for:     COVID: Symptoms started this am at 3am and tested positive this morning. She has had COVID in January 2023 and was able to take Paxlovid.     Shingles in her eyes that activates when she is very tired. She takes acyclovir PRN for flair ups. Not currently taking.     Hx of breast cancer: takes Femara daily.     Allergies   Allergen Reactions    Sulfa Antibiotics Anaphylaxis       Current Outpatient Medications   Medication Sig Dispense Refill    acetaminophen (TYLENOL) 325 MG tablet Take 2 tablets (650 mg) by mouth every 4 hours as needed for mild pain 50 tablet 1    acyclovir (ZOVIRAX) 400 MG tablet one tid po for 7 days prn for flare ups and one daily for maintenance. 180 tablet 3    aspirin (ASA) 81 MG EC tablet Take 1 tablet (81 mg) by mouth daily 90 tablet 3    atorvastatin (LIPITOR) 40 MG tablet Take 1 tablet (40 mg) by mouth daily 90 tablet 3    cetirizine HCl 10 MG CAPS Take one tablet daily.      cyanocobalamin (VITAMIN B-12) 1000 MCG tablet Take 1,000 mcg by mouth daily      doxylamine (UNISOM) 25 MG TABS tablet Take 25 mg by mouth at bedtime      DULoxetine (CYMBALTA) 30 MG capsule Take 1 capsule (30 mg) by mouth 2 times daily 180 capsule 3    ibuprofen (ADVIL/MOTRIN) 600 MG tablet Take 1 tablet (600 mg) by mouth every 6 hours as needed for moderate pain 30  tablet 1    letrozole (FEMARA) 2.5 MG tablet TAKE ONE TABLET BY MOUTH ONCE DAILY 90 tablet 3    omeprazole 20 MG tablet Take 1 tablet (20 mg) by mouth daily Take 30-60 minutes before a meal. 90 tablet 3     No current facility-administered medications for this visit.       Past Medical History:   Diagnosis Date    Cancer (H)     Chronic rhinitis     Environmental allergies 01/18/2013    Gastro-oesophageal reflux disease     GERD (gastroesophageal reflux disease) 01/03/2013    Herpes zoster without mention of complication     L eye     Hiatal hernia     Hyperlipidemia     Obese     PONV (postoperative nausea and vomiting)     PONV            Review of Systems  Constitutional, HEENT, cardiovascular, pulmonary, gi and gu systems are negative, except as otherwise noted.      Objective           Vitals:  No vitals were obtained today due to virtual visit.    Physical Exam   General: Alert and no distress //Respiratory: No audible wheeze, cough, or shortness of breath // Psychiatric:  Appropriate affect, tone, and pace of words            Phone call duration: 11 minutes  Signed Electronically by: Radha Cervantes DNP

## 2024-07-08 NOTE — TELEPHONE ENCOUNTER
Called pt:  Sx:  Congestion, runny nose, body aches, dry hacking cough, sinus pain, chills.  Fever was 100.1 max.  Sx started at 3 a.m. this morning.    Connected pt to [facility] for scheduling with provider and assessment for antiviral treatment.    Routed to Dr. Alegria and Jennifer Browning, [facility]

## 2024-07-08 NOTE — PATIENT INSTRUCTIONS
Paxlovid was ordered for COVID treatment. Your script was sent to the pharmacy. Please call them to check on status of medication prior to picking it up.     Possible side effects of Paxlovid: impaired sense of taste, diarrhea, high blood pressure, and muscle aches. Paxlovid can increase risk of liver inflammation and jaundice. Please contact the clinic if you develop any concern signs/symptoms after starting medication.    Hold tonight while taking Paxlovid starting today for 8 days.     Monitor blood pressure closely while on Paxlovid if you have hypertension or blood pressure issues.    There is a risk of rebound COVID after taking COVID antivirals. There would be no further treatment necessary if you do develop rebound COVID, but you would be contagious and would need to quarantine again if you retest positive.    Please contact us with any questions or concerns.       Coronavirus (COVID-19): Care Instructions  What is COVID-19?  COVID-19 is a disease caused by a type of coronavirus. This illness was first found in 2019 and has since spread worldwide (pandemic). Symptoms can range from mild, such as fever and body aches, to severe, including trouble breathing. COVID-19 can be deadly.  Coronaviruses are a large group of viruses. Some types cause the common cold. Others cause more serious illnesses like [place] respiratory syndrome (MERS) and severe acute respiratory syndrome (SARS).  Follow-up care is a key part of your treatment and safety. Be sure to make and go to all appointments, and call your doctor if you are having problems. It's also a good idea to know your test results and keep a list of the medicines you take.  How can you self-isolate when you have COVID-19?  If you have COVID-19, there are things you can do to help avoid spreading the virus to others.  Stay home, and avoid contact with other people.  Limit contact with people in your home. If possible, stay in a separate bedroom and use a  separate bathroom.  Wear a high-quality mask when you are around other people.  Improve airflow. If you have to spend time indoors with others, open windows and doors. Or you can use a fan to blow air away from people and out a window.  Avoid contact with pets and other animals.  Cover your mouth and nose with a tissue when you cough or sneeze. Then throw it in the trash right away.  Wash your hands often, especially after you cough or sneeze. Use soap and water, and scrub for at least 20 seconds. If soap and water aren't available, use an alcohol-based hand .  Don't share personal household items. These include bedding, towels, cups and glasses, and eating utensils.  Wash laundry in the warmest water allowed for the fabric type, and dry it completely. It's okay to wash other people's laundry with yours.  Clean and disinfect your home. Use household  and disinfectant wipes or sprays.  Go to the CDC website at cdc.gov if you have questions.  When can you end self-isolation for COVID-19?  If you know or think that you have the virus, you will need to self-isolate. When you can be around other people you live with and leave home depends on whether you have symptoms. Important: Day 0 is the day your symptoms started or the day you tested positive. Day 1 is the day after your symptoms first started or your test was positive.  If you tested positive but had no symptoms, it's safe to end isolation at the end of Day 5. But if you start to have symptoms, follow the recommendations below, and count your first day of symptoms as Day 0.  If you have symptoms, when you can end isolation depends on how sick you were and your overall health. No matter what, you need to wait until your symptoms are getting better and you haven't had a fever for 24 hours while not taking medicines to lower the fever. Here's how long to isolate, based on your symptoms:  If you were only a little sick: (This means you might have felt  "really bad but had no shortness of breath and never needed to be in the hospital.) You can end isolation at the end of Day 5.  If you were more sick: (You had some shortness of breath or some trouble breathing but never needed to be in the hospital.) You can end isolation at the end of Day 10.  If you were very sick and needed to be in the hospital, or if you have a weakened immune system: You can end isolation at the end of Day 10 or later. Talk to your doctor to find out when it's safe to end isolation. You may need a viral test.  After you end isolation, if your symptoms come back or get worse: Restart your isolation at Day 0. Do this even if it happens after you took medicine for COVID.  Avoid travel and stay away from people at high risk for serious disease for at least 10 days.  Those who can't wear a mask because they are under 2 years old or have certain disabilities should isolate for at least 10 full days.  Call your doctor or seek care if you have questions about your symptoms or when to end isolation.  Go to the CDC website at cdc.gov if you have questions.  Where can you learn more?  Go to https://www.[employer].net/patiented  Enter C007 in the search box to learn more about \"Coronavirus (COVID-19): Care Instructions.\"  Current as of: February 28, 2024               Content Version: 14.0    8896-4027 [employer].   Care instructions adapted under license by your healthcare professional. If you have questions about a medical condition or this instruction, always ask your healthcare professional. [employer] disclaims any warranty or liability for your use of this information.      "

## 2024-09-17 DIAGNOSIS — T45.1X5A AROMATASE INHIBITOR-ASSOCIATED ARTHRALGIA: ICD-10-CM

## 2024-09-17 DIAGNOSIS — M25.50 AROMATASE INHIBITOR-ASSOCIATED ARTHRALGIA: ICD-10-CM

## 2024-09-17 RX ORDER — DULOXETIN HYDROCHLORIDE 30 MG/1
30 CAPSULE, DELAYED RELEASE ORAL 2 TIMES DAILY
Qty: 180 CAPSULE | Refills: 3 | Status: SHIPPED | OUTPATIENT
Start: 2024-09-17

## 2024-09-17 NOTE — TELEPHONE ENCOUNTER
Cymbalta Refill   Last prescribing provider: Bailey Almeida     Last clinic visit date: 6/10/24 Dr Alegria     Recommendations for requested medication (if none, N/A): Copied from chart note   Her arthralgias have improved in the last 3 months since increased dose of Cymbalta now at 60mg daily     Any other pertinent information (if none, N/A): N/A    Refilled: Y/N, if NO, why?

## 2024-09-26 ENCOUNTER — ANCILLARY PROCEDURE (OUTPATIENT)
Dept: MAMMOGRAPHY | Facility: CLINIC | Age: 54
End: 2024-09-26
Attending: INTERNAL MEDICINE
Payer: COMMERCIAL

## 2024-09-26 DIAGNOSIS — C50.912 INVASIVE DUCTAL CARCINOMA OF BREAST, FEMALE, LEFT (H): ICD-10-CM

## 2024-09-26 DIAGNOSIS — Z79.811 LONG TERM (CURRENT) USE OF AROMATASE INHIBITORS: ICD-10-CM

## 2024-09-26 PROCEDURE — G0279 TOMOSYNTHESIS, MAMMO: HCPCS

## 2024-09-26 PROCEDURE — 77066 DX MAMMO INCL CAD BI: CPT

## 2024-10-03 ENCOUNTER — OFFICE VISIT (OUTPATIENT)
Dept: FAMILY MEDICINE | Facility: CLINIC | Age: 54
End: 2024-10-03
Payer: COMMERCIAL

## 2024-10-03 VITALS
OXYGEN SATURATION: 97 % | BODY MASS INDEX: 41.48 KG/M2 | WEIGHT: 258.1 LBS | RESPIRATION RATE: 16 BRPM | TEMPERATURE: 99.4 F | HEART RATE: 91 BPM | SYSTOLIC BLOOD PRESSURE: 124 MMHG | HEIGHT: 66 IN | DIASTOLIC BLOOD PRESSURE: 84 MMHG

## 2024-10-03 DIAGNOSIS — R93.1 ELEVATED CORONARY ARTERY CALCIUM SCORE: ICD-10-CM

## 2024-10-03 DIAGNOSIS — M85.852 OSTEOPENIA OF LEFT HIP: ICD-10-CM

## 2024-10-03 DIAGNOSIS — Z23 HIGH PRIORITY FOR 2019-NCOV VACCINE: ICD-10-CM

## 2024-10-03 DIAGNOSIS — Z13.0 SCREENING FOR DISORDER OF BLOOD AND BLOOD-FORMING ORGANS: ICD-10-CM

## 2024-10-03 DIAGNOSIS — D22.9 NUMEROUS SKIN MOLES: ICD-10-CM

## 2024-10-03 DIAGNOSIS — Z00.00 ROUTINE GENERAL MEDICAL EXAMINATION AT A HEALTH CARE FACILITY: Primary | ICD-10-CM

## 2024-10-03 DIAGNOSIS — Z13.1 SCREENING FOR DIABETES MELLITUS (DM): ICD-10-CM

## 2024-10-03 DIAGNOSIS — R73.03 PREDIABETES: ICD-10-CM

## 2024-10-03 DIAGNOSIS — Z13.29 SCREENING FOR THYROID DISORDER: ICD-10-CM

## 2024-10-03 DIAGNOSIS — Z23 ENCOUNTER FOR ADMINISTRATION OF VACCINE: ICD-10-CM

## 2024-10-03 DIAGNOSIS — E78.5 HYPERLIPIDEMIA LDL GOAL <100: ICD-10-CM

## 2024-10-03 DIAGNOSIS — Z12.4 SCREENING FOR MALIGNANT NEOPLASM OF CERVIX: ICD-10-CM

## 2024-10-03 LAB
ALBUMIN SERPL BCG-MCNC: 4.7 G/DL (ref 3.5–5.2)
ALP SERPL-CCNC: 135 U/L (ref 40–150)
ALT SERPL W P-5'-P-CCNC: 28 U/L (ref 0–50)
ANION GAP SERPL CALCULATED.3IONS-SCNC: 10 MMOL/L (ref 7–15)
AST SERPL W P-5'-P-CCNC: 29 U/L (ref 0–45)
BILIRUB SERPL-MCNC: 0.4 MG/DL
BUN SERPL-MCNC: 16.4 MG/DL (ref 6–20)
CALCIUM SERPL-MCNC: 10.4 MG/DL (ref 8.8–10.4)
CHLORIDE SERPL-SCNC: 104 MMOL/L (ref 98–107)
CHOLEST SERPL-MCNC: 143 MG/DL
CREAT SERPL-MCNC: 0.68 MG/DL (ref 0.51–0.95)
EGFRCR SERPLBLD CKD-EPI 2021: >90 ML/MIN/1.73M2
ERYTHROCYTE [DISTWIDTH] IN BLOOD BY AUTOMATED COUNT: 14 % (ref 10–15)
EST. AVERAGE GLUCOSE BLD GHB EST-MCNC: 120 MG/DL
FASTING STATUS PATIENT QL REPORTED: YES
FASTING STATUS PATIENT QL REPORTED: YES
GLUCOSE SERPL-MCNC: 117 MG/DL (ref 70–99)
HBA1C MFR BLD: 5.8 % (ref 0–5.6)
HCO3 SERPL-SCNC: 25 MMOL/L (ref 22–29)
HCT VFR BLD AUTO: 41.8 % (ref 35–47)
HDLC SERPL-MCNC: 44 MG/DL
HGB BLD-MCNC: 13.5 G/DL (ref 11.7–15.7)
LDLC SERPL CALC-MCNC: 81 MG/DL
MCH RBC QN AUTO: 28.7 PG (ref 26.5–33)
MCHC RBC AUTO-ENTMCNC: 32.3 G/DL (ref 31.5–36.5)
MCV RBC AUTO: 89 FL (ref 78–100)
NONHDLC SERPL-MCNC: 99 MG/DL
PLATELET # BLD AUTO: 252 10E3/UL (ref 150–450)
POTASSIUM SERPL-SCNC: 4.8 MMOL/L (ref 3.4–5.3)
PROT SERPL-MCNC: 8.4 G/DL (ref 6.4–8.3)
RBC # BLD AUTO: 4.7 10E6/UL (ref 3.8–5.2)
SODIUM SERPL-SCNC: 139 MMOL/L (ref 135–145)
TRIGL SERPL-MCNC: 89 MG/DL
TSH SERPL DL<=0.005 MIU/L-ACNC: 1.52 UIU/ML (ref 0.3–4.2)
VIT D+METAB SERPL-MCNC: 51 NG/ML (ref 20–50)
WBC # BLD AUTO: 6.6 10E3/UL (ref 4–11)

## 2024-10-03 PROCEDURE — 99214 OFFICE O/P EST MOD 30 MIN: CPT | Mod: 25 | Performed by: NURSE PRACTITIONER

## 2024-10-03 PROCEDURE — 80061 LIPID PANEL: CPT | Performed by: NURSE PRACTITIONER

## 2024-10-03 PROCEDURE — 84443 ASSAY THYROID STIM HORMONE: CPT | Performed by: NURSE PRACTITIONER

## 2024-10-03 PROCEDURE — 85027 COMPLETE CBC AUTOMATED: CPT | Performed by: NURSE PRACTITIONER

## 2024-10-03 PROCEDURE — 91320 SARSCV2 VAC 30MCG TRS-SUC IM: CPT | Performed by: NURSE PRACTITIONER

## 2024-10-03 PROCEDURE — 90480 ADMN SARSCOV2 VAC 1/ONLY CMP: CPT | Performed by: NURSE PRACTITIONER

## 2024-10-03 PROCEDURE — 80053 COMPREHEN METABOLIC PANEL: CPT | Performed by: NURSE PRACTITIONER

## 2024-10-03 PROCEDURE — 36415 COLL VENOUS BLD VENIPUNCTURE: CPT | Performed by: NURSE PRACTITIONER

## 2024-10-03 PROCEDURE — 83036 HEMOGLOBIN GLYCOSYLATED A1C: CPT | Performed by: NURSE PRACTITIONER

## 2024-10-03 PROCEDURE — G0145 SCR C/V CYTO,THINLAYER,RESCR: HCPCS | Performed by: NURSE PRACTITIONER

## 2024-10-03 PROCEDURE — 87624 HPV HI-RISK TYP POOLED RSLT: CPT | Performed by: NURSE PRACTITIONER

## 2024-10-03 PROCEDURE — 90715 TDAP VACCINE 7 YRS/> IM: CPT | Performed by: NURSE PRACTITIONER

## 2024-10-03 PROCEDURE — 82306 VITAMIN D 25 HYDROXY: CPT | Performed by: NURSE PRACTITIONER

## 2024-10-03 PROCEDURE — 90471 IMMUNIZATION ADMIN: CPT | Performed by: NURSE PRACTITIONER

## 2024-10-03 PROCEDURE — 99396 PREV VISIT EST AGE 40-64: CPT | Mod: 25 | Performed by: NURSE PRACTITIONER

## 2024-10-03 RX ORDER — ATORVASTATIN CALCIUM 40 MG/1
40 TABLET, FILM COATED ORAL DAILY
Qty: 90 TABLET | Refills: 3 | Status: SHIPPED | OUTPATIENT
Start: 2024-10-03

## 2024-10-03 SDOH — HEALTH STABILITY: PHYSICAL HEALTH: ON AVERAGE, HOW MANY DAYS PER WEEK DO YOU ENGAGE IN MODERATE TO STRENUOUS EXERCISE (LIKE A BRISK WALK)?: 3 DAYS

## 2024-10-03 SDOH — HEALTH STABILITY: PHYSICAL HEALTH: ON AVERAGE, HOW MANY MINUTES DO YOU ENGAGE IN EXERCISE AT THIS LEVEL?: 30 MIN

## 2024-10-03 ASSESSMENT — PAIN SCALES - GENERAL: PAINLEVEL: NO PAIN (0)

## 2024-10-03 ASSESSMENT — SOCIAL DETERMINANTS OF HEALTH (SDOH): HOW OFTEN DO YOU GET TOGETHER WITH FRIENDS OR RELATIVES?: ONCE A WEEK

## 2024-10-03 NOTE — NURSING NOTE
Prior to immunization administration, verified patients identity using patient s name and date of birth. Please see Immunization Activity for additional information.     Screening Questionnaire for Adult Immunization    Are you sick today?   No   Do you have allergies to medications, food, a vaccine component or latex?   No   Have you ever had a serious reaction after receiving a vaccination?   No   Do you have a long-term health problem with heart, lung, kidney, or metabolic disease (e.g., diabetes), asthma, a blood disorder, no spleen, complement component deficiency, a cochlear implant, or a spinal fluid leak?  Are you on long-term aspirin therapy?   No   Do you have cancer, leukemia, HIV/AIDS, or any other immune system problem?   No   Do you have a parent, brother, or sister with an immune system problem?   No   In the past 3 months, have you taken medications that affect  your immune system, such as prednisone, other steroids, or anticancer drugs; drugs for the treatment of rheumatoid arthritis, Crohn s disease, or psoriasis; or have you had radiation treatments?   No   Have you had a seizure, or a brain or other nervous system problem?   No   During the past year, have you received a transfusion of blood or blood    products, or been given immune (gamma) globulin or antiviral drug?   No   For women: Are you pregnant or is there a chance you could become       pregnant during the next month?   No   Have you received any vaccinations in the past 4 weeks?   No     Immunization questionnaire answers were all negative.      Patient instructed to remain in clinic for 15 minutes afterwards, and to report any adverse reactions.     Screening performed by Aria Azevedo MA on 10/3/2024 at 8:21 AM.

## 2024-10-03 NOTE — PROGRESS NOTES
"Preventive Care Visit  [facility]  Suha ROSALIND Fitzgerald CNP, Family Medicine  Oct 3, 2024      Assessment & Plan     Routine general medical examination at a health care facility    Hyperlipidemia LDL goal <100  The current medical regimen is effective.  Continue current medication regimen unchanged.  - atorvastatin (LIPITOR) 40 MG tablet  Dispense: 90 tablet; Refill: 3  - Lipid panel reflex to direct LDL Fasting    Screening for diabetes mellitus (DM)  - Comprehensive metabolic panel  - Hemoglobin A1c    Screening for disorder of blood and blood-forming organs  - CBC with platelets    Screening for thyroid disorder  - TSH with free T4 reflex    Prediabetes  - Hemoglobin A1c    High priority for 2019-nCoV vaccine  - COVID-19 12+ (PFIZER)    Encounter for administration of vaccine  - TDAP 10-64Y (ADACEL,BOOSTRIX)    Screening for malignant neoplasm of cervix  - HPV and Gynecologic Cytology Panel - Recommended Age 30-65 Years  - Gynecologic Cytology (PAP)    Numerous skin moles  - Adult Dermatology  Referral    Osteopenia of left hip  - Vitamin D Deficiency    Elevated coronary artery calcium score  The current medical regimen is effective.  Continue current medication regimen unchanged.  - atorvastatin (LIPITOR) 40 MG tablet  Dispense: 90 tablet; Refill: 3      Patient has been advised of split billing requirements and indicates understanding: Yes        BMI  Estimated body mass index is 41.98 kg/m  as calculated from the following:    Height as of this encounter: 1.67 m (5' 5.75\").    Weight as of this encounter: 117.1 kg (258 lb 1.6 oz).   Weight management plan: Discussed healthy diet and exercise guidelines    Counseling  Appropriate preventive services were addressed with this patient via screening, questionnaire, or discussion as appropriate for fall prevention, nutrition, physical activity, Tobacco-use cessation, social engagement, weight loss and cognition.  Checklist " reviewing preventive services available has been given to the patient.  Reviewed patient's diet, addressing concerns and/or questions.   She is at risk for lack of exercise and has been provided with information to increase physical activity for the benefit of her well-being.       FUTURE APPOINTMENTS:       - Follow-up for annual visit or as needed  See Patient Instructions  CONSULTATION/REFERRAL to dermatology   Work on weight loss  Regular exercise  Will follow up and/or notify patient of  results via My Chart to determine further need for followup   Follow up office visit in one year for annual health maintenance exam, sooner PRN.   Patient needs to follow up in if no improvement,or sooner if worsening of symptoms or other symptoms develop.    Keagan Womack is a 54 year old, presenting for the following:  Physical        10/3/2024     7:21 AM   Additional Questions   Roomed by Racheal DE LEON   Accompanied by self        Health Care Directive  Patient does not have a Health Care Directive or Living Will: Discussed advance care planning with patient; information given to patient to review.    Healthy Habits:     Getting at least 3 servings of Calcium per day:  Yes    Bi-annual eye exam:  Yes    Dental care twice a year:  Yes    Sleep apnea or symptoms of sleep apnea:  None    Diet:  Regular (no restrictions)    Frequency of exercise:  2-3 days/week    Duration of exercise:  15-30 minutes    Taking medications regularly:  Yes    Barriers to taking medications:  None    Medication side effects:  Muscle aches    Additional concerns today:  No        10/3/2024   General Health   How would you rate your overall physical health? Good   Feel stress (tense, anxious, or unable to sleep) To some extent      (!) STRESS CONCERN      10/3/2024   Nutrition   Three or more servings of calcium each day? Yes   Diet: Regular (no restrictions)   How many servings of fruit and vegetables per day? (!) 2-3   How many sweetened beverages  each day? 0-1            10/3/2024   Exercise   Days per week of moderate/strenous exercise 3 days   Average minutes spent exercising at this level 30 min            10/3/2024   Social Factors   Frequency of gathering with friends or relatives Once a week   Worry food won't last until get money to buy more No   Food not last or not have enough money for food? No   Do you have housing? (Housing is defined as stable permanent housing and does not include staying ouside in a car, in a tent, in an abandoned building, in an overnight shelter, or couch-surfing.) Yes   Are you worried about losing your housing? No   Lack of transportation? No   Unable to get utilities (heat,electricity)? No            10/3/2024   Fall Risk   Fallen 2 or more times in the past year? No   Trouble with walking or balance? No             10/3/2024   Dental   Dentist two times every year? Yes            10/3/2024   TB Screening   Were you born outside of the [place]? No        Today's PHQ-2 Score:       7/8/2024     1:04 PM   PHQ-2 ( 1999 [employer])   Q1: Little interest or pleasure in doing things 0   Q2: Feeling down, depressed or hopeless 0   PHQ-2 Score 0   Q1: Little interest or pleasure in doing things Not at all   Q2: Feeling down, depressed or hopeless Not at all   PHQ-2 Score 0         10/3/2024   Substance Use   Alcohol more than 3/day or more than 7/wk No   Do you use any other substances recreationally? No        Social History     Tobacco Use    Smoking status: Never     Passive exposure: Never    Smokeless tobacco: Never   Vaping Use    Vaping status: Never Used   Substance Use Topics    Alcohol use: Yes     Alcohol/week: 0.8 standard drinks of alcohol     Types: 1 Standard drinks or equivalent per week     Comment: occasionally    Drug use: No           9/26/2024   LAST FHS-7 RESULTS   1st degree relative breast or ovarian cancer No   Any relative bilateral breast cancer No   Any male have breast cancer No   Any ONE woman have BOTH  breast AND ovarian cancer No   Any woman with breast cancer before 50yrs No   2 or more relatives with breast AND/OR ovarian cancer Yes   2 or more relatives with breast AND/OR bowel cancer Yes       Mammogram Screening - Annual screen due to history of breast cancer, carcinoma in situ, or hyperplasia        10/3/2024   STI Screening   New sexual partner(s) since last STI/HIV test? No        History of abnormal Pap smear: No - age 30- 64 PAP with HPV every 5 years recommended        Latest Ref Rng & Units 10/3/2024     7:47 AM 9/20/2019     8:45 AM 9/20/2019     8:36 AM   PAP / HPV   PAP  Negative for Intraepithelial Lesion or Malignancy (NILM)      PAP (Historical)    OTHER-NIL, See Result    HPV 16 DNA Negative Negative  Negative     HPV 18 DNA Negative Negative  Negative     Other HR HPV Negative Negative  Negative       ASCVD Risk   The 10-year ASCVD risk score (Roman MILNER, et al., 2019) is: 1.1%    Values used to calculate the score:      Age: 54 years      Sex: Female      Is Non- : No      Diabetic: No      Tobacco smoker: No      Systolic Blood Pressure: 124 mmHg      Is BP treated: No      HDL Cholesterol: 46 mg/dL      Total Cholesterol: 143 mg/dL           Reviewed and updated as needed this visit by Provider                    Past Medical History:   Diagnosis Date    Cancer (H)     Chronic rhinitis     Environmental allergies 01/18/2013    Gastro-oesophageal reflux disease     GERD (gastroesophageal reflux disease) 01/03/2013    Herpes zoster without mention of complication     L eye     Hiatal hernia     Hyperlipidemia     Obese     PONV (postoperative nausea and vomiting)     PONV     Past Surgical History:   Procedure Laterality Date    ADENOIDECTOMY  1977    ARTHROSCOPY KNEE WITH MENISCAL REPAIR Left 06/16/2020    Procedure: Examination under anesthesia Left knee, Left knee arthroscopy, Left meniscus root repair;  Surgeon: Armando Sunshine MD;  Location:  [facility]    ARTHROSCOPY KNEE WITH MENISCAL REPAIR Right 04/19/2022    Procedure: right knee examination under anesthesia, knee arthroscopy, meniscus root repair;  Surgeon: Armando Sunshine MD;  Location: [facility]    COLONOSCOPY N/A 10/27/2015    Procedure: COLONOSCOPY;  Surgeon: Duane, William Charles, MD;  Location: [facility]    COLONOSCOPY WITH CO2 INSUFFLATION N/A 10/27/2015    Procedure: COLONOSCOPY WITH CO2 INSUFFLATION;  Surgeon: Duane, William Charles, MD;  Location: [facility]    COLONOSCOPY WITH CO2 INSUFFLATION N/A 11/1/2022    Procedure: COLONOSCOPY, WITH CO2 INSUFFLATION;  Surgeon: Yvan Brown DO;  Location: [facility] OR    GALLBLADDER SURGERY      HC TOOTH EXTRACTION W/FORCEP      20 yo    LAPAROSCOPIC CHOLECYSTECTOMY  01/30/2013    Procedure: LAPAROSCOPIC CHOLECYSTECTOMY;  Laparoscopic Cholecystectomy;  Surgeon: Cindy Soni MD;  Location: [facility]    LUMPECTOMY BREAST WITH SENTINEL NODE, COMBINED Left 11/10/2021    Procedure: Left wire localized breast lumpectomy with sentinel lymph node biopsy;  Surgeon: Ge De La Vega MD;  Location: [facility] OR    SINUS SURGERY  1988    Deviated septum    wisdom teeth removed[       Lab work is in process  Labs reviewed in EPIC  BP Readings from Last 3 Encounters:   10/03/24 124/84   06/10/24 137/84   02/26/24 116/82    Wt Readings from Last 3 Encounters:   10/03/24 117.1 kg (258 lb 1.6 oz)   06/10/24 115.6 kg (254 lb 14.4 oz)   02/26/24 116.8 kg (257 lb 9.6 oz)                  Patient Active Problem List   Diagnosis    Encounter for other general counseling or advice on contraception    Herpes zoster    Family history of colon cancer    Hyperlipidemia LDL goal <130    Obesity    Vitamin D deficiency    Hiatal hernia    GERD (gastroesophageal reflux disease)    Cholelithiasis    Biliary colic    Environmental allergies    Obesity (BMI 35.0-39.9) with comorbidity (H)    Chronic pain of left knee    Invasive ductal carcinoma of breast, female, left (H)     Right knee pain, unspecified chronicity    Long term (current) use of aromatase inhibitors    Family history of ischemic heart disease    Elevated coronary artery calcium score    Coronary artery disease involving native coronary artery of native heart without angina pectoris    Prediabetes     Past Surgical History:   Procedure Laterality Date    ADENOIDECTOMY  1977    ARTHROSCOPY KNEE WITH MENISCAL REPAIR Left 06/16/2020    Procedure: Examination under anesthesia Left knee, Left knee arthroscopy, Left meniscus root repair;  Surgeon: Armando Sunshine MD;  [facility]    ARTHROSCOPY KNEE WITH MENISCAL REPAIR Right 04/19/2022    Procedure: right knee examination under anesthesia, knee arthroscopy, meniscus root repair;  Surgeon: Armando Sunshine MD;  Location: [facility]    COLONOSCOPY N/A 10/27/2015    Procedure: COLONOSCOPY;  Surgeon: Duane, William Charles, MD;  [facility]: [facility]    COLONOSCOPY WITH CO2 INSUFFLATION N/A 10/27/2015    Procedure: COLONOSCOPY WITH CO2 INSUFFLATION;  Surgeon: Duane, William Charles, MD;  [facility]: [facility]    COLONOSCOPY WITH CO2 INSUFFLATION N/A 11/1/2022    Procedure: COLONOSCOPY, WITH CO2 INSUFFLATION;  Surgeon: Yvan Brown DO;  Location: [facility]    GALLBLADDER SURGERY      HC TOOTH EXTRACTION W/FORCEP      20 yo    LAPAROSCOPIC CHOLECYSTECTOMY  01/30/2013    Procedure: LAPAROSCOPIC CHOLECYSTECTOMY;  Laparoscopic Cholecystectomy;  Surgeon: Cindy Soni MD;  [facility]    LUMPECTOMY BREAST WITH SENTINEL NODE, COMBINED Left 11/10/2021    Procedure: Left wire localized breast lumpectomy with sentinel lymph node biopsy;  Surgeon: Ge De La Vega MD;  Location: [facility]    SINUS SURGERY  1988    Deviated septum    wisdom teeth removed[         Social History     Tobacco Use    Smoking status: Never     Passive exposure: Never    Smokeless tobacco: Never   Substance Use Topics    Alcohol use: Yes     Alcohol/week: 0.8 standard drinks of alcohol      Types: 1 Standard drinks or equivalent per week     Comment: occasionally     Family History   Problem Relation Age of Onset    Coronary Artery Disease Mother     Gastrointestinal Disease Mother         diverticulosis    Arthritis Mother         fibromyalgia, lupus, rheumatoid    Anesthesia Reaction Mother         nausea    Asthma Mother     Lipids Mother     Diabetes Mother     C.A.D. Father         bypass    Diabetes Father     Cancer - colorectal Father 42    Coronary Artery Disease Father     Hypertension Maternal Grandmother     Glaucoma Maternal Grandmother     Gastrointestinal Disease Maternal Grandmother         diverticulosis    C.A.D. Maternal Grandfather     Cerebrovascular Disease Maternal Grandfather     Coronary Artery Disease Maternal Grandfather     Coronary Artery Disease Brother     Colon Polyps Brother     Depression Brother     Cancer Maternal Aunt     Breast Cancer Maternal Aunt     Coronary Artery Disease Maternal Uncle     Macular Degeneration Other     Cancer Other 29        CML    Breast Cancer Other     Thyroid Disease No family hx of     Prostate Cancer No family hx of          Current Outpatient Medications   Medication Sig Dispense Refill    acetaminophen (TYLENOL) 325 MG tablet Take 2 tablets (650 mg) by mouth every 4 hours as needed for mild pain 50 tablet 1    acyclovir (ZOVIRAX) 400 MG tablet one tid po for 7 days prn for flare ups and one daily for maintenance. 180 tablet 3    aspirin (ASA) 81 MG EC tablet Take 1 tablet (81 mg) by mouth daily 90 tablet 3    atorvastatin (LIPITOR) 40 MG tablet Take 1 tablet (40 mg) by mouth daily. 90 tablet 3    cetirizine HCl 10 MG CAPS Take one tablet daily.      cyanocobalamin (VITAMIN B-12) 1000 MCG tablet Take 1,000 mcg by mouth daily      doxylamine (UNISOM) 25 MG TABS tablet Take 25 mg by mouth at bedtime      DULoxetine (CYMBALTA) 30 MG capsule TAKE ONE CAPSULE BY MOUTH TWICE A DAY (Patient taking differently: Take 60 mg by mouth daily.)  "180 capsule 3    ibuprofen (ADVIL/MOTRIN) 600 MG tablet Take 1 tablet (600 mg) by mouth every 6 hours as needed for moderate pain 30 tablet 1    letrozole (FEMARA) 2.5 MG tablet TAKE ONE TABLET BY MOUTH ONCE DAILY 90 tablet 3    omeprazole 20 MG tablet Take 1 tablet (20 mg) by mouth daily Take 30-60 minutes before a meal. 90 tablet 3     Allergies   Allergen Reactions    Sulfa Antibiotics Anaphylaxis     CONSTITUTIONAL:POSITIVE  for weight gain and difficulty with weight loss  and NEGATIVE  for sweats  INTEGUMENTARY/SKIN: NEGATIVE for worrisome rashes, moles or lesions  EYES: NEGATIVE for vision changes or irritation  ENT: NEGATIVE for ear, mouth and throat problems  RESP:NEGATIVE for significant cough or SOB  BREAST: NEGATIVE for masses, tenderness or discharge  CV: NEGATIVE for chest pain, palpitations or peripheral edema  GI: NEGATIVE for nausea, abdominal pain, heartburn, or change in bowel habits   menopausal female: amenorrhea, no unusual urinary symptoms, and no unusual vaginal symptoms  MUSCULOSKELETAL:NEGATIVE for significant arthralgias or myalgia  NEURO: NEGATIVE for weakness, dizziness or paresthesias  ENDOCRINE: NEGATIVE for temperature intolerance, skin/hair changes  HEME/ALLERGY/IMMUNE: NEGATIVE for bleeding problems  PSYCHIATRIC: NEGATIVE for changes in mood or affect        Objective    Exam  /84 (BP Location: Right arm, Patient Position: Sitting, Cuff Size: Adult Large)   Pulse 91   Temp 99.4  F (37.4  C) (Oral)   Resp 16   Ht 1.67 m (5' 5.75\")   Wt 117.1 kg (258 lb 1.6 oz)   LMP 10/31/2021   SpO2 97%   BMI 41.98 kg/m     Estimated body mass index is 41.98 kg/m  as calculated from the following:    Height as of this encounter: 1.67 m (5' 5.75\").    Weight as of this encounter: 117.1 kg (258 lb 1.6 oz).  Wt Readings from Last 4 Encounters:   10/03/24 117.1 kg (258 lb 1.6 oz)   06/10/24 115.6 kg (254 lb 14.4 oz)   02/26/24 116.8 kg (257 lb 9.6 oz)   12/14/23 116.6 kg (257 lb)    "   Physical Exam  GENERAL: alert, no distress, and obese  EYES: Eyes grossly normal to inspection and conjunctivae and sclerae normal  HENT: ear canals and TM's normal, nose and mouth without ulcers or lesions  NECK: no adenopathy, no asymmetry, masses, or scars  RESP: lungs clear to auscultation - no rales, rhonchi or wheezes  BREAST: normal without masses, tenderness or nipple discharge and no palpable axillary masses or adenopathy  CV: regular rates and rhythm, no murmur, click or rub, peripheral pulses strong, and no peripheral edema  ABDOMEN: soft, nontender, no hepatosplenomegaly, no masses and bowel sounds normal   (female): normal female external genitalia, normal urethral meatus , normal vaginal mucosa, and normal cervix, adnexae, and uterus without masses.  MS: no gross musculoskeletal defects noted, no edema  SKIN: no suspicious lesions or rashes. Numerous moles  NEURO: Normal strength and tone, mentation intact and speech normal  PSYCH: mentation appears normal, affect normal/bright  LYMPH: no cervical, supraclavicular, axillary, or inguinal adenopathy    Results for orders placed or performed in visit on 10/03/24   HPV and Gynecologic Cytology Panel - Recommended Age 30-65 Years     Status: None   Result Value Ref Range    Human Papilloma Virus 16 DNA Negative Negative    Human Papilloma Virus 18 DNA Negative Negative    Human Papilloma Virus Other Negative Negative    FINAL DIAGNOSIS       This patient's sample is negative for high risk HPV DNA.          METHODOLOGY: The [employer] system uses automated extraction, simultaneous amplification of HPV (E6/E7 oncogenes) and beta-globin, followed by real time detection of fluorescent labeled HPV and beta globin using specific oligonucleotide probes. The test specifically identifies types HPV 16 DNA and HPV 18 DNA while concurrently detecting the rest of the high risk types (31, 33, 35, 39, 45, 51, 52, 56, 58, 59, 66 or 68).     COMMENTS: This test is not  intended for use as a screening device for woman under age 30 with normal cervical cytology. Results should be correlated with cytologic and histologic findings. Close clinical follow up is recommended.      Please see the separate Gynecologic Cytology (Pap) report from the same collection date.     Lipid panel reflex to direct LDL Fasting     Status: Abnormal   Result Value Ref Range    Cholesterol 143 <200 mg/dL    Triglycerides 89 <150 mg/dL    Direct Measure HDL 44 (L) >=50 mg/dL    LDL Cholesterol Calculated 81 <100 mg/dL    Non HDL Cholesterol 99 <130 mg/dL    Patient Fasting > 8hrs? Yes     Narrative    Cholesterol  Desirable: < 200 mg/dL  Borderline High: 200 - 239 mg/dL  High: >= 240 mg/dL    Triglycerides  Normal: < 150 mg/dL  Borderline High: 150 - 199 mg/dL  High: 200-499 mg/dL  Very High: >= 500 mg/dL    Direct Measure HDL  Female: >= 50 mg/dL   Male: >= 40 mg/dL    LDL Cholesterol  Desirable: < 100 mg/dL  Above Desirable: 100 - 129 mg/dL   Borderline High: 130 - 159 mg/dL   High:  160 - 189 mg/dL   Very High: >= 190 mg/dL    Non HDL Cholesterol  Desirable: < 130 mg/dL  Above Desirable: 130 - 159 mg/dL  Borderline High: 160 - 189 mg/dL  High: 190 - 219 mg/dL  Very High: >= 220 mg/dL   CBC with platelets     Status: Normal   Result Value Ref Range    WBC Count 6.6 4.0 - 11.0 10e3/uL    RBC Count 4.70 3.80 - 5.20 10e6/uL    Hemoglobin 13.5 11.7 - 15.7 g/dL    Hematocrit 41.8 35.0 - 47.0 %    MCV 89 78 - 100 fL    MCH 28.7 26.5 - 33.0 pg    MCHC 32.3 31.5 - 36.5 g/dL    RDW 14.0 10.0 - 15.0 %    Platelet Count 252 150 - 450 10e3/uL   Comprehensive metabolic panel     Status: Abnormal   Result Value Ref Range    Sodium 139 135 - 145 mmol/L    Potassium 4.8 3.4 - 5.3 mmol/L    Carbon Dioxide (CO2) 25 22 - 29 mmol/L    Anion Gap 10 7 - 15 mmol/L    Urea Nitrogen 16.4 6.0 - 20.0 mg/dL    Creatinine 0.68 0.51 - 0.95 mg/dL    GFR Estimate >90 >60 mL/min/1.73m2    Calcium 10.4 8.8 - 10.4 mg/dL    Chloride 104 98  - 107 mmol/L    Glucose 117 (H) 70 - 99 mg/dL    Alkaline Phosphatase 135 40 - 150 U/L    AST 29 0 - 45 U/L    ALT 28 0 - 50 U/L    Protein Total 8.4 (H) 6.4 - 8.3 g/dL    Albumin 4.7 3.5 - 5.2 g/dL    Bilirubin Total 0.4 <=1.2 mg/dL    Patient Fasting > 8hrs? Yes    Hemoglobin A1c     Status: Abnormal   Result Value Ref Range    Estimated Average Glucose 120 (H) <117 mg/dL    Hemoglobin A1C 5.8 (H) 0.0 - 5.6 %   TSH with free T4 reflex     Status: Normal   Result Value Ref Range    TSH 1.52 0.30 - 4.20 uIU/mL   Vitamin D Deficiency     Status: Abnormal   Result Value Ref Range    Vitamin D, Total (25-Hydroxy) 51 (H) 20 - 50 ng/mL    Narrative    Season, race, dietary intake, and treatment affect the concentration of 25-hydroxy-Vitamin D. Values may decrease during winter months and increase during summer months.    Vitamin D determination is routinely performed by an immunoassay specific for 25 hydroxyvitamin D3.  If an individual is on vitamin D2(ergocalciferol) supplementation, please specify 25 OH vitamin D2 and D3 level determination by LCMSMS test [id number].     Gynecologic Cytology (PAP)     Status: None   Result Value Ref Range    Interpretation        Negative for Intraepithelial Lesion or Malignancy (NILM)    Comment         Papanicolaou Test Limitations:  Cervical cytology is a screening test with limited sensitivity, and regular screening is critical for cancer prevention.  Pap tests are primarily effective for the diagnosis/prevention of squamous cell carcinoma, not adenocarcinoma or other cancers.        Specimen Adequacy       Satisfactory for evaluation, endocerv/transformation zone component absent, atrophy    Clinical Information       none      Previous Abnormal?       No      Performing Labs       The technical component of this testing was completed at [facility].    Stain controls for all stains resulted within this report have been  reviewed and show appropriate reactivity.      Associated HPV Report       Please see the associated HPV High Risk Types DNA Cervical report for Specimen [id number] from the same collection date.           Signed Electronically by: ROSALIND Nazario CNP

## 2024-10-03 NOTE — PATIENT INSTRUCTIONS
PLAN:   1.   Symptomatic therapy suggested: Continue current medications as prescribed.    Increase calcium to 1000mg and 1000iu Vit D   2.  Orders Placed This Encounter   Medications    atorvastatin (LIPITOR) 40 MG tablet     Sig: Take 1 tablet (40 mg) by mouth daily.     Dispense:  90 tablet     Refill:  3     Orders Placed This Encounter   Procedures    REVIEW OF HEALTH MAINTENANCE PROTOCOL ORDERS    TDAP 10-64Y (ADACEL,BOOSTRIX)    COVID-19 12+ (PFIZER)    Lipid panel reflex to direct LDL Fasting    CBC with platelets    Comprehensive metabolic panel    Hemoglobin A1c    TSH with free T4 reflex    HPV and Gynecologic Cytology Panel - Recommended Age 30-65 Years    Vitamin D Deficiency    Adult Dermatology  Referral       3.  CONSULTATION/REFERRAL to dermatology   Work on weight loss  Regular exercise  Will follow up and/or notify patient of  results via My Chart to determine further need for followup   Follow up office visit in one year for annual health maintenance exam, sooner PRN.   Patient needs to follow up in if no improvement,or sooner if worsening of symptoms or other symptoms develop.         Patient Education   Preventive Care Advice   This is general advice given by our system to help you stay healthy. However, your care team may have specific advice just for you. Please talk to your care team about your preventive care needs.  Nutrition  Eat 5 or more servings of fruits and vegetables each day.  Try wheat bread, brown rice and whole grain pasta (instead of white bread, rice, and pasta).  Get enough calcium and vitamin D. Check the label on foods and aim for 100% of the RDA (recommended daily allowance).  Lifestyle  Exercise at least 150 minutes each week  (30 minutes a day, 5 days a week).  Do muscle strengthening activities 2 days a week. These help control your weight and prevent disease.  No smoking.  Wear sunscreen to prevent skin cancer.  Have a dental exam and cleaning every 6  months.  Yearly exams  See your health care team every year to talk about:  Any changes in your health.  Any medicines your care team has prescribed.  Preventive care, family planning, and ways to prevent chronic diseases.  Shots (vaccines)   HPV shots (up to age 26), if you've never had them before.  Hepatitis B shots (up to age 59), if you've never had them before.  COVID-19 shot: Get this shot when it's due.  Flu shot: Get a flu shot every year.  Tetanus shot: Get a tetanus shot every 10 years.  Pneumococcal, hepatitis A, and RSV shots: Ask your care team if you need these based on your risk.  Shingles shot (for age 50 and up)  General health tests  Diabetes screening:  Starting at age 35, Get screened for diabetes at least every 3 years.  If you are younger than age 35, ask your care team if you should be screened for diabetes.  Cholesterol test: At age 39, start having a cholesterol test every 5 years, or more often if advised.  Bone density scan (DEXA): At age 50, ask your care team if you should have this scan for osteoporosis (brittle bones).  Hepatitis C: Get tested at least once in your life.  STIs (sexually transmitted infections)  Before age 24: Ask your care team if you should be screened for STIs.  After age 24: Get screened for STIs if you're at risk. You are at risk for STIs (including HIV) if:  You are sexually active with more than one person.  You don't use condoms every time.  You or a partner was diagnosed with a sexually transmitted infection.  If you are at risk for HIV, ask about PrEP medicine to prevent HIV.  Get tested for HIV at least once in your life, whether you are at risk for HIV or not.  Cancer screening tests  Cervical cancer screening: If you have a cervix, begin getting regular cervical cancer screening tests starting at age 21.  Breast cancer scan (mammogram): If you've ever had breasts, begin having regular mammograms starting at age 40. This is a scan to check for breast  cancer.  Colon cancer screening: It is important to start screening for colon cancer at age 45.  Have a colonoscopy test every 10 years (or more often if you're at risk) Or, ask your provider about stool tests like a FIT test every year or Cologuard test every 3 years.  To learn more about your testing options, visit:   .  For help making a decision, visit:   https://bit.ly/wg30386.  Prostate cancer screening test: If you have a prostate, ask your care team if a prostate cancer screening test (PSA) at age 55 is right for you.  Lung cancer screening: If you are a current or former smoker ages 50 to 80, ask your care team if ongoing lung cancer screenings are right for you.  For informational purposes only. Not to replace the advice of your health care provider. Copyright   2023 [employer]. All rights reserved. Clinically reviewed by the [facility]itions [facility]. [employer] 213074 - REV 01/24.

## 2024-10-03 NOTE — RESULT ENCOUNTER NOTE
Yanelis Duque,    Attached are your test results.  -Normal red blood cell (hgb) levels, normal white blood cell count and normal platelet levels.  -A1C  is slight elevated and may be a sign of early diabetes (prediabetes). ADVISE:: eating a low carbohydrate diet, exercising, trying to lose weight (if necessary) and rechecking your glucose level in 12 months.   Please contact us if you have any questions.    Suha Beavers, CNP

## 2024-10-04 LAB
HPV HR 12 DNA CVX QL NAA+PROBE: NEGATIVE
HPV16 DNA CVX QL NAA+PROBE: NEGATIVE
HPV18 DNA CVX QL NAA+PROBE: NEGATIVE
HUMAN PAPILLOMA VIRUS FINAL DIAGNOSIS: NORMAL

## 2024-10-08 LAB
BKR AP ASSOCIATED HPV REPORT: NORMAL
BKR LAB AP GYN ADEQUACY: NORMAL
BKR LAB AP GYN INTERPRETATION: NORMAL
BKR LAB AP PREVIOUS ABNORMAL: NORMAL
PATH REPORT.COMMENTS IMP SPEC: NORMAL
PATH REPORT.COMMENTS IMP SPEC: NORMAL
PATH REPORT.RELEVANT HX SPEC: NORMAL

## 2024-10-16 NOTE — RESULT ENCOUNTER NOTE
Yanelis Duque,    Attached are your test results.  -Cholesterol levels are at your goal levels.  ADVISE: continuing your medication, a regular exercise program with at least 150 minutes of aerobic exercise per week, and eating a low saturated fat/low carbohydrate diet.  Also, you should recheck this fasting cholesterol panel in 12 months.  -Liver and gallbladder tests (ALT,AST, Alk phos,bilirubin) are normal.  -Kidney function (GFR) is normal.  -Sodium is normal.  -Potassium is normal.  -Calcium is normal.  -Glucose is slight elevated and may be a sign of early diabetes (prediabetes). ADVISE:: eating a low carbohydrate diet, exercising, trying to lose weight (if necessary) and rechecking your glucose level in 12 months.  -TSH (thyroid stimulating hormone) level is normal which indicates normal thyroid function.  -Vitamin D level is slight above normal and getting 1000 IU daily in your diet or supplements is recommended.    Please contact us if you have any questions.    Suha Beavers, CNP

## 2024-11-07 DIAGNOSIS — Z79.811 LONG TERM (CURRENT) USE OF AROMATASE INHIBITORS: ICD-10-CM

## 2024-11-07 DIAGNOSIS — C50.412 MALIGNANT NEOPLASM OF UPPER-OUTER QUADRANT OF LEFT BREAST IN FEMALE, ESTROGEN RECEPTOR POSITIVE (H): ICD-10-CM

## 2024-11-07 DIAGNOSIS — Z17.0 MALIGNANT NEOPLASM OF UPPER-OUTER QUADRANT OF LEFT BREAST IN FEMALE, ESTROGEN RECEPTOR POSITIVE (H): ICD-10-CM

## 2024-11-07 RX ORDER — LETROZOLE 2.5 MG/1
1 TABLET, FILM COATED ORAL DAILY
Qty: 90 TABLET | Refills: 3 | Status: SHIPPED | OUTPATIENT
Start: 2024-11-07

## 2024-11-07 NOTE — TELEPHONE ENCOUNTER
Medication requested: letrozole 2.5 mg tablet    Last prescribing provider: ELIS Vazquez on 11/16/2023    Last clinic visit date: 6/10/24 with Dr. Alegria    Recommendations for requested medication (if none, N/A): NA    Any other pertinent information (if none, N/A): NA    Refilled: Y/N, if NO, why?    Pended and Routed to Dr. Giovanna Alegria

## 2024-12-05 ENCOUNTER — APPOINTMENT (OUTPATIENT)
Dept: LAB | Facility: CLINIC | Age: 54
End: 2024-12-05
Attending: INTERNAL MEDICINE
Payer: COMMERCIAL

## 2024-12-05 ENCOUNTER — ONCOLOGY VISIT (OUTPATIENT)
Dept: ONCOLOGY | Facility: CLINIC | Age: 54
End: 2024-12-05
Attending: INTERNAL MEDICINE
Payer: COMMERCIAL

## 2024-12-05 VITALS
RESPIRATION RATE: 16 BRPM | WEIGHT: 260.5 LBS | HEART RATE: 102 BPM | OXYGEN SATURATION: 99 % | DIASTOLIC BLOOD PRESSURE: 81 MMHG | BODY MASS INDEX: 42.37 KG/M2 | SYSTOLIC BLOOD PRESSURE: 118 MMHG

## 2024-12-05 VITALS — TEMPERATURE: 97.8 F

## 2024-12-05 DIAGNOSIS — Z80.41 FAMILY HISTORY OF MALIGNANT NEOPLASM OF OVARY: Primary | ICD-10-CM

## 2024-12-05 DIAGNOSIS — Z80.41 FAMILY HISTORY OF MALIGNANT NEOPLASM OF OVARY: ICD-10-CM

## 2024-12-05 DIAGNOSIS — C50.912 INVASIVE DUCTAL CARCINOMA OF BREAST, FEMALE, LEFT (H): ICD-10-CM

## 2024-12-05 DIAGNOSIS — C50.912 INVASIVE DUCTAL CARCINOMA OF BREAST, FEMALE, LEFT (H): Primary | ICD-10-CM

## 2024-12-05 DIAGNOSIS — Z79.811 LONG TERM (CURRENT) USE OF AROMATASE INHIBITORS: ICD-10-CM

## 2024-12-05 LAB
ALBUMIN SERPL BCG-MCNC: 4.3 G/DL (ref 3.5–5.2)
CALCIUM SERPL-MCNC: 10 MG/DL (ref 8.8–10.4)
CANCER AG125 SERPL-ACNC: 13 U/ML
CREAT SERPL-MCNC: 0.67 MG/DL (ref 0.51–0.95)
EGFRCR SERPLBLD CKD-EPI 2021: >90 ML/MIN/1.73M2

## 2024-12-05 PROCEDURE — 82565 ASSAY OF CREATININE: CPT | Performed by: INTERNAL MEDICINE

## 2024-12-05 PROCEDURE — 82310 ASSAY OF CALCIUM: CPT | Performed by: INTERNAL MEDICINE

## 2024-12-05 PROCEDURE — 36415 COLL VENOUS BLD VENIPUNCTURE: CPT | Performed by: INTERNAL MEDICINE

## 2024-12-05 PROCEDURE — 258N000003 HC RX IP 258 OP 636: Performed by: INTERNAL MEDICINE

## 2024-12-05 PROCEDURE — 82040 ASSAY OF SERUM ALBUMIN: CPT | Performed by: INTERNAL MEDICINE

## 2024-12-05 PROCEDURE — 99213 OFFICE O/P EST LOW 20 MIN: CPT | Performed by: INTERNAL MEDICINE

## 2024-12-05 PROCEDURE — 86304 IMMUNOASSAY TUMOR CA 125: CPT

## 2024-12-05 PROCEDURE — 250N000011 HC RX IP 250 OP 636: Performed by: INTERNAL MEDICINE

## 2024-12-05 RX ORDER — HEPARIN SODIUM,PORCINE 10 UNIT/ML
5 VIAL (ML) INTRAVENOUS
Status: CANCELLED | OUTPATIENT
Start: 2024-12-05

## 2024-12-05 RX ORDER — ZOLEDRONIC ACID 0.04 MG/ML
4 INJECTION, SOLUTION INTRAVENOUS ONCE
Status: CANCELLED | OUTPATIENT
Start: 2024-12-05 | End: 2024-12-05

## 2024-12-05 RX ORDER — ZOLEDRONIC ACID 0.04 MG/ML
4 INJECTION, SOLUTION INTRAVENOUS ONCE
Status: COMPLETED | OUTPATIENT
Start: 2024-12-05 | End: 2024-12-05

## 2024-12-05 RX ORDER — HEPARIN SODIUM (PORCINE) LOCK FLUSH IV SOLN 100 UNIT/ML 100 UNIT/ML
5 SOLUTION INTRAVENOUS
Status: CANCELLED | OUTPATIENT
Start: 2024-12-05

## 2024-12-05 RX ADMIN — ZOLEDRONIC ACID 4 MG: 0.04 INJECTION, SOLUTION INTRAVENOUS at 09:46

## 2024-12-05 RX ADMIN — SODIUM CHLORIDE 500 ML: 9 INJECTION, SOLUTION INTRAVENOUS at 09:29

## 2024-12-05 ASSESSMENT — PAIN SCALES - GENERAL: PAINLEVEL_OUTOF10: NO PAIN (0)

## 2024-12-05 NOTE — LETTER
12/5/2024      Lachelle Duque  [address]  [place] [address]      Dear Colleague,    Thank you for referring your patient, Lachelle Duque, to the [facility]. Please see a copy of my visit note below.    Dec 5, 2024    REASON FOR VISIT: h/o left breast cancer, T1cN1, ER + WA + her2 negative, oncotype Dx 19.      She underwent a screening mammogram on 09/29/2021, which demonstrated possible asymmetry in her left breast at 4-o'clock position.  On 10/08/2021, she underwent a mammogram and ultrasound.  On mammography, there was a spiculated mass, at the 4-o'clock position, measuring 1.3 cm in size.  On ultrasound, the mass measured 1.0 cm in size.  Her lymph nodes were negative by ultrasonography. On 10/15/2021, she underwent a contrast enhanced mammography which demonstrated the lesion to be 1.1 cm.  There are no other suspicious lesions in the rest of her breast.  Also, on 10/15, she underwent a needle biopsy, which demonstrated invasive ductal cancer, grade 1, ER positive, WA positive, HER2 negative. She has not palpated a mass in her breast.  She has not had a prior history of any breast problems.    She met with Dr Ge De La Vega.  She underwent a lumpectomy and sentinel lymph node biopsy.  Pathology revealed a 1.3 cm invasive ductal carcinoma.  1/1 lymph node was positive and measured 2 cm.    Oncotype low at 19.  Labs confirmed postmenopausal.  Completed radiation on 2/2/2022  Started letrozole 2/3/2022    INTERVAL HISTORY:    Shanta presents for follow up today. Her arthralgias have improved with increased dose of Cymbalta to 60 mg each morning.   She is happy with this change and finds this tolerable.      She does experience hot flashes but these are stable and manageable, does not wake her up at night.  Her mood is OK.    Zometa infusions have been going better.  Today is #6.      She otherwise feeling well today with no new issues.       She continues to work on 7[employer]  (now 5B) and its been stressful.     ROS: 10 point ROS neg other than the symptoms noted above in the HPI.     PAST MEDICAL HISTORY:       Current Outpatient Medications   Medication Sig Dispense Refill     acetaminophen (TYLENOL) 325 MG tablet Take 2 tablets (650 mg) by mouth every 4 hours as needed for mild pain 50 tablet 1     acyclovir (ZOVIRAX) 400 MG tablet one tid po for 7 days prn for flare ups and one daily for maintenance. 180 tablet 3     aspirin (ASA) 81 MG EC tablet Take 1 tablet (81 mg) by mouth daily 90 tablet 3     atorvastatin (LIPITOR) 40 MG tablet Take 1 tablet (40 mg) by mouth daily. 90 tablet 3     cetirizine HCl 10 MG CAPS Take one tablet daily.       cyanocobalamin (VITAMIN B-12) 1000 MCG tablet Take 1,000 mcg by mouth daily       doxylamine (UNISOM) 25 MG TABS tablet Take 25 mg by mouth at bedtime       DULoxetine (CYMBALTA) 30 MG capsule TAKE ONE CAPSULE BY MOUTH TWICE A DAY (Patient taking differently: Take 60 mg by mouth daily.) 180 capsule 3     ibuprofen (ADVIL/MOTRIN) 600 MG tablet Take 1 tablet (600 mg) by mouth every 6 hours as needed for moderate pain 30 tablet 1     letrozole (FEMARA) 2.5 MG tablet TAKE ONE TABLET BY MOUTH ONCE DAILY 90 tablet 3     omeprazole 20 MG tablet Take 1 tablet (20 mg) by mouth daily Take 30-60 minutes before a meal. 90 tablet 3     No current facility-administered medications for this visit.     Allergies:  Sulfa drugs     FAMILY HISTORY:  Significant for a maternal aunt with breast cancer.  She found out she has a family member with ovarian cancer.    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.  No tobacco use.  .  .     She has never had a breast biopsy previously.     PHYSICAL EXAMINATION:  /81 (BP Location: Right arm, Patient Position: Sitting, Cuff Size: Adult Large)   Pulse 102   Resp 16   Wt 118.2 kg (260 lb 8 oz)   LMP 10/31/2021   SpO2 99%   BMI 42.37 kg/m      Wt Readings from  Last 4 Encounters:   12/05/24 118.2 kg (260 lb 8 oz)   10/03/24 117.1 kg (258 lb 1.6 oz)   06/10/24 115.6 kg (254 lb 14.4 oz)   02/26/24 116.8 kg (257 lb 9.6 oz)       She is a well-appearing woman in no apparent distress.   HEENT: no icterus  NECK: supple  CV: rrr  Lungs: clear  Abd; soft, nt nd + bs  Ext: no edema  Bilateral breast examination: no nodules or masses in either breast, no axillary adenopathy.  Left breast with a thickened scar which is unchanged from prior exam.  No palpable mass or edema or cording noted.        Oncotype Dx 19    DEXA: slight worsening, but still normal range    Reviewed her mammogram from Sept 2024 - benign      A/P:  55 y/o female with a left breast cancer pT1cN1 ER + WA + her2 negative now s/p lumpectomy and SNLB.      1. Breast cancer- Shanta is on active surveillance and remains on letrozole. September Mammo 2024-  This is unremarkable.    Initially had severe arthralgias with the AI. The Cymbalta helped particularly after increased to 60 mg daily.       Plan for ten years of endocrine therapy.    Repeat mammogram.    2. Bone health - Her DEXA scan had lowest T-score of -0.9.   She is on prophylactic zometa IV every six months while on AI.  Today is dose #6.  We discussed observing her off of bisphosphonates after this.      Repeat dexa q 2 years.    3. Liver lesions - consistent with adenomas.      4. Encouraged exercise of at least 150 min per week, healthy diet and limiting alcohol use.      5. Ovarian cancer in the family - she had a VUS in the BRCA 2 mutation.  We discussed adding on ca 125 and obtaining pelvic US.  She may benefit from seeing our cancer genetic counselors again in the future to discuss ongoing screening.      Giovanna Alegria MD MS      The longitudinal plan of care for the diagnosis(es)/condition(s) as documented were addressed during this visit. Due to the added complexity in care, I will continue to support Shanta in the subsequent management and with ongoing  continuity of care.      Again, thank you for allowing me to participate in the care of your patient.        Sincerely,        Giovanna Alegria MD

## 2024-12-05 NOTE — PROGRESS NOTES
Infusion Nursing Note:  Lachelle Duque presents today for Zometa.    Patient seen by provider today: Yes: Dr. Alegria   present during visit today: Not Applicable.    Note:   Patient has no additional questions or concerns after her appointment with Dr. Alegria. She confirms that she is taking calcium with vitamin D. Patient states she does not have any upcoming dental procedures and does not have any dental concerns.      Intravenous Access:  Peripheral IV placed in lab.    Treatment Conditions:   Latest Reference Range & Units 12/05/24 08:30   Creatinine 0.51 - 0.95 mg/dL 0.67   GFR Estimate >60 mL/min/1.73m2 >90   Calcium 8.8 - 10.4 mg/dL 10.0   Albumin 3.5 - 5.2 g/dL 4.3     Results reviewed, labs MET treatment parameters, ok to proceed with treatment.      Post Infusion Assessment:  Patient tolerated infusion without incident.  Site patent and intact, free from redness, edema or discomfort.  No evidence of extravasations.  Access discontinued per protocol.       Discharge Plan:   Discharge instructions reviewed with: Patient.  Patient and/or family verbalized understanding of discharge instructions and all questions answered.  AVS to patient via [employer].  Next appointment to be scheduled. Per patient today is her last Zometa.   Patient discharged in stable condition accompanied by: self.  Departure Mode: Ambulatory.      Lauren Roa RN

## 2024-12-05 NOTE — NURSING NOTE
"Oncology Rooming Note    December 5, 2024 8:42 AM   Lachelle Duque is a 54 year old female who presents for:    Chief Complaint   Patient presents with    Blood Draw     Labs drawn via piv placed by vascular in lab. VS taken.     Oncology Clinic Visit     Invasive Female Ductal Carcinoma of Left Breast     Initial Vitals: /81 (BP Location: Right arm, Patient Position: Sitting, Cuff Size: Adult Large)   Pulse 102   Resp 16   Wt 118.2 kg (260 lb 8 oz)   LMP 10/31/2021   SpO2 99%   BMI 42.37 kg/m   Estimated body mass index is 42.37 kg/m  as calculated from the following:    Height as of 10/3/24: 1.67 m (5' 5.75\").    Weight as of this encounter: 118.2 kg (260 lb 8 oz). Body surface area is 2.34 meters squared.  No Pain (0) Comment: Data Unavailable   Patient's last menstrual period was 10/31/2021.  Allergies reviewed: Yes  Medications reviewed: Yes    Medications: Medication refills not needed today.  Pharmacy name entered into [employer]:    [facility] - [place], [place] - [address]  [facility] - [place], [place] - [address]  [facility]  [facility] - [place], [place] - [address]  [id number] - [place], [place] - [address] [id number] - [place], [place] - [address]    Frailty Screening:   Is the patient here for a new oncology consult visit in cancer care? 2. No      Clinical concerns: None       Siomara Ryan LPN  12/5/2024              "

## 2024-12-05 NOTE — PROGRESS NOTES
Dec 5, 2024    REASON FOR VISIT: h/o left breast cancer, T1cN1, ER + RI + her2 negative, oncotype Dx 19.      She underwent a screening mammogram on 09/29/2021, which demonstrated possible asymmetry in her left breast at 4-o'clock position.  On 10/08/2021, she underwent a mammogram and ultrasound.  On mammography, there was a spiculated mass, at the 4-o'clock position, measuring 1.3 cm in size.  On ultrasound, the mass measured 1.0 cm in size.  Her lymph nodes were negative by ultrasonography. On 10/15/2021, she underwent a contrast enhanced mammography which demonstrated the lesion to be 1.1 cm.  There are no other suspicious lesions in the rest of her breast.  Also, on 10/15, she underwent a needle biopsy, which demonstrated invasive ductal cancer, grade 1, ER positive, RI positive, HER2 negative. She has not palpated a mass in her breast.  She has not had a prior history of any breast problems.    She met with Dr Ge De La Vega.  She underwent a lumpectomy and sentinel lymph node biopsy.  Pathology revealed a 1.3 cm invasive ductal carcinoma.  1/1 lymph node was positive and measured 2 cm.    Oncotype low at 19.  Labs confirmed postmenopausal.  Completed radiation on 2/2/2022  Started letrozole 2/3/2022    INTERVAL HISTORY:    Shanta presents for follow up today. Her arthralgias have improved with increased dose of Cymbalta to 60 mg each morning.   She is happy with this change and finds this tolerable.      She does experience hot flashes but these are stable and manageable, does not wake her up at night.  Her mood is OK.    Zometa infusions have been going better.  Today is #6.      She otherwise feeling well today with no new issues.       She continues to work on 7[employer] (now 5B) and its been stressful.     ROS: 10 point ROS neg other than the symptoms noted above in the HPI.     PAST MEDICAL HISTORY:       Current Outpatient Medications   Medication Sig Dispense Refill    acetaminophen (TYLENOL) 325 MG tablet Take 2  tablets (650 mg) by mouth every 4 hours as needed for mild pain 50 tablet 1    acyclovir (ZOVIRAX) 400 MG tablet one tid po for 7 days prn for flare ups and one daily for maintenance. 180 tablet 3    aspirin (ASA) 81 MG EC tablet Take 1 tablet (81 mg) by mouth daily 90 tablet 3    atorvastatin (LIPITOR) 40 MG tablet Take 1 tablet (40 mg) by mouth daily. 90 tablet 3    cetirizine HCl 10 MG CAPS Take one tablet daily.      cyanocobalamin (VITAMIN B-12) 1000 MCG tablet Take 1,000 mcg by mouth daily      doxylamine (UNISOM) 25 MG TABS tablet Take 25 mg by mouth at bedtime      DULoxetine (CYMBALTA) 30 MG capsule TAKE ONE CAPSULE BY MOUTH TWICE A DAY (Patient taking differently: Take 60 mg by mouth daily.) 180 capsule 3    ibuprofen (ADVIL/MOTRIN) 600 MG tablet Take 1 tablet (600 mg) by mouth every 6 hours as needed for moderate pain 30 tablet 1    letrozole (FEMARA) 2.5 MG tablet TAKE ONE TABLET BY MOUTH ONCE DAILY 90 tablet 3    omeprazole 20 MG tablet Take 1 tablet (20 mg) by mouth daily Take 30-60 minutes before a meal. 90 tablet 3     No current facility-administered medications for this visit.     Allergies:  Sulfa drugs     FAMILY HISTORY:  Significant for a maternal aunt with breast cancer.  She found out she has a family member with ovarian cancer.    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  .  .     She has never had a breast biopsy previously.     PHYSICAL EXAMINATION:  /81 (BP Location: Right arm, Patient Position: Sitting, Cuff Size: Adult Large)   Pulse 102   Resp 16   Wt 118.2 kg (260 lb 8 oz)   LMP 10/31/2021   SpO2 99%   BMI 42.37 kg/m      Wt Readings from Last 4 Encounters:   24 118.2 kg (260 lb 8 oz)   10/03/24 117.1 kg (258 lb 1.6 oz)   06/10/24 115.6 kg (254 lb 14.4 oz)   24 116.8 kg (257 lb 9.6 oz)       She is a well-appearing woman in no apparent distress.   HEENT: no icterus  NECK: supple  CV:  rrr  Lungs: clear  Abd; soft, nt nd + bs  Ext: no edema  Bilateral breast examination: no nodules or masses in either breast, no axillary adenopathy.  Left breast with a thickened scar which is unchanged from prior exam.  No palpable mass or edema or cording noted.        Oncotype Dx 19    DEXA: slight worsening, but still normal range    Reviewed her mammogram from Sept 2024 - benign      A/P:  53 y/o female with a left breast cancer pT1cN1 ER + [place] + her2 negative now s/p lumpectomy and SNLB.      1. Breast cancer- Shanta is on active surveillance and remains on letrozole. September Mammo 2024-  This is unremarkable.    Initially had severe arthralgias with the AI. The Cymbalta helped particularly after increased to 60 mg daily.       Plan for ten years of endocrine therapy.    Repeat mammogram.    2. Bone health - Her DEXA scan had lowest T-score of -0.9.   She is on prophylactic zometa IV every six months while on AI.  Today is dose #6.  We discussed observing her off of bisphosphonates after this.      Repeat dexa q 2 years.    3. Liver lesions - consistent with adenomas.      4. Encouraged exercise of at least 150 min per week, healthy diet and limiting alcohol use.      5. Ovarian cancer in the family - she had a VUS in the BRCA 2 mutation.  We discussed adding on ca 125 and obtaining pelvic US.  She may benefit from seeing our cancer genetic counselors again in the future to discuss ongoing screening.      Giovanna Alegria MD MS      The longitudinal plan of care for the diagnosis(es)/condition(s) as documented were addressed during this visit. Due to the added complexity in care, I will continue to support Shanta in the subsequent management and with ongoing continuity of care.

## 2024-12-05 NOTE — NURSING NOTE
Chief Complaint   Patient presents with    Blood Draw     Labs drawn via piv placed by vascular in lab. VS taken.      Labs drawn from PIV placed by vascular. Line flushed with saline. Vitals taken. Pt checked in for appointment(s).    Carmen Beard RN

## 2025-01-09 ENCOUNTER — OFFICE VISIT (OUTPATIENT)
Dept: OPTOMETRY | Facility: CLINIC | Age: 55
End: 2025-01-09
Payer: COMMERCIAL

## 2025-01-09 DIAGNOSIS — H52.4 PRESBYOPIA: ICD-10-CM

## 2025-01-09 DIAGNOSIS — H52.03 HYPEROPIA, BILATERAL: ICD-10-CM

## 2025-01-09 DIAGNOSIS — Z01.00 EXAMINATION OF EYES AND VISION: Primary | ICD-10-CM

## 2025-01-09 DIAGNOSIS — H52.223 REGULAR ASTIGMATISM OF BOTH EYES: ICD-10-CM

## 2025-01-09 ASSESSMENT — REFRACTION_WEARINGRX
SPECS_TYPE: PAL
OS_ADD: +2.25
OD_ADD: +2.25
OS_SPHERE: +0.50
OS_CYLINDER: +1.00
OS_AXIS: 167
OD_AXIS: 020
OD_CYLINDER: +1.25
OD_SPHERE: +0.75

## 2025-01-09 ASSESSMENT — VISUAL ACUITY
OD_SC: 20/50
METHOD: SNELLEN - LINEAR
CORRECTION_TYPE: GLASSES
OD_CC: 20/30
OS_CC: 20/25
OD_CC+: -1
OS_SC: 20/30
OD_CC: 20/25
OS_CC: 20/20
OS_SC+: -1

## 2025-01-09 ASSESSMENT — CONF VISUAL FIELD
OS_SUPERIOR_NASAL_RESTRICTION: 0
OD_INFERIOR_TEMPORAL_RESTRICTION: 0
OD_INFERIOR_NASAL_RESTRICTION: 0
OD_NORMAL: 1
OS_INFERIOR_NASAL_RESTRICTION: 0
OS_NORMAL: 1
OS_SUPERIOR_TEMPORAL_RESTRICTION: 0
OD_SUPERIOR_NASAL_RESTRICTION: 0
OS_INFERIOR_TEMPORAL_RESTRICTION: 0
OD_SUPERIOR_TEMPORAL_RESTRICTION: 0

## 2025-01-09 ASSESSMENT — REFRACTION_CURRENTRX
OS_SPHERE: +1.50
OD_SPHERE: +3.50
OS_ADDL_SPECS: DIST
OD_AXIS: 110
OD_CYLINDER: -1.25
OS_BRAND: J&J ACUVUE OASYS FOR ASTIGMATISM BC 8.6, D 14.5
OS_CYLINDER: -0.75
OS_AXIS: 070
OD_ADDL_SPECS: NEAR
OD_BRAND: J&J ACUVUE OASYS FOR ASTIGMATISM BC 8.6, D 14.5

## 2025-01-09 ASSESSMENT — KERATOMETRY
OD_AXISANGLE2_DEGREES: 100
OS_K1POWER_DIOPTERS: 44.25
OS_K2POWER_DIOPTERS: 45.00
OD_K2POWER_DIOPTERS: 45.50
OD_AXISANGLE_DEGREES: 010
OS_AXISANGLE_DEGREES: 174
OS_AXISANGLE2_DEGREES: 084
OD_K1POWER_DIOPTERS: 44.25

## 2025-01-09 ASSESSMENT — SLIT LAMP EXAM - LIDS: COMMENTS: NORMAL

## 2025-01-09 ASSESSMENT — EXTERNAL EXAM - LEFT EYE: OS_EXAM: NORMAL

## 2025-01-09 ASSESSMENT — REFRACTION_MANIFEST
OS_ADD: +2.25
OD_ADD: +2.25
OD_CYLINDER: -1.25
OS_AXIS: 077
OD_AXIS: 110
OD_SPHERE: +2.25
OS_CYLINDER: -1.00
OS_SPHERE: +2.00

## 2025-01-09 ASSESSMENT — CUP TO DISC RATIO
OD_RATIO: 0.1
OS_RATIO: 0.1

## 2025-01-09 ASSESSMENT — TONOMETRY
IOP_METHOD: ICARE
OS_IOP_MMHG: 21.9
OD_IOP_MMHG: 20.9

## 2025-01-09 ASSESSMENT — EXTERNAL EXAM - RIGHT EYE: OD_EXAM: NORMAL

## 2025-01-09 NOTE — LETTER
1/9/2025      Lachelle Duque  [address]  [place] [place] [address]      Dear Colleague,    Thank you for referring your patient, Lachelle Duque, to the [facility]. Please see a copy of my visit note below.    Chief Complaint   Patient presents with     Annual Eye Exam     More contacts fit fee $75 ok        Previous contact lens wearer? Yes: acuvue oasys astig- likes 2 week - had done 1 use in the past  Comfort of contact lenses :excellent   Satisfied with current lenses: Yes  Last Eye Exam: 1-4-2024  Dilated Previously: Yes    What are you currently using to see?  glasses and contacts    Distance Vision Acuity: Satisfied with vision    Near Vision Acuity: Not satisfied     Eye Comfort: good  Do you use eye drops? 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     Medical, surgical and family histories reviewed and updated 1/9/2025.       OBJECTIVE: See Ophthalmology exam    ASSESSMENT:    ICD-10-CM    1. Examination of eyes and vision  Z01.00 EYE EXAM (SIMPLE-NONBILLABLE)      2. Hyperopia, bilateral  H52.03 REFRACTION     CONTACT LENS FITTING,BILAT w/ signed waiver      3. Regular astigmatism of both eyes  H52.223 REFRACTION     CONTACT LENS FITTING,BILAT w/ signed waiver      4. Presbyopia  H52.4 REFRACTION         PLAN:     Patient Instructions   Eyeglass prescription given.    Contact lens prescription given and form signed.  Trials will be ordered with updated prescription.  Ok to order if satisfied.    Return in 1 year for a complete eye exam or sooner if needed.    Juan Carlos Spann, BREN                         Again, thank you for allowing me to participate in the care of your patient.        Sincerely,        Juan Carlos Spann, BREN    Electronically signed

## 2025-01-09 NOTE — PATIENT INSTRUCTIONS
Eyeglass prescription given.    Contact lens prescription given and form signed.  Trials will be ordered with updated prescription.  Ok to order if satisfied.    Return in 1 year for a complete eye exam or sooner if needed.    Juan Carlos Spann OD    The affects of the dilating drops last for 4- 6 hours.  You will be more sensitive to light and vision will be blurry up close.  Do not drive if you do not feel comfortable.  Mydriatic sunglasses were given if needed.      Optometry Providers       Clinic Locations                                 Telephone Number   Dr. Faith Whitman    [facility]  [place] [phone]     [place] [facility] Hours:                [place] [facility] Hours:       [place] [facility] Hours:   [address]   [address] [facility] [address]     [address] [facility]  [place] [address]   [place] [address] [address]    [place] [address]  Phone: [phone]                    Phone: [phone]     Phone: [phone]                      Monday 8:00-6:00                          Monday 8:00-6:00                          Monday 8:00-6:00              Tuesday 8:00-6:00                          Tuesday 8:00-6:00                          Tuesday 8:00-6:00              Wednesday 8:00-6:00                  Wednesday 8:00-6:00                   Wednesday 8:00-6:00      Thursday 8:00-6:00                        Thursday 8:00-6:00                         Thursday 8:00-6:00            Friday 8:00-5:00                              Friday 8:00-5:00                              Friday 8:00-5:00    [place] Hours:   [address] [facility] [place] [address]  [phone]    Monday 9:00-6:00  Tuesday 9:00-6:00  Wednesday 9:00-6:00  Thursday 9:00-6:00  Friday 9:00-5:00  As always, Thank you for trusting us with your health care  needs!

## 2025-02-07 ENCOUNTER — ANCILLARY PROCEDURE (OUTPATIENT)
Dept: ULTRASOUND IMAGING | Facility: CLINIC | Age: 55
End: 2025-02-07
Attending: INTERNAL MEDICINE
Payer: COMMERCIAL

## 2025-02-07 DIAGNOSIS — Z80.41 FAMILY HISTORY OF MALIGNANT NEOPLASM OF OVARY: ICD-10-CM

## 2025-02-07 DIAGNOSIS — C50.912 INVASIVE DUCTAL CARCINOMA OF BREAST, FEMALE, LEFT (H): ICD-10-CM

## 2025-02-07 PROCEDURE — 76830 TRANSVAGINAL US NON-OB: CPT | Mod: TC | Performed by: STUDENT IN AN ORGANIZED HEALTH CARE EDUCATION/TRAINING PROGRAM

## 2025-02-07 PROCEDURE — 76856 US EXAM PELVIC COMPLETE: CPT | Mod: TC | Performed by: STUDENT IN AN ORGANIZED HEALTH CARE EDUCATION/TRAINING PROGRAM

## 2025-06-04 NOTE — PATIENT INSTRUCTIONS
Proper skin care from [facility]:    -Eliminate harsh soaps as they strip the natural oils from the skin, often resulting in dry itchy skin ( i.e. Dial, Zest, Brazilian Spring)  -Use mild soaps such as Cetaphil or Dove Sensitive Skin in the shower. You do not need to use soap on arms, legs, and trunk every time you shower unless visibly soiled.   -Avoid hot or cold showers.  -After showering, lightly dry off and apply moisturizing within 2-3 minutes. This will help trap moisture in the skin.   -Aggressive use of a moisturizer at least 1-2 times a day to the entire body (including -Vanicream, Cetaphil, Aquaphor or Cerave) and moisturize hands after every washing.  -We recommend using moisturizers that come in a tub that needs to be scooped out, not a pump. This has more of an oil base. It will hold moisture in your skin much better than a water base moisturizer. The above recommended are non-pore clogging.      Wear a sunscreen with at least SPF 30 on your face, ears, neck and V of the chest daily. Wear sunscreen on other areas of the body if those areas are exposed to the sun throughout the day. Sunscreens can contain physical and/or chemical blockers. Physical blockers are less likely to clog pores, these include zinc oxide and titanium dioxide. Reapply every two hour and after swimming.     Sunscreen examples: https://www.ewg.org/sunscreen/    UV radiation  UVA radiation remains constant throughout the day and throughout the year. It is a longer wavelength than UVB and therefore penetrates deeper into the skin leading to immediate and delayed tanning, photoaging, and skin cancer. 70-80% of UVA and UVB radiation occurs between the hours of 10am-2pm.  UVB radiation  UVB radiation causes the most harmful effects and is more significant during the summer months. However, snow and ice can reflect UVB radiation leading to skin damage during the winter months as well. UVB radiation is responsible for tanning,  burning, inflammation, delayed erythema (pinkness), pigmentation (brown spots), and skin cancer.     I recommend self monthly full body exams and yearly full body exams with a dermatology provider. If you develop a new or changing lesion please follow up for examination. Most skin cancers are pink and scaly or pink and pearly. However, we do see blue/brown/black skin cancers.  Consider the ABCDEs of melanoma when giving yourself your monthly full body exam ( don't forget the groin, buttocks, feet, toes, etc). A-asymmetry, B-borders, C-color, D-diameter, E-elevation or evolving. If you see any of these changes please follow up in clinic. If you cannot see your back I recommend purchasing a hand held mirror to use with a larger wall mirror.       Checking for Skin Cancer  You can find cancer early by checking your skin each month. There are 3 kinds of skin cancer. They are melanoma, basal cell carcinoma, and squamous cell carcinoma. Doing monthly skin checks is the best way to find new marks or skin changes. Follow the instructions below for checking your skin.   The ABCDEs of checking moles for melanoma   Check your moles or growths for signs of melanoma using ABCDE:   Asymmetry: the sides of the mole or growth don t match  Border: the edges are ragged, notched, or blurred  Color: the color within the mole or growth varies  Diameter: the mole or growth is larger than 6 mm (size of a pencil eraser)  Evolving: the size, shape, or color of the mole or growth is changing (evolving is not shown in the images below)    Checking for other types of skin cancer  Basal cell carcinoma or squamous cell carcinoma have symptoms such as:     A spot or mole that looks different from all other marks on your skin  Changes in how an area feels, such as itching, tenderness, or pain  Changes in the skin's surface, such as oozing, bleeding, or scaliness  A sore that does not heal  New swelling or redness beyond the border of a  mole    Who s at risk?  Anyone can get skin cancer. But you are at greater risk if you have:   Fair skin, light-colored hair, or light-colored eyes  Many moles or abnormal moles on your skin  A history of sunburns from sunlight or tanning beds  A family history of skin cancer  A history of exposure to radiation or chemicals  A weakened immune system  If you have had skin cancer in the past, you are at risk for recurring skin cancer.   How to check your skin  Do your monthly skin checkups in front of a full-length mirror. Check all parts of your body, including your:   Head (ears, face, neck, and scalp)  Torso (front, back, and sides)  Arms (tops, undersides, upper, and lower armpits)  Hands (palms, backs, and fingers, including under the nails)  Buttocks and genitals  Legs (front, back, and sides)  Feet (tops, soles, toes, including under the nails, and between toes)  If you have a lot of moles, take digital photos of them each month. Make sure to take photos both up close and from a distance. These can help you see if any moles change over time.   Most skin changes are not cancer. But if you see any changes in your skin, call your doctor right away. Only he or she can diagnose a problem. If you have skin cancer, seeing your doctor can be the first step toward getting the treatment that could save your life.   [employer] last reviewed this educational content on 4/1/2019 2000-2020 [employer]. [address] [place] [address], [place] [address]. All rights reserved. This information is not intended as a substitute for professional medical care. Always follow your healthcare professional's instructions.       When should I call my doctor?  If you are worsening or not improving, please, contact us or seek urgent care as noted below.     Who should I call with questions (adults)?    [facility] [phone]  For urgent needs outside of business hours call the [facility] at  [phone] and ask for the dermatology resident on call to be paged  If this is a medical emergency and you are unable to reach an ER, Call 911      If you need a prescription refill, please contact your pharmacy. Refills are approved or denied by our Physicians during normal business hours, Monday through Friday.  Per office policy, refills will not be granted if you have not been seen within the past year (or sooner depending on the condition).

## 2025-06-05 ENCOUNTER — OFFICE VISIT (OUTPATIENT)
Dept: DERMATOLOGY | Facility: CLINIC | Age: 55
End: 2025-06-05
Attending: NURSE PRACTITIONER
Payer: COMMERCIAL

## 2025-06-05 DIAGNOSIS — D22.9 MULTIPLE BENIGN NEVI: ICD-10-CM

## 2025-06-05 DIAGNOSIS — L82.1 SEBORRHEIC KERATOSES: ICD-10-CM

## 2025-06-05 DIAGNOSIS — D22.9 NUMEROUS SKIN MOLES: ICD-10-CM

## 2025-06-05 DIAGNOSIS — L81.4 LENTIGINES: ICD-10-CM

## 2025-06-05 DIAGNOSIS — D23.9 DERMATOFIBROMA: ICD-10-CM

## 2025-06-05 DIAGNOSIS — Z12.83 ENCOUNTER FOR SCREENING FOR MALIGNANT NEOPLASM OF SKIN: ICD-10-CM

## 2025-06-05 DIAGNOSIS — L30.9 ECZEMA, UNSPECIFIED TYPE: Primary | ICD-10-CM

## 2025-06-05 DIAGNOSIS — D18.01 CHERRY ANGIOMA: ICD-10-CM

## 2025-06-05 RX ORDER — TRIAMCINOLONE ACETONIDE 1 MG/G
OINTMENT TOPICAL 2 TIMES DAILY
Qty: 80 G | Refills: 1 | Status: SHIPPED | OUTPATIENT
Start: 2025-06-05

## 2025-06-05 NOTE — PROGRESS NOTES
[facility] Dermatology Note  Encounter Date: Jun 5, 2025  Office Visit     Reviewed patients past medical history and pertinent chart review prior to patients visit today.     Dermatology Problem List:  Eczema, given triamcinolone 0.1% ointment 6/5/2025   History of breast cancer with radiation of left breast    - Patient denies personal history of skin cancer or dysplastic nevi.    - Patient denies family history of skin cancer or dysplastic nevi.       ____________________________________________    Assessment & Plan:     # Eczematous dermatitis  -Start triamcinolone 0.1% ointment twice daily for 2-4 weeks, decreasing as patients rash improves, repeat cycle for flares. If not fully resolved in 1 month, patient advised to return to clinic for reevaluation. Discussed risk of skin atrophy with long term chronic use. Not for face, armpits or groin.   -When bathing, use gentle soap such as Dove for Sensitive Skin and lukewarm water on amrpits, groin, and other visibly dirty areas, all other areas let the water run over but no soap is necessary. Pat skin dry instead of vigorous rubbing. Encouraged regular use of an emollient such as Vanicream, Cera Ve Cream or Cetaphil Cream to the entire body.   -Start a humidifier in bedroom when sleeping if possible for patient. Clean regularly.     Return to clinic in 1 month if not fully resolved, sooner PRN for new or worsening conditions.     # Benign skin findings including: dermatofibroma, seborrheic keratoses, cherry angioma, lentigines and benign nevi.   - No further intervention required. Patient to report changes.   - Patient reassured of the benign nature of these lesions.    #Signs and Symptoms of non-melanoma skin cancer and ABCDEs of melanoma reviewed with patient. Patient encouraged to perform monthly self skin exams and educated on how to perform them. UV precautions reviewed with patient. Patient was asked about new or changing moles/lesions on  body.     #Reviewed Sunscreen: Apply 20 minutes prior to going outdoors and reapply every two hours, when wet or sweating. We recommend using an SPF 30 or higher, and to use one that is water resistant.       Follow-up:  1-2 years for follow up full body skin exam, prn for new or changing lesions or new concerns    Bonita Reyes CNP  Dermatology     ____________________________________________    CC: Derm Problem (Skin check)    HPI:  Ms. Lachelle Duque is a(n) 55 year old female who presents today as a new patient for a full body skin cancer screening. Patient has concerns today about spot of concern on the left upper arm that has been present a few years and not changing. She also has some eczema that flares on and off on her buttock mostly in the winter.     Patient is otherwise feeling well, without additional skin concerns.     Physical Exam:  SKIN: Total skin excluding the genitalia areas was performed. The exam included the head/face, neck, both arms, chest, back, abdomen, both legs, digits, mons pubis, buttock and nails.   -right upper arm, pink/tan firm papules with stellate center on dermoscopy and positive dimples sign   -mild erythematous papules on lateral buttock consistent with papular eczema  -several 1-2mm red dome shaped symmetric papules scattered on the trunk  -multiple tan/brown flat round macules and raised papules scattered throughout trunk, extremities and head. No worrisome features for malignancy noted on examination.  -scattered tan, homogenous macules scattered on sun exposed areas of trunk, extremities and face.   -scattered waxy, stuck on tan/brown papules and patches on the trunk     - No other lesions of concern on areas examined.     Medications:  Current Outpatient Medications   Medication Sig Dispense Refill    acetaminophen (TYLENOL) 325 MG tablet Take 2 tablets (650 mg) by mouth every 4 hours as needed for mild pain 50 tablet 1    acyclovir (ZOVIRAX) 400 MG tablet one tid po for 7  days prn for flare ups and one daily for maintenance. 180 tablet 3    aspirin (ASA) 81 MG EC tablet Take 1 tablet (81 mg) by mouth daily 90 tablet 3    atorvastatin (LIPITOR) 40 MG tablet Take 1 tablet (40 mg) by mouth daily. 90 tablet 3    cetirizine HCl 10 MG CAPS Take one tablet daily.      cyanocobalamin (VITAMIN B-12) 1000 MCG tablet Take 1,000 mcg by mouth daily      doxylamine (UNISOM) 25 MG TABS tablet Take 25 mg by mouth at bedtime      DULoxetine (CYMBALTA) 30 MG capsule TAKE ONE CAPSULE BY MOUTH TWICE A DAY (Patient taking differently: Take 60 mg by mouth daily.) 180 capsule 3    ibuprofen (ADVIL/MOTRIN) 600 MG tablet Take 1 tablet (600 mg) by mouth every 6 hours as needed for moderate pain 30 tablet 1    letrozole (FEMARA) 2.5 MG tablet TAKE ONE TABLET BY MOUTH ONCE DAILY 90 tablet 3    omeprazole 20 MG tablet Take 1 tablet (20 mg) by mouth daily Take 30-60 minutes before a meal. 90 tablet 3     No current facility-administered medications for this visit.      Past Medical History:   Patient Active Problem List   Diagnosis    Encounter for other general counseling or advice on contraception    Herpes zoster    Family history of colon cancer    Hyperlipidemia LDL goal <130    Obesity    Vitamin D deficiency    Hiatal hernia    GERD (gastroesophageal reflux disease)    Cholelithiasis    Biliary colic    Environmental allergies    Obesity (BMI 35.0-39.9) with comorbidity (H)    Chronic pain of left knee    Invasive ductal carcinoma of breast, female, left (H)    Right knee pain, unspecified chronicity    Long term (current) use of aromatase inhibitors    Family history of ischemic heart disease    Elevated coronary artery calcium score    Coronary artery disease involving native coronary artery of native heart without angina pectoris    Prediabetes     Past Medical History:   Diagnosis Date    Cancer (H)     Chronic rhinitis     Environmental allergies 01/18/2013    Gastro-oesophageal reflux disease      GERD (gastroesophageal reflux disease) 01/03/2013    Herpes zoster without mention of complication     L eye     Hiatal hernia     Hyperlipidemia     Obese     PONV (postoperative nausea and vomiting)     PONV       CC Suha Beavers, APRN CNP  [address] [place] [address] on close of this encounter.

## 2025-06-05 NOTE — LETTER
6/5/2025      Lcahelle Duque  [address]  [place] [address]      Dear Colleague,    Thank you for referring your patient, Lachelle Duque, to the [facility]. Please see a copy of my visit note below.    [facility] Dermatology Note  Encounter Date: Jun 5, 2025  Office Visit     Reviewed patients past medical history and pertinent chart review prior to patients visit today.     Dermatology Problem List:  Eczema, given triamcinolone 0.1% ointment 6/5/2025   History of breast cancer with radiation of left breast    - Patient denies personal history of skin cancer or dysplastic nevi.    - Patient denies family history of skin cancer or dysplastic nevi.       ____________________________________________    Assessment & Plan:     # Eczematous dermatitis  -Start triamcinolone 0.1% ointment twice daily for 2-4 weeks, decreasing as patients rash improves, repeat cycle for flares. If not fully resolved in 1 month, patient advised to return to clinic for reevaluation. Discussed risk of skin atrophy with long term chronic use. Not for face, armpits or groin.   -When bathing, use gentle soap such as Dove for Sensitive Skin and lukewarm water on amrpits, groin, and other visibly dirty areas, all other areas let the water run over but no soap is necessary. Pat skin dry instead of vigorous rubbing. Encouraged regular use of an emollient such as Vanicream, Cera Ve Cream or Cetaphil Cream to the entire body.   -Start a humidifier in bedroom when sleeping if possible for patient. Clean regularly.     Return to clinic in 1 month if not fully resolved, sooner PRN for new or worsening conditions.     # Benign skin findings including: dermatofibroma, seborrheic keratoses, cherry angioma, lentigines and benign nevi.   - No further intervention required. Patient to report changes.   - Patient reassured of the benign nature of these lesions.    #Signs and Symptoms of non-melanoma skin  cancer and ABCDEs of melanoma reviewed with patient. Patient encouraged to perform monthly self skin exams and educated on how to perform them. UV precautions reviewed with patient. Patient was asked about new or changing moles/lesions on body.     #Reviewed Sunscreen: Apply 20 minutes prior to going outdoors and reapply every two hours, when wet or sweating. We recommend using an SPF 30 or higher, and to use one that is water resistant.       Follow-up:  1-2 years for follow up full body skin exam, prn for new or changing lesions or new concerns    Bonita Reyes, ALEJO  Dermatology     ____________________________________________    CC: Derm Problem (Skin check)    HPI:  Ms. Lachelle Duque is a(n) 55 year old female who presents today as a new patient for a full body skin cancer screening. Patient has concerns today about spot of concern on the left upper arm that has been present a few years and not changing. She also has some eczema that flares on and off on her buttock mostly in the winter.     Patient is otherwise feeling well, without additional skin concerns.     Physical Exam:  SKIN: Total skin excluding the genitalia areas was performed. The exam included the head/face, neck, both arms, chest, back, abdomen, both legs, digits, mons pubis, buttock and nails.   -right upper arm, pink/tan firm papules with stellate center on dermoscopy and positive dimples sign   -mild erythematous papules on lateral buttock consistent with papular eczema  -several 1-2mm red dome shaped symmetric papules scattered on the trunk  -multiple tan/brown flat round macules and raised papules scattered throughout trunk, extremities and head. No worrisome features for malignancy noted on examination.  -scattered tan, homogenous macules scattered on sun exposed areas of trunk, extremities and face.   -scattered waxy, stuck on tan/brown papules and patches on the trunk     - No other lesions of concern on areas examined.      Medications:  Current Outpatient Medications   Medication Sig Dispense Refill     acetaminophen (TYLENOL) 325 MG tablet Take 2 tablets (650 mg) by mouth every 4 hours as needed for mild pain 50 tablet 1     acyclovir (ZOVIRAX) 400 MG tablet one tid po for 7 days prn for flare ups and one daily for maintenance. 180 tablet 3     aspirin (ASA) 81 MG EC tablet Take 1 tablet (81 mg) by mouth daily 90 tablet 3     atorvastatin (LIPITOR) 40 MG tablet Take 1 tablet (40 mg) by mouth daily. 90 tablet 3     cetirizine HCl 10 MG CAPS Take one tablet daily.       cyanocobalamin (VITAMIN B-12) 1000 MCG tablet Take 1,000 mcg by mouth daily       doxylamine (UNISOM) 25 MG TABS tablet Take 25 mg by mouth at bedtime       DULoxetine (CYMBALTA) 30 MG capsule TAKE ONE CAPSULE BY MOUTH TWICE A DAY (Patient taking differently: Take 60 mg by mouth daily.) 180 capsule 3     ibuprofen (ADVIL/MOTRIN) 600 MG tablet Take 1 tablet (600 mg) by mouth every 6 hours as needed for moderate pain 30 tablet 1     letrozole (FEMARA) 2.5 MG tablet TAKE ONE TABLET BY MOUTH ONCE DAILY 90 tablet 3     omeprazole 20 MG tablet Take 1 tablet (20 mg) by mouth daily Take 30-60 minutes before a meal. 90 tablet 3     No current facility-administered medications for this visit.      Past Medical History:   Patient Active Problem List   Diagnosis     Encounter for other general counseling or advice on contraception     Herpes zoster     Family history of colon cancer     Hyperlipidemia LDL goal <130     Obesity     Vitamin D deficiency     Hiatal hernia     GERD (gastroesophageal reflux disease)     Cholelithiasis     Biliary colic     Environmental allergies     Obesity (BMI 35.0-39.9) with comorbidity (H)     Chronic pain of left knee     Invasive ductal carcinoma of breast, female, left (H)     Right knee pain, unspecified chronicity     Long term (current) use of aromatase inhibitors     Family history of ischemic heart disease     Elevated coronary artery  calcium score     Coronary artery disease involving native coronary artery of native heart without angina pectoris     Prediabetes     Past Medical History:   Diagnosis Date     Cancer (H)      Chronic rhinitis      Environmental allergies 01/18/2013     Gastro-oesophageal reflux disease      GERD (gastroesophageal reflux disease) 01/03/2013     Herpes zoster without mention of complication     L eye      Hiatal hernia      Hyperlipidemia      Obese      PONV (postoperative nausea and vomiting)     PONV       CC Suha Beavers APRN CNP  [address] [place] [address] on close of this encounter.    Again, thank you for allowing me to participate in the care of your patient.        Sincerely,        Eloina Reyes, APRN CNP    Electronically signed

## (undated) DEVICE — PREP DURAPREP REMOVER 4OZ 8611

## (undated) DEVICE — DRAPE IOBAN INCISE 23X17" 6650EZ

## (undated) DEVICE — BASIN SET SINGLE STERILE 13752-624

## (undated) DEVICE — LINEN ORTHO PACK 5446

## (undated) DEVICE — BLADE KNIFE SURG 10 371110

## (undated) DEVICE — PAD ARMBOARD FOAM EGGCRATE COVIDEN 3114367

## (undated) DEVICE — TUBING SYSTEM ARTHREX PUMP REDEUCE AR-6411

## (undated) DEVICE — SU PDS II 4-0 FS-2 27" Z422H

## (undated) DEVICE — BUR ARTHREX COOLCUT SABRE 4.0MMX13CM AR-8400SR

## (undated) DEVICE — SU TIGERSTICK #2 TIGERWIRE 50" STIFF END 12" AR-7209T

## (undated) DEVICE — PREP DURAPREP 26ML APL 8630

## (undated) DEVICE — SU VICRYL 3-0 SH 27" J316H

## (undated) DEVICE — GLOVE PROTEXIS POWDER FREE SMT 8.0  2D72PT80X

## (undated) DEVICE — SUCTION MANIFOLD NEPTUNE 2 SYS 1 PORT 702-025-000

## (undated) DEVICE — BLADE KNIFE SURG 15 371115

## (undated) DEVICE — LIGHT HANDLE X1 31140133

## (undated) DEVICE — PAD CHUX UNDERPAD 23X24" 7136

## (undated) DEVICE — PAD CHUX UNDERPAD 30X30"

## (undated) DEVICE — PREP CHLORAPREP 26ML TINTED ORANGE  260815

## (undated) DEVICE — DRSG STERI STRIP 1/2X4" R1547

## (undated) DEVICE — BNDG SPANDAGRIP SZ G LF BEIGE 4.5" SAG13145

## (undated) DEVICE — CLIP HORIZON SM RED WIDE SLOT 001201

## (undated) DEVICE — SYR 05ML LL W/O NDL

## (undated) DEVICE — ESU PENCIL SMOKE EVAC W/ROCKER SWITCH 0703-047-000

## (undated) DEVICE — SU ETHILON 3-0 PS-1 18" 1663H

## (undated) DEVICE — GLOVE PROTEXIS BLUE W/NEU-THERA 8.0  2D73EB80

## (undated) DEVICE — LINEN GOWN XLG 5407

## (undated) DEVICE — COVER CAMERA IN-LIGHT DISP LT-C02

## (undated) DEVICE — ESU GROUND PAD ADULT W/CORD E7507

## (undated) DEVICE — DRAPE SHEET MED 44X70" 9355

## (undated) DEVICE — SU SILK 3-0 SH 30" K832H

## (undated) DEVICE — DRAPE LAP TRANSVERSE 29421

## (undated) DEVICE — SOL WATER IRRIG 500ML BOTTLE 2F7113

## (undated) DEVICE — NDL BLUNT 18GA 1" W/O FILTER 305181

## (undated) DEVICE — NDL ARTHREX SCORPION KNEE 2-0 AR-12990N

## (undated) DEVICE — SU FIBERLINK #2 BRAIDED PB BLUE W/1.5" CLSD LOOP  AR-7235

## (undated) DEVICE — TUBING SYSTEM ARTHREX PATIENT REDEUCE AR-6421

## (undated) DEVICE — NDL 27GA 1.25" 305136

## (undated) DEVICE — PACK ARTHROSCOPY CUSTOM ASC

## (undated) DEVICE — SOL NACL 0.9% IRRIG 500ML BOTTLE 2F7123

## (undated) DEVICE — BNDG COHESIVE 2"X5YDS UNSTERILE ASSORTED COLORS LF 8962

## (undated) DEVICE — CLIP HORIZON MED BLUE 002200

## (undated) DEVICE — SOL NACL 0.9% IRRIG 3000ML BAG 2B7477

## (undated) DEVICE — CANNULA ARTHREX PASSPORT BUTTON 8MMX4CM AR-6592-08-40

## (undated) DEVICE — KIT ENDO FIRST STEP DISINFECTANT 200ML W/POUCH EP-4

## (undated) DEVICE — PACK MINOR CUSTOM ASC

## (undated) DEVICE — NDL 25GA 2"  8881200441

## (undated) DEVICE — SUCTION MANIFOLD NEPTUNE 2 SYS 4 PORT 0702-020-000

## (undated) DEVICE — LINEN TOWEL PACK X5 5464

## (undated) DEVICE — ESU ELEC BLADE 2.75" COATED/INSULATED E1455

## (undated) DEVICE — ADH SKIN CLOSURE PREMIERPRO EXOFIN 1.0ML 3470

## (undated) DEVICE — CANNULA PASSPORT BUTTON 8X3CM AR-6592-08-30

## (undated) DEVICE — PREP PAD ALCOHOL 6818

## (undated) DEVICE — MARKER MARGIN MARKER STD 6 COLOR SGL USE MMS6

## (undated) RX ORDER — ONDANSETRON 2 MG/ML
INJECTION INTRAMUSCULAR; INTRAVENOUS
Status: DISPENSED
Start: 2022-11-01

## (undated) RX ORDER — GLYCOPYRROLATE 0.2 MG/ML
INJECTION INTRAMUSCULAR; INTRAVENOUS
Status: DISPENSED
Start: 2020-06-16

## (undated) RX ORDER — ACETAMINOPHEN 325 MG/1
TABLET ORAL
Status: DISPENSED
Start: 2022-04-19

## (undated) RX ORDER — EPINEPHRINE 1 MG/ML
INJECTION, SOLUTION INTRAMUSCULAR; SUBCUTANEOUS
Status: DISPENSED
Start: 2022-04-19

## (undated) RX ORDER — PROPOFOL 10 MG/ML
INJECTION, EMULSION INTRAVENOUS
Status: DISPENSED
Start: 2020-06-16

## (undated) RX ORDER — DEXAMETHASONE SODIUM PHOSPHATE 4 MG/ML
INJECTION, SOLUTION INTRA-ARTICULAR; INTRALESIONAL; INTRAMUSCULAR; INTRAVENOUS; SOFT TISSUE
Status: DISPENSED
Start: 2020-06-16

## (undated) RX ORDER — ISOSULFAN BLUE 50 MG/5ML
INJECTION, SOLUTION SUBCUTANEOUS
Status: DISPENSED
Start: 2021-11-10

## (undated) RX ORDER — ONDANSETRON 2 MG/ML
INJECTION INTRAMUSCULAR; INTRAVENOUS
Status: DISPENSED
Start: 2021-11-10

## (undated) RX ORDER — GLYCOPYRROLATE 0.2 MG/ML
INJECTION INTRAMUSCULAR; INTRAVENOUS
Status: DISPENSED
Start: 2022-04-19

## (undated) RX ORDER — OXYCODONE HYDROCHLORIDE 5 MG/1
TABLET ORAL
Status: DISPENSED
Start: 2022-04-19

## (undated) RX ORDER — KETOROLAC TROMETHAMINE 30 MG/ML
INJECTION, SOLUTION INTRAMUSCULAR; INTRAVENOUS
Status: DISPENSED
Start: 2022-04-19

## (undated) RX ORDER — CEFAZOLIN SODIUM 2 G/50ML
SOLUTION INTRAVENOUS
Status: DISPENSED
Start: 2020-06-16

## (undated) RX ORDER — FENTANYL CITRATE 50 UG/ML
INJECTION, SOLUTION INTRAMUSCULAR; INTRAVENOUS
Status: DISPENSED
Start: 2021-11-10

## (undated) RX ORDER — HYDROMORPHONE HYDROCHLORIDE 1 MG/ML
INJECTION, SOLUTION INTRAMUSCULAR; INTRAVENOUS; SUBCUTANEOUS
Status: DISPENSED
Start: 2022-04-19

## (undated) RX ORDER — LIDOCAINE HYDROCHLORIDE 20 MG/ML
INJECTION, SOLUTION EPIDURAL; INFILTRATION; INTRACAUDAL; PERINEURAL
Status: DISPENSED
Start: 2020-06-16

## (undated) RX ORDER — ONDANSETRON 2 MG/ML
INJECTION INTRAMUSCULAR; INTRAVENOUS
Status: DISPENSED
Start: 2020-06-16

## (undated) RX ORDER — LIDOCAINE HYDROCHLORIDE AND EPINEPHRINE 10; 10 MG/ML; UG/ML
INJECTION, SOLUTION INFILTRATION; PERINEURAL
Status: DISPENSED
Start: 2021-11-10

## (undated) RX ORDER — FENTANYL CITRATE-0.9 % NACL/PF 10 MCG/ML
PLASTIC BAG, INJECTION (ML) INTRAVENOUS
Status: DISPENSED
Start: 2021-11-10

## (undated) RX ORDER — ONDANSETRON 2 MG/ML
INJECTION INTRAMUSCULAR; INTRAVENOUS
Status: DISPENSED
Start: 2022-04-19

## (undated) RX ORDER — DEXAMETHASONE SODIUM PHOSPHATE 4 MG/ML
INJECTION, SOLUTION INTRA-ARTICULAR; INTRALESIONAL; INTRAMUSCULAR; INTRAVENOUS; SOFT TISSUE
Status: DISPENSED
Start: 2021-11-10

## (undated) RX ORDER — CEFAZOLIN SODIUM 1 G/3ML
INJECTION, POWDER, FOR SOLUTION INTRAMUSCULAR; INTRAVENOUS
Status: DISPENSED
Start: 2021-11-10

## (undated) RX ORDER — PROPOFOL 10 MG/ML
INJECTION, EMULSION INTRAVENOUS
Status: DISPENSED
Start: 2022-04-19

## (undated) RX ORDER — KETOROLAC TROMETHAMINE 30 MG/ML
INJECTION, SOLUTION INTRAMUSCULAR; INTRAVENOUS
Status: DISPENSED
Start: 2020-06-16

## (undated) RX ORDER — ACETAMINOPHEN 325 MG/1
TABLET ORAL
Status: DISPENSED
Start: 2021-11-10

## (undated) RX ORDER — GABAPENTIN 300 MG/1
CAPSULE ORAL
Status: DISPENSED
Start: 2020-06-16

## (undated) RX ORDER — OXYCODONE HYDROCHLORIDE 5 MG/1
TABLET ORAL
Status: DISPENSED
Start: 2020-06-16

## (undated) RX ORDER — BUPIVACAINE HYDROCHLORIDE 2.5 MG/ML
INJECTION, SOLUTION EPIDURAL; INFILTRATION; INTRACAUDAL
Status: DISPENSED
Start: 2022-04-19

## (undated) RX ORDER — GABAPENTIN 300 MG/1
CAPSULE ORAL
Status: DISPENSED
Start: 2021-11-10

## (undated) RX ORDER — LIDOCAINE HYDROCHLORIDE 20 MG/ML
INJECTION, SOLUTION EPIDURAL; INFILTRATION; INTRACAUDAL; PERINEURAL
Status: DISPENSED
Start: 2022-04-19

## (undated) RX ORDER — ACETAMINOPHEN 325 MG/1
TABLET ORAL
Status: DISPENSED
Start: 2020-06-16

## (undated) RX ORDER — CEFAZOLIN SODIUM 1 G/3ML
INJECTION, POWDER, FOR SOLUTION INTRAMUSCULAR; INTRAVENOUS
Status: DISPENSED
Start: 2022-04-19

## (undated) RX ORDER — DEXAMETHASONE SODIUM PHOSPHATE 4 MG/ML
INJECTION, SOLUTION INTRA-ARTICULAR; INTRALESIONAL; INTRAMUSCULAR; INTRAVENOUS; SOFT TISSUE
Status: DISPENSED
Start: 2022-04-19

## (undated) RX ORDER — FENTANYL CITRATE 50 UG/ML
INJECTION, SOLUTION INTRAMUSCULAR; INTRAVENOUS
Status: DISPENSED
Start: 2022-04-19

## (undated) RX ORDER — BUPIVACAINE HYDROCHLORIDE 2.5 MG/ML
INJECTION, SOLUTION EPIDURAL; INFILTRATION; INTRACAUDAL
Status: DISPENSED
Start: 2021-11-10